# Patient Record
Sex: FEMALE | Race: WHITE | Employment: OTHER | URBAN - METROPOLITAN AREA
[De-identification: names, ages, dates, MRNs, and addresses within clinical notes are randomized per-mention and may not be internally consistent; named-entity substitution may affect disease eponyms.]

---

## 2017-01-06 RX ORDER — OMEPRAZOLE 20 MG/1
CAPSULE, DELAYED RELEASE ORAL
Qty: 90 CAP | Refills: 3 | Status: SHIPPED | OUTPATIENT
Start: 2017-01-06 | End: 2017-10-23 | Stop reason: SDUPTHER

## 2017-01-06 RX ORDER — METOPROLOL SUCCINATE 25 MG/1
TABLET, EXTENDED RELEASE ORAL
Qty: 90 TAB | Refills: 3 | Status: SHIPPED | OUTPATIENT
Start: 2017-01-06 | End: 2017-10-23 | Stop reason: SDUPTHER

## 2017-01-31 ENCOUNTER — CLINICAL SUPPORT (OUTPATIENT)
Dept: INTERNAL MEDICINE CLINIC | Age: 72
End: 2017-01-31

## 2017-01-31 DIAGNOSIS — Z23 ENCOUNTER FOR IMMUNIZATION: Primary | ICD-10-CM

## 2017-01-31 NOTE — PATIENT INSTRUCTIONS
Vaccine Information Statement     Pneumococcal Conjugate Vaccine (PCV13): What You Need to Know    Many Vaccine Information Statements are available in Hebrew and other languages. See www.immunize.org/vis. Hojas de información Sobre Vacunas están disponibles en español y en muchos otros idiomas. Visite www.immunize.org/vis. 1. Why get vaccinated? Vaccination can protect both children and adults from pneumococcal disease. Pneumococcal disease is caused by bacteria that can spread from person to person through close contact. It can cause ear infections, and it can also lead to more serious infections of the:   Lungs (pneumonia),   Blood (bacteremia), and   Covering of the brain and spinal cord (meningitis). Pneumococcal pneumonia is most common among adults. Pneumococcal meningitis can cause deafness and brain damage, and it kills about 1 child in 10 who get it. Anyone can get pneumococcal disease, but children under 3years of age and adults 72 years and older, people with certain medical conditions, and cigarette smokers are at the highest risk. Before there was a vaccine, the [facility] saw:   more than 700 cases of meningitis,   about 13,000 blood infections,   about 5 million ear infections, and   about 200 deaths  in children under 5 each year from pneumococcal disease. Since vaccine became available, severe pneumococcal disease in these children has fallen by 88%. About 18,000 older adults die of pneumococcal disease each year in the [place]. Treatment of pneumococcal infections with penicillin and other drugs is not as effective as it used to be, because some strains of the disease have become resistant to these drugs. This makes prevention of the disease, through vaccination, even more important. 2. PCV13 vaccine    Pneumococcal conjugate vaccine (called PCV13) protects against 13 types of pneumococcal bacteria.       PCV13 is routinely given to children at 2, 4, 6, and 1515 months of age. It is also recommended for children and adults 3to 59years of age with certain health conditions, and for all adults 72years of age and older. Your doctor can give you details. 3. Some people should not get this vaccine    Anyone who has ever had a life-threatening allergic reaction to a dose of this vaccine, to an earlier pneumococcal vaccine called PCV7, or to any vaccine containing diphtheria toxoid (for example, DTaP), should not get PCV13. Anyone with a severe allergy to any component of PCV13 should not get the vaccine. Tell your doctor if the person being vaccinated has any severe allergies. If the person scheduled for vaccination is not feeling well, your healthcare provider might decide to reschedule the shot on another day. 4. Risks of a vaccine reaction    With any medicine, including vaccines, there is a chance of reactions. These are usually mild and go away on their own, but serious reactions are also possible. Problems reported following PCV13 varied by age and dose in the series. The most common problems reported among children were:    About half became drowsy after the shot, had a temporary loss of appetite, or had redness or tenderness where the shot was given.  About 1 out of 3 had swelling where the shot was given.  About 1 out of 3 had a mild fever, and about 1 in 20 had a fever over 102.2°F.   Up to about 8 out of 10 became fussy or irritable. Adults have reported pain, redness, and swelling where the shot was given; also mild fever, fatigue, headache, chills, or muscle pain. [employer] children who get PCV13 along with inactivated flu vaccine at the same time may be at increased risk for seizures caused by fever. Ask your doctor for more information. Problems that could happen after any vaccine:     People sometimes faint after a medical procedure, including vaccination.  Sitting or lying down for about 15 minutes can help prevent fainting, and injuries caused by a fall. Tell your doctor if you feel dizzy, or have vision changes or ringing in the ears.  Some older children and adults get severe pain in the shoulder and have difficulty moving the arm where a shot was given. This happens very rarely.  Any medication can cause a severe allergic reaction. Such reactions from a vaccine are very rare, estimated at about 1 in a million doses, and would happen within a few minutes to a few hours after the vaccination. As with any medicine, there is a very small chance of a vaccine causing a serious injury or death. The safety of vaccines is always being monitored. For more information, visit: www.cdc.gov/vaccinesafety/     5. What if there is a serious reaction? What should I look for?  Look for anything that concerns you, such as signs of a severe allergic reaction, very high fever, or unusual behavior. Signs of a severe allergic reaction can include hives, swelling of the face and throat, difficulty breathing, a fast heartbeat, dizziness, and weakness - usually within a few minutes to a few hours after the vaccination. What should I do?  If you think it is a severe allergic reaction or other emergency that cant wait, call 9-1-1 or get the person to the nearest hospital. Otherwise, call your doctor. Reactions should be reported to the Vaccine Adverse Event Reporting System (VAERS). Your doctor should file this report, or you can do it yourself through the VAERS web site at www.vaers. hhs.gov, or by calling 5-154.727.7606. VAERS does not give medical advice. 6. The National Vaccine Injury Compensation Program    The [facility] Vaccine Injury Compensation Program (VICP) is a federal program that was created to compensate people who may have been injured by certain vaccines.     Persons who believe they may have been injured by a vaccine can learn about the program and about filing a claim by calling 1-806.302.5250 or visiting the [facility] website at www.[email or web address]/vaccinecompensation. There is a time limit to file a claim for compensation. 7. How can I learn more?  Ask your healthcare provider. He or she can give you the vaccine package insert or suggest other sources of information.  Call your local or state health department.  Contact the Centers for Disease Control and Prevention (CDC):  - Call 0-623.257.4990 (8-095-NJQ-INFO) or  - Visit CDCs website at www.cdc.gov/vaccines    Vaccine Information Statement   PCV13 Vaccine   11/5/2015   42 PRESTON No 124KJ-08    Department of Health and Human Services  Centers for Disease Control and Prevention    Office Use Only

## 2017-01-31 NOTE — PROGRESS NOTES
After obtaining consent, and per orders of Dr Kristina Jackson, injection of Prevnar-13 given by Randal Evans LPN in Left deltoid without complaint.

## 2017-03-08 RX ORDER — PRAMIPEXOLE DIHYDROCHLORIDE 0.5 MG/1
TABLET ORAL
Qty: 45 TAB | Refills: 3 | Status: SHIPPED | OUTPATIENT
Start: 2017-03-08 | End: 2017-12-04 | Stop reason: SDUPTHER

## 2017-07-21 RX ORDER — ALENDRONATE SODIUM 70 MG/1
TABLET ORAL
Qty: 12 TAB | Refills: 3 | Status: SHIPPED | OUTPATIENT
Start: 2017-07-21 | End: 2018-04-18 | Stop reason: SDUPTHER

## 2017-08-04 ENCOUNTER — TELEPHONE (OUTPATIENT)
Dept: INTERNAL MEDICINE CLINIC | Age: 72
End: 2017-08-04

## 2017-08-04 DIAGNOSIS — E55.9 VITAMIN D DEFICIENCY: ICD-10-CM

## 2017-08-04 DIAGNOSIS — E78.5 HYPERLIPIDEMIA, UNSPECIFIED HYPERLIPIDEMIA TYPE: Primary | ICD-10-CM

## 2017-08-04 NOTE — TELEPHONE ENCOUNTER
Pt has appt on Novemeber 6, 2017 with Dr Richie Lopez and she wants to know does she need to do fasting lab work the week prior? Pt can be reached at [phone].

## 2017-08-07 ENCOUNTER — HOSPITAL ENCOUNTER (OUTPATIENT)
Dept: MAMMOGRAPHY | Age: 72
Discharge: HOME | End: 2017-08-07
Attending: INTERNAL MEDICINE

## 2017-08-07 DIAGNOSIS — Z12.31 VISIT FOR SCREENING MAMMOGRAM: ICD-10-CM

## 2017-10-06 ENCOUNTER — PATIENT OUTREACH (OUTPATIENT)
Dept: INTERNAL MEDICINE CLINIC | Age: 72
End: 2017-10-06

## 2017-10-06 NOTE — PROGRESS NOTES
Hospital Discharge Follow-Up      Date/Time:  10/6/2017 3:14 PM    Patient listed on discharge [facility]) report on 10/5/17. RRAT score: Low Risk            4       Total Score        4 Charlson Comorbidity Score (Age + Comorbid Conditions)        Criteria that do not apply:    Has Seen PCP in Last 6 Months (Yes=3, No=0)    . Living with Significant Other. Assisted Living. LTAC. SNF. or   Rehab    Patient Length of Stay (>5 days = 3)    IP Visits Last 12 Months (1-3=4, 4=9, >4=11)    Pt. Coverage (Medicare=5 , Medicaid, or Self-Pay=4)        Brief hospitalization history:  Patient was admitted to [facility] on 10/1/17 and discharged on 10/5/17 for sepsis due to pyelonephritis. Patient woke up in the middle of the night with heartburn and vomiting and started having pain under her left rib. On admission, patient had leukocytosis, WBC 17.12, and elevated lactic acid. ABD CT showed high-grade obstruction left ureteral stone. Urology consulted and had ureteral stent placed. Blood and urine cultures positive for Klebsiella Pneumoniae. IV Levaquin started. Patient discharged with 2 weeks of IV Levaquin and [facility] home health. Nurse Navigator(NN) contacted the patient by telephone to perform post hospital discharge assessment. Verified  and address with patient as identifiers. Provided introduction to self, and explanation of the Nurses Navigator role. The patient states that she is doing good. She is not having any pain and never really had any. She says that she has a Midline access for IV antibiotics, but when the home health nurse came out, she said that the Midline will not handle Levaquin. The home health nurse is trying to get in contact with the ordering physician to see what he would like to do. The patient states that she drinks water and a lot of coke. NN discussed not drinking any coke right now and to drink plenty of water and the patient verbalizes understanding. She is fever free and denies nausea or vomiting at this time. Patient given an opportunity to ask questions and they have no further questions or concerns at this time. No other clinical/social/functional needs noted. The patient agrees to contact the PCP office for questions related to their healthcare. NN provided contact information to the patient for future reference. The patient expressed thanks, offered no additional questions and ended the call. Diet:   Patient reports: Regular Diet    Activity:    Patient reports: very active, drives, lives alone, and does need any assistance with ADL's. Medication:   Performed medication reconciliation with patient, and patient verbalizes understanding of administration of home medications. There were no barriers to obtaining medications identified at this time. Support system:  patient and daughter    Discharge Instructions :  Reviewed discharge instructions with patient. Patient verbalizes understanding of discharge instructions and follow-up care. Red Flags:  Signs of infection, back or abdominal pain, vomiting  Reviewed red flags with patient, and patient verbalizes understanding. PCP/Specialist follow up: Patient scheduled to follow up with Dr. Sonido Mathew on 10/17/17.

## 2017-10-10 ENCOUNTER — PATIENT OUTREACH (OUTPATIENT)
Dept: INTERNAL MEDICINE CLINIC | Age: 72
End: 2017-10-10

## 2017-10-10 NOTE — PROGRESS NOTES
NN spoke to patient to follow up on antibiotic therapy. The patient states that she went back to the ED on Saturday for dizziness and lightheadedness when she stood up and she had a headache. She states that she has not received her IV Levaquin yet, but was given PO Levaquin Saturday and is currently taking the PO. The patient is concerned because she has been unable to reach a doctor regarding the IV antibiotics. She states that [facility] is refusing to give IV Levaquin through a Midline. She did call radiology and spoke to a  about getting the Midline changed to a PICC line, but has not heard anything back from them. She also states that her bandage needs to be changed because it is starting to smell and there is dried blood underneath. The patient denies having any abdominal or back pain and says that her urine is clear yellow. She denies any pain with urination and denies having a fever. DONNIE spoke with [facility] radiology department to schedule an appointment for the PICC line to be placed and was transferred to the  who is working on this case. Unable to speak with the , Cuca Castro, so left her a voicemail. DONNIE spoke with Jonathan at [facility] and explained the situation. [facility] is going to have the [facility] nurse call the NN. The radiology department called DONNIE stating that the patient needs an order for the PICC line in order to change the Midline out. Cuca Castro from [facility] called DONNIE stating that the hospitalist wrote the order for the Midline so since the patient has been discharged, the PCP will need to write the order. Cuca Castro also states that [facility] will need to be notified to send the Levaquin once the PICC line is placed. The patient called DONNIE and left a message stating that she has an appointment at 1:00 PM to change out the line.     DONNIE spoke to [address] [place] [address] with radiology and she states that they still need an order before they change out the line. Will fax order once Dr. Carmen Haque gives order for PICC line. NN spoke to Dr. Carmen Haque and explained patient's situation. Dr. Carmen Haque advised that PO Levaquin should be as effective and will not give orders for PICC line. Dr. Carmen Haque also advised that this needs to be handled with the urologist or hospitalist.    NN spoke to [address] [place] [address] with [facility] Radiology Dept to make them aware that there will not be an order for a PICC line via Dr. Carmen Haque. [address] [place] [address] states that they are cancelling the procedure since there is not an order. NN spoke to Maryam Rodriguez  with [facility] Radiology Dept who states that [facility] had the order for the PICC to be placed. Radiology has already cancelled the procedure. Rajat Navas states that she is going to have Quang Herndon with [facility]. NN spoke to [facility] with [facility]iology Dept and explained the situation. [facility] says that they have received an order for a PICC line from Quang Herndon with [facility]. [facility] states that they are going to go ahead and switch out the Midline with the PICC line.

## 2017-10-11 ENCOUNTER — PATIENT OUTREACH (OUTPATIENT)
Dept: INTERNAL MEDICINE CLINIC | Age: 72
End: 2017-10-11

## 2017-10-11 NOTE — PROGRESS NOTES
Patient called NN to make her aware that [facility] was not able to come out yesterday evening. She did have the PICC placed.  is supposed to be coming out this afternoon. The patient states that [facility] is only coming out once a week. NN explained that the [facility] nurse will show her how to give the Levaquin IV. The patient is concerned about doing it herself. NN told patient that if she has any issues she can come to the office and NN will work with patient on how to give herself the [address] The patient does not have any further questions or concerns at this time, but has the NN contact information for any future needs that arise.

## 2017-10-12 ENCOUNTER — DOCUMENTATION ONLY (OUTPATIENT)
Dept: INTERNAL MEDICINE CLINIC | Age: 72
End: 2017-10-12

## 2017-10-12 NOTE — PROGRESS NOTES
Pt walked into office, requesting PICC line care. States Queenie was at her home this morning and she feels the nurse did not know what she was doing. Advise pt we do not do PICC line care here. Called [facility] on behalf of the pt and spoke with Ronaldo. Ronaldo states her nurse was there this am for PICC care for pt and she had not heard of any problem. States she will call the pt to clarify her concerns. Advise pt that I spoke with Ronaldo and I also gave pt Yeimi's contact #.

## 2017-10-19 ENCOUNTER — TELEPHONE (OUTPATIENT)
Dept: INTERNAL MEDICINE CLINIC | Age: 72
End: 2017-10-19

## 2017-10-19 NOTE — TELEPHONE ENCOUNTER
Pt would like a call for her x-ray results from [facility] in [place], [place]. Pt can be reached at [phone].

## 2017-10-19 NOTE — TELEPHONE ENCOUNTER
Called pt to let her know we do not have access to [facility] records. Pt states the ordering physician for the x-rays was Dr Violetta Rinne. Advise pt to call Dr Lanetta Rinne office.

## 2017-10-23 ENCOUNTER — TELEPHONE (OUTPATIENT)
Dept: INTERNAL MEDICINE CLINIC | Age: 72
End: 2017-10-23

## 2017-10-23 RX ORDER — OMEPRAZOLE 20 MG/1
CAPSULE, DELAYED RELEASE ORAL
Qty: 90 CAP | Refills: 3 | Status: SHIPPED | OUTPATIENT
Start: 2017-10-23 | End: 2019-02-19 | Stop reason: SDUPTHER

## 2017-10-23 RX ORDER — METOPROLOL SUCCINATE 25 MG/1
TABLET, EXTENDED RELEASE ORAL
Qty: 90 TAB | Refills: 3 | Status: SHIPPED | OUTPATIENT
Start: 2017-10-23 | End: 2019-02-19 | Stop reason: SDUPTHER

## 2017-10-23 NOTE — TELEPHONE ENCOUNTER
Crystal Morin [phone] called from [facility] for labs and urine on patient. She is requesting cbc bmp and urine. Per dr rogers cbc and bmp are approved. Left detailed message for melissa with order from dr rogers.

## 2017-10-24 ENCOUNTER — TELEPHONE (OUTPATIENT)
Dept: INTERNAL MEDICINE CLINIC | Age: 72
End: 2017-10-24

## 2017-10-24 NOTE — TELEPHONE ENCOUNTER
Per John:    [facility] called w/STAT labs for pt; states they may be reached at [phone]

## 2017-10-25 NOTE — TELEPHONE ENCOUNTER
Called the lab at the below # and they were unable to locate this patient and provide lab results. I will check the fax for STAT lab faxed results.

## 2017-10-30 ENCOUNTER — APPOINTMENT (OUTPATIENT)
Dept: INTERNAL MEDICINE CLINIC | Age: 72
End: 2017-10-30

## 2017-10-30 ENCOUNTER — HOSPITAL ENCOUNTER (OUTPATIENT)
Dept: LAB | Age: 72
Discharge: HOME | End: 2017-10-30
Payer: MEDICARE

## 2017-10-30 DIAGNOSIS — I10 ESSENTIAL HYPERTENSION: Primary | ICD-10-CM

## 2017-10-30 DIAGNOSIS — E55.9 VITAMIN D DEFICIENCY: ICD-10-CM

## 2017-10-30 DIAGNOSIS — E78.5 HYPERLIPIDEMIA, UNSPECIFIED HYPERLIPIDEMIA TYPE: ICD-10-CM

## 2017-10-30 PROCEDURE — 80061 LIPID PANEL: CPT

## 2017-10-30 PROCEDURE — 82306 VITAMIN D 25 HYDROXY: CPT

## 2017-10-30 PROCEDURE — 36415 COLL VENOUS BLD VENIPUNCTURE: CPT

## 2017-10-30 PROCEDURE — 80053 COMPREHEN METABOLIC PANEL: CPT

## 2017-10-31 LAB
25(OH)D3+25(OH)D2 SERPL-MCNC: 24.6 NG/ML (ref 30–100)
ALBUMIN SERPL-MCNC: 3.8 G/DL (ref 3.5–4.8)
ALBUMIN/GLOB SERPL: 1.4 {RATIO} (ref 1.2–2.2)
ALP SERPL-CCNC: 80 IU/L (ref 39–117)
ALT SERPL-CCNC: 19 IU/L (ref 0–32)
AST SERPL-CCNC: 14 IU/L (ref 0–40)
BILIRUB SERPL-MCNC: <0.2 MG/DL (ref 0–1.2)
BUN SERPL-MCNC: 14 MG/DL (ref 8–27)
BUN/CREAT SERPL: 12 (ref 12–28)
CALCIUM SERPL-MCNC: 8.7 MG/DL (ref 8.7–10.3)
CHLORIDE SERPL-SCNC: 106 MMOL/L (ref 96–106)
CHOLEST SERPL-MCNC: 177 MG/DL (ref 100–199)
CO2 SERPL-SCNC: 24 MMOL/L (ref 18–29)
CREAT SERPL-MCNC: 1.14 MG/DL (ref 0.57–1)
GFR SERPLBLD CREATININE-BSD FMLA CKD-EPI: 48 ML/MIN/1.73
GFR SERPLBLD CREATININE-BSD FMLA CKD-EPI: 56 ML/MIN/1.73
GLOBULIN SER CALC-MCNC: 2.8 G/DL (ref 1.5–4.5)
GLUCOSE SERPL-MCNC: 99 MG/DL (ref 65–99)
HDLC SERPL-MCNC: 33 MG/DL
INTERPRETATION, 910389: NORMAL
INTERPRETATION: NORMAL
LDLC SERPL CALC-MCNC: 105 MG/DL (ref 0–99)
PDF IMAGE, 910387: NORMAL
POTASSIUM SERPL-SCNC: 5.1 MMOL/L (ref 3.5–5.2)
PROT SERPL-MCNC: 6.6 G/DL (ref 6–8.5)
SODIUM SERPL-SCNC: 142 MMOL/L (ref 134–144)
TRIGL SERPL-MCNC: 196 MG/DL (ref 0–149)
VLDLC SERPL CALC-MCNC: 39 MG/DL (ref 5–40)

## 2017-11-06 ENCOUNTER — HOSPITAL ENCOUNTER (OUTPATIENT)
Dept: LAB | Age: 72
Discharge: HOME | End: 2017-11-06
Payer: MEDICARE

## 2017-11-06 ENCOUNTER — OFFICE VISIT (OUTPATIENT)
Dept: INTERNAL MEDICINE CLINIC | Age: 72
End: 2017-11-06

## 2017-11-06 ENCOUNTER — PATIENT OUTREACH (OUTPATIENT)
Dept: INTERNAL MEDICINE CLINIC | Age: 72
End: 2017-11-06

## 2017-11-06 VITALS
TEMPERATURE: 97.9 F | BODY MASS INDEX: 29.57 KG/M2 | HEIGHT: 60 IN | SYSTOLIC BLOOD PRESSURE: 138 MMHG | WEIGHT: 150.6 LBS | DIASTOLIC BLOOD PRESSURE: 80 MMHG | OXYGEN SATURATION: 96 % | HEART RATE: 80 BPM | RESPIRATION RATE: 16 BRPM

## 2017-11-06 DIAGNOSIS — N20.0 KIDNEY STONE ON LEFT SIDE: ICD-10-CM

## 2017-11-06 DIAGNOSIS — I10 ESSENTIAL HYPERTENSION: ICD-10-CM

## 2017-11-06 DIAGNOSIS — M54.50 ACUTE MIDLINE LOW BACK PAIN WITHOUT SCIATICA: ICD-10-CM

## 2017-11-06 DIAGNOSIS — M54.50 ACUTE BILATERAL LOW BACK PAIN WITHOUT SCIATICA: ICD-10-CM

## 2017-11-06 DIAGNOSIS — E78.5 HYPERLIPIDEMIA, UNSPECIFIED HYPERLIPIDEMIA TYPE: ICD-10-CM

## 2017-11-06 DIAGNOSIS — N12 PYELONEPHRITIS: Primary | ICD-10-CM

## 2017-11-06 DIAGNOSIS — Z23 ENCOUNTER FOR IMMUNIZATION: ICD-10-CM

## 2017-11-06 LAB
BILIRUB UR QL STRIP: NEGATIVE
GLUCOSE UR-MCNC: NEGATIVE MG/DL
KETONES P FAST UR STRIP-MCNC: NEGATIVE MG/DL
PH UR STRIP: 5.5 [PH] (ref 4.6–8)
PROT UR QL STRIP: NORMAL
SP GR UR STRIP: 1.01 (ref 1–1.03)
UA UROBILINOGEN AMB POC: NORMAL (ref 0.2–1)
URINALYSIS CLARITY POC: CLEAR
URINALYSIS COLOR POC: YELLOW
URINE BLOOD POC: NORMAL
URINE LEUKOCYTES POC: NORMAL
URINE NITRITES POC: NEGATIVE

## 2017-11-06 PROCEDURE — 87086 URINE CULTURE/COLONY COUNT: CPT

## 2017-11-06 NOTE — PROGRESS NOTES
HPI  Ms. Romaine Skinner is a 67y.o. year old female, she is seen today for follow up HTN, high cholesterol. Also hospital follow up. Admitted to [address] for pyelonephritis. Hospital records reviewed. Treated with IV levofloxacin outpatient via PICC line - since removed. Also has left renal stent. Has been feeling tired since hospitalization. More tired as day goes on, napping but feels worse when gets up. No f/c, no n/v/abd pain. No sob or chest pain. Back has been hurting for past couple of years, but now different kind of pain for the last 2 weeks. Pain is across low back, doesn't radiate. Worse with bending over, sharp pain at times. Walking longer than 15 min will cause worsening back pain. Had upper arm and knee pain while on levofloxacin - no pain in knees now and improving in shoulders but still present. Was told it could be side effect of levofloxacin. Some urinary pressure and burning for over a week. Prior to admission had abdominal pain, n/v sudden onset without urinary symptoms. Still has left renal stent. No pain in that area. Follows up with urology on 12/6/17. Chief Complaint   Patient presents with   [facility] Follow Up     Room 1// PICC line was removed 10/24    Shoulder Pain     bilat shoulder pain x weeks     LOW BACK PAIN     bilat low back pain         Prior to Admission medications    Medication Sig Start Date End Date Taking?  Authorizing Provider   metoprolol succinate (TOPROL-XL) 25 mg XL tablet TAKE ONE TABLET BY MOUTH NIGHTLY 10/23/17  Yes Karin Lowe MD   omeprazole (PRILOSEC) 20 mg capsule TAKE 1 CAPSULE EVERY DAY 10/23/17  Yes Karin Lowe MD   alendronate (FOSAMAX) 70 mg tablet TAKE 1 TABLET EVERY SUNDAY 7/21/17  Yes Karin Lowe MD   pramipexole (MIRAPEX) 0.5 mg tablet TAKE 1/2 TABLET EVERY NIGHT 3/8/17  Yes Karin Lowe MD   atorvastatin (LIPITOR) 40 mg tablet TAKE 1 TABLET EVERY DAY 12/13/16  Yes Bettie Castaneda MD Danny   Aspirin, Buffered 81 mg tab Take 81 mg by mouth daily. Yes Historical Provider   acetaminophen (TYLENOL) 325 mg tablet Take  by mouth every four (4) hours as needed for Pain. Yes Historical Provider         Allergies   Allergen Reactions    Fosamax [Alendronate] Myalgia         REVIEW OF SYSTEMS:  Per HPI    PHYSICAL EXAM:  Visit Vitals    /80    Pulse 80    Temp 97.9 °F (36.6 °C) (Oral)    Resp 16    Ht 5' (1.524 m)    Wt 150 lb 9.6 oz (68.3 kg)    LMP 10/26/1995    SpO2 96%    BMI 29.41 kg/m2     Constitutional: Appears well-developed and well-nourished. No distress. HENT:   Head: Normocephalic and atraumatic. Eyes: No scleral icterus. Neck: no lad, no tm, supple   Cardiovascular: Normal S1/S2, regular rhythm. No murmurs, rubs, or gallops. Pulmonary/Chest: Effort normal and breath sounds normal. No respiratory distress. No wheezes, rhonchi, or rales. Abdomen: Soft, NT/ND, +BS, no rebound or guarding, no masses, no HSM appreciated. Back: mild tenderness paraspinal muscles lumbar region b/l, no CVA tenderness  Ext: No edema. Neurological: Alert. SLR negative b/l  Psychiatric: Normal mood and affect. Behavior is normal.     Lab Results   Component Value Date/Time    Sodium 142 10/30/2017 08:22 AM    Potassium 5.1 10/30/2017 08:22 AM    Chloride 106 10/30/2017 08:22 AM    CO2 24 10/30/2017 08:22 AM    Anion gap 12 07/16/2010 10:07 AM    Glucose 99 10/30/2017 08:22 AM    Glucose 98 08/05/2010 08:34 AM    BUN 14 10/30/2017 08:22 AM    Creatinine 1.14 10/30/2017 08:22 AM    BUN/Creatinine ratio 12 10/30/2017 08:22 AM    GFR est AA 56 10/30/2017 08:22 AM    GFR est non-AA 48 10/30/2017 08:22 AM    Calcium 8.7 10/30/2017 08:22 AM    Bilirubin, total <0.2 10/30/2017 08:22 AM    AST (SGOT) 14 10/30/2017 08:22 AM    Alk.  phosphatase 80 10/30/2017 08:22 AM    Protein, total 6.6 10/30/2017 08:22 AM    Albumin 3.8 10/30/2017 08:22 AM    Globulin 3.1 07/16/2010 10:07 AM    A-G Ratio 1.4 10/30/2017 08:22 AM    ALT (SGPT) 19 10/30/2017 08:22 AM     Lab Results   Component Value Date/Time    Hemoglobin A1c 5.7 08/05/2010 08:34 AM      Lab Results   Component Value Date/Time    Cholesterol, total 177 10/30/2017 08:22 AM    HDL Cholesterol 33 10/30/2017 08:22 AM    LDL, calculated 105 10/30/2017 08:22 AM    VLDL, calculated 39 10/30/2017 08:22 AM    Triglyceride 196 10/30/2017 08:22 AM          ASSESSMENT/PLAN  Diagnoses and all orders for this visit:    1. Pyelonephritis  -     CULTURE, URINE  Recent hospitalization - check culture  2. Acute bilateral low back pain without sciatica  -     AMB POC URINALYSIS DIP STICK AUTO W/O MICRO    3. Encounter for immunization  -     Administration fee () for Medicare insured patients  -     Administration fee () for Medicare insured patients  -     Influenza virus vaccine ([address]) 72 years and older (32636)  -     Administration fee () for Medicare insured patients    4. Kidney stone on left side  To follow up with urology - still has stent  5. Essential hypertension  Controlled on current regimen, continue   6. Hyperlipidemia, unspecified hyperlipidemia type  triglycerides little high - otherwise okay  7. Acute midline low back pain without sciatica  Likely msk, try heat  8. BMI 29.0-29.9,adult  Acceptable for age    Health Maintenance Due   Topic Date Due    GLAUCOMA SCREENING Q2Y  05/27/2017        Follow-up Disposition:  Return in about 2 months (around 1/6/2018) for renal labs, bp. Reviewed plan of care. Patient has provided input and agrees with goals. The nurse provided the patient and/or family with advanced directive information if needed and encouraged the patient to provide a copy to the office when available.

## 2017-11-06 NOTE — MR AVS SNAPSHOT
Visit Information Date & Time Provider Department Dept. Phone Encounter #  
 11/6/2017  2:00 PM Radha Lyon MD [address] [place] [address] [phone] [id number] Follow-up Instructions Return in about 2 months (around 1/6/2018) for renal labs, bp. Upcoming Health Maintenance Date Due  
 GLAUCOMA SCREENING Q2Y 5/27/2017 INFLUENZA AGE 9 TO ADULT 8/1/2017 MEDICARE YEARLY EXAM 12/30/2017 BREAST CANCER SCRN MAMMOGRAM 8/8/2018 COLONOSCOPY 9/4/2020 DTaP/Tdap/Td series (2 - Td) 12/19/2024 Allergies as of 11/6/2017  Review Complete On: 11/6/2017 By: Radha Lyon MD  
  
 Severity Noted Reaction Type Reactions Fosamax [Alendronate]  08/31/2010    Myalgia Current Immunizations  Reviewed on 1/31/2017 Name Date Influenza High Dose Vaccine PF 11/3/2016, 9/14/2015 Influenza Vaccine 10/5/2013 Influenza Vaccine (Quad) PF  Incomplete Influenza Vaccine Split 10/26/2010 Pneumococcal Conjugate (PCV-13) 1/31/2017 Tdap 12/19/2014 ZZZ-RETIRED (DO NOT USE) Pneumococcal Vaccine (Unspecified Type) 10/26/2010, 10/26/2003 Zoster Vaccine, Live 1/23/2015 Not reviewed this visit You Were Diagnosed With   
  
 Codes Comments Pyelonephritis    -  Primary ICD-10-CM: N12 
ICD-9-CM: 590.80 Acute bilateral low back pain without sciatica     ICD-10-CM: M54.5 ICD-9-CM: 724.2, 338.19 Encounter for immunization     ICD-10-CM: V97 ICD-9-CM: V03.89 Kidney stone on left side     ICD-10-CM: N20.0 ICD-9-CM: 592.0 Essential hypertension     ICD-10-CM: I10 
ICD-9-CM: 401.9 Hyperlipidemia, unspecified hyperlipidemia type     ICD-10-CM: E78.5 ICD-9-CM: 272.4 Acute midline low back pain without sciatica     ICD-10-CM: M54.5 ICD-9-CM: 724.2 BMI 29.0-29.9,adult     ICD-10-CM: M09.35 ICD-9-CM: V85.25 Vitals BP Pulse Temp Resp Height(growth percentile) Weight(growth percentile) 138/80 80 97.9 °F (36.6 °C) (Oral) 16 5' (1.524 m) 150 lb 9.6 oz (68.3 kg) LMP SpO2 BMI OB Status Smoking Status 10/26/1995 96% 29.41 kg/m2 Postmenopausal Never Smoker Vitals History BMI and BSA Data Body Mass Index Body Surface Area  
 29.41 kg/m 2 1.7 m 2 Preferred Pharmacy Pharmacy Name Phone [facility]  [address] [phone] Your Updated Medication List  
  
   
This list is accurate as of: 11/6/17  2:52 PM.  Always use your most recent med list.  
  
  
  
  
 alendronate 70 mg tablet Commonly known as:  FOSAMAX TAKE 1 TABLET EVERY SUNDAY  
  
 aspirin, buffered 81 mg Tab Take 81 mg by mouth daily. atorvastatin 40 mg tablet Commonly known as:  LIPITOR  
TAKE 1 TABLET EVERY DAY  
  
 metoprolol succinate 25 mg XL tablet Commonly known as:  TOPROL-XL  
TAKE ONE TABLET BY MOUTH NIGHTLY  
  
 omeprazole 20 mg capsule Commonly known as:  PRILOSEC  
TAKE 1 CAPSULE EVERY DAY  
  
 pramipexole 0.5 mg tablet Commonly known as:  MIRAPEX TAKE 1/2 TABLET EVERY NIGHT  
  
 TYLENOL 325 mg tablet Generic drug:  acetaminophen Take  by mouth every four (4) hours as needed for Pain. We Performed the Following ADMIN INFLUENZA VIRUS VAC [ [facility]] ADMIN INFLUENZA VIRUS VAC [ [facility]] AMB POC URINALYSIS DIP STICK AUTO W/O MICRO [10143 CPT(R)] CULTURE, URINE [id number] CPT(R)] INFLUENZA VIRUS VAC QUAD,SPLIT,PRESV FREE SYRINGE IM [id number] CPT(R)] Follow-up Instructions Return in about 2 months (around 1/6/2018) for renal labs, bp. Patient Instructions Take vit D 1000IU daily for low levels. Vaccine Information Statement Influenza (Flu) Vaccine (Inactivated or Recombinant): What you need to know Many Vaccine Information Statements are available in Welsh and other languages. See www.immunize.org/vis Hojas de Información Sobre Vacunas están disponibles en Español y en muchos otros idiomas. Visite www.immunize.org/vis 1. Why get vaccinated? Influenza (flu) is a contagious disease that spreads around the [place] every year, usually between October and May. Flu is caused by influenza viruses, and is spread mainly by coughing, sneezing, and close contact. Anyone can get flu. Flu strikes suddenly and can last several days. Symptoms vary by age, but can include: 
 fever/chills  sore throat  muscle aches  fatigue  cough  headache  runny or stuffy nose Flu can also lead to pneumonia and blood infections, and cause diarrhea and seizures in children. If you have a medical condition, such as heart or lung disease, flu can make it worse. Flu is more dangerous for some people. Infants and young children, people 72years of age and older, pregnant women, and people with certain health conditions or a weakened immune system are at greatest risk. Each year thousands of people in the [facility] die from flu, and many more are hospitalized. Flu vaccine can: 
 keep you from getting flu, 
 make flu less severe if you do get it, and 
 keep you from spreading flu to your family and other people. 2. Inactivated and recombinant flu vaccines A dose of flu vaccine is recommended every flu season. Children 6 months through 6years of age may need two doses during the same flu season. Everyone else needs only one dose each flu season. Some inactivated flu vaccines contain a very small amount of a mercury-based preservative called thimerosal. Studies have not shown thimerosal in vaccines to be harmful, but flu vaccines that do not contain thimerosal are available. There is no live flu virus in flu shots. They cannot cause the flu. There are many flu viruses, and they are always changing.  Each year a new flu vaccine is made to protect against three or four viruses that are likely to cause disease in the upcoming flu season. But even when the vaccine doesnt exactly match these viruses, it may still provide some protection Flu vaccine cannot prevent: 
 flu that is caused by a virus not covered by the vaccine, or 
 illnesses that look like flu but are not. It takes about 2 weeks for protection to develop after vaccination, and protection lasts through the flu season. 3. Some people should not get this vaccine Tell the person who is giving you the vaccine:  If you have any severe, life-threatening allergies. If you ever had a life-threatening allergic reaction after a dose of flu vaccine, or have a severe allergy to any part of this vaccine, you may be advised not to get vaccinated. Most, but not all, types of flu vaccine contain a small amount of egg protein.  If you ever had Guillain-Barré Syndrome (also called GBS). Some people with a history of GBS should not get this vaccine. This should be discussed with your doctor.  If you are not feeling well. It is usually okay to get flu vaccine when you have a mild illness, but you might be asked to come back when you feel better. 4. Risks of a vaccine reaction With any medicine, including vaccines, there is a chance of reactions. These are usually mild and go away on their own, but serious reactions are also possible. Most people who get a flu shot do not have any problems with it. Minor problems following a flu shot include:  
 soreness, redness, or swelling where the shot was given  hoarseness  sore, red or itchy eyes  cough  fever  aches  headache  itching  fatigue If these problems occur, they usually begin soon after the shot and last 1 or 2 days. More serious problems following a flu shot can include the following:  There may be a small increased risk of Guillain-Barré Syndrome (GBS) after inactivated flu vaccine. This risk has been estimated at 1 or 2 additional cases per million people vaccinated. This is much lower than the risk of severe complications from flu, which can be prevented by flu vaccine.  Young children who get the flu shot along with pneumococcal vaccine (PCV13) and/or DTaP vaccine at the same time might be slightly more likely to have a seizure caused by fever. Ask your doctor for more information. Tell your doctor if a child who is getting flu vaccine has ever had a seizure. Problems that could happen after any injected vaccine:  People sometimes faint after a medical procedure, including vaccination. Sitting or lying down for about 15 minutes can help prevent fainting, and injuries caused by a fall. Tell your doctor if you feel dizzy, or have vision changes or ringing in the ears.  Some people get severe pain in the shoulder and have difficulty moving the arm where a shot was given. This happens very rarely.  Any medication can cause a severe allergic reaction. Such reactions from a vaccine are very rare, estimated at about 1 in a million doses, and would happen within a few minutes to a few hours after the vaccination. As with any medicine, there is a very remote chance of a vaccine causing a serious injury or death. The safety of vaccines is always being monitored. For more information, visit: www.cdc.gov/vaccinesafety/ 
 
5. What if there is a serious reaction? What should I look for?  Look for anything that concerns you, such as signs of a severe allergic reaction, very high fever, or unusual behavior. Signs of a severe allergic reaction can include hives, swelling of the face and throat, difficulty breathing, a fast heartbeat, dizziness, and weakness  usually within a few minutes to a few hours after the vaccination. What should I do?  
 
 If you think it is a severe allergic reaction or other emergency that cant wait, call 9-1-1 and get the person to the nearest hospital. Otherwise, call your doctor.  Reactions should be reported to the Vaccine Adverse Event Reporting System (VAERS). Your doctor should file this report, or you can do it yourself through  the VAERS web site at www.vaers. [facility].gov, or by calling 3-646.927.5225. VAERS does not give medical advice. 6. The National Vaccine Injury Compensation Program 
 
The [facility] Vaccine Injury Compensation Program (VICP) is a federal program that was created to compensate people who may have been injured by certain vaccines. Persons who believe they may have been injured by a vaccine can learn about the program and about filing a claim by calling 5-287.854.5101 or visiting the [employer] website at www.[facility].gov/vaccinecompensation. There is a time limit to file a claim for compensation. 7. How can I learn more?  Ask your healthcare provider. He or she can give you the vaccine package insert or suggest other sources of information.  Call your local or state health department.  Contact the Centers for Disease Control and Prevention (CDC): 
- Call 9-715.338.7077 (1-800-CDC-INFO) or 
- Visit CDCs website at www.cdc.gov/flu Vaccine Information Statement Inactivated Influenza Vaccine 8/7/2015 
42 PRESTON Verde 931VS-82 [facility] and [employer] Centers for Disease Control and Prevention Office Use Only Vaccine Information Statement Influenza (Flu) Vaccine (Inactivated or Recombinant): What you need to know Many Vaccine Information Statements are available in Danish and other languages. See www.immunize.org/vis Hojas de Información Sobre Vacunas están disponibles en Español y en muchos otros idiomas. Visite www.immunize.org/vis 1. Why get vaccinated? Influenza (flu) is a contagious disease that spreads around the [place] every year, usually between October and May. Flu is caused by influenza viruses, and is spread mainly by coughing, sneezing, and close contact. Anyone can get flu. Flu strikes suddenly and can last several days. Symptoms vary by age, but can include: 
 fever/chills  sore throat  muscle aches  fatigue  cough  headache  runny or stuffy nose Flu can also lead to pneumonia and blood infections, and cause diarrhea and seizures in children. If you have a medical condition, such as heart or lung disease, flu can make it worse. Flu is more dangerous for some people. Infants and young children, people 72years of age and older, pregnant women, and people with certain health conditions or a weakened immune system are at greatest risk. Each year thousands of people in the [facility] die from flu, and many more are hospitalized. Flu vaccine can: 
 keep you from getting flu, 
 make flu less severe if you do get it, and 
 keep you from spreading flu to your family and other people. 2. Inactivated and recombinant flu vaccines A dose of flu vaccine is recommended every flu season. Children 6 months through 6years of age may need two doses during the same flu season. Everyone else needs only one dose each flu season. Some inactivated flu vaccines contain a very small amount of a mercury-based preservative called thimerosal. Studies have not shown thimerosal in vaccines to be harmful, but flu vaccines that do not contain thimerosal are available. There is no live flu virus in flu shots. They cannot cause the flu. There are many flu viruses, and they are always changing. Each year a new flu vaccine is made to protect against three or four viruses that are likely to cause disease in the upcoming flu season. But even when the vaccine doesnt exactly match these viruses, it may still provide some protection Flu vaccine cannot prevent: 
 flu that is caused by a virus not covered by the vaccine, or 
  illnesses that look like flu but are not. It takes about 2 weeks for protection to develop after vaccination, and protection lasts through the flu season. 3. Some people should not get this vaccine Tell the person who is giving you the vaccine:  If you have any severe, life-threatening allergies. If you ever had a life-threatening allergic reaction after a dose of flu vaccine, or have a severe allergy to any part of this vaccine, you may be advised not to get vaccinated. Most, but not all, types of flu vaccine contain a small amount of egg protein.  If you ever had Guillain-Barré Syndrome (also called GBS). Some people with a history of GBS should not get this vaccine. This should be discussed with your doctor.  If you are not feeling well. It is usually okay to get flu vaccine when you have a mild illness, but you might be asked to come back when you feel better. 4. Risks of a vaccine reaction With any medicine, including vaccines, there is a chance of reactions. These are usually mild and go away on their own, but serious reactions are also possible. Most people who get a flu shot do not have any problems with it. Minor problems following a flu shot include:  
 soreness, redness, or swelling where the shot was given  hoarseness  sore, red or itchy eyes  cough  fever  aches  headache  itching  fatigue If these problems occur, they usually begin soon after the shot and last 1 or 2 days. More serious problems following a flu shot can include the following:  There may be a small increased risk of Guillain-Barré Syndrome (GBS) after inactivated flu vaccine. This risk has been estimated at 1 or 2 additional cases per million people vaccinated. This is much lower than the risk of severe complications from flu, which can be prevented by flu vaccine.    
 
 Young children who get the flu shot along with pneumococcal vaccine (PCV13) and/or DTaP vaccine at the same time might be slightly more likely to have a seizure caused by fever. Ask your doctor for more information. Tell your doctor if a child who is getting flu vaccine has ever had a seizure. Problems that could happen after any injected vaccine:  People sometimes faint after a medical procedure, including vaccination. Sitting or lying down for about 15 minutes can help prevent fainting, and injuries caused by a fall. Tell your doctor if you feel dizzy, or have vision changes or ringing in the ears.  Some people get severe pain in the shoulder and have difficulty moving the arm where a shot was given. This happens very rarely.  Any medication can cause a severe allergic reaction. Such reactions from a vaccine are very rare, estimated at about 1 in a million doses, and would happen within a few minutes to a few hours after the vaccination. As with any medicine, there is a very remote chance of a vaccine causing a serious injury or death. The safety of vaccines is always being monitored. For more information, visit: www.cdc.gov/vaccinesafety/ 
 
 
The [facility] Vaccine Injury Compensation Program (VICP) is a federal program that was created to compensate people who may have been injured by certain vaccines. Persons who believe they may have been injured by a vaccine can learn about the program and about filing a claim by calling 1-251.890.5131 or visiting the [employer] website at www.[facility].gov/vaccinecompensation. There is a time limit to file a claim for compensation. 7. How can I learn more?  Ask your healthcare provider. He or she can give you the vaccine package insert or suggest other sources of information.  Call your local or state health department.  Contact the Centers for Disease Control and Prevention (CDC): 
- Call 6-186.624.5305 (1-800-CDC-INFO) or 
- Visit CDCs website at www.cdc.gov/flu Vaccine Information Statement Inactivated Influenza Vaccine 8/7/2015 
42 UN[place]elongo [id number]-08 Department of Health and [employer] Centers for Disease Control and Prevention Office Use Only Back Stretches: Exercises Your Care Instructions Here are some examples of exercises for stretching your back. Start each exercise slowly. Ease off the exercise if you start to have pain. Your doctor or physical therapist will tell you when you can start these exercises and which ones will work best for you. How to do the exercises Overhead stretch 1. Stand comfortably with your feet shoulder-width apart. 2. Looking straight ahead, raise both arms over your head and reach toward the ceiling. Do not allow your head to tilt back. 3. Hold for 15 to 30 seconds, then lower your arms to your sides. 4. Repeat 2 to 4 times. Side stretch 1. Stand comfortably with your feet shoulder-width apart. 2. Raise one arm over your head, and then lean to the other side. 3. Slide your hand down your leg as you let the weight of your arm gently stretch your side muscles. Hold for 15 to 30 seconds. 4. Repeat 2 to 4 times on each side. Press-up 1. Lie on your stomach, supporting your body with your forearms. 2. Press your elbows down into the floor to raise your upper back. As you do this, relax your stomach muscles and allow your back to arch without using your back muscles. As your press up, do not let your hips or pelvis come off the floor. 3. Hold for 15 to 30 seconds, then relax. 4. Repeat 2 to 4 times. Relax and rest 
 
1. Lie on your back with a rolled towel under your neck and a pillow under your knees. Extend your arms comfortably to your sides. 2. Relax and breathe normally. 3. Remain in this position for about 10 minutes. 4. If you can, do this 2 or 3 times each day. Follow-up care is a key part of your treatment and safety. Be sure to make and go to all appointments, and call your doctor if you are having problems. It's also a good idea to know your test results and keep a list of the medicines you take. Where can you learn more? Go to http://grace-gmóez.info/. Enter H196 in the search box to learn more about \"Back Stretches: Exercises. \" Current as of: March 21, 2017 Content Version: 11.4 © 7661-4729 [employer]wise, [employer]. Care instructions adapted under license by [employer] (which disclaims liability or warranty for this information). If you have questions about a medical condition or this instruction, always ask your healthcare professional. [address] any warranty or liability for your use of this information. Introducing [facility]! Dear Dawn Coffey: 
Thank you for requesting a RightAnswers account. Our records indicate that you already have an active [employer] account. You can access your account anytime at [email or web address] Did you know that you can access your hospital and ER discharge instructions at any time in [employer] You can also review all of your test results from your hospital stay or ER visit. Additional Information If you have questions, please visit the Frequently Asked Questions section of the [employer] website at [email or web address] Remember, [employer] is NOT to be used for urgent needs. For medical emergencies, dial 911. Now available from your iPhone and Android! Please provide this summary of care documentation to your next provider. Your primary care clinician is listed as Ray Slater. If you have any questions after today's visit, please call [phone].

## 2017-11-06 NOTE — PATIENT INSTRUCTIONS
Take vit D 1000IU daily for low levels. Vaccine Information Statement    Influenza (Flu) Vaccine (Inactivated or Recombinant): What you need to know    Many Vaccine Information Statements are available in Turkish and other languages. See www.immunize.org/vis  Hojas de Información Sobre Vacunas están disponibles en Español y en muchos otros idiomas. Visite www.immunize.org/vis    1. Why get vaccinated? Influenza (flu) is a contagious disease that spreads around the [facility] every year, usually between October and May. Flu is caused by influenza viruses, and is spread mainly by coughing, sneezing, and close contact. Anyone can get flu. Flu strikes suddenly and can last several days. Symptoms vary by age, but can include:   fever/chills   sore throat   muscle aches   fatigue   cough   headache    runny or stuffy nose    Flu can also lead to pneumonia and blood infections, and cause diarrhea and seizures in children. If you have a medical condition, such as heart or lung disease, flu can make it worse. Flu is more dangerous for some people. Infants and young children, people 72years of age and older, pregnant women, and people with certain health conditions or a weakened immune system are at greatest risk. Each year thousands of people in the [facility] die from flu, and many more are hospitalized. Flu vaccine can:   keep you from getting flu,   make flu less severe if you do get it, and   keep you from spreading flu to your family and other people. 2. Inactivated and recombinant flu vaccines    A dose of flu vaccine is recommended every flu season. Children 6 months through 6years of age may need two doses during the same flu season. Everyone else needs only one dose each flu season.        Some inactivated flu vaccines contain a very small amount of a mercury-based preservative called thimerosal. Studies have not shown thimerosal in vaccines to be harmful, but flu vaccines that do not contain thimerosal are available. There is no live flu virus in flu shots. They cannot cause the flu. There are many flu viruses, and they are always changing. Each year a new flu vaccine is made to protect against three or four viruses that are likely to cause disease in the upcoming flu season. But even when the vaccine doesnt exactly match these viruses, it may still provide some protection    Flu vaccine cannot prevent:   flu that is caused by a virus not covered by the vaccine, or   illnesses that look like flu but are not. It takes about 2 weeks for protection to develop after vaccination, and protection lasts through the flu season. 3. Some people should not get this vaccine    Tell the person who is giving you the vaccine:     If you have any severe, life-threatening allergies. If you ever had a life-threatening allergic reaction after a dose of flu vaccine, or have a severe allergy to any part of this vaccine, you may be advised not to get vaccinated. Most, but not all, types of flu vaccine contain a small amount of egg protein.  If you ever had Guillain-Barré Syndrome (also called GBS). Some people with a history of GBS should not get this vaccine. This should be discussed with your doctor.  If you are not feeling well. It is usually okay to get flu vaccine when you have a mild illness, but you might be asked to come back when you feel better. 4. Risks of a vaccine reaction    With any medicine, including vaccines, there is a chance of reactions. These are usually mild and go away on their own, but serious reactions are also possible. Most people who get a flu shot do not have any problems with it.      Minor problems following a flu shot include:    soreness, redness, or swelling where the shot was given     hoarseness   sore, red or itchy eyes   cough   fever   aches   headache   itching   fatigue  If these problems occur, they usually begin soon after the shot and last 1 or 2 days. More serious problems following a flu shot can include the following:     There may be a small increased risk of Guillain-Barré Syndrome (GBS) after inactivated flu vaccine. This risk has been estimated at 1 or 2 additional cases per million people vaccinated. This is much lower than the risk of severe complications from flu, which can be prevented by flu vaccine.  Young children who get the flu shot along with pneumococcal vaccine (PCV13) and/or DTaP vaccine at the same time might be slightly more likely to have a seizure caused by fever. Ask your doctor for more information. Tell your doctor if a child who is getting flu vaccine has ever had a seizure. Problems that could happen after any injected vaccine:      People sometimes faint after a medical procedure, including vaccination. Sitting or lying down for about 15 minutes can help prevent fainting, and injuries caused by a fall. Tell your doctor if you feel dizzy, or have vision changes or ringing in the ears.  Some people get severe pain in the shoulder and have difficulty moving the arm where a shot was given. This happens very rarely.  Any medication can cause a severe allergic reaction. Such reactions from a vaccine are very rare, estimated at about 1 in a million doses, and would happen within a few minutes to a few hours after the vaccination. As with any medicine, there is a very remote chance of a vaccine causing a serious injury or death. The safety of vaccines is always being monitored. For more information, visit: www.cdc.gov/vaccinesafety/    5. What if there is a serious reaction? What should I look for?  Look for anything that concerns you, such as signs of a severe allergic reaction, very high fever, or unusual behavior.     Signs of a severe allergic reaction can include hives, swelling of the face and throat, difficulty breathing, a fast heartbeat, dizziness, and weakness  usually within a few minutes to a few hours after the vaccination. What should I do?  If you think it is a severe allergic reaction or other emergency that cant wait, call 9-1-1 and get the person to the nearest hospital. Otherwise, call your doctor.  Reactions should be reported to the Vaccine Adverse Event Reporting System (VAERS). Your doctor should file this report, or you can do it yourself through  the VAERS web site at www.vaers. [facility].gov, or by calling 2-916.339.1310. VAERS does not give medical advice. 6. The National Vaccine Injury Compensation Program    The [facility] Vaccine Injury Compensation Program (VICP) is a federal program that was created to compensate people who may have been injured by certain vaccines. Persons who believe they may have been injured by a vaccine can learn about the program and about filing a claim by calling 5-970.438.4363 or visiting the [place] website at www.[facility].gov/vaccinecompensation. There is a time limit to file a claim for compensation. 7. How can I learn more?  Ask your healthcare provider. He or she can give you the vaccine package insert or suggest other sources of information.  Call your local or state health department.  Contact the Centers for Disease Control and Prevention (CDC):  - Call 7-972.271.6151 (1-800-CDC-INFO) or  - Visit CDCs website at www.cdc.gov/flu    Vaccine Information Statement   Inactivated Influenza Vaccine   8/7/2015  42 [address] Norm Faustinaot [id number]    Department of Health and Human Services  Centers for Disease Control and Prevention    Office Use Only      Vaccine Information Statement    Influenza (Flu) Vaccine (Inactivated or Recombinant): What you need to know    Many Vaccine Information Statements are available in Latvian and other languages. See www.immunize.org/vis  Hojas de Información Sobre Vacunas están disponibles en Español y en muchos otros idiomas. Visite www.immunize.org/vis    1.  Why get vaccinated? Influenza (flu) is a contagious disease that spreads around the [place] every year, usually between October and May. Flu is caused by influenza viruses, and is spread mainly by coughing, sneezing, and close contact. Anyone can get flu. Flu strikes suddenly and can last several days. Symptoms vary by age, but can include:   fever/chills   sore throat   muscle aches   fatigue   cough   headache    runny or stuffy nose    Flu can also lead to pneumonia and blood infections, and cause diarrhea and seizures in children. If you have a medical condition, such as heart or lung disease, flu can make it worse. Flu is more dangerous for some people. Infants and young children, people 72years of age and older, pregnant women, and people with certain health conditions or a weakened immune system are at greatest risk. Each year thousands of people in the [facility] die from flu, and many more are hospitalized. Flu vaccine can:   keep you from getting flu,   make flu less severe if you do get it, and   keep you from spreading flu to your family and other people. 2. Inactivated and recombinant flu vaccines    A dose of flu vaccine is recommended every flu season. Children 6 months through 6years of age may need two doses during the same flu season. Everyone else needs only one dose each flu season. Some inactivated flu vaccines contain a very small amount of a mercury-based preservative called thimerosal. Studies have not shown thimerosal in vaccines to be harmful, but flu vaccines that do not contain thimerosal are available. There is no live flu virus in flu shots. They cannot cause the flu. There are many flu viruses, and they are always changing. Each year a new flu vaccine is made to protect against three or four viruses that are likely to cause disease in the upcoming flu season.  But even when the vaccine doesnt exactly match these viruses, it may still provide some protection    Flu vaccine cannot prevent:   flu that is caused by a virus not covered by the vaccine, or   illnesses that look like flu but are not. It takes about 2 weeks for protection to develop after vaccination, and protection lasts through the flu season. 3. Some people should not get this vaccine    Tell the person who is giving you the vaccine:     If you have any severe, life-threatening allergies. If you ever had a life-threatening allergic reaction after a dose of flu vaccine, or have a severe allergy to any part of this vaccine, you may be advised not to get vaccinated. Most, but not all, types of flu vaccine contain a small amount of egg protein.  If you ever had Guillain-Barré Syndrome (also called GBS). Some people with a history of GBS should not get this vaccine. This should be discussed with your doctor.  If you are not feeling well. It is usually okay to get flu vaccine when you have a mild illness, but you might be asked to come back when you feel better. 4. Risks of a vaccine reaction    With any medicine, including vaccines, there is a chance of reactions. These are usually mild and go away on their own, but serious reactions are also possible. Most people who get a flu shot do not have any problems with it. Minor problems following a flu shot include:    soreness, redness, or swelling where the shot was given     hoarseness   sore, red or itchy eyes   cough   fever   aches   headache   itching   fatigue  If these problems occur, they usually begin soon after the shot and last 1 or 2 days. More serious problems following a flu shot can include the following:     There may be a small increased risk of Guillain-Barré Syndrome (GBS) after inactivated flu vaccine. This risk has been estimated at 1 or 2 additional cases per million people vaccinated.  This is much lower than the risk of severe complications from flu, which can be prevented by flu vaccine.  Young children who get the flu shot along with pneumococcal vaccine (PCV13) and/or DTaP vaccine at the same time might be slightly more likely to have a seizure caused by fever. Ask your doctor for more information. Tell your doctor if a child who is getting flu vaccine has ever had a seizure. Problems that could happen after any injected vaccine:      People sometimes faint after a medical procedure, including vaccination. Sitting or lying down for about 15 minutes can help prevent fainting, and injuries caused by a fall. Tell your doctor if you feel dizzy, or have vision changes or ringing in the ears.  Some people get severe pain in the shoulder and have difficulty moving the arm where a shot was given. This happens very rarely.  Any medication can cause a severe allergic reaction. Such reactions from a vaccine are very rare, estimated at about 1 in a million doses, and would happen within a few minutes to a few hours after the vaccination. As with any medicine, there is a very remote chance of a vaccine causing a serious injury or death. The safety of vaccines is always being monitored. For more information, visit: www.cdc.gov/vaccinesafety/    5. What if there is a serious reaction? What should I look for?  Look for anything that concerns you, such as signs of a severe allergic reaction, very high fever, or unusual behavior. Signs of a severe allergic reaction can include hives, swelling of the face and throat, difficulty breathing, a fast heartbeat, dizziness, and weakness  usually within a few minutes to a few hours after the vaccination. What should I do?  If you think it is a severe allergic reaction or other emergency that cant wait, call 9-1-1 and get the person to the nearest hospital. Otherwise, call your doctor.  Reactions should be reported to the Vaccine Adverse Event Reporting System (VAERS).  Your doctor should file this report, or you can do it yourself through  the VAERS web site at www.vaers. [facility].gov, or by calling 5-988.731.7991. [employer] does not give medical advice. 6. The National Vaccine Injury Compensation Program    The [facility] Vaccine Injury Compensation Program (VICP) is a federal program that was created to compensate people who may have been injured by certain vaccines. Persons who believe they may have been injured by a vaccine can learn about the program and about filing a claim by calling 5-942.105.4918 or visiting the TM website at www.[facility].gov/vaccinecompensation. There is a time limit to file a claim for compensation. 7. How can I learn more?  Ask your healthcare provider. He or she can give you the vaccine package insert or suggest other sources of information.  Call your local or state health department.  Contact the Centers for Disease Control and Prevention (CDC):  - Call 3-914.808.8384 (1-800-CDC-INFO) or  - Visit CDCs website at www.cdc.gov/flu    Vaccine Information Statement   Inactivated Influenza Vaccine   8/7/2015  42 PRESTON Martino [id number]-86    Department of Health and Human Services  Centers for Disease Control and Prevention    Office Use Only       Back Stretches: Exercises  Your Care Instructions  Here are some examples of exercises for stretching your back. Start each exercise slowly. Ease off the exercise if you start to have pain. Your doctor or physical therapist will tell you when you can start these exercises and which ones will work best for you. How to do the exercises  Overhead stretch    1. Stand comfortably with your feet shoulder-width apart. 2. Looking straight ahead, raise both arms over your head and reach toward the ceiling. Do not allow your head to tilt back. 3. Hold for 15 to 30 seconds, then lower your arms to your sides. 4. Repeat 2 to 4 times. Side stretch    1. Stand comfortably with your feet shoulder-width apart.   2. Raise one arm over your head, and then lean to the other side. 3. Slide your hand down your leg as you let the weight of your arm gently stretch your side muscles. Hold for 15 to 30 seconds. 4. Repeat 2 to 4 times on each side. Press-up    1. Lie on your stomach, supporting your body with your forearms. 2. Press your elbows down into the floor to raise your upper back. As you do this, relax your stomach muscles and allow your back to arch without using your back muscles. As your press up, do not let your hips or pelvis come off the floor. 3. Hold for 15 to 30 seconds, then relax. 4. Repeat 2 to 4 times. Relax and rest    1. Lie on your back with a rolled towel under your neck and a pillow under your knees. Extend your arms comfortably to your sides. 2. Relax and breathe normally. 3. Remain in this position for about 10 minutes. 4. If you can, do this 2 or 3 times each day. Follow-up care is a key part of your treatment and safety. Be sure to make and go to all appointments, and call your doctor if you are having problems. It's also a good idea to know your test results and keep a list of the medicines you take. Where can you learn more? Go to http://grace-gómez.info/. Enter Z602 in the search box to learn more about \"Back Stretches: Exercises. \"  Current as of: March 21, 2017  Content Version: 11.4  © 9302-0758 [employer], [employer]. Care instructions adapted under license by [employer] (which disclaims liability or warranty for this information). If you have questions about a medical condition or this instruction, always ask your healthcare professional. [place] [address] any warranty or liability for your use of this information.

## 2017-11-06 NOTE — PROGRESS NOTES
Angela CUADRA  Identified pt with two pt identifiers(name and ). Chief Complaint   Patient presents with   [facility] Follow Up     Room 1//     Hypertension       1. Have you been to the ER, urgent care clinic since your last visit? Hospitalized since your last visit? Yes, See encounter notes    2. Have you seen or consulted any other health care providers outside of the [address] [place] [address] since your last visit? Include any pap smears or colon screening.  NO       [facility] notified of reason for visit and vitals    Patient received paperwork for advance directive during previous visit but has not completed at this time     Reviewed record In preparation for visit, huddled with provider and have obtained necessary documentation      Health Maintenance Due   Topic    GLAUCOMA SCREENING Q2Y     INFLUENZA AGE 9 TO ADULT        Wt Readings from Last 3 Encounters:   17 150 lb 9.6 oz (68.3 kg)   16 159 lb 6.4 oz (72.3 kg)   16 153 lb 12.8 oz (69.8 kg)     Temp Readings from Last 3 Encounters:   16 97.8 °F (36.6 °C) (Oral)   16 98.3 °F (36.8 °C)   12/04/15 97.4 °F (36.3 °C) (Oral)     BP Readings from Last 3 Encounters:   16 150/74   16 122/90   16 142/74     Pulse Readings from Last 3 Encounters:   16 62   16 72   16 100         Learning Assessment:  :     Learning Assessment 2014   PRIMARY LEARNER Patient   HIGHEST LEVEL OF EDUCATION - PRIMARY LEARNER  DID NOT GRADUATE HIGH SCHOOL   BARRIERS PRIMARY LEARNER NONE   CO-LEARNER CAREGIVER No   PRIMARY LANGUAGE ENGLISH   LEARNER PREFERENCE PRIMARY DEMONSTRATION   ANSWERED BY patient   RELATIONSHIP SELF       Depression Screening:  :     PHQ over the last two weeks 2016   Little interest or pleasure in doing things Not at all   Feeling down, depressed or hopeless Not at all   Total Score PHQ 2 0       Fall Risk Assessment:  :     Fall Risk Assessment, last 12 mths 2016   Able to walk? Yes   Fall in past 12 months? No       Abuse Screening:  :     Abuse Screening Questionnaire 12/29/2016 9/14/2015   Do you ever feel afraid of your partner? N N   Are you in a relationship with someone who physically or mentally threatens you? N N   Is it safe for you to go home? Rashad Andujar I have received verbal consent from [facility]. to discuss any/all medical information while they are present in the room. Medication reconciliation up to date and corrected with patient at this time.

## 2017-11-07 LAB — BACTERIA UR CULT: NORMAL

## 2017-12-05 RX ORDER — PRAMIPEXOLE DIHYDROCHLORIDE 0.5 MG/1
TABLET ORAL
Qty: 45 TAB | Refills: 3 | Status: SHIPPED | OUTPATIENT
Start: 2017-12-05 | End: 2019-02-19 | Stop reason: SDUPTHER

## 2018-01-08 ENCOUNTER — OFFICE VISIT (OUTPATIENT)
Dept: INTERNAL MEDICINE CLINIC | Age: 73
End: 2018-01-08

## 2018-01-08 ENCOUNTER — HOSPITAL ENCOUNTER (OUTPATIENT)
Dept: LAB | Age: 73
Discharge: HOME | End: 2018-01-08
Payer: MEDICARE

## 2018-01-08 VITALS
WEIGHT: 151.4 LBS | RESPIRATION RATE: 14 BRPM | SYSTOLIC BLOOD PRESSURE: 152 MMHG | HEART RATE: 72 BPM | BODY MASS INDEX: 29.72 KG/M2 | OXYGEN SATURATION: 98 % | HEIGHT: 60 IN | DIASTOLIC BLOOD PRESSURE: 86 MMHG | TEMPERATURE: 97.9 F

## 2018-01-08 DIAGNOSIS — Z13.39 SCREENING FOR ALCOHOLISM: ICD-10-CM

## 2018-01-08 DIAGNOSIS — Z13.31 SCREENING FOR DEPRESSION: ICD-10-CM

## 2018-01-08 DIAGNOSIS — I10 ESSENTIAL HYPERTENSION: ICD-10-CM

## 2018-01-08 DIAGNOSIS — Z00.00 MEDICARE ANNUAL WELLNESS VISIT, SUBSEQUENT: Primary | ICD-10-CM

## 2018-01-08 DIAGNOSIS — Z87.442 HISTORY OF KIDNEY STONES: ICD-10-CM

## 2018-01-08 DIAGNOSIS — G89.29 CHRONIC MIDLINE LOW BACK PAIN WITHOUT SCIATICA: ICD-10-CM

## 2018-01-08 DIAGNOSIS — Z71.89 ADVANCED DIRECTIVES, COUNSELING/DISCUSSION: ICD-10-CM

## 2018-01-08 DIAGNOSIS — M54.50 CHRONIC MIDLINE LOW BACK PAIN WITHOUT SCIATICA: ICD-10-CM

## 2018-01-08 DIAGNOSIS — E78.5 HYPERLIPIDEMIA, UNSPECIFIED HYPERLIPIDEMIA TYPE: ICD-10-CM

## 2018-01-08 PROCEDURE — 80048 BASIC METABOLIC PNL TOTAL CA: CPT

## 2018-01-08 PROCEDURE — 80061 LIPID PANEL: CPT

## 2018-01-08 PROCEDURE — 36415 COLL VENOUS BLD VENIPUNCTURE: CPT

## 2018-01-08 NOTE — PROGRESS NOTES
[id number] [address] [place] [address]  Identified pt with two pt identifiers(name and ). Chief Complaint   Patient presents with   [facility] Annual Wellness Visit     Room 1// Renal Labs // fasting // Honoring Choices     Blood Pressure Check     eye exam up to date w/ nba       1. Have you been to the ER, urgent care clinic since your last visit? Hospitalized since your last visit? NO    2. Have you seen or consulted any other health care providers outside of the [address] [place] [address] since your last visit? Include any pap smears or colon screening.  NO      Dr Brittni Cano notified of reason for visit and vitals    Patient received paperwork for advance directive during previous visit but has not completed at this time     Reviewed record In preparation for visit, huddled with provider and have obtained necessary documentation      Health Maintenance Due   Topic    MEDICARE YEARLY EXAM        Wt Readings from Last 3 Encounters:   18 151 lb 6.4 oz (68.7 kg)   17 150 lb 9.6 oz (68.3 kg)   16 159 lb 6.4 oz (72.3 kg)     Temp Readings from Last 3 Encounters:   17 97.9 °F (36.6 °C) (Oral)   16 97.8 °F (36.6 °C) (Oral)   16 98.3 °F (36.8 °C)     BP Readings from Last 3 Encounters:   17 138/80   16 150/74   16 122/90     Pulse Readings from Last 3 Encounters:   17 80   16 62   16 72         Learning Assessment:  :     Learning Assessment 2014   PRIMARY LEARNER Patient   HIGHEST LEVEL OF EDUCATION - PRIMARY LEARNER  DID NOT GRADUATE HIGH SCHOOL   BARRIERS PRIMARY LEARNER NONE   CO-LEARNER CAREGIVER No   PRIMARY LANGUAGE ENGLISH   LEARNER PREFERENCE PRIMARY DEMONSTRATION   ANSWERED BY patient   RELATIONSHIP SELF       Depression Screening:  :     PHQ over the last two weeks 2018   Little interest or pleasure in doing things Not at all   Feeling down, depressed or hopeless Not at all   Total Score PHQ 2 0       Fall Risk Assessment:  :     Fall Risk Assessment, last 12 mths 1/8/2018   Able to walk? Yes   Fall in past 12 months? No       Abuse Screening:  :     Abuse Screening Questionnaire 1/8/2018 12/29/2016 9/14/2015   Do you ever feel afraid of your partner? N N N   Are you in a relationship with someone who physically or mentally threatens you? N N N   Is it safe for you to go home? Y Y Y            I have received verbal consent from Yenni Guzman to discuss any/all medical information while they are present in the room. Medication reconciliation up to date and corrected with patient at this time.

## 2018-01-08 NOTE — MR AVS SNAPSHOT
Visit Information Date & Time Provider Department Dept. Phone Encounter #  
 1/8/2018  9:30 AM Sonia Lozano MD [facility] [phone] [id number] Follow-up Instructions Return in about 6 months (around 7/8/2018) for bp, chol. Upcoming Health Maintenance Date Due  
 MEDICARE YEARLY EXAM 12/30/2017 BREAST CANCER SCRN MAMMOGRAM 8/8/2018 GLAUCOMA SCREENING Q2Y 9/1/2019 COLONOSCOPY 9/4/2020 DTaP/Tdap/Td series (2 - Td) 12/19/2024 Allergies as of 1/8/2018  Review Complete On: 1/8/2018 By: Sonia Lozano MD  
  
 Severity Noted Reaction Type Reactions Fosamax [Alendronate]  08/31/2010    Myalgia Current Immunizations  Reviewed on 1/31/2017 Name Date Influenza High Dose Vaccine PF 11/6/2017, 11/3/2016, 9/14/2015 Influenza Vaccine 10/5/2013 Influenza Vaccine Split 10/26/2010 Pneumococcal Conjugate (PCV-13) 1/31/2017 Tdap 12/19/2014 ZZZ-RETIRED (DO NOT USE) Pneumococcal Vaccine (Unspecified Type) 10/26/2010, 10/26/2003 Zoster Vaccine, Live 1/23/2015 Not reviewed this visit You Were Diagnosed With   
  
 Codes Comments Essential hypertension    -  Primary ICD-10-CM: I10 
ICD-9-CM: 401.9 History of kidney stones     ICD-10-CM: Z87.442 ICD-9-CM: V13.01 Chronic midline low back pain without sciatica     ICD-10-CM: M54.5, G89.29 ICD-9-CM: 724.2, 338.29 Hyperlipidemia, unspecified hyperlipidemia type     ICD-10-CM: E78.5 ICD-9-CM: 272.4 Medicare annual wellness visit, subsequent     ICD-10-CM: Z00.00 ICD-9-CM: V70.0 Advanced directives, counseling/discussion     ICD-10-CM: Z71.89 ICD-9-CM: V65.49 Screening for alcoholism     ICD-10-CM: Z13.89 ICD-9-CM: V79.1 Screening for depression     ICD-10-CM: Z13.89 ICD-9-CM: V79.0 Vitals BP Pulse Temp Resp Height(growth percentile) Weight(growth percentile) 152/86 72 97.9 °F (36.6 °C) (Oral) 14 5' (1.524 m) 151 lb 6.4 oz (68.7 kg) LMP SpO2 BMI OB Status Smoking Status 10/26/1995 98% 29.57 kg/m2 Postmenopausal Never Smoker Vitals History BMI and BSA Data Body Mass Index Body Surface Area  
 29.57 kg/m 2 1.71 m 2 Preferred Pharmacy Pharmacy Name Phone Marc  [address] [phone] Your Updated Medication List  
  
   
This list is accurate as of: 1/8/18  9:52 AM.  Always use your most recent med list.  
  
  
  
  
 alendronate 70 mg tablet Commonly known as:  FOSAMAX TAKE 1 TABLET EVERY SUNDAY  
  
 aspirin, buffered 81 mg Tab Take 81 mg by mouth daily. atorvastatin 40 mg tablet Commonly known as:  LIPITOR  
TAKE 1 TABLET EVERY DAY  
  
 metoprolol succinate 25 mg XL tablet Commonly known as:  TOPROL-XL  
TAKE ONE TABLET BY MOUTH NIGHTLY  
  
 omeprazole 20 mg capsule Commonly known as:  PRILOSEC  
TAKE 1 CAPSULE EVERY DAY  
  
 OTHER  
  
 pramipexole 0.5 mg tablet Commonly known as:  MIRAPEX TAKE 1/2 TABLET EVERY NIGHT  
  
 TYLENOL 325 mg tablet Generic drug:  acetaminophen Take  by mouth every four (4) hours as needed for Pain. We Performed the Following [facility] [[id number] [facility]] LIPID PANEL [42638 CPT(R)] METABOLIC PANEL, BASIC [75275 CPT(R)] [place] ANNUAL ALCOHOL SCREEN 15 MIN [id number] [facility]] REFERRAL TO ORTHOPEDICS [TEH528 Custom] Follow-up Instructions Return in about 6 months (around 7/8/2018) for bp, chol. Referral Information Referral ID Referred By Referred To  
  
 [id number] [facility] [address] [place] [address] [address] [place], [address] Phone: [phone] Visits Status Start Date End Date 1 New Request 1/8/18 1/8/19  If your referral has a status of pending review or denied, additional information will be sent to support the outcome of this decision. Patient Instructions Medicare Wellness Visit, Female The best way to live healthy is to have a healthy lifestyle by eating a well-balanced diet, exercising regularly, limiting alcohol and stopping smoking. Regular physical exams and screening tests are another way to keep healthy. Preventive exams provided by your health care provider can find health problems before they become diseases or illnesses. Preventive services including immunizations, screening tests, monitoring and exams can help you take care of your own health. All people over age 72 should have a pneumovax  and and a prevnar shot to prevent pneumonia. These are once in a lifetime unless you and your provider decide differently. All people over 65 should have a yearly flu shot and a tetanus vaccine every 10 years. A bone mass density to screen for osteoporosis or thinning of the bones should be done every 2 years after 65. Screening for diabetes mellitus with a blood sugar test should be done every year. Glaucoma is a disease of the eye due to increased ocular pressure that can lead to blindness and it should be done every year by an eye professional. 
 
Cardiovascular screening tests that check for elevated lipids (fatty part of blood) which can lead to heart disease and strokes should be done every 5 years. Colorectal screening that evaluates for blood or polyps in your colon should be done yearly as a stool test or every five years as a flexible sigmoidoscope or every 10 years as a colonoscopy up to age 76. Breast cancer screening with a mammogram is recommended biennially  for women age 54-69. Screening for cervical cancer with a pap smear and pelvic exam is recommended for women after age 72 years every 2 years up to age 79 or when the provider and patient decide to stop. If there is a history of cervical abnormalities or other increased risk for cancer then the test is recommended yearly. Hepatitis C screening is also recommended for anyone born between [birth year] through [address]. A shingles vaccine is also recommended once in a lifetime after age 61. Your Medicare Wellness Exam is recommended annually. Here is a list of your current Health Maintenance items with a due date: 
Health Maintenance Due Topic Date Due  
 Annual Well Visit  12/30/2017 Introducing [facility]! Dear Alexsander Troncoso: 
Thank you for requesting a [employer] account. Our records indicate that you already have an active [employer] account. You can access your account anytime at [email or web address] Did you know that you can access your hospital and ER discharge instructions at any time in [employer]? You can also review all of your test results from your hospital stay or ER visit. Additional Information If you have questions, please visit the Frequently Asked Questions section of the [employer] website at [email or web address] Remember, [employer] is NOT to be used for urgent needs. For medical emergencies, dial 911. Now available from your iPhone and Android! Please provide this summary of care documentation to your next provider. Your primary care clinician is listed as Ray [facility]ater. If you have any questions after today's visit, please call [phone].

## 2018-01-08 NOTE — PATIENT INSTRUCTIONS

## 2018-01-09 LAB
BUN SERPL-MCNC: 19 MG/DL (ref 8–27)
BUN/CREAT SERPL: 18 (ref 12–28)
CALCIUM SERPL-MCNC: 9.2 MG/DL (ref 8.7–10.3)
CHLORIDE SERPL-SCNC: 103 MMOL/L (ref 96–106)
CHOLEST SERPL-MCNC: 247 MG/DL (ref 100–199)
CO2 SERPL-SCNC: 23 MMOL/L (ref 18–29)
CREAT SERPL-MCNC: 1.07 MG/DL (ref 0.57–1)
GLUCOSE SERPL-MCNC: 100 MG/DL (ref 65–99)
HDLC SERPL-MCNC: 33 MG/DL
INTERPRETATION, 910389: NORMAL
INTERPRETATION: NORMAL
LDLC SERPL CALC-MCNC: 158 MG/DL (ref 0–99)
PDF IMAGE, 910387: NORMAL
POTASSIUM SERPL-SCNC: 4.7 MMOL/L (ref 3.5–5.2)
SODIUM SERPL-SCNC: 141 MMOL/L (ref 134–144)
TRIGL SERPL-MCNC: 282 MG/DL (ref 0–149)
VLDLC SERPL CALC-MCNC: 56 MG/DL (ref 5–40)

## 2018-01-18 ENCOUNTER — PATIENT OUTREACH (OUTPATIENT)
Dept: INTERNAL MEDICINE CLINIC | Age: 73
End: 2018-01-18

## 2018-01-18 ENCOUNTER — CLINICAL SUPPORT (OUTPATIENT)
Dept: INTERNAL MEDICINE CLINIC | Age: 73
End: 2018-01-18

## 2018-01-18 DIAGNOSIS — Z71.89 ADVANCE CARE PLANNING: Primary | ICD-10-CM

## 2018-01-18 NOTE — ACP (ADVANCE CARE PLANNING)
[address]     Prior to today's conversation, did individual have existing ACP documents? [] Yes  [x] No    Date of conversation: 1/18/18  Location: [facility]    Participants:   [x] Patient      Individual's Fears/Concerns about planning: none     Goals/Narrative of Conversation: Discussed facilitators role and purpose of the program.  Discussed ACP planning and what it is about along with the patient's understanding of what ACP planning is. Explored and discussed the patient's experiences with family who were seriously ill or injured. Explored and discussed living well and what that means to the patient. Provided a brief overview of the 3 decisions in [employer] ACP. Explored and discussed cultural, Latter-day, and personal beliefs that the patient has. Discussed CPR and mechanical ventilation and patient gave their views of how they felt about them. Explored and discussed scenario regarding the patient's goals of care for a severe permanent brain injury. Discussed anatomical gifts with the patient. Explained that in any scenario or decision that is made, the patient will always be made comfortable. Clarified any questions and concerns that the patient had. Conversation topics for Individual    Has learned the following from prior experiences with serious illness: she lost 2 sons and had to make all of the decisions. She stated it was hard to do and it would have been easier if they had an AMD.    Identifies the following as important for living well: being able to work in the yard, not having any pain, being able to do anything she wants    \"What cultural, Latter-day, spiritual, or personal beliefs (if any) do you have that might help you choose the care you want, or do not want? \"  Patient response: none    \"Would you like to talk to someone about these beliefs or concerns? \"  Patient response: no      The healthcare agent was not available, but does know what the patient wants. The patient has discussed this with her daughter who will be her agent.     [employer] ACP Facilitator: Sawyer Jenkins RN    Length (minutes): 39    The following was provided (check all that apply):   [x] Clarification of the goals of ACP    [x] Criteria for choosing a healthcare agent   [x] Written information on the planning process      Healthcare Decision Information Cards:   [x] Help with Breathing Facts   [x] Tube Feeding Facts   [] CPR Facts      [x] Review of the completion of the advance directive    [] Review of existing advance directive       Meeting Outcomes:   [x] ACP discussion completed   [x] Advance directive form completed   [x] Original of completed advance directive given to patient   [x] Copy made to give to healthcare agent    [x] Copy scanned to electronic medical record    Follow-up plan:     [] Schedule follow-up conversation to continue planning   [] Referred individual to provider for additional questions/concerns    [] Individual will invite agent/prospective agent to next ACP appointment   [x] Recommended to review completed AMD annually or upon change in health status     [x] This note routed to one or more involved healthcare providers

## 2018-02-11 RX ORDER — ATORVASTATIN CALCIUM 40 MG/1
TABLET, FILM COATED ORAL
Qty: 90 TAB | Refills: 3 | Status: SHIPPED | OUTPATIENT
Start: 2018-02-11 | End: 2019-02-19 | Stop reason: SDUPTHER

## 2018-03-31 ENCOUNTER — HOSPITAL ENCOUNTER (OUTPATIENT)
Dept: MRI IMAGING | Age: 73
Discharge: HOME | End: 2018-03-31
Attending: PHYSICAL MEDICINE & REHABILITATION
Payer: MEDICARE

## 2018-03-31 DIAGNOSIS — M54.42 BILATERAL LOW BACK PAIN WITH LEFT-SIDED SCIATICA: ICD-10-CM

## 2018-03-31 PROCEDURE — 72148 MRI LUMBAR SPINE W/O DYE: CPT

## 2018-04-18 RX ORDER — ALENDRONATE SODIUM 70 MG/1
TABLET ORAL
Qty: 12 TAB | Refills: 3 | Status: SHIPPED | OUTPATIENT
Start: 2018-04-18 | End: 2019-02-19 | Stop reason: SDUPTHER

## 2018-06-04 NOTE — PROGRESS NOTES
HPI  Ms. Fawn Benton is a 67y.o. year old female, she is seen today for follow up HTN, recheck kidney labs. Will follow up later this week with urologist for h/o stones, pyelonephritis. No n/v/abd pain, f/c, chest pain, sob. No weakness in legs. Has chronic back pain in low back, has been seen by ortho Dr. Imelda Lamb in past with some improvement after what sounds like DAXA. She would like to make another appt with him. BP at home normally about 130-150/80. Takes tylenol for back pain every 2-3 days which helps temporarily. Chief Complaint   Patient presents with   [facility] Annual Wellness Visit     Room 1// Renal Labs // fasting // [facility] [place]     Blood Pressure Check     eye exam up to date w/ nba        Prior to Admission medications    Medication Sig Start Date End Date Taking? Authorizing Provider   OTHER    Yes Historical Provider   pramipexole (MIRAPEX) 0.5 mg tablet TAKE 1/2 TABLET EVERY NIGHT 12/5/17  Yes Kareem Celeste MD   metoprolol succinate (TOPROL-XL) 25 mg XL tablet TAKE ONE TABLET BY MOUTH NIGHTLY 10/23/17  Yes Kareem Celeste MD   omeprazole (PRILOSEC) 20 mg capsule TAKE 1 CAPSULE EVERY DAY 10/23/17  Yes Kareem Celeste MD   alendronate (FOSAMAX) 70 mg tablet TAKE 1 TABLET EVERY SUNDAY 7/21/17  Yes Kareem Celeste MD   atorvastatin (LIPITOR) 40 mg tablet TAKE 1 TABLET EVERY DAY 12/13/16  Yes Kareem Celeste MD   Aspirin, Buffered 81 mg tab Take 81 mg by mouth daily. Yes Historical Provider   acetaminophen (TYLENOL) 325 mg tablet Take  by mouth every four (4) hours as needed for Pain.    Yes Historical Provider         Allergies   Allergen Reactions    Fosamax [Alendronate] Myalgia         REVIEW OF SYSTEMS:  Per HPI    PHYSICAL EXAM:  Visit Vitals    /86    Pulse 72    Temp 97.9 °F (36.6 °C) (Oral)    Resp 14    Ht 5' (1.524 m)    Wt 151 lb 6.4 oz (68.7 kg)    LMP 10/26/1995    SpO2 98%    BMI 29.57 kg/m2     Constitutional: Appears well-developed [facility]  *** FINAL REPORT ***    Name: Yves Shaw  MRN: [id number]        Outpatient  : 1951  HIS Order #: [id number]  [id number] [facility] Visit #: [id number]  Date: 2018    TYPE OF TEST: Extremity Arterial Duplex    REASON FOR TEST  Diminished pulses                            Right                     Left  Artery               PSV   Finding             PSV   Finding  ------------------  -----  ---------------    -----  ---------------  External iliac:  Common femoral:      93.0  Mild stenosis      130.0  Mild stenosis  Profunda femoris:   103.0  Mild stenosis      101.0  Mild stenosis  Proximal SFA:        93.0                     110.0  Mid SFA:            105.0  Mild stenosis      106.0  Mild stenosis  Distal SFA:         100.0                      49.0  Popliteal:           77.0  Mild stenosis       67.0  Mild stenosis  Anterior tibial:     44.0  Mild stenosis       35.0  Posterior tibial:    85.0  Mild stenosis       79.0  Mild stenosis    Pressures               Right     Left               -----     -----     Brachial:           DP:           PT:            MILLA:            Toe: INTERPRETATION/FINDINGS  Right leg :  1. <50% stenosis of the common femoral, profunda femoris, superficial  femoral, popliteal (ak), anterior tibial, posterior tibial and  peroneal arteries. Left leg :  1. <50% stenosis of the common femoral, profunda femoris, superficial  femoral, popliteal (ak) and posterior tibial arteries. 2. A monophasic signal is demonstrated in the anterior tibial and  peroneal arteries. ADDITIONAL COMMENTS    I have personally reviewed the data relevant to the interpretation of  this  study. TECHNOLOGIST: Leatha Mg Rater  Signed: 2018 01:25 PM    PHYSICIAN: Tiffany Sims.  Reji Galloway MD  Signed: 2018 02:33 PM and well-nourished. No distress. HENT:   Head: Normocephalic and atraumatic. Eyes: No scleral icterus. Cardiovascular: Normal S1/S2, regular rhythm. No murmurs, rubs, or gallops. Pulmonary/Chest: Effort normal and breath sounds normal. No respiratory distress. No wheezes, rhonchi, or rales. Back: no pain on palpation  Ext: No edema. Neurological: Alert. Strength 5/5 b/l LE  Psychiatric: Normal mood and affect. Behavior is normal.     Lab Results   Component Value Date/Time    Sodium 142 10/30/2017 08:22 AM    Potassium 5.1 10/30/2017 08:22 AM    Chloride 106 10/30/2017 08:22 AM    CO2 24 10/30/2017 08:22 AM    Anion gap 12 07/16/2010 10:07 AM    Glucose 99 10/30/2017 08:22 AM    Glucose 98 08/05/2010 08:34 AM    BUN 14 10/30/2017 08:22 AM    Creatinine 1.14 10/30/2017 08:22 AM    BUN/Creatinine ratio 12 10/30/2017 08:22 AM    GFR est AA 56 10/30/2017 08:22 AM    GFR est non-AA 48 10/30/2017 08:22 AM    Calcium 8.7 10/30/2017 08:22 AM    Bilirubin, total <0.2 10/30/2017 08:22 AM    AST (SGOT) 14 10/30/2017 08:22 AM    Alk. phosphatase 80 10/30/2017 08:22 AM    Protein, total 6.6 10/30/2017 08:22 AM    Albumin 3.8 10/30/2017 08:22 AM    Globulin 3.1 07/16/2010 10:07 AM    A-G Ratio 1.4 10/30/2017 08:22 AM    ALT (SGPT) 19 10/30/2017 08:22 AM     Lab Results   Component Value Date/Time    Hemoglobin A1c 5.7 08/05/2010 08:34 AM      Lab Results   Component Value Date/Time    Cholesterol, total 177 10/30/2017 08:22 AM    HDL Cholesterol 33 10/30/2017 08:22 AM    LDL, calculated 105 10/30/2017 08:22 AM    VLDL, calculated 39 10/30/2017 08:22 AM    Triglyceride 196 10/30/2017 08:22 AM          ASSESSMENT/PLAN  Diagnoses and all orders for this visit:    1. Essential hypertension  -     METABOLIC PANEL, BASIC  Slightly high - most values at home are okay - continue current medications , continue to monitor bp at home and call if consistently high  2.  History of kidney stones  To see urology later this week - no symptoms of stones or infection  3. Chronic midline low back pain without sciatica  -     Porsha Lace [facility]  Tylenol prn  4. Hyperlipidemia, unspecified hyperlipidemia type  -     LIPID PANEL  Recheck today - tg slightly high last visit  5. Medicare annual wellness visit, subsequent    6. Advanced directives, counseling/discussion    7. Screening for alcoholism  -     Annual  Alcohol Screen 15 min ()    8. Screening for depression  -     Depression Screen Annual          Health Maintenance Due   Topic Date Due    MEDICARE YEARLY EXAM  12/30/2017        Follow-up Disposition:  Return in about 6 months (around 7/8/2018) for bp, chol. Reviewed plan of care. Patient has provided input and agrees with goals. The nurse provided the patient and/or family with advanced directive information if needed and encouraged the patient to provide a copy to the office when available. This is a Subsequent Medicare Annual Wellness Exam (AWV) (Performed 12 months after IPPE or effective date of Medicare Part B enrollment)    I have reviewed the patient's medical history in detail and updated the computerized patient record.      History     Past Medical History:   Diagnosis Date    Arthritis     GERD (gastroesophageal reflux disease)     Menopause 1995    Palpitations     Pneumonia 2006 (approx)    RLS (restless legs syndrome)     Shoulder fracture 2009 [facility] -Jan 09  Right side       Past Surgical History:   Procedure Laterality Date    COLONOSCOPY  7/30/2010    Dr. Naif Ruiz, diverticulosis, repeat due 7/30/2015    COLONOSCOPY,JONAH CALHOUN,FORCEP/CAUTERY  9/4/2015         HX ORTHOPAEDIC      Left ankle  1982     Current Outpatient Prescriptions   Medication Sig Dispense Refill    OTHER       pramipexole (MIRAPEX) 0.5 mg tablet TAKE 1/2 TABLET EVERY NIGHT 45 Tab 3    metoprolol succinate (TOPROL-XL) 25 mg XL tablet TAKE ONE TABLET BY MOUTH NIGHTLY 90 Tab 3    omeprazole (PRILOSEC) 20 mg capsule TAKE 1 CAPSULE EVERY DAY 90 Cap 3    alendronate (FOSAMAX) 70 mg tablet TAKE 1 TABLET EVERY SUNDAY 12 Tab 3    atorvastatin (LIPITOR) 40 mg tablet TAKE 1 TABLET EVERY DAY 90 Tab 3    Aspirin, Buffered 81 mg tab Take 81 mg by mouth daily.  acetaminophen (TYLENOL) 325 mg tablet Take  by mouth every four (4) hours as needed for Pain. Allergies   Allergen Reactions    Fosamax [Alendronate] Myalgia     Family History   Problem Relation Age of Onset    Cancer Father      Colon     Diabetes Grandchild      Social History   Substance Use Topics    Smoking status: Never Smoker    Smokeless tobacco: Never Used    Alcohol use No     Patient Active Problem List   Diagnosis Code    Osteoporosis M81.0    Vitamin D deficiency E55.9    RLS (restless legs syndrome) G25.81    Hyperlipidemia E78.5    Abnormal stress test R94.39    Compression fracture [id number]    Low back pain M54.5    Hypertension I10    Advanced care planning/counseling discussion Z71.89    Chronic midline low back pain without sciatica M54.5, G89.29       Depression Risk Factor Screening:     PHQ over the last two weeks 1/8/2018   Little interest or pleasure in doing things Not at all   Feeling down, depressed or hopeless Not at all   Total Score PHQ 2 0     Alcohol Risk Factor Screening: You do not drink alcohol or very rarely. Functional Ability and Level of Safety:   Hearing Loss  Hearing is good. Activities of Daily Living  The home contains: no safety equipment. Patient does total self care    Fall Risk  Fall Risk Assessment, last 12 mths 1/8/2018   Able to walk? Yes   Fall in past 12 months?  No       Abuse Screen  Patient is not abused    Cognitive Screening   Evaluation of Cognitive Function:  Has your family/caregiver stated any concerns about your memory: no  Normal    Patient Care Team   Patient Care Team:  Adelaida Kayser, MD as PCP - Isabel Wang MD as Physician (Cardiology)    Assessment/Plan Education and counseling provided:  Are appropriate based on today's review and evaluation    Diagnoses and all orders for this visit:    1. Essential hypertension  -     METABOLIC PANEL, BASIC    2. History of kidney stones    3. Chronic midline low back pain without sciatica  -     [address]    4. Hyperlipidemia, unspecified hyperlipidemia type  -     LIPID PANEL    5. Medicare annual wellness visit, subsequent    6. Advanced directives, counseling/discussion    7. Screening for alcoholism  -     Annual  Alcohol Screen 15 min ()    8.  Screening for depression  -     Depression Screen Annual        Health Maintenance Due   Topic Date Due    MEDICARE YEARLY EXAM  12/30/2017

## 2018-06-16 ENCOUNTER — HOSPITAL ENCOUNTER (OUTPATIENT)
Dept: MRI IMAGING | Age: 73
Discharge: HOME | End: 2018-06-16
Attending: PHYSICAL MEDICINE & REHABILITATION
Payer: MEDICARE

## 2018-06-16 DIAGNOSIS — S16.1XXS CERVICAL STRAIN, SEQUELA: ICD-10-CM

## 2018-06-16 DIAGNOSIS — M54.2 CERVICALGIA: ICD-10-CM

## 2018-06-16 PROCEDURE — 72141 MRI NECK SPINE W/O DYE: CPT

## 2018-08-20 ENCOUNTER — HOSPITAL ENCOUNTER (OUTPATIENT)
Dept: MAMMOGRAPHY | Age: 73
Discharge: HOME | End: 2018-08-20
Attending: INTERNAL MEDICINE
Payer: MEDICARE

## 2018-08-20 DIAGNOSIS — Z12.31 VISIT FOR SCREENING MAMMOGRAM: ICD-10-CM

## 2018-08-20 PROCEDURE — 77067 SCR MAMMO BI INCL CAD: CPT

## 2018-10-02 ENCOUNTER — OFFICE VISIT (OUTPATIENT)
Dept: INTERNAL MEDICINE CLINIC | Facility: CLINIC | Age: 73
End: 2018-10-02

## 2018-10-02 VITALS
HEART RATE: 82 BPM | WEIGHT: 149 LBS | SYSTOLIC BLOOD PRESSURE: 125 MMHG | RESPIRATION RATE: 16 BRPM | DIASTOLIC BLOOD PRESSURE: 74 MMHG | HEIGHT: 60 IN | OXYGEN SATURATION: 96 % | TEMPERATURE: 98 F | BODY MASS INDEX: 29.25 KG/M2

## 2018-10-02 DIAGNOSIS — Z23 ENCOUNTER FOR IMMUNIZATION: ICD-10-CM

## 2018-10-02 DIAGNOSIS — G89.29 CHRONIC MIDLINE LOW BACK PAIN WITHOUT SCIATICA: ICD-10-CM

## 2018-10-02 DIAGNOSIS — I10 ESSENTIAL HYPERTENSION: ICD-10-CM

## 2018-10-02 DIAGNOSIS — M54.50 CHRONIC MIDLINE LOW BACK PAIN WITHOUT SCIATICA: ICD-10-CM

## 2018-10-02 DIAGNOSIS — R60.0 LOCALIZED EDEMA: Primary | ICD-10-CM

## 2018-10-02 RX ORDER — FUROSEMIDE 20 MG/1
20 TABLET ORAL
Qty: 10 TAB | Refills: 0 | Status: SHIPPED | OUTPATIENT
Start: 2018-10-02 | End: 2019-06-24

## 2018-10-02 NOTE — PROGRESS NOTES
Sunshine Payne    Chief Complaint   Patient presents with   [facility] Visit     Room 2C//            Reviewed record In preparation for visit and have obtained necessary documentation    1. Have you been to the ER, urgent care clinic since your last visit? Hospitalized since your last visit? NO  2. Have you seen or consulted any other health care providers outside of the [address] [place] [address] since your last visit? Include any pap smears or colon screening. NO    Patient has advance directive on file    Provider advised of reason for visit and vitals    Health Maintenance Due   Topic    Shingrix Vaccine Age 50> (1 of 2)    Influenza Age 5 to Adult        Wt Readings from Last 3 Encounters:   01/08/18 151 lb 6.4 oz (68.7 kg)   11/06/17 150 lb 9.6 oz (68.3 kg)   12/29/16 159 lb 6.4 oz (72.3 kg)     Temp Readings from Last 3 Encounters:   01/08/18 97.9 °F (36.6 °C) (Oral)   11/06/17 97.9 °F (36.6 °C) (Oral)   12/29/16 97.8 °F (36.6 °C) (Oral)     BP Readings from Last 3 Encounters:   01/08/18 152/86   11/06/17 138/80   12/29/16 150/74     Pulse Readings from Last 3 Encounters:   01/08/18 72   11/06/17 80   12/29/16 62         Learning Assessment:  :     Learning Assessment 12/19/2014   PRIMARY LEARNER Patient   HIGHEST LEVEL OF EDUCATION - PRIMARY LEARNER  DID NOT GRADUATE HIGH SCHOOL   BARRIERS PRIMARY LEARNER NONE   CO-LEARNER CAREGIVER No   PRIMARY LANGUAGE ENGLISH   LEARNER PREFERENCE PRIMARY DEMONSTRATION   ANSWERED BY patient   RELATIONSHIP SELF       Depression Screening:  :     PHQ over the last two weeks 1/8/2018   Little interest or pleasure in doing things Not at all   Feeling down, depressed, irritable, or hopeless Not at all   Total Score PHQ 2 0       Fall Risk Assessment:  :     Fall Risk Assessment, last 12 mths 1/8/2018   Able to walk? Yes   Fall in past 12 months?  No       Abuse Screening:  :     Abuse Screening Questionnaire 1/8/2018 12/29/2016 9/14/2015   Do you ever feel afraid of your partner? N N N   Are you in a relationship with someone who physically or mentally threatens you? N N N   Is it safe for you to go home? Y Y Y       ADL Screening:  :     ADL Assessment 1/8/2018   Feeding yourself No Help Needed   Getting from bed to chair No Help Needed   Getting dressed No Help Needed   Bathing or showering No Help Needed   Walk across the room (includes cane/walker) No Help Needed   Using the telphone No Help Needed   Taking your medications No Help Needed   Preparing meals No Help Needed   Managing money (expenses/bills) No Help Needed   Moderately strenuous housework (laundry) No Help Needed   Shopping for personal items (toiletries/medicines) No Help Needed   Shopping for groceries No Help Needed   Driving No Help Needed   Climbing a flight of stairs No Help Needed   Getting to places beyond walking distances No Help Needed           Medication reconciliation up to date and corrected with patient at this time.

## 2018-10-02 NOTE — MR AVS SNAPSHOT
[address] [address] [phone] Patient: Maris Narayan MRN: [id number] Visit Information Date & Time Provider Department Dept. Phone Encounter #  
 10/2/2018  2:00 PM Melissa Talamantes MD [facility] Internal Medicine of [address] [place] [address] [id number] Follow-up Instructions Return in about 4 months (around 2/2/2019) for cholesterol, bp - come fasting, AWV, 30. Upcoming Health Maintenance Date Due Influenza Age 5 to Adult 8/1/2018 Shingrix Vaccine Age 50> (1 of 2) 11/30/2018* MEDICARE YEARLY EXAM 1/9/2019 GLAUCOMA SCREENING Q2Y 10/12/2019 BREAST CANCER SCRN MAMMOGRAM 8/20/2020 COLONOSCOPY 9/4/2020 DTaP/Tdap/Td series (2 - Td) 12/19/2024 *Topic was postponed. The date shown is not the original due date. Allergies as of 10/2/2018  Review Complete On: 10/2/2018 By: Melissa Talamantes MD  
  
 Severity Noted Reaction Type Reactions Fosamax [Alendronate]  08/31/2010    Myalgia Current Immunizations  Reviewed on 1/31/2017 Name Date Influenza High Dose Vaccine PF 11/6/2017, 11/3/2016, 9/14/2015 Influenza Vaccine 10/5/2013 Influenza Vaccine (Tri) Adjuvanted  Incomplete Influenza Vaccine Split 10/26/2010 Pneumococcal Conjugate (PCV-13) 1/31/2017 Tdap 12/19/2014 ZZZ-RETIRED (DO NOT USE) Pneumococcal Vaccine (Unspecified Type) 10/26/2010, 10/26/2003 Zoster Vaccine, Live 1/23/2015 Not reviewed this visit You Were Diagnosed With   
  
 Codes Comments Localized edema    -  Primary ICD-10-CM: R60.0 ICD-9-CM: 782.3 Encounter for immunization     ICD-10-CM: P69 ICD-9-CM: V03.89 Essential hypertension     ICD-10-CM: I10 
ICD-9-CM: 401.9 Chronic midline low back pain without sciatica     ICD-10-CM: M54.5, G89.29 ICD-9-CM: 724.2, 338.29 Vitals BP Pulse Temp Resp Height(growth percentile) Weight(growth percentile) 125/74 (BP 1 Location: Left arm, BP Patient Position: Sitting) 82 98 °F (36.7 °C) (Oral) 16 5' (1.524 m) 149 lb (67.6 kg) LMP SpO2 BMI OB Status Smoking Status 10/26/1995 96% 29.1 kg/m2 Postmenopausal Never Smoker Vitals History BMI and BSA Data Body Mass Index Body Surface Area  
 29.1 kg/m 2 1.69 m 2 Preferred Pharmacy Pharmacy Name Phone [facility] [address] [place] [address] Vernon Guardado [phone] Your Updated Medication List  
  
   
This list is accurate as of 10/2/18  2:38 PM.  Always use your most recent med list.  
  
  
  
  
 alendronate 70 mg tablet Commonly known as:  FOSAMAX TAKE 1 TABLET EVERY SUNDAY  
  
 aspirin, buffered 81 mg Tab Take 81 mg by mouth daily. atorvastatin 40 mg tablet Commonly known as:  LIPITOR  
TAKE 1 TABLET EVERY DAY  
  
 furosemide 20 mg tablet Commonly known as:  LASIX Take 1 Tab by mouth daily as needed. Indications: swelling  
  
 metoprolol succinate 25 mg XL tablet Commonly known as:  TOPROL-XL  
TAKE ONE TABLET BY MOUTH NIGHTLY  
  
 omeprazole 20 mg capsule Commonly known as:  PRILOSEC  
TAKE 1 CAPSULE EVERY DAY  
  
 pramipexole 0.5 mg tablet Commonly known as:  MIRAPEX TAKE 1/2 TABLET EVERY NIGHT  
  
 TYLENOL 325 mg tablet Generic drug:  acetaminophen Take  by mouth every four (4) hours as needed for Pain. Prescriptions Sent to Pharmacy Refills  
 furosemide (LASIX) 20 mg tablet 0 Sig: Take 1 Tab by mouth daily as needed. Indications: swelling Class: Normal  
 Pharmacy: [address], [address] [place] [address] Ph #: [phone] Route: Oral  
  
We Performed the Following ADMIN INFLUENZA VIRUS VAC [ [facility]] CBC WITH AUTOMATED DIFF [09600 CPT(R)] INFLUENZA VACCINE INACTIVATED (IIV), SUBUNIT, ADJUVANTED, IM [id number] CPT(R)] METABOLIC PANEL, BASIC [19939 CPT(R)] Follow-up Instructions Return in about 4 months (around 2/2/2019) for cholesterol, bp - come fasting, AWV, 30. Patient Instructions Vaccine Information Statement Influenza (Flu) Vaccine (Inactivated or Recombinant): What you need to know Many Vaccine Information Statements are available in [place] and other languages. See www.immunize.org/vis Hojas de Información Sobre Vacunas están disponibles en Español y en muchos otros idiomas. Visite www.immunize.org/vis 1. Why get vaccinated? Influenza (flu) is a contagious disease that spreads around the [facility] every year, usually between October and May. Flu is caused by influenza viruses, and is spread mainly by coughing, sneezing, and close contact. Anyone can get flu. Flu strikes suddenly and can last several days. Symptoms vary by age, but can include: 
 fever/chills  sore throat  muscle aches  fatigue  cough  headache  runny or stuffy nose Flu can also lead to pneumonia and blood infections, and cause diarrhea and seizures in children. If you have a medical condition, such as heart or lung disease, flu can make it worse. Flu is more dangerous for some people. Infants and young children, people 72years of age and older, pregnant women, and people with certain health conditions or a weakened immune system are at greatest risk. Each year thousands of people in the [facility] die from flu, and many more are hospitalized. Flu vaccine can: 
 keep you from getting flu, 
 make flu less severe if you do get it, and 
 keep you from spreading flu to your family and other people. 2. Inactivated and recombinant flu vaccines A dose of flu vaccine is recommended every flu season. Children 6 months through 6years of age may need two doses during the same flu season. Everyone else needs only one dose each flu season.   
 
 
Some inactivated flu vaccines contain a very small amount of a mercury-based preservative called thimerosal. Studies have not shown thimerosal in vaccines to be harmful, but flu vaccines that do not contain thimerosal are available. There is no live flu virus in flu shots. They cannot cause the flu. There are many flu viruses, and they are always changing. Each year a new flu vaccine is made to protect against three or four viruses that are likely to cause disease in the upcoming flu season. But even when the vaccine doesnt exactly match these viruses, it may still provide some protection Flu vaccine cannot prevent: 
 flu that is caused by a virus not covered by the vaccine, or 
 illnesses that look like flu but are not. It takes about 2 weeks for protection to develop after vaccination, and protection lasts through the flu season. 3. Some people should not get this vaccine Tell the person who is giving you the vaccine:  If you have any severe, life-threatening allergies. If you ever had a life-threatening allergic reaction after a dose of flu vaccine, or have a severe allergy to any part of this vaccine, you may be advised not to get vaccinated. Most, but not all, types of flu vaccine contain a small amount of egg protein.  If you ever had Guillain-Barré Syndrome (also called GBS). Some people with a history of GBS should not get this vaccine. This should be discussed with your doctor.  If you are not feeling well. It is usually okay to get flu vaccine when you have a mild illness, but you might be asked to come back when you feel better. 4. Risks of a vaccine reaction With any medicine, including vaccines, there is a chance of reactions. These are usually mild and go away on their own, but serious reactions are also possible. Most people who get a flu shot do not have any problems with it. Minor problems following a flu shot include:  
 soreness, redness, or swelling where the shot was given  hoarseness  sore, red or itchy eyes  cough  fever  aches  headache  itching  fatigue If these problems occur, they usually begin soon after the shot and last 1 or 2 days. More serious problems following a flu shot can include the following:  There may be a small increased risk of Guillain-Barré Syndrome (GBS) after inactivated flu vaccine. This risk has been estimated at 1 or 2 additional cases per million people vaccinated. This is much lower than the risk of severe complications from flu, which can be prevented by flu vaccine.  Young children who get the flu shot along with pneumococcal vaccine (PCV13) and/or DTaP vaccine at the same time might be slightly more likely to have a seizure caused by fever. Ask your doctor for more information. Tell your doctor if a child who is getting flu vaccine has ever had a seizure. Problems that could happen after any injected vaccine:  People sometimes faint after a medical procedure, including vaccination. Sitting or lying down for about 15 minutes can help prevent fainting, and injuries caused by a fall. Tell your doctor if you feel dizzy, or have vision changes or ringing in the ears.  Some people get severe pain in the shoulder and have difficulty moving the arm where a shot was given. This happens very rarely.  Any medication can cause a severe allergic reaction. Such reactions from a vaccine are very rare, estimated at about 1 in a million doses, and would happen within a few minutes to a few hours after the vaccination. As with any medicine, there is a very remote chance of a vaccine causing a serious injury or death. The safety of vaccines is always being monitored. For more information, visit: www.cdc.gov/vaccinesafety/ 
 
5. What if there is a serious reaction? What should I look for?  Look for anything that concerns you, such as signs of a severe allergic reaction, very high fever, or unusual behavior. Signs of a severe allergic reaction can include hives, swelling of the face and throat, difficulty breathing, a fast heartbeat, dizziness, and weakness  usually within a few minutes to a few hours after the vaccination. What should I do?  If you think it is a severe allergic reaction or other emergency that cant wait, call 9-1-1 and get the person to the nearest hospital. Otherwise, call your doctor.  Reactions should be reported to the Vaccine Adverse Event Reporting System (VAERS). Your doctor should file this report, or you can do it yourself through  the VAERS web site at www.vaers. [facility].gov, or by calling 5-998.849.4855. VAERS does not give medical advice. 6. The National Vaccine Injury Compensation Program 
 
The [facility] Vaccine Injury Compensation Program (VICP) is a federal program that was created to compensate people who may have been injured by certain vaccines. Persons who believe they may have been injured by a vaccine can learn about the program and about filing a claim by calling 8-988.207.8192 or visiting the [facility] [address] website at www.[facility].gov/vaccinecompensation. There is a time limit to file a claim for compensation. 7. How can I learn more?  Ask your healthcare provider. He or she can give you the vaccine package insert or suggest other sources of information.  Call your local or state health department.  Contact the Centers for Disease Control and Prevention (CDC): 
- Call 5-384.420.7137 (1-800-CDC-INFO) or 
- Visit CDCs website at www.cdc.gov/flu Vaccine Information Statement Inactivated Influenza Vaccine 8/7/2015 
42 PRESTON Brantley [id number] Department of Health and [employer] Centers for Disease Control and Prevention Office Use Only Introducing [facility]! Dear Sathish Gooden: 
Thank you for requesting a [employer] account. Our records indicate that you already have an active [employer] account.   You can access your account anytime at [email or web address] Did you know that you can access your hospital and ER discharge instructions at any time in [employer]? You can also review all of your test results from your hospital stay or ER visit. Additional Information If you have questions, please visit the Frequently Asked Questions section of the [employer] website at [email or web address] Remember, [employer] is NOT to be used for urgent needs. For medical emergencies, dial 911. Now available from your iPhone and Android! Please provide this summary of care documentation to your next provider. Your primary care clinician is listed as Ray Slater. If you have any questions after today's visit, please call [phone].

## 2018-10-02 NOTE — PATIENT INSTRUCTIONS
Vaccine Information Statement    Influenza (Flu) Vaccine (Inactivated or Recombinant): What you need to know    Many Vaccine Information Statements are available in English and other languages. See www.immunize.org/vis  Hojas de Información Sobre Vacunas están disponibles en Español y en muchos otros idiomas. Visite www.immunize.org/vis    1. Why get vaccinated? Influenza (flu) is a contagious disease that spreads around the [place] every year, usually between October and May. Flu is caused by influenza viruses, and is spread mainly by coughing, sneezing, and close contact. Anyone can get flu. Flu strikes suddenly and can last several days. Symptoms vary by age, but can include:   fever/chills   sore throat   muscle aches   fatigue   cough   headache    runny or stuffy nose    Flu can also lead to pneumonia and blood infections, and cause diarrhea and seizures in children. If you have a medical condition, such as heart or lung disease, flu can make it worse. Flu is more dangerous for some people. Infants and young children, people 72years of age and older, pregnant women, and people with certain health conditions or a weakened immune system are at greatest risk. Each year thousands of people in the [facility] die from flu, and many more are hospitalized. Flu vaccine can:   keep you from getting flu,   make flu less severe if you do get it, and   keep you from spreading flu to your family and other people. 2. Inactivated and recombinant flu vaccines    A dose of flu vaccine is recommended every flu season. Children 6 months through 6years of age may need two doses during the same flu season. Everyone else needs only one dose each flu season.        Some inactivated flu vaccines contain a very small amount of a mercury-based preservative called thimerosal. Studies have not shown thimerosal in vaccines to be harmful, but flu vaccines that do not contain thimerosal are available. There is no live flu virus in flu shots. They cannot cause the flu. There are many flu viruses, and they are always changing. Each year a new flu vaccine is made to protect against three or four viruses that are likely to cause disease in the upcoming flu season. But even when the vaccine doesnt exactly match these viruses, it may still provide some protection    Flu vaccine cannot prevent:   flu that is caused by a virus not covered by the vaccine, or   illnesses that look like flu but are not. It takes about 2 weeks for protection to develop after vaccination, and protection lasts through the flu season. 3. Some people should not get this vaccine    Tell the person who is giving you the vaccine:     If you have any severe, life-threatening allergies. If you ever had a life-threatening allergic reaction after a dose of flu vaccine, or have a severe allergy to any part of this vaccine, you may be advised not to get vaccinated. Most, but not all, types of flu vaccine contain a small amount of egg protein.  If you ever had Guillain-Barré Syndrome (also called GBS). Some people with a history of GBS should not get this vaccine. This should be discussed with your doctor.  If you are not feeling well. It is usually okay to get flu vaccine when you have a mild illness, but you might be asked to come back when you feel better. 4. Risks of a vaccine reaction    With any medicine, including vaccines, there is a chance of reactions. These are usually mild and go away on their own, but serious reactions are also possible. Most people who get a flu shot do not have any problems with it.      Minor problems following a flu shot include:    soreness, redness, or swelling where the shot was given     hoarseness   sore, red or itchy eyes   cough   fever   aches   headache   itching   fatigue  If these problems occur, they usually begin soon after the shot and last 1 or 2 days. More serious problems following a flu shot can include the following:     There may be a small increased risk of Guillain-Barré Syndrome (GBS) after inactivated flu vaccine. This risk has been estimated at 1 or 2 additional cases per million people vaccinated. This is much lower than the risk of severe complications from flu, which can be prevented by flu vaccine.  Young children who get the flu shot along with pneumococcal vaccine (PCV13) and/or DTaP vaccine at the same time might be slightly more likely to have a seizure caused by fever. Ask your doctor for more information. Tell your doctor if a child who is getting flu vaccine has ever had a seizure. Problems that could happen after any injected vaccine:      People sometimes faint after a medical procedure, including vaccination. Sitting or lying down for about 15 minutes can help prevent fainting, and injuries caused by a fall. Tell your doctor if you feel dizzy, or have vision changes or ringing in the ears.  Some people get severe pain in the shoulder and have difficulty moving the arm where a shot was given. This happens very rarely.  Any medication can cause a severe allergic reaction. Such reactions from a vaccine are very rare, estimated at about 1 in a million doses, and would happen within a few minutes to a few hours after the vaccination. As with any medicine, there is a very remote chance of a vaccine causing a serious injury or death. The safety of vaccines is always being monitored. For more information, visit: www.cdc.gov/vaccinesafety/    5. What if there is a serious reaction? What should I look for?  Look for anything that concerns you, such as signs of a severe allergic reaction, very high fever, or unusual behavior.     Signs of a severe allergic reaction can include hives, swelling of the face and throat, difficulty breathing, a fast heartbeat, dizziness, and weakness - usually within a few minutes to a few hours after the vaccination. What should I do?  If you think it is a severe allergic reaction or other emergency that cant wait, call 9-1-1 and get the person to the nearest hospital. Otherwise, call your doctor.  Reactions should be reported to the Vaccine Adverse Event Reporting System (VAERS). Your doctor should file this report, or you can do it yourself through  the VAERS web site at www.vaers. [facility].gov, or by calling 1-148.635.9408. VAERS does not give medical advice. 6. The National Vaccine Injury Compensation Program    The [facility] Vaccine Injury Compensation Program (VICP) is a federal program that was created to compensate people who may have been injured by certain vaccines. Persons who believe they may have been injured by a vaccine can learn about the program and about filing a claim by calling 3-425.246.2724 or visiting the [employer] website at www.[facility].gov/vaccinecompensation. There is a time limit to file a claim for compensation. 7. How can I learn more?  Ask your healthcare provider. He or she can give you the vaccine package insert or suggest other sources of information.  Call your local or state health department.  Contact the Centers for Disease Control and Prevention (CDC):  - Call 8-558.804.6999 (1-800-CDC-INFO) or  - Visit CDCs website at www.cdc.gov/flu    Vaccine Information Statement   Inactivated Influenza Vaccine   8/7/2015  42 [address] Juvencio Lowers [id number]    Department of Health and Human Services  Centers for Disease Control and Prevention    Office Use Only

## 2018-10-02 NOTE — PROGRESS NOTES
HPI  Ms. Tracey Ganser is a 68y.o. year old female, she is seen today for AWV but already done. Had DAXA lumbar spine on Thursday. Prior to injection had tingling down left leg. Initially numbness after injection and since has had burning, numbness and swelling left lower leg getting worse. Better in am and better as day goes on. Has tried ice doesn't help much. No chest pain or sob. No f/c. No n/v/abd pain. Low back pain is improved since injection. Notes swelling is actually improved today compared to yesterday. Chief Complaint   Patient presents with    Annual Wellness Visit     Room 2A// NON fasting     Foot Swelling     R foot x Saturday         Prior to Admission medications    Medication Sig Start Date End Date Taking? Authorizing Provider   alendronate (FOSAMAX) 70 mg tablet TAKE 1 TABLET EVERY SUNDAY 4/18/18  Yes Conchita Valdez MD   atorvastatin (LIPITOR) 40 mg tablet TAKE 1 TABLET EVERY DAY 2/11/18  Yes Conchita Valdez MD   pramipexole (MIRAPEX) 0.5 mg tablet TAKE 1/2 TABLET EVERY NIGHT 12/5/17  Yes Conchita Valdez MD   metoprolol succinate (TOPROL-XL) 25 mg XL tablet TAKE ONE TABLET BY MOUTH NIGHTLY 10/23/17  Yes Conchita Valdez MD   omeprazole (PRILOSEC) 20 mg capsule TAKE 1 CAPSULE EVERY DAY 10/23/17  Yes Conchita Valdez MD   Aspirin, Buffered 81 mg tab Take 81 mg by mouth daily. Yes Historical Provider   acetaminophen (TYLENOL) 325 mg tablet Take  by mouth every four (4) hours as needed for Pain.    Yes Historical Provider   OTHER     Historical Provider         Allergies   Allergen Reactions    Fosamax [Alendronate] Myalgia         REVIEW OF SYSTEMS:  Per HPI    PHYSICAL EXAM:  Visit Vitals    /74 (BP 1 Location: Left arm, BP Patient Position: Sitting)    Pulse 82    Temp 98 °F (36.7 °C) (Oral)    Resp 16    Ht 5' (1.524 m)    Wt 149 lb (67.6 kg)    LMP 10/26/1995    SpO2 96%    BMI 29.1 kg/m2     Constitutional: Appears well-developed and well-nourished. No distress. HENT:   Head: Normocephalic and atraumatic. Eyes: No scleral icterus. Cardiovascular: Normal S1/S2, regular rhythm. No murmurs, rubs, or gallops. Pulmonary/Chest: Effort normal and breath sounds normal. No respiratory distress. No wheezes, rhonchi, or rales. Back: no lesions, no pain on palpation  Ext: 1+ left foot edema, no calf tenderness. Neurological: Alert. Strength 5/5 b/l LE  Psychiatric: Normal mood and affect. Behavior is normal.     Lab Results   Component Value Date/Time    Sodium 141 01/08/2018 09:59 AM    Potassium 4.7 01/08/2018 09:59 AM    Chloride 103 01/08/2018 09:59 AM    CO2 23 01/08/2018 09:59 AM    Anion gap 12 07/16/2010 10:07 AM    Glucose 100 (H) 01/08/2018 09:59 AM    Glucose 98 08/05/2010 08:34 AM    BUN 19 01/08/2018 09:59 AM    Creatinine 1.07 (H) 01/08/2018 09:59 AM    BUN/Creatinine ratio 18 01/08/2018 09:59 AM    GFR est AA 60 01/08/2018 09:59 AM    GFR est non-AA 52 (L) 01/08/2018 09:59 AM    Calcium 9.2 01/08/2018 09:59 AM    Bilirubin, total <0.2 10/30/2017 08:22 AM    AST (SGOT) 14 10/30/2017 08:22 AM    Alk. phosphatase 80 10/30/2017 08:22 AM    Protein, total 6.6 10/30/2017 08:22 AM    Albumin 3.8 10/30/2017 08:22 AM    Globulin 3.1 07/16/2010 10:07 AM    A-G Ratio 1.4 10/30/2017 08:22 AM    ALT (SGPT) 19 10/30/2017 08:22 AM     Lab Results   Component Value Date/Time    Hemoglobin A1c 5.7 08/05/2010 08:34 AM      Lab Results   Component Value Date/Time    Cholesterol, total 247 (H) 01/08/2018 09:59 AM    HDL Cholesterol 33 (L) 01/08/2018 09:59 AM    LDL, calculated 158 (H) 01/08/2018 09:59 AM    VLDL, calculated 56 (H) 01/08/2018 09:59 AM    Triglyceride 282 (H) 01/08/2018 09:59 AM          ASSESSMENT/PLAN  Diagnoses and all orders for this visit:    1. Localized edema  -     furosemide (LASIX) 20 mg tablet; Take 1 Tab by mouth daily as needed.  Indications: swelling  Improving - add above - if not better see Dr. Glen Garner as it occurred after injection  2. Encounter for immunization  -     Influenza Vaccine Inactivated (IIV)(FLUAD), Subunit, Adjuvanted, IM, (99732)  -     Administration fee () for Medicare insured patients    3. Essential hypertension  -     METABOLIC PANEL, BASIC  -     CBC WITH AUTOMATED DIFF  Controlled on current regimen, continue   4. Chronic midline low back pain without sciatica  Followed by ortho - s/p DAXA        Health Maintenance Due   Topic Date Due    Influenza Age 5 to Adult  08/01/2018        Follow-up Disposition:  Return in about 4 months (around 2/2/2019) for cholesterol, bp - come fasting, AWV, 30.       Reviewed plan of care. Patient has provided input and agrees with goals. The nurse provided the patient and/or family with advanced directive information if needed and encouraged the patient to provide a copy to the office when available.

## 2018-10-03 LAB
BASOPHILS # BLD AUTO: 0 X10E3/UL (ref 0–0.2)
BASOPHILS NFR BLD AUTO: 0 %
BUN SERPL-MCNC: 20 MG/DL (ref 8–27)
BUN/CREAT SERPL: 15 (ref 12–28)
CALCIUM SERPL-MCNC: 9.1 MG/DL (ref 8.7–10.3)
CHLORIDE SERPL-SCNC: 105 MMOL/L (ref 96–106)
CO2 SERPL-SCNC: 22 MMOL/L (ref 20–29)
CREAT SERPL-MCNC: 1.31 MG/DL (ref 0.57–1)
EOSINOPHIL # BLD AUTO: 0.1 X10E3/UL (ref 0–0.4)
EOSINOPHIL NFR BLD AUTO: 1 %
ERYTHROCYTE [DISTWIDTH] IN BLOOD BY AUTOMATED COUNT: 14.2 % (ref 12.3–15.4)
GLUCOSE SERPL-MCNC: 130 MG/DL (ref 65–99)
HCT VFR BLD AUTO: 42 % (ref 34–46.6)
HGB BLD-MCNC: 13.7 G/DL (ref 11.1–15.9)
IMM GRANULOCYTES # BLD: 0.1 X10E3/UL (ref 0–0.1)
IMM GRANULOCYTES NFR BLD: 1 %
LYMPHOCYTES # BLD AUTO: 2.3 X10E3/UL (ref 0.7–3.1)
LYMPHOCYTES NFR BLD AUTO: 24 %
MCH RBC QN AUTO: 30 PG (ref 26.6–33)
MCHC RBC AUTO-ENTMCNC: 32.6 G/DL (ref 31.5–35.7)
MCV RBC AUTO: 92 FL (ref 79–97)
MONOCYTES # BLD AUTO: 0.6 X10E3/UL (ref 0.1–0.9)
MONOCYTES NFR BLD AUTO: 6 %
NEUTROPHILS # BLD AUTO: 6.5 X10E3/UL (ref 1.4–7)
NEUTROPHILS NFR BLD AUTO: 68 %
PLATELET # BLD AUTO: 274 X10E3/UL (ref 150–379)
POTASSIUM SERPL-SCNC: 4.3 MMOL/L (ref 3.5–5.2)
RBC # BLD AUTO: 4.57 X10E6/UL (ref 3.77–5.28)
SODIUM SERPL-SCNC: 143 MMOL/L (ref 134–144)
WBC # BLD AUTO: 9.6 X10E3/UL (ref 3.4–10.8)

## 2018-11-04 NOTE — PROGRESS NOTES
Please have patient return this month for repeat BMP - creatinine (kidney lab) slightly elevated and need to recheck - if taking nsaids (aleve, ibuprofen) stop them

## 2018-11-07 NOTE — PROGRESS NOTES
Pt notified. States Ortho put her on something for her back and not sure if it is an NSAID or not. Will call back when she gets home with the medication name to ask if she can still take it. She will schedule her lab appt when she calls back.

## 2018-11-09 ENCOUNTER — TELEPHONE (OUTPATIENT)
Dept: INTERNAL MEDICINE CLINIC | Facility: CLINIC | Age: 73
End: 2018-11-09

## 2018-11-09 NOTE — TELEPHONE ENCOUNTER
Pt states spoke w/nurse on 11/7/18 but couldn't remember the name of the medication she was taking  Called to advise name of medication is celecoxib, wants to know if that interferes with any of her other medications

## 2018-11-09 NOTE — TELEPHONE ENCOUNTER
Pt reports she is taking 200 mg daily. States the pharmacist did not report any compatibility concerns with her other meds, but she wanted Dr Michelle Sawyer to know she is on this now. Was rx'ed by Dr Riki Nguyen @[facility].

## 2018-11-28 ENCOUNTER — TELEPHONE (OUTPATIENT)
Dept: INTERNAL MEDICINE CLINIC | Facility: CLINIC | Age: 73
End: 2018-11-28

## 2018-11-28 DIAGNOSIS — R79.89 ELEVATED SERUM CREATININE: Primary | ICD-10-CM

## 2018-11-28 NOTE — TELEPHONE ENCOUNTER
Celecoxib is an nsaid and should not take for now. Can consider restarting at lower or less frequent dosing if repeat bmp normal.  Should have scheduled soon.

## 2018-11-28 NOTE — TELEPHONE ENCOUNTER
Pt was concerned her Celecoxib was an Nsaid and wanted to see if she should take it with her elevated kidney labs

## 2018-11-28 NOTE — TELEPHONE ENCOUNTER
[phone] - pt number    Patient is stating she is still waiting for instructions concerning a medication that Dr. Zelalem Ortiz prescribed her on 11/9    Please call.

## 2018-12-03 NOTE — TELEPHONE ENCOUNTER
Best if she avoids nsaids including celebrex. Tylenol okay. If pain intolerable could try celebrex 100mg few days per week if needed.

## 2019-02-19 ENCOUNTER — OFFICE VISIT (OUTPATIENT)
Dept: INTERNAL MEDICINE CLINIC | Facility: CLINIC | Age: 74
End: 2019-02-19

## 2019-02-19 VITALS
SYSTOLIC BLOOD PRESSURE: 140 MMHG | BODY MASS INDEX: 30.9 KG/M2 | WEIGHT: 157.4 LBS | HEART RATE: 74 BPM | DIASTOLIC BLOOD PRESSURE: 74 MMHG | OXYGEN SATURATION: 97 % | RESPIRATION RATE: 16 BRPM | TEMPERATURE: 97.6 F | HEIGHT: 60 IN

## 2019-02-19 DIAGNOSIS — Z00.00 MEDICARE ANNUAL WELLNESS VISIT, SUBSEQUENT: Primary | ICD-10-CM

## 2019-02-19 DIAGNOSIS — M81.0 AGE-RELATED OSTEOPOROSIS WITHOUT CURRENT PATHOLOGICAL FRACTURE: ICD-10-CM

## 2019-02-19 DIAGNOSIS — Z71.89 ADVANCED DIRECTIVES, COUNSELING/DISCUSSION: ICD-10-CM

## 2019-02-19 DIAGNOSIS — E78.5 HYPERLIPIDEMIA, UNSPECIFIED HYPERLIPIDEMIA TYPE: ICD-10-CM

## 2019-02-19 DIAGNOSIS — Z13.31 SCREENING FOR DEPRESSION: ICD-10-CM

## 2019-02-19 DIAGNOSIS — K21.9 GASTROESOPHAGEAL REFLUX DISEASE, ESOPHAGITIS PRESENCE NOT SPECIFIED: ICD-10-CM

## 2019-02-19 DIAGNOSIS — M54.41 ACUTE BACK PAIN WITH SCIATICA, RIGHT: ICD-10-CM

## 2019-02-19 DIAGNOSIS — I10 ESSENTIAL HYPERTENSION: ICD-10-CM

## 2019-02-19 DIAGNOSIS — G25.81 RLS (RESTLESS LEGS SYNDROME): ICD-10-CM

## 2019-02-19 RX ORDER — ATORVASTATIN CALCIUM 40 MG/1
40 TABLET, FILM COATED ORAL DAILY
Qty: 90 TAB | Refills: 3 | Status: SHIPPED | OUTPATIENT
Start: 2019-02-19 | End: 2020-10-13 | Stop reason: SDUPTHER

## 2019-02-19 RX ORDER — PRAMIPEXOLE DIHYDROCHLORIDE 0.5 MG/1
0.25 TABLET ORAL DAILY
Qty: 45 TAB | Refills: 3 | Status: SHIPPED | OUTPATIENT
Start: 2019-02-19 | End: 2020-03-10 | Stop reason: SDUPTHER

## 2019-02-19 RX ORDER — METOPROLOL SUCCINATE 25 MG/1
25 TABLET, EXTENDED RELEASE ORAL DAILY
Qty: 90 TAB | Refills: 3 | Status: SHIPPED | OUTPATIENT
Start: 2019-02-19 | End: 2019-09-10 | Stop reason: SDUPTHER

## 2019-02-19 RX ORDER — ALENDRONATE SODIUM 70 MG/1
TABLET ORAL
Qty: 12 TAB | Refills: 3 | Status: SHIPPED | OUTPATIENT
Start: 2019-02-19 | End: 2019-12-12

## 2019-02-19 RX ORDER — METHYLPREDNISOLONE 4 MG/1
TABLET ORAL
Qty: 1 DOSE PACK | Refills: 0 | Status: SHIPPED | OUTPATIENT
Start: 2019-02-19 | End: 2019-02-25

## 2019-02-19 RX ORDER — OMEPRAZOLE 20 MG/1
20 CAPSULE, DELAYED RELEASE ORAL DAILY
Qty: 90 CAP | Refills: 3 | Status: SHIPPED | OUTPATIENT
Start: 2019-02-19 | End: 2020-03-10 | Stop reason: SDUPTHER

## 2019-02-19 NOTE — PROGRESS NOTES
[id number] Chief Complaint Patient presents with  Cholesterol Problem Room 2A//  Blood Pressure Check [facility] Annual Wellness Visit Reviewed record In preparation for visit and have obtained necessary documentation 1. Have you been to the ER, urgent care clinic since your last visit? Hospitalized since your last visit? NO 
2. Have you seen or consulted any other health care providers outside of the [address] [place] [address] since your last visit? Include any pap smears or colon screening. NO Patient has advance directive on file Provider advised of reason for visit and vitals Health Maintenance Due Topic  MEDICARE YEARLY EXAM   
 
 
Wt Readings from Last 3 Encounters:  
10/02/18 149 lb (67.6 kg) 01/08/18 151 lb 6.4 oz (68.7 kg) 11/06/17 150 lb 9.6 oz (68.3 kg) Temp Readings from Last 3 Encounters:  
10/02/18 98 °F (36.7 °C) (Oral) 01/08/18 97.9 °F (36.6 °C) (Oral) 11/06/17 97.9 °F (36.6 °C) (Oral) BP Readings from Last 3 Encounters:  
10/02/18 125/74  
01/08/18 152/86  
11/06/17 138/80 Pulse Readings from Last 3 Encounters:  
10/02/18 82  
01/08/18 72  
11/06/17 80 Learning Assessment: 
:  
 
Learning Assessment 12/19/2014 PRIMARY LEARNER Patient HIGHEST LEVEL OF EDUCATION - PRIMARY LEARNER  DID NOT GRADUATE HIGH SCHOOL  
BARRIERS PRIMARY LEARNER NONE  
CO-LEARNER CAREGIVER No  
PRIMARY LANGUAGE ENGLISH  
LEARNER PREFERENCE PRIMARY DEMONSTRATION  
ANSWERED BY patient RELATIONSHIP SELF Depression Screening: 
:  
 
3 most recent PHQ Screens 2/19/2019 Little interest or pleasure in doing things Not at all Feeling down, depressed, irritable, or hopeless Not at all Total Score PHQ 2 0 Fall Risk Assessment: 
:  
 
Fall Risk Assessment, last 12 mths 2/19/2019 Able to walk? Yes Fall in past 12 months? No  
 
 
Abuse Screening: 
:  
 
Abuse Screening Questionnaire 2/19/2019 1/8/2018 12/29/2016 9/14/2015 Do you ever feel afraid of your partner? N N N N Are you in a relationship with someone who physically or mentally threatens you? N N N N Is it safe for you to go home? Y Y Y Y  
 
 
ADL Screening: 
:  
 
ADL Assessment 1/8/2018 Feeding yourself No Help Needed Getting from bed to chair No Help Needed Getting dressed No Help Needed Bathing or showering No Help Needed Walk across the room (includes cane/walker) No Help Needed Using the telphone No Help Needed Taking your medications No Help Needed Preparing meals No Help Needed Managing money (expenses/bills) No Help Needed Moderately strenuous housework (laundry) No Help Needed Shopping for personal items (toiletries/medicines) No Help Needed Shopping for groceries No Help Needed Driving No Help Needed Climbing a flight of stairs No Help Needed Getting to places beyond walking distances No Help Needed Medication reconciliation up to date and corrected with patient at this time.

## 2019-02-19 NOTE — PROGRESS NOTES
HPI Ms. Sanjana Capellan is a 68y.o. year old female, she is seen today for follow up cholesterol, HTN, and AWV. Has been out of metoprolol for past 5 days. No chest pain, sob, dizziness, weakness. No n/v/abd pain. Complains of throbbing pain right leg, worse at night for the last 10 days. Pain is worse in right calf with swelling. No known injury. Has worsening chronic low back pain since traveled to [place] about 3 weeks ago. Went down for son's surgery - had cancer in lung, bladder, prostate. Did get up and walk during drive. Also complains of right hand pain and swelling over mcp joints for about 10 days. Weight is up 8# since October. May have been eating more - has been eating out more this month - was down in [place] with son for most of February. Chief Complaint Patient presents with  Cholesterol Problem Room 2A// fasting  Blood Pressure Check  
  ran out of metoprolol x 5 days  Pre-op Exam  
 Leg Pain R leg pain and bilat hand pain  Medication Refill  
  would like printed rx's Prior to Admission medications Medication Sig Start Date End Date Taking? Authorizing Provider  
metoprolol succinate (TOPROL-XL) 25 mg XL tablet Take 1 Tab by mouth daily. 2/19/19  Yes Deborah Duarte MD  
atorvastatin (LIPITOR) 40 mg tablet Take 1 Tab by mouth daily. 2/19/19  Yes Deborah Duarte MD  
pramipexole (MIRAPEX) 0.5 mg tablet Take 0.5 Tabs by mouth daily. 2/19/19  Yes Deborah Duarte MD  
omeprazole (PRILOSEC) 20 mg capsule Take 1 Cap by mouth daily. 2/19/19  Yes Deborah Duarte MD  
alendronate (FOSAMAX) 70 mg tablet TAKE 1 TABLET EVERY SUNDAY 2/19/19  Yes Deborah Duarte MD  
methylPREDNISolone (MEDROL DOSEPACK) 4 mg tablet use as directed 2/19/19 2/25/19 Yes Deborah Duarte MD  
Aspirin, Buffered 81 mg tab Take 81 mg by mouth daily.    Yes Provider, Historical  
acetaminophen (TYLENOL) 325 mg tablet Take  by mouth every four (4) hours as needed for Pain. Yes Provider, Historical  
furosemide (LASIX) 20 mg tablet Take 1 Tab by mouth daily as needed. Indications: swelling 10/2/18   Lainey Hernandez MD  
 
 
 
Allergies Allergen Reactions  Fosamax [Alendronate] Myalgia REVIEW OF SYSTEMS: 
Per HPI PHYSICAL EXAM: 
Visit Vitals /74 Pulse 74 Temp 97.6 °F (36.4 °C) (Oral) Resp 16 Ht 5' (1.524 m) Wt 157 lb 6.4 oz (71.4 kg) LMP 10/26/1995 SpO2 97% BMI 30.74 kg/m² Constitutional: Appears well-developed and well-nourished. No distress. HENT:  
Head: Normocephalic and atraumatic. Eyes: No scleral icterus. Neck: no lad, no tm, supple Cardiovascular: Normal S1/S2, regular rhythm. No murmurs, rubs, or gallops. Pulmonary/Chest: Effort normal and breath sounds normal. No respiratory distress. No wheezes, rhonchi, or rales. Abdomen: Soft, NT/ND, +BS, no rebound or guarding, no masses, no HSM appreciated. Back: +pain on palpation lumbar region b/l Ext: No edema. No calf tenderness, no erythema or warm. Rehan's negative on right. Right knee: no pain on palpation, ligaments intact, no effusion, warmth, erythema Neurological: Alert. SLR pos on right Psychiatric: Normal mood and affect. Behavior is normal. 
  
Lab Results Component Value Date/Time Sodium 140 11/29/2018 01:53 PM  
 Potassium 4.8 11/29/2018 01:53 PM  
 Chloride 102 11/29/2018 01:53 PM  
 CO2 27 11/29/2018 01:53 PM  
 Anion gap 12 07/16/2010 10:07 AM  
 Glucose 105 (H) 11/29/2018 01:53 PM  
 Glucose 98 08/05/2010 08:34 AM  
 BUN 15 11/29/2018 01:53 PM  
 Creatinine 0.95 11/29/2018 01:53 PM  
 BUN/Creatinine ratio 16 11/29/2018 01:53 PM  
 GFR est AA 69 11/29/2018 01:53 PM  
 GFR est non-AA 60 11/29/2018 01:53 PM  
 Calcium 8.8 11/29/2018 01:53 PM  
 Bilirubin, total <0.2 10/30/2017 08:22 AM  
 AST (SGOT) 14 10/30/2017 08:22 AM  
 Alk.  phosphatase 80 10/30/2017 08:22 AM  
 Protein, total 6.6 10/30/2017 08:22 AM  
 Albumin 3.8 10/30/2017 08:22 AM  
 Globulin 3.1 07/16/2010 10:07 AM  
 A-G Ratio 1.4 10/30/2017 08:22 AM  
 ALT (SGPT) 19 10/30/2017 08:22 AM  
 
Lab Results Component Value Date/Time Hemoglobin A1c 5.7 08/05/2010 08:34 AM  
  
Lab Results Component Value Date/Time Cholesterol, total 247 (H) 01/08/2018 09:59 AM  
 HDL Cholesterol 33 (L) 01/08/2018 09:59 AM  
 LDL, calculated 158 (H) 01/08/2018 09:59 AM  
 VLDL, calculated 56 (H) 01/08/2018 09:59 AM  
 Triglyceride 282 (H) 01/08/2018 09:59 AM  
  
 
 
ASSESSMENT/PLAN Diagnoses and all orders for this visit: 
 
1. Medicare annual wellness visit, subsequent 2. Essential hypertension 
-     metoprolol succinate (TOPROL-XL) 25 mg XL tablet; Take 1 Tab by mouth daily. High today but out of metoprolol - will restart 3. Age-related osteoporosis without current pathological fracture 
-     alendronate (FOSAMAX) 70 mg tablet; TAKE 1 TABLET EVERY SUNDAY 4. Hyperlipidemia, unspecified hyperlipidemia type -     LIPID PANEL 
-     METABOLIC PANEL, COMPREHENSIVE 
-     atorvastatin (LIPITOR) 40 mg tablet; Take 1 Tab by mouth daily. Check lipids 5. Acute back pain with sciatica, right 
-     methylPREDNISolone (MEDROL DOSEPACK) 4 mg tablet; use as directed Suspect injured while long drive to [place] and less activity staying with son - treat as above - will go back to Dr. Connie Solorio if worse 6. Screening for depression 
-     [place] 68 7. Advanced directives, counseling/discussion 8. RLS (restless legs syndrome) -     pramipexole (MIRAPEX) 0.5 mg tablet; Take 0.5 Tabs by mouth daily. 9. Gastroesophageal reflux disease, esophagitis presence not specified 
-     omeprazole (PRILOSEC) 20 mg capsule; Take 1 Cap by mouth daily. Health Maintenance Due Topic Date Due  MEDICARE YEARLY EXAM  01/09/2019 Follow-up Disposition: 
Return for 1-2 weeks pre op. Reviewed plan of care. Patient has provided input and agrees with goals. The nurse provided the patient and/or family with advanced directive information if needed and encouraged the patient to provide a copy to the office when available. This is the Subsequent Medicare Annual Wellness Exam, performed 12 months or more after the Initial AWV or the last Subsequent AWV I have reviewed the patient's medical history in detail and updated the computerized patient record. History Past Medical History:  
Diagnosis Date  Arthritis  GERD (gastroesophageal reflux disease) [address]  Palpitations  Pneumonia 2006 (approx)  RLS (restless legs syndrome)  Shoulder fracture 2009 [facility] -Jan 09  Right side Past Surgical History:  
Procedure Laterality Date  COLONOSCOPY  7/30/2010 Dr. Tavo Layton, diverticulosis, repeat due 7/30/2015  COLONOSCOPY,JONAH CALHOUN,FORCEP/CAUTERY  9/4/2015  HX ORTHOPAEDIC Left ankle  1982 Current Outpatient Medications Medication Sig Dispense Refill  metoprolol succinate (TOPROL-XL) 25 mg XL tablet Take 1 Tab by mouth daily. 90 Tab 3  
 atorvastatin (LIPITOR) 40 mg tablet Take 1 Tab by mouth daily. 90 Tab 3  pramipexole (MIRAPEX) 0.5 mg tablet Take 0.5 Tabs by mouth daily. 45 Tab 3  
 omeprazole (PRILOSEC) 20 mg capsule Take 1 Cap by mouth daily. 90 Cap 3  
 alendronate (FOSAMAX) 70 mg tablet TAKE 1 TABLET EVERY SUNDAY 12 Tab 3  
 methylPREDNISolone (MEDROL DOSEPACK) 4 mg tablet use as directed 1 Dose Pack 0  Aspirin, Buffered 81 mg tab Take 81 mg by mouth daily.  acetaminophen (TYLENOL) 325 mg tablet Take  by mouth every four (4) hours as needed for Pain.  furosemide (LASIX) 20 mg tablet Take 1 Tab by mouth daily as needed. Indications: swelling 10 Tab 0 Allergies Allergen Reactions  Fosamax [Alendronate] Myalgia Family History Problem Relation Age of Onset  Cancer Father Colon  Diabetes Grandchild Social History Tobacco Use  Smoking status: Never Smoker  Smokeless tobacco: Never Used Substance Use Topics  Alcohol use: No  
  Alcohol/week: 0.0 oz Patient Active Problem List  
Diagnosis Code  Osteoporosis M81.0  Vitamin D deficiency E55.9  RLS (restless legs syndrome) G25.81  
 Hyperlipidemia E78.5  Abnormal stress test R94.39  
 Compression fracture [id number]  Low back pain M54.5  Hypertension I10  
 Advanced care planning/counseling discussion Z71.89  Chronic midline low back pain without sciatica M54.5, G89.29 Depression Risk Factor Screening:  
 
3 most recent PHQ Screens 2/19/2019 Little interest or pleasure in doing things Not at all Feeling down, depressed, irritable, or hopeless Not at all Total Score PHQ 2 0 Alcohol Risk Factor Screening: You do not drink alcohol or very rarely. Functional Ability and Level of Safety:  
Hearing Loss Hearing is good. Activities of Daily Living The home contains: no safety equipment. Patient does total self care Fall Risk Fall Risk Assessment, last 12 mths 2/19/2019 Able to walk? Yes Fall in past 12 months? No  
 
 
Abuse Screen Patient is not abused Cognitive Screening Evaluation of Cognitive Function: 
Has your family/caregiver stated any concerns about your memory: no 
Normal 
 
Patient Care Team  
Patient Care Team: 
Vero Mccann MD as PCP - Marilu Boles MD as Physician (Cardiology) Kashmir Roper MD (Physical Medicine and Rehab) Assessment/Plan Education and counseling provided: 
Are appropriate based on today's review and evaluation Diagnoses and all orders for this visit: 
 
1. Medicare annual wellness visit, subsequent 2. Essential hypertension 
-     metoprolol succinate (TOPROL-XL) 25 mg XL tablet; Take 1 Tab by mouth daily. 3. Age-related osteoporosis without current pathological fracture 
-     alendronate (FOSAMAX) 70 mg tablet; TAKE 1 TABLET EVERY SUNDAY 4. Hyperlipidemia, unspecified hyperlipidemia type -     LIPID PANEL 
-     METABOLIC PANEL, COMPREHENSIVE 
-     atorvastatin (LIPITOR) 40 mg tablet; Take 1 Tab by mouth daily. 5. Acute back pain with sciatica, right 
-     methylPREDNISolone (MEDROL DOSEPACK) 4 mg tablet; use as directed 6. Screening for depression 
-     [place] [id number] 7. Advanced directives, counseling/discussion 8. RLS (restless legs syndrome) -     pramipexole (MIRAPEX) 0.5 mg tablet; Take 0.5 Tabs by mouth daily. 9. Gastroesophageal reflux disease, esophagitis presence not specified 
-     omeprazole (PRILOSEC) 20 mg capsule; Take 1 Cap by mouth daily. Health Maintenance Due Topic Date Due  MEDICARE YEARLY EXAM  01/09/2019

## 2019-03-01 ENCOUNTER — OFFICE VISIT (OUTPATIENT)
Dept: INTERNAL MEDICINE CLINIC | Facility: CLINIC | Age: 74
End: 2019-03-01

## 2019-03-01 VITALS
WEIGHT: 154.8 LBS | RESPIRATION RATE: 16 BRPM | HEART RATE: 83 BPM | SYSTOLIC BLOOD PRESSURE: 130 MMHG | OXYGEN SATURATION: 97 % | TEMPERATURE: 98 F | DIASTOLIC BLOOD PRESSURE: 79 MMHG | BODY MASS INDEX: 30.39 KG/M2 | HEIGHT: 60 IN

## 2019-03-01 DIAGNOSIS — M54.50 CHRONIC MIDLINE LOW BACK PAIN WITHOUT SCIATICA: ICD-10-CM

## 2019-03-01 DIAGNOSIS — Z01.818 PREOP EXAMINATION: Primary | ICD-10-CM

## 2019-03-01 DIAGNOSIS — E78.5 HYPERLIPIDEMIA, UNSPECIFIED HYPERLIPIDEMIA TYPE: ICD-10-CM

## 2019-03-01 DIAGNOSIS — M81.0 AGE-RELATED OSTEOPOROSIS WITHOUT CURRENT PATHOLOGICAL FRACTURE: ICD-10-CM

## 2019-03-01 DIAGNOSIS — G25.81 RLS (RESTLESS LEGS SYNDROME): ICD-10-CM

## 2019-03-01 DIAGNOSIS — G89.29 CHRONIC MIDLINE LOW BACK PAIN WITHOUT SCIATICA: ICD-10-CM

## 2019-03-01 DIAGNOSIS — H25.9 SENILE CATARACT OF RIGHT EYE, UNSPECIFIED AGE-RELATED CATARACT TYPE: ICD-10-CM

## 2019-03-01 DIAGNOSIS — I10 ESSENTIAL HYPERTENSION: ICD-10-CM

## 2019-03-01 NOTE — PATIENT INSTRUCTIONS
Viral Respiratory Infection: Care Instructions Your Care Instructions Viruses are very small organisms. They grow in number after they enter your body. There are many types that cause different illnesses, such as colds and the mumps. The symptoms of a viral respiratory infection often start quickly. They include a fever, sore throat, and runny nose. You may also just not feel well. Or you may not want to eat much. Most viral respiratory infections are not serious. They usually get better with time and self-care. Antibiotics are not used to treat a viral infection. That's because antibiotics will not help cure a viral illness. In some cases, antiviral medicine can help your body fight a serious viral infection. Follow-up care is a key part of your treatment and safety. Be sure to make and go to all appointments, and call your doctor if you are having problems. It's also a good idea to know your test results and keep a list of the medicines you take. How can you care for yourself at home? · Rest as much as possible until you feel better. · Be safe with medicines. Take your medicine exactly as prescribed. Call your doctor if you think you are having a problem with your medicine. You will get more details on the specific medicine your doctor prescribes. · Take an over-the-counter pain medicine, such as acetaminophen (Tylenol), ibuprofen (Advil, Motrin), or naproxen (Aleve), as needed for pain and fever. Read and follow all instructions on the label. Do not give aspirin to anyone younger than 20. It has been linked to Reye syndrome, a serious illness. · Drink plenty of fluids, enough so that your urine is light yellow or clear like water. Hot fluids, such as tea or soup, may help relieve congestion in your nose and throat. If you have kidney, heart, or liver disease and have to limit fluids, talk with your doctor before you increase the amount of fluids you drink. · Try to clear mucus from your lungs by breathing deeply and coughing. · Gargle with warm salt water once an hour. This can help reduce swelling and throat pain. Use 1 teaspoon of salt mixed in 1 cup of warm water. · Do not smoke or allow others to smoke around you. If you need help quitting, talk to your doctor about stop-smoking programs and medicines. These can increase your chances of quitting for good. To avoid spreading the virus · Cough or sneeze into a tissue. Then throw the tissue away. · If you don't have a tissue, use your hand to cover your cough or sneeze. Then clean your hand. You can also cough into your sleeve. · Wash your hands often. Use soap and warm water. Wash for 15 to 20 seconds each time. · If you don't have soap and water near you, you can clean your hands with alcohol wipes or gel. When should you call for help? Call your doctor now or seek immediate medical care if: 
  · You have a new or higher fever.  
  · Your fever lasts more than 48 hours.  
  · You have trouble breathing.  
  · You have a fever with a stiff neck or a severe headache.  
  · You are sensitive to light.  
  · You feel very sleepy or confused.  
 Watch closely for changes in your health, and be sure to contact your doctor if: 
  · You do not get better as expected. Where can you learn more? Go to http://grace-gómez.info/. Enter M503 in the search box to learn more about \"Viral Respiratory Infection: Care Instructions. \" Current as of: September 5, 2018 Content Version: 11.9 © 7388-0745 [employer]. Care instructions adapted under license by [employer] (which disclaims liability or warranty for this information). If you have questions about a medical condition or this instruction, always ask your healthcare professional. [address] any warranty or liability for your use of this information.

## 2019-03-01 NOTE — PROGRESS NOTES
CC:  
Chief Complaint Patient presents with  Pre-op Exam  
  Dr Jah Keith [facility] surgery HISTORY OF PRESENT ILLNESS 
Gonzalez Leblanc is a 68 y.o. female. Presents for pre-operative consultation requested by Dr. Jah Kearns prior to right eye cataract surgery scheduled on 3/13/19. She has HTN, hyperlipidemia, chronic low back pain, osteoporosis, vitamin D deficiency. Today she complains of a 2 day history of a cold with non-productive cough and runny nose. Denies fevers, chills, sore throat, dyspnea, wheezing, or chest pain. Reports right leg pain she complained of at her last clinic visit went away with Medrol dose pack. Hx of Preoperative Complications Anesthesia Reactions: no 
Malignant Hypertension: no 
Bleeding excessively: no 
Transfusion: no 
DVT/PE Hx: no 
  
Latex allergy: no 
  
Physical Activity Capacity:  
Greater than 4 METS (e.g., walking > 4 mph, 1 flight of stairs, moderate housework or exercise) 
  Medication Considerations: 
Patient takes aspirin 81 mg  daily. Patient Active Problem List  
Diagnosis Code  Osteoporosis M81.0  Vitamin D deficiency E55.9  RLS (restless legs syndrome) G25.81  
 Hyperlipidemia E78.5  Abnormal stress test R94.39  
 Compression fracture [id number]  Low back pain M54.5  Hypertension I10  
 Advanced care planning/counseling discussion Z71.89  Chronic midline low back pain without sciatica M54.5, G89.29 Past Medical History:  
Diagnosis Date  Arthritis  GERD (gastroesophageal reflux disease) [address]  Palpitations  Pneumonia 2006 (approx)  RLS (restless legs syndrome)  Shoulder fracture 2009 [facility] -Jan 09  Right side Past Surgical History:  
Procedure Laterality Date  COLONOSCOPY  7/30/2010 Dr. Melvin Silva, diverticulosis, repeat due 7/30/2015  COLONOSCOPY,JONAH CALHOUN,FORCEP/CAUTERY  9/4/2015  HX LITHOTRIPSY  10/2017 [address] Left ankle fracture repair  HX OTHER SURGICAL  2008 Right shoulder surgery Allergies Allergen Reactions  Fosamax [Alendronate] Myalgia Current Outpatient Medications Medication Sig Dispense Refill  metoprolol succinate (TOPROL-XL) 25 mg XL tablet Take 1 Tab by mouth daily. 90 Tab 3  
 atorvastatin (LIPITOR) 40 mg tablet Take 1 Tab by mouth daily. 90 Tab 3  pramipexole (MIRAPEX) 0.5 mg tablet Take 0.5 Tabs by mouth daily. 45 Tab 3  
 omeprazole (PRILOSEC) 20 mg capsule Take 1 Cap by mouth daily. 90 Cap 3  
 alendronate (FOSAMAX) 70 mg tablet TAKE 1 TABLET EVERY SUNDAY 12 Tab 3  furosemide (LASIX) 20 mg tablet Take 1 Tab by mouth daily as needed. Indications: swelling 10 Tab 0  Aspirin, Buffered 81 mg tab Take 81 mg by mouth daily.  acetaminophen (TYLENOL) 325 mg tablet Take  by mouth every four (4) hours as needed for Pain. PHYSICAL EXAM 
Visit Vitals /79 (BP 1 Location: Right arm, BP Patient Position: Sitting) Pulse 83 Temp 98 °F (36.7 °C) (Oral) Resp 16 Ht 5' (1.524 m) Wt 154 lb 12.8 oz (70.2 kg) LMP 10/26/1995 SpO2 97% BMI 30.23 kg/m² General: Well-developed and well-nourished, no distress. HEENT:  Head normocephalic/atraumatic, no scleral icterus Neck: Supple. No carotid bruits, JVD, lymphadenopathy, or thyromegaly. Lungs:  Clear to ausculation bilaterally. Good air movement. Heart:  Regular rate and rhythm, normal S1 and S2, no murmur, gallop, or rub Abdomen: Soft, non-distended, normal bowel sounds, no tenderness, no guarding, masses, rebound tenderness, or HSM. Extremities: No clubbing, cyanosis, or edema. 2+ pedal pulses. Neurological: Alert and oriented. Psychiatric: Normal mood and affect. Behavior is normal.  
 
Results for orders placed or performed in visit on 11/29/18 METABOLIC PANEL, BASIC Result Value Ref Range Glucose 105 (H) 65 - 99 mg/dL BUN 15 8 - 27 mg/dL Creatinine 0.95 0.57 - 1.00 mg/dL GFR est non-AA 60 >59 mL/min/1.73 GFR est AA 69 >59 mL/min/1.73  
 BUN/Creatinine ratio 16 12 - 28 Sodium 140 134 - 144 mmol/L Potassium 4.8 3.5 - 5.2 mmol/L Chloride 102 96 - 106 mmol/L  
 CO2 27 20 - 29 mmol/L Calcium 8.8 8.7 - 10.3 mg/dL EKG today: NSR at 87 bpm. Nonspecific T wave changes. Unchanged compared to EKG dated 6/20/16. ASSESSMENT AND PLAN 
  ICD-10-CM ICD-9-CM 1. Preop examination Z01.818 V72.84 AMB POC EKG ROUTINE W/ 12 LEADS, INTER & REP 2. Senile cataract of right eye, unspecified age-related cataract type H25.9 366.10   
3. Cold J00 460   
4. Essential hypertension I10 401.9 AMB POC EKG ROUTINE W/ 12 LEADS, INTER & REP 5. Hyperlipidemia, unspecified hyperlipidemia type E78.5 272.4 6. Chronic midline low back pain without sciatica M54.5 724.2 G89.29 338.29   
7. Age-related osteoporosis without current pathological fracture M81.0 733.01   
8. RLS (restless legs syndrome) G25.81 333.94 Diagnoses and all orders for this visit: 1. Preop examination She is a low risk candidate for cataract surgery. Okay to continue ASA 81 mg daily. 
-     AMB POC EKG ROUTINE W/ 12 LEADS, INTER & REP 2. Senile cataract of right eye, unspecified age-related cataract type 3. Cold Recommended symptomatic treatment, fluids, and rest. 
 
4. Essential hypertension -     AMB POC EKG ROUTINE W/ 12 LEADS, INTER & REP 5. Hyperlipidemia, unspecified hyperlipidemia type 6. Chronic midline low back pain without sciatica 7. Age-related osteoporosis without current pathological fracture 8. RLS (restless legs syndrome) Follow-up Disposition: 
Return in about 6 months (around 9/1/2019), or if symptoms worsen or fail to improve, for Follow up with Dr. Minnie Feliciano (PCP). I have discussed the diagnosis with the patient and the intended plan as seen in the above orders. Patient is in agreement.   The patient has received an after-visit summary and questions were answered concerning future plans. I have discussed medication side effects and warnings with the patient as well.

## 2019-03-01 NOTE — PROGRESS NOTES
Per Dr. Pamela Aponte verbal order read back orders placed for EKG. [id number]  Identified pt with two pt identifiers(name and ). Chief Complaint Patient presents with  Pre-op Exam  
  Dr Didi Eugene [facility] surgery 1. Have you been to the ER, urgent care clinic since your last visit? Hospitalized since your last visit? NO 
 
2. Have you seen or consulted any other health care providers outside of the [address] [place] [address] since your last visit? Include any pap smears or colon screening. Dr Didi Eugene Today's provider has been notified of reason for visit, vitals and flowsheets obtained on patients. Patient received paperwork for advance directive during previous visit but has not completed at this time Reviewed record In preparation for visit, huddled with provider and have obtained necessary documentation There are no preventive care reminders to display for this patient. Wt Readings from Last 3 Encounters:  
19 154 lb 12.8 oz (70.2 kg) 19 157 lb 6.4 oz (71.4 kg) 10/02/18 149 lb (67.6 kg) Temp Readings from Last 3 Encounters:  
19 98 °F (36.7 °C) (Oral) 19 97.6 °F (36.4 °C) (Oral) 10/02/18 98 °F (36.7 °C) (Oral) BP Readings from Last 3 Encounters:  
19 130/79  
19 140/74  
10/02/18 125/74 Pulse Readings from Last 3 Encounters:  
19 83  
19 74  
10/02/18 82 Vitals:  
 19 1560 BP: 130/79 Pulse: 83 Resp: 16 Temp: 98 °F (36.7 °C) TempSrc: Oral  
SpO2: 97% Weight: 154 lb 12.8 oz (70.2 kg) Height: 5' (1.524 m) PainSc:   0 - No pain LMP: 10/26/1995 Learning Assessment: 
:  
 
Learning Assessment 2014 PRIMARY LEARNER Patient HIGHEST LEVEL OF EDUCATION - PRIMARY LEARNER  DID NOT GRADUATE HIGH SCHOOL  
BARRIERS PRIMARY LEARNER NONE  
CO-LEARNER CAREGIVER No  
PRIMARY LANGUAGE ENGLISH  
LEARNER PREFERENCE PRIMARY DEMONSTRATION  
ANSWERED BY patient RELATIONSHIP SELF  
 
 
 Depression Screening: 
:  
 
3 most recent PHQ Screens 3/1/2019 Little interest or pleasure in doing things Not at all Feeling down, depressed, irritable, or hopeless Not at all Total Score PHQ 2 0 Fall Risk Assessment: 
:  
 
Fall Risk Assessment, last 12 mths 2/19/2019 Able to walk? Yes Fall in past 12 months? No  
 
 
Abuse Screening: 
:  
 
Abuse Screening Questionnaire 2/19/2019 1/8/2018 12/29/2016 9/14/2015 Do you ever feel afraid of your partner? N N N N Are you in a relationship with someone who physically or mentally threatens you? N N N N Is it safe for you to go home? Y Y Y Y  
 
 
ADL Screening: 
:  
 
ADL Assessment 1/8/2018 Feeding yourself No Help Needed Getting from bed to chair No Help Needed Getting dressed No Help Needed Bathing or showering No Help Needed Walk across the room (includes cane/walker) No Help Needed Using the telphone No Help Needed Taking your medications No Help Needed Preparing meals No Help Needed Managing money (expenses/bills) No Help Needed Moderately strenuous housework (laundry) No Help Needed Shopping for personal items (toiletries/medicines) No Help Needed Shopping for groceries No Help Needed Driving No Help Needed Climbing a flight of stairs No Help Needed Getting to places beyond walking distances No Help Needed Medication reconciliation up to date and corrected with patient at this time.

## 2019-03-04 ENCOUNTER — DOCUMENTATION ONLY (OUTPATIENT)
Dept: INTERNAL MEDICINE CLINIC | Facility: CLINIC | Age: 74
End: 2019-03-04

## 2019-03-07 PROBLEM — H25.811 COMBINED FORMS OF AGE-RELATED CATARACT OF RIGHT EYE: Status: ACTIVE | Noted: 2019-03-07

## 2019-03-08 NOTE — PERIOP NOTES
[facility]  Ambulatory Surgery Unit  Pre-operative Instructions    Surgery/Procedure Date  Wednesday, March 13, 2019            Tentative Arrival Time unable to give      1. On the day of your surgery/procedure, please report to the Ambulatory Surgery Unit Registration Desk and sign in at your designated time. The Ambulatory Surgery Unit is located in [facility] on the [facility] side of the [facility] across from the [address] [place] [address]. Please have all of your health insurance cards and a photo ID. 2. You must have someone with you to drive you home, as you should not drive a car for 24 hours following anesthesia. Please make arrangements for a responsible adult friend or family member to stay with you for at least the first 24 hours after your surgery. 3. Do not have anything to eat or drink (including water, gum, mints, coffee, juice) after 11:59 PM, Tuesday. This may not apply to medications prescribed by your physician. (Please note below the special instructions with medications to take the morning of surgery, if applicable.)    4. We recommend you do not drink any alcoholic beverages for 24 hours before and after your surgery. 5. Contact your surgeons office for instructions on the following medications: non-steroidal anti-inflammatory drugs (i.e. Advil, Aleve), vitamins, and supplements. (Some surgeons will want you to stop these medications prior to surgery and others may allow you to take them)   **If you are currently taking Plavix, Coumadin, Aspirin and/or other blood-thinning agents, contact your surgeon for instructions. ** Your surgeon will partner with the physician prescribing these medications to determine if it is safe to stop or if you need to continue taking. Please do not stop taking these medications without instructions from your surgeon.     6. In an effort to help prevent surgical site infection, we ask that you shower with an anti-bacterial soap (i.e. Dial/Safeguard, or the soap provided to you at your preadmission testing appointment) for 3 days prior to and on the morning of surgery, using a fresh towel after each shower. (Please begin this process with fresh bed linens.) Do not apply any lotions, powders, or deodorants after the shower on the day of your procedure. If applicable, please do not shave the operative site for 48 hours prior to surgery. 7. Wear comfortable clothes. Wear glasses instead of contacts. Do not bring any jewelry or money (other than copays or fees as instructed). Do not wear make-up, particularly mascara, the morning of your surgery. Do not wear nail polish, particularly if you are having foot /hand surgery. Wear your hair loose or down, no ponytails, buns, meghana pins or clips. All body piercings must be removed. 8. You should understand that if you do not follow these instructions your surgery may be cancelled. If your physical condition changes (i.e. fever, cold or flu) please contact your surgeon as soon as possible. 9. It is important that you be on time. If a situation occurs where you may be late, or if you have any questions or problems, please call [phone].    10. Your surgery time may be subject to change. You will receive a phone call the day prior to surgery to confirm your arrival time. 11. Pediatric patients: please bring a change of clothes, diapers, bottle/sippy cup, pacifier, etc.      Special Instructions: Take all medications and inhalers, as prescribed, on the morning of surgery with a sip of water. I understand a pre-operative phone call will be made to verify my surgery time. In the event that I am not available, I give permission for a message to be left on my answering service and/or with another person?       yes    Preop instructions reviewed  Pt verbalized understanding.      ___________________      ___________________      ________________  (Signature of Patient)          (Witness) (Date and Time)

## 2019-03-12 ENCOUNTER — ANESTHESIA EVENT (OUTPATIENT)
Dept: SURGERY | Age: 74
End: 2019-03-12
Payer: MEDICARE

## 2019-03-12 RX ORDER — KETOROLAC TROMETHAMINE 4 MG/ML
1 SOLUTION/ DROPS OPHTHALMIC
Status: CANCELLED | OUTPATIENT
Start: 2019-03-13

## 2019-03-13 ENCOUNTER — ANESTHESIA (OUTPATIENT)
Dept: SURGERY | Age: 74
End: 2019-03-13
Payer: MEDICARE

## 2019-03-13 ENCOUNTER — HOSPITAL ENCOUNTER (OUTPATIENT)
Age: 74
Setting detail: OUTPATIENT SURGERY
Discharge: HOME | End: 2019-03-13
Attending: OPHTHALMOLOGY | Admitting: OPHTHALMOLOGY
Payer: MEDICARE

## 2019-03-13 VITALS
BODY MASS INDEX: 31.22 KG/M2 | RESPIRATION RATE: 17 BRPM | SYSTOLIC BLOOD PRESSURE: 134 MMHG | HEART RATE: 73 BPM | TEMPERATURE: 97.6 F | OXYGEN SATURATION: 95 % | HEIGHT: 60 IN | DIASTOLIC BLOOD PRESSURE: 75 MMHG | WEIGHT: 159 LBS

## 2019-03-13 PROCEDURE — 76210000046 HC AMBSU PH II REC FIRST 0.5 HR: Performed by: OPHTHALMOLOGY

## 2019-03-13 PROCEDURE — V2632 POST CHMBR INTRAOCULAR LENS: HCPCS | Performed by: OPHTHALMOLOGY

## 2019-03-13 PROCEDURE — 74011250636 HC RX REV CODE- 250/636

## 2019-03-13 PROCEDURE — 74011000250 HC RX REV CODE- 250: Performed by: OPHTHALMOLOGY

## 2019-03-13 PROCEDURE — 76060000073 HC AMB SURG ANES FIRST 0.5 HR: Performed by: OPHTHALMOLOGY

## 2019-03-13 PROCEDURE — 74011250637 HC RX REV CODE- 250/637: Performed by: OPHTHALMOLOGY

## 2019-03-13 PROCEDURE — 74011250636 HC RX REV CODE- 250/636: Performed by: OPHTHALMOLOGY

## 2019-03-13 PROCEDURE — 76030000002 HC AMB SURG OR TIME FIRST 0.: Performed by: OPHTHALMOLOGY

## 2019-03-13 PROCEDURE — 77030021352 HC CBL LD SYS DISP COVD -B: Performed by: OPHTHALMOLOGY

## 2019-03-13 PROCEDURE — 77030018846 HC SOL IRR STRL H20 ICUM -A: Performed by: OPHTHALMOLOGY

## 2019-03-13 PROCEDURE — 74011000250 HC RX REV CODE- 250

## 2019-03-13 DEVICE — LENS IOL POST 1-PC 6X13 24.0 -- ACRYSOF: Type: IMPLANTABLE DEVICE | Site: EYE | Status: FUNCTIONAL

## 2019-03-13 RX ORDER — LIDOCAINE HYDROCHLORIDE 10 MG/ML
1 INJECTION, SOLUTION EPIDURAL; INFILTRATION; INTRACAUDAL; PERINEURAL ONCE
Status: COMPLETED | OUTPATIENT
Start: 2019-03-13 | End: 2019-03-13

## 2019-03-13 RX ORDER — MIDAZOLAM HYDROCHLORIDE 1 MG/ML
INJECTION, SOLUTION INTRAMUSCULAR; INTRAVENOUS AS NEEDED
Status: DISCONTINUED | OUTPATIENT
Start: 2019-03-13 | End: 2019-03-13 | Stop reason: HOSPADM

## 2019-03-13 RX ORDER — ERYTHROMYCIN 5 MG/G
OINTMENT OPHTHALMIC AS NEEDED
Status: DISCONTINUED | OUTPATIENT
Start: 2019-03-13 | End: 2019-03-13 | Stop reason: HOSPADM

## 2019-03-13 RX ORDER — SODIUM CHLORIDE 0.9 % (FLUSH) 0.9 %
5-40 SYRINGE (ML) INJECTION EVERY 8 HOURS
Status: DISCONTINUED | OUTPATIENT
Start: 2019-03-13 | End: 2019-03-13 | Stop reason: HOSPADM

## 2019-03-13 RX ORDER — SODIUM CHLORIDE, SODIUM LACTATE, POTASSIUM CHLORIDE, CALCIUM CHLORIDE 600; 310; 30; 20 MG/100ML; MG/100ML; MG/100ML; MG/100ML
25 INJECTION, SOLUTION INTRAVENOUS CONTINUOUS
Status: DISCONTINUED | OUTPATIENT
Start: 2019-03-13 | End: 2019-03-13 | Stop reason: HOSPADM

## 2019-03-13 RX ORDER — ERYTHROMYCIN 5 MG/G
OINTMENT OPHTHALMIC
Status: DISCONTINUED | OUTPATIENT
Start: 2019-03-13 | End: 2019-03-13 | Stop reason: HOSPADM

## 2019-03-13 RX ORDER — SODIUM CHLORIDE 0.9 % (FLUSH) 0.9 %
5-40 SYRINGE (ML) INJECTION AS NEEDED
Status: DISCONTINUED | OUTPATIENT
Start: 2019-03-13 | End: 2019-03-13 | Stop reason: HOSPADM

## 2019-03-13 RX ORDER — ONDANSETRON 2 MG/ML
4 INJECTION INTRAMUSCULAR; INTRAVENOUS AS NEEDED
Status: DISCONTINUED | OUTPATIENT
Start: 2019-03-13 | End: 2019-03-13 | Stop reason: HOSPADM

## 2019-03-13 RX ORDER — LIDOCAINE HYDROCHLORIDE 10 MG/ML
0.1 INJECTION, SOLUTION EPIDURAL; INFILTRATION; INTRACAUDAL; PERINEURAL AS NEEDED
Status: DISCONTINUED | OUTPATIENT
Start: 2019-03-13 | End: 2019-03-13 | Stop reason: HOSPADM

## 2019-03-13 RX ORDER — FENTANYL CITRATE 50 UG/ML
INJECTION, SOLUTION INTRAMUSCULAR; INTRAVENOUS AS NEEDED
Status: DISCONTINUED | OUTPATIENT
Start: 2019-03-13 | End: 2019-03-13 | Stop reason: HOSPADM

## 2019-03-13 RX ORDER — DIPHENHYDRAMINE HYDROCHLORIDE 50 MG/ML
12.5 INJECTION, SOLUTION INTRAMUSCULAR; INTRAVENOUS AS NEEDED
Status: DISCONTINUED | OUTPATIENT
Start: 2019-03-13 | End: 2019-03-13 | Stop reason: HOSPADM

## 2019-03-13 RX ORDER — SODIUM CHLORIDE 9 MG/ML
25 INJECTION, SOLUTION INTRAVENOUS CONTINUOUS
Status: DISCONTINUED | OUTPATIENT
Start: 2019-03-13 | End: 2019-03-13 | Stop reason: HOSPADM

## 2019-03-13 RX ORDER — KETOROLAC TROMETHAMINE 5 MG/ML
SOLUTION OPHTHALMIC
Status: COMPLETED
Start: 2019-03-13 | End: 2019-03-13

## 2019-03-13 RX ORDER — CYCLOPENTOLATE HYDROCHLORIDE 20 MG/ML
1 SOLUTION/ DROPS OPHTHALMIC
Status: COMPLETED | OUTPATIENT
Start: 2019-03-13 | End: 2019-03-13

## 2019-03-13 RX ORDER — KETOROLAC TROMETHAMINE 5 MG/ML
1 SOLUTION OPHTHALMIC
Status: COMPLETED | OUTPATIENT
Start: 2019-03-13 | End: 2019-03-13

## 2019-03-13 RX ORDER — PREDNISOLONE ACETATE 10 MG/ML
1 SUSPENSION/ DROPS OPHTHALMIC 2 TIMES DAILY
Status: DISCONTINUED | OUTPATIENT
Start: 2019-03-13 | End: 2019-03-13 | Stop reason: HOSPADM

## 2019-03-13 RX ORDER — FENTANYL CITRATE 50 UG/ML
25 INJECTION, SOLUTION INTRAMUSCULAR; INTRAVENOUS
Status: DISCONTINUED | OUTPATIENT
Start: 2019-03-13 | End: 2019-03-13 | Stop reason: HOSPADM

## 2019-03-13 RX ORDER — KETOROLAC TROMETHAMINE 5 MG/ML
1 SOLUTION OPHTHALMIC
Status: DISCONTINUED | OUTPATIENT
Start: 2019-03-13 | End: 2019-03-13 | Stop reason: HOSPADM

## 2019-03-13 RX ORDER — PROPARACAINE HYDROCHLORIDE 5 MG/ML
1 SOLUTION/ DROPS OPHTHALMIC
Status: COMPLETED | OUTPATIENT
Start: 2019-03-13 | End: 2019-03-13

## 2019-03-13 RX ORDER — LIDOCAINE HYDROCHLORIDE 20 MG/ML
3 INJECTION, SOLUTION INFILTRATION; PERINEURAL ONCE
Status: COMPLETED | OUTPATIENT
Start: 2019-03-13 | End: 2019-03-13

## 2019-03-13 RX ORDER — GENTAMICIN SULFATE 3 MG/ML
1 SOLUTION/ DROPS OPHTHALMIC 3 TIMES DAILY
Status: DISCONTINUED | OUTPATIENT
Start: 2019-03-13 | End: 2019-03-13 | Stop reason: HOSPADM

## 2019-03-13 RX ORDER — PHENYLEPHRINE HYDROCHLORIDE 25 MG/ML
1 SOLUTION/ DROPS OPHTHALMIC
Status: COMPLETED | OUTPATIENT
Start: 2019-03-13 | End: 2019-03-13

## 2019-03-13 RX ORDER — ONDANSETRON 2 MG/ML
INJECTION INTRAMUSCULAR; INTRAVENOUS AS NEEDED
Status: DISCONTINUED | OUTPATIENT
Start: 2019-03-13 | End: 2019-03-13 | Stop reason: HOSPADM

## 2019-03-13 RX ORDER — PHENYLEPHRINE HYDROCHLORIDE 25 MG/ML
SOLUTION/ DROPS OPHTHALMIC
Status: COMPLETED
Start: 2019-03-13 | End: 2019-03-13

## 2019-03-13 RX ADMIN — MIDAZOLAM HYDROCHLORIDE 1 MG: 1 INJECTION, SOLUTION INTRAMUSCULAR; INTRAVENOUS at 09:17

## 2019-03-13 RX ADMIN — MIDAZOLAM HYDROCHLORIDE 0.5 MG: 1 INJECTION, SOLUTION INTRAMUSCULAR; INTRAVENOUS at 09:13

## 2019-03-13 RX ADMIN — MIDAZOLAM HYDROCHLORIDE 0.5 MG: 1 INJECTION, SOLUTION INTRAMUSCULAR; INTRAVENOUS at 09:09

## 2019-03-13 RX ADMIN — KETOROLAC TROMETHAMINE 1 DROP: 5 SOLUTION OPHTHALMIC at 08:13

## 2019-03-13 RX ADMIN — PHENYLEPHRINE HYDROCHLORIDE 1 DROP: 25 SOLUTION/ DROPS OPHTHALMIC at 08:06

## 2019-03-13 RX ADMIN — FENTANYL CITRATE 25 MCG: 50 INJECTION, SOLUTION INTRAMUSCULAR; INTRAVENOUS at 09:17

## 2019-03-13 RX ADMIN — CYCLOPENTOLATE HYDROCHLORIDE 1 DROP: 20 SOLUTION/ DROPS OPHTHALMIC at 08:06

## 2019-03-13 RX ADMIN — PROPARACAINE HYDROCHLORIDE 1 DROP: 5 SOLUTION/ DROPS OPHTHALMIC at 08:13

## 2019-03-13 RX ADMIN — PROPARACAINE HYDROCHLORIDE 1 DROP: 5 SOLUTION/ DROPS OPHTHALMIC at 08:19

## 2019-03-13 RX ADMIN — KETOROLAC TROMETHAMINE 1 DROP: 5 SOLUTION OPHTHALMIC at 08:06

## 2019-03-13 RX ADMIN — PHENYLEPHRINE HYDROCHLORIDE 1 DROP: 25 SOLUTION/ DROPS OPHTHALMIC at 08:14

## 2019-03-13 RX ADMIN — PROPARACAINE HYDROCHLORIDE 1 DROP: 5 SOLUTION/ DROPS OPHTHALMIC at 08:25

## 2019-03-13 RX ADMIN — CYCLOPENTOLATE HYDROCHLORIDE 1 DROP: 20 SOLUTION/ DROPS OPHTHALMIC at 08:25

## 2019-03-13 RX ADMIN — CYCLOPENTOLATE HYDROCHLORIDE 1 DROP: 20 SOLUTION/ DROPS OPHTHALMIC at 08:14

## 2019-03-13 RX ADMIN — PROPARACAINE HYDROCHLORIDE 1 DROP: 5 SOLUTION/ DROPS OPHTHALMIC at 08:29

## 2019-03-13 RX ADMIN — KETOROLAC TROMETHAMINE 1 DROP: 5 SOLUTION OPHTHALMIC at 08:25

## 2019-03-13 RX ADMIN — SODIUM CHLORIDE 25 ML/HR: 900 INJECTION, SOLUTION INTRAVENOUS at 08:05

## 2019-03-13 RX ADMIN — PHENYLEPHRINE HYDROCHLORIDE 1 DROP: 25 SOLUTION/ DROPS OPHTHALMIC at 08:25

## 2019-03-13 RX ADMIN — PHENYLEPHRINE HYDROCHLORIDE 1 DROP: 25 SOLUTION/ DROPS OPHTHALMIC at 08:20

## 2019-03-13 RX ADMIN — CYCLOPENTOLATE HYDROCHLORIDE 1 DROP: 20 SOLUTION/ DROPS OPHTHALMIC at 08:20

## 2019-03-13 RX ADMIN — ONDANSETRON 4 MG: 2 INJECTION INTRAMUSCULAR; INTRAVENOUS at 09:18

## 2019-03-13 RX ADMIN — PROPARACAINE HYDROCHLORIDE 1 DROP: 5 SOLUTION/ DROPS OPHTHALMIC at 08:06

## 2019-03-13 RX ADMIN — KETOROLAC TROMETHAMINE 1 DROP: 5 SOLUTION OPHTHALMIC at 08:20

## 2019-03-13 NOTE — OP NOTES
Name[place] Akash Luciano [facility]        updated 3/1/2013  Description:  Jackson Radha with intraocular lens implant    PREOPERATIVE DIAGNOSIS: Visually impairing combined cataract, Right eye. POSTOPERATIVE DIAGNOSIS: Visually impairing combined   cataract,Right eye. OPERATION: Procedure(s):  CATARACT EXTRACTION WITH INTRA OCULAR LENS IMPLANT RIGHT EYE    ANESTHESIA: Topical with intravenous sedation    TYPE OF LENS IMPLANT USED:   Implant Name Type Inv. Item Serial No.  Lot No. LRB No. Used Action   LENS IOL POST 1-PC 6X13 24.0 -- [employer] - [id number] Other LENS IOL POST 1-PC 6X13 24.0 -- [employer] [id number] [employer] N/A Right 1 Implanted       PHACO TIME:   37 seconds at an average power of 16.1%. Estimated blood loss: None    Complications:None    Specimen removed: None    DESCRIPTION OF PROCEDURE:  The patient was brought to the holding area, where an IV was begun at the keep open rate. The patient received several instillations of Francisco-Synephrine 2.5%, Cyclogyl 2%, and tetracaine 0.5% until adequate pupillary dilatation was achieved. The patient was connected to cardiovascular monitoring. Prior to being brought back to the operating suite, the patient received 2 drops of Betadine 5% solution into the inferior cul-de-sac of the operated eye. The patient was then brought back to the operating suite, and prepped and draped in the usual sterile manner after being re-connnected to cardiovascular monitoring. One drop of 4% Xylocaine was then instilled onto the eye. The lid speculum was set into position and the operating microscope was focused on the patient. Two drops of 4% Xylocaine were again instilled onto the eye. Using the fixation ring, the sharp 15-degree blade was used to create a paracentesis site and 1% MPF Xylocaine was instilled into the anterior chamber for anesthesia. Viscoelastic was then instilled into the anterior chamber.  The 2.4 mm keratome was then utilized to create a clear corneal incision, and a circular capsulorrhexis was performed. BSS was utilized to perform careful hydrodissection of the lens. Viscoelastic was then instilled into the anterior chamber to protect the corneal endothelium. Phacoemulsification was then carried out. Any remaining cortical material was removed in irrigation/aspiration mode. Viscoelastic was then instilled into the capsular bag. The lens implant was inspected for any defects. After finding none, the lens was placed in its injector and inserted into the capsular bag. The lens implant was centered on the patient's visual/astigmatic axis. The viscoelastic was then removed from the eye. Miochol was instilled posterior to the iris plane to facilitate pupillary miosis. The wound was checked for any leaks, after finding none, Iopidine and Pred Forte drops were instilled into the inferior cul-de-sac, and gentamicin ointment was placed over the cornea. A semi-pressure dressing and shield were then placed for the patient. The patient tolerated the procedure very well, and was brought to the recovery room in an alert and stable and satisfactory postoperative condition. Instructions were given to the patient and their family for their immediate postoperative care.   [address] [place] [address],   3/13/2019

## 2019-03-13 NOTE — PERIOP NOTES
Patient: Augusto Lanza MRN: [id number]  SSN: [id number]   YOB: 1945  Age: 68 y.o. Sex: female     Patient is status post Procedure(s):  CATARACT EXTRACTION WITH INTRA OCULAR LENS IMPLANT RIGHT EYE. Surgeon(s) and Role:     * Roopa Long, DO - Primary    Local/Dose/Irrigation:  SEE MAR                  Peripheral IV 03/13/19 Left Hand (Active)   Site Assessment Clean, dry, & intact 3/13/2019  8:17 AM   Phlebitis Assessment 0 3/13/2019  8:17 AM   Infiltration Assessment 0 3/13/2019  8:17 AM   Dressing Status Clean, dry, & intact 3/13/2019  8:17 AM   Dressing Type Tape;Transparent 3/13/2019  8:17 AM   Hub Color/Line Status Blue; Infusing 3/13/2019  8:17 AM                           Dressing/Packing:  Wound Eye Right-Dressing Type : Eye patch; Eye shield; Other (Comment)(SECURED WITH TAPE BY MD.) (03/13/19 0700)

## 2019-03-13 NOTE — PERIOP NOTES
Permission received to review discharge instructions and discuss private health information with daughter Deya Piresmelyssa

## 2019-03-13 NOTE — ANESTHESIA PREPROCEDURE EVALUATION
Anesthetic History   No history of anesthetic complications            Review of Systems / Medical History  Patient summary reviewed, nursing notes reviewed and pertinent labs reviewed    Pulmonary  Within defined limits                 Neuro/Psych   Within defined limits           Cardiovascular    Hypertension        Dysrhythmias (palpitations)   Hyperlipidemia    Exercise tolerance: >4 METS     GI/Hepatic/Renal     GERD: well controlled          Comments: diverticulosis Endo/Other        Arthritis     Other Findings   Comments: RLS  Low back pain  Compression Fracture  Osteoporosis  Vit D defic         Physical Exam    Airway  Mallampati: III  TM Distance: < 4 cm  Neck ROM: normal range of motion   Mouth opening: Normal     Cardiovascular    Rhythm: regular  Rate: normal         Dental    Dentition: Full upper dentures and Implants  Comments: implants across lower front   Pulmonary  Breath sounds clear to auscultation               Abdominal  GI exam deferred       Other Findings            Anesthetic Plan    ASA: 2  Anesthesia type: MAC          Induction: Intravenous  Anesthetic plan and risks discussed with: Patient      Took BB at 6 am

## 2019-03-13 NOTE — PERIOP NOTES
Christy [facility]  1945  [id number]    Situation:  Verbal report given from: SAMIRA Ivory rN and Braxton GORMAN  Procedure: Procedure(s):  CATARACT EXTRACTION WITH INTRA OCULAR LENS IMPLANT RIGHT EYE    Background:    Preoperative diagnosis: Combined forms of age-related cataract of right eye [H25.811]    Postoperative diagnosis: Combined forms of age-related cataract of right eye [H25.811]    :  Dr. Simeon Haines    Assistant(s): Circ-1: Leonie Montgomery RN  Scrub Tech-1: Ely Bernabe    Specimens: * No specimens in log *    Assessment:  Intra-procedure medications         Anesthesia gave intra-procedure sedation and medications, see anesthesia flow sheet     Intravenous fluids: [address] signs stable       Recommendation:    Permission to share finding with vidhi Cruz Sat : yes

## 2019-03-13 NOTE — DISCHARGE INSTRUCTIONS
Tami Vásquez D.O., P.CNelson  [facility] 183.  [phone]    Post-Operative Instructions for Cataract Surgery     Remove your eye shield and bandage at 12 noon the same day as surgery and start your eye drops. THROW AWAY THE GAUZE UNDER THE SHIELD.  Place the blue eye shield back on for one week while asleep, even if napping in the recliner. Use the tape included in your bag.  DO NOT RUB YOUR EYE EVER! DO NOT WIPE YOUR EYE! ONLY WIPE ON YOUR CHEEK!                DO NOT WEAR EYE MAKEUP FOR 1 WEEK!  You can shower starting tomorrow.  You may resume your previous diet.  If you use glaucoma drops in the operative eye, continue them as directed.  Common symptoms after surgery include a scratchy feeling, slight headache, red eye, and/or blurred vision. You may use Advil or Tylenol for any discomfort.  Avoid strenuous activities and driving until you see Dr. Royer Flores tomorrow. Please use care when walking, your depth perception may be altered.  Bring your bag with your drops to your follow up appointment. Below are Instructions On How To Use Your Eye Drops. ON THE DAY OF SURGERY:     Use all three eye drops at 12 noon, 4pm, and 8pm.  Wait 10 minutes between drops. THE DAY AFTER SURGERY:     You will use the drops 4 times a day at 8am, 12 noon, 4pm, and 8pm.   Use the drops every day until bottles are empty. Vigamox (tan top) Put 1 drop in at 8am, 12 noon, 4pm, and 8pm.    Pred Acetate (pink top) Put 1 drop in at 8:10am, 12:10pm, 4:10pm, and 8:10pm. Shake before using. Ketorolac/Diclofenac Put 1 drop in at 8:20am, 12:20pm, 4:20pm, 8:20pm.    CONTINUE DROPS UNTIL ALL BOTTLES ARE EMPTY! Follow-up appointment tomorrow at Dr. Michel Singleton office:_____948 am_______________    Please call the office at [phone] if you experience severe pain or nausea.      The following personal items collected during your admission are returned to you: Dental Appliance: Dental Appliances: Lowers(implants)  Vision: Visual Aid: Glasses  Hearing Aid: @FLOW  DISCHARGE SUMMARY from Nurse  (1341:LAST)@  Jewelry:    Clothing:    Other Valuables:    Valuables sent to safe:        PATIENT INSTRUCTIONS:    After General Anesthesia or Intravenous Sedation, for 24 hours or while taking prescription Narcotics:        Someone should be with you for the next 24 hours. For your own safety, a responsible adult must drive you home. · Limit your activities  · Recommended activity: Rest today, up with assistance today. Do not climb stairs or shower unattended for the next 24 hours. · Please start with a soft bland diet and advance as tolerated (no nausea) to regular diet. · If you have a sore throat you should try the following: fluids, warm salt water gargles, or throat lozenges. If it does not improve after several days please follow up with your primary physician. · Do not drive and operate hazardous machinery  · Do not make important personal or business decisions  · Do  not drink alcoholic beverages  · If you have not urinated within 8 hours after discharge, please contact your surgeon on call. Report the following to your surgeon:  · Excessive pain, swelling, redness or odor of or around the surgical area  · Temperature over 100.5  · Any nausea or vomiting. · You will receive a Post Operative Call from one of the Recovery Room Nurses on the day after your surgery to check on you. It is very important for us to know how you are recovering after your surgery. If you have an issue or need to speak with someone, please call your surgeon, do not wait for the post operative call. · You may receive an e-mail or letter in the mail from [facility] regarding your experience with us in the Ambulatory Surgery Unit. Your feedback is valuable to us and we appreciate your participation in the survey.        · If the above instructions are not adequate or you are having problems after your surgery, call the physician at their office number. · We wish you a speedy recovery ? What to do at Home:      *  Please give a list of your current medications to your Primary Care Provider. *  Please update this list whenever your medications are discontinued, doses are      changed, or new medications (including over-the-counter products) are added. *  Please carry medication information at all times in case of emergency situations. These are general instructions for a healthy lifestyle:    No smoking/ No tobacco products/ Avoid exposure to second hand smoke    Surgeon General's Warning:  Quitting smoking now greatly reduces serious risk to your health. Obesity, smoking, and sedentary lifestyle greatly increases your risk for illness    A healthy diet, regular physical exercise & weight monitoring are important for maintaining a healthy lifestyle    You may be retaining fluid if you have a history of heart failure or if you experience any of the following symptoms:  Weight gain of 3 pounds or more overnight or 5 pounds in a week, increased swelling in our hands or feet or shortness of breath while lying flat in bed. Please call your doctor as soon as you notice any of these symptoms; do not wait until your next office visit. Recognize signs and symptoms of STROKE:    B - Balance  E - Eyes    F-  Face looks uneven    A-  Arms unable to move or move even    S-  Speech slurred or non-existent    T-  Time-call 911 as soon as signs and symptoms begin-DO NOT go       Back to bed or wait to see if you get better-TIME IS BRAIN. If you have not received your influenza and/or pneumococcal vaccine, please follow up with your primary care physician. The discharge information has been reviewed with the patient and family. The patient and family verbalized understanding. TO PREVENT AN INFECTION      1.  [address] YOUR HANDS     To prevent infection, good handwashing is the most important thing you or your caregiver can do.  Wash your hands with soap and water or use the hand  we gave you before you touch any wounds. 2.  USE CLEAN SHEETS     Use freshly cleaned sheets on your bed after surgery.  To keep the surgery site clean, do not allow pets to sleep with you while your wound is still healing. 3. STOP SMOKING    Stop smoking, or at least cut back on smoking     Smoking slows your healing. 4.  CONTROL YOUR BLOOD SUGAR     High blood sugars slow wound healing.  If you are diabetic, control your blood sugar levels before and after your surgery.

## 2019-03-18 NOTE — PERIOP NOTES
[facility]  Ambulatory Surgery Unit  Pre-operative Instructions    Surgery/Procedure Date  Wednesday, March 20, 2019            Tentative Arrival Time unable to give      1. On the day of your surgery/procedure, please report to the Ambulatory Surgery Unit Registration Desk and sign in at your designated time. The Ambulatory Surgery Unit is located in [facility] on the [facility] side of the [facility] across from the [address] [place] [address]. Please have all of your health insurance cards and a photo ID. 2. You must have someone with you to drive you home, as you should not drive a car for 24 hours following anesthesia. Please make arrangements for a responsible adult friend or family member to stay with you for at least the first 24 hours after your surgery. 3. Do not have anything to eat or drink (including water, gum, mints, coffee, juice) after 11:59 PM, Tuesday. This may not apply to medications prescribed by your physician. (Please note below the special instructions with medications to take the morning of surgery, if applicable.)    4. We recommend you do not drink any alcoholic beverages for 24 hours before and after your surgery. 5. Contact your surgeons office for instructions on the following medications: non-steroidal anti-inflammatory drugs (i.e. Advil, Aleve), vitamins, and supplements. (Some surgeons will want you to stop these medications prior to surgery and others may allow you to take them)   **If you are currently taking Plavix, Coumadin, Aspirin and/or other blood-thinning agents, contact your surgeon for instructions. ** Your surgeon will partner with the physician prescribing these medications to determine if it is safe to stop or if you need to continue taking. Please do not stop taking these medications without instructions from your surgeon.     6. In an effort to help prevent surgical site infection, we ask that you shower with an anti-bacterial soap (i.e. Dial/Safeguard, or the soap provided to you at your preadmission testing appointment) for 3 days prior to and on the morning of surgery, using a fresh towel after each shower. (Please begin this process with fresh bed linens.) Do not apply any lotions, powders, or deodorants after the shower on the day of your procedure. If applicable, please do not shave the operative site for 48 hours prior to surgery. 7. Wear comfortable clothes. Wear glasses instead of contacts. Do not bring any jewelry or money (other than copays or fees as instructed). Do not wear make-up, particularly mascara, the morning of your surgery. Do not wear nail polish, particularly if you are having foot /hand surgery. Wear your hair loose or down, no ponytails, buns, meghana pins or clips. All body piercings must be removed. 8. You should understand that if you do not follow these instructions your surgery may be cancelled. If your physical condition changes (i.e. fever, cold or flu) please contact your surgeon as soon as possible. 9. It is important that you be on time. If a situation occurs where you may be late, or if you have any questions or problems, please call [phone].    10. Your surgery time may be subject to change. You will receive a phone call the day prior to surgery to confirm your arrival time. 11. Pediatric patients: please bring a change of clothes, diapers, bottle/sippy cup, pacifier, etc.      Special Instructions: Take all medications and inhalers, as prescribed, on the morning of surgery with a sip of water. I understand a pre-operative phone call will be made to verify my surgery time. In the event that I am not available, I give permission for a message to be left on my answering service and/or with another person?       yes    Preop instructions reviewed  Pt verbalized understanding.      ___________________      ___________________      ________________  (Signature of Patient)          (Witness) (Date and Time)

## 2019-03-19 ENCOUNTER — ANESTHESIA EVENT (OUTPATIENT)
Dept: SURGERY | Age: 74
End: 2019-03-19
Payer: MEDICARE

## 2019-03-20 ENCOUNTER — ANESTHESIA (OUTPATIENT)
Dept: SURGERY | Age: 74
End: 2019-03-20
Payer: MEDICARE

## 2019-03-20 ENCOUNTER — HOSPITAL ENCOUNTER (OUTPATIENT)
Age: 74
Setting detail: OUTPATIENT SURGERY
Discharge: HOME | End: 2019-03-20
Attending: OPHTHALMOLOGY | Admitting: OPHTHALMOLOGY
Payer: MEDICARE

## 2019-03-20 VITALS
BODY MASS INDEX: 31.41 KG/M2 | WEIGHT: 160 LBS | SYSTOLIC BLOOD PRESSURE: 134 MMHG | RESPIRATION RATE: 19 BRPM | HEIGHT: 60 IN | TEMPERATURE: 98 F | HEART RATE: 69 BPM | DIASTOLIC BLOOD PRESSURE: 55 MMHG | OXYGEN SATURATION: 96 %

## 2019-03-20 PROCEDURE — 77030018846 HC SOL IRR STRL H20 ICUM -A: Performed by: OPHTHALMOLOGY

## 2019-03-20 PROCEDURE — 74011250636 HC RX REV CODE- 250/636: Performed by: ANESTHESIOLOGY

## 2019-03-20 PROCEDURE — 76030000002 HC AMB SURG OR TIME FIRST 0.: Performed by: OPHTHALMOLOGY

## 2019-03-20 PROCEDURE — V2632 POST CHMBR INTRAOCULAR LENS: HCPCS | Performed by: OPHTHALMOLOGY

## 2019-03-20 PROCEDURE — 74011250636 HC RX REV CODE- 250/636: Performed by: OPHTHALMOLOGY

## 2019-03-20 PROCEDURE — 74011250636 HC RX REV CODE- 250/636

## 2019-03-20 PROCEDURE — 76210000050 HC AMBSU PH II REC 0.5 TO 1 HR: Performed by: OPHTHALMOLOGY

## 2019-03-20 PROCEDURE — 74011000250 HC RX REV CODE- 250

## 2019-03-20 PROCEDURE — 74011000250 HC RX REV CODE- 250: Performed by: OPHTHALMOLOGY

## 2019-03-20 PROCEDURE — 74011250637 HC RX REV CODE- 250/637: Performed by: OPHTHALMOLOGY

## 2019-03-20 PROCEDURE — 76060000073 HC AMB SURG ANES FIRST 0.5 HR: Performed by: OPHTHALMOLOGY

## 2019-03-20 DEVICE — LENS IOL POST 1-PC 6X13 24.0 -- ACRYSOF: Type: IMPLANTABLE DEVICE | Site: EYE | Status: FUNCTIONAL

## 2019-03-20 RX ORDER — KETOROLAC TROMETHAMINE 5 MG/ML
1 SOLUTION OPHTHALMIC 4 TIMES DAILY
Status: DISCONTINUED | OUTPATIENT
Start: 2019-03-20 | End: 2019-03-20 | Stop reason: HOSPADM

## 2019-03-20 RX ORDER — SODIUM CHLORIDE 9 MG/ML
25 INJECTION, SOLUTION INTRAVENOUS CONTINUOUS
Status: DISCONTINUED | OUTPATIENT
Start: 2019-03-20 | End: 2019-03-20 | Stop reason: HOSPADM

## 2019-03-20 RX ORDER — PROPARACAINE HYDROCHLORIDE 5 MG/ML
1 SOLUTION/ DROPS OPHTHALMIC
Status: COMPLETED | OUTPATIENT
Start: 2019-03-20 | End: 2019-03-20

## 2019-03-20 RX ORDER — LIDOCAINE HYDROCHLORIDE 10 MG/ML
0.1 INJECTION, SOLUTION EPIDURAL; INFILTRATION; INTRACAUDAL; PERINEURAL AS NEEDED
Status: DISCONTINUED | OUTPATIENT
Start: 2019-03-20 | End: 2019-03-20 | Stop reason: HOSPADM

## 2019-03-20 RX ORDER — PHENYLEPHRINE HYDROCHLORIDE 25 MG/ML
SOLUTION/ DROPS OPHTHALMIC
Status: COMPLETED
Start: 2019-03-20 | End: 2019-03-20

## 2019-03-20 RX ORDER — ERYTHROMYCIN 5 MG/G
OINTMENT OPHTHALMIC ONCE
Status: COMPLETED | OUTPATIENT
Start: 2019-03-20 | End: 2019-03-20

## 2019-03-20 RX ORDER — ONDANSETRON 2 MG/ML
INJECTION INTRAMUSCULAR; INTRAVENOUS AS NEEDED
Status: DISCONTINUED | OUTPATIENT
Start: 2019-03-20 | End: 2019-03-20 | Stop reason: HOSPADM

## 2019-03-20 RX ORDER — PHENYLEPHRINE HYDROCHLORIDE 25 MG/ML
1 SOLUTION/ DROPS OPHTHALMIC
Status: COMPLETED | OUTPATIENT
Start: 2019-03-20 | End: 2019-03-20

## 2019-03-20 RX ORDER — CYCLOPENTOLATE HYDROCHLORIDE 20 MG/ML
1 SOLUTION/ DROPS OPHTHALMIC
Status: COMPLETED | OUTPATIENT
Start: 2019-03-20 | End: 2019-03-20

## 2019-03-20 RX ORDER — SODIUM CHLORIDE 0.9 % (FLUSH) 0.9 %
5-40 SYRINGE (ML) INJECTION AS NEEDED
Status: DISCONTINUED | OUTPATIENT
Start: 2019-03-20 | End: 2019-03-20 | Stop reason: HOSPADM

## 2019-03-20 RX ORDER — SODIUM CHLORIDE 0.9 % (FLUSH) 0.9 %
5-40 SYRINGE (ML) INJECTION EVERY 8 HOURS
Status: DISCONTINUED | OUTPATIENT
Start: 2019-03-20 | End: 2019-03-20 | Stop reason: HOSPADM

## 2019-03-20 RX ORDER — LIDOCAINE HYDROCHLORIDE 10 MG/ML
1 INJECTION, SOLUTION EPIDURAL; INFILTRATION; INTRACAUDAL; PERINEURAL ONCE
Status: COMPLETED | OUTPATIENT
Start: 2019-03-20 | End: 2019-03-20

## 2019-03-20 RX ORDER — KETOROLAC TROMETHAMINE 5 MG/ML
SOLUTION OPHTHALMIC
Status: COMPLETED
Start: 2019-03-20 | End: 2019-03-20

## 2019-03-20 RX ORDER — MIDAZOLAM HYDROCHLORIDE 1 MG/ML
INJECTION, SOLUTION INTRAMUSCULAR; INTRAVENOUS AS NEEDED
Status: DISCONTINUED | OUTPATIENT
Start: 2019-03-20 | End: 2019-03-20 | Stop reason: HOSPADM

## 2019-03-20 RX ORDER — DIPHENHYDRAMINE HYDROCHLORIDE 50 MG/ML
12.5 INJECTION, SOLUTION INTRAMUSCULAR; INTRAVENOUS AS NEEDED
Status: DISCONTINUED | OUTPATIENT
Start: 2019-03-20 | End: 2019-03-20 | Stop reason: HOSPADM

## 2019-03-20 RX ORDER — LIDOCAINE HYDROCHLORIDE 20 MG/ML
5 INJECTION, SOLUTION EPIDURAL; INFILTRATION; INTRACAUDAL; PERINEURAL ONCE
Status: COMPLETED | OUTPATIENT
Start: 2019-03-20 | End: 2019-03-20

## 2019-03-20 RX ORDER — FENTANYL CITRATE 50 UG/ML
INJECTION, SOLUTION INTRAMUSCULAR; INTRAVENOUS AS NEEDED
Status: DISCONTINUED | OUTPATIENT
Start: 2019-03-20 | End: 2019-03-20 | Stop reason: HOSPADM

## 2019-03-20 RX ORDER — SODIUM CHLORIDE, SODIUM LACTATE, POTASSIUM CHLORIDE, CALCIUM CHLORIDE 600; 310; 30; 20 MG/100ML; MG/100ML; MG/100ML; MG/100ML
25 INJECTION, SOLUTION INTRAVENOUS CONTINUOUS
Status: DISCONTINUED | OUTPATIENT
Start: 2019-03-20 | End: 2019-03-20 | Stop reason: HOSPADM

## 2019-03-20 RX ORDER — ONDANSETRON 2 MG/ML
4 INJECTION INTRAMUSCULAR; INTRAVENOUS AS NEEDED
Status: DISCONTINUED | OUTPATIENT
Start: 2019-03-20 | End: 2019-03-20 | Stop reason: HOSPADM

## 2019-03-20 RX ORDER — FENTANYL CITRATE 50 UG/ML
25 INJECTION, SOLUTION INTRAMUSCULAR; INTRAVENOUS
Status: DISCONTINUED | OUTPATIENT
Start: 2019-03-20 | End: 2019-03-20 | Stop reason: HOSPADM

## 2019-03-20 RX ORDER — PREDNISOLONE ACETATE 10 MG/ML
1 SUSPENSION/ DROPS OPHTHALMIC 2 TIMES DAILY
Status: DISCONTINUED | OUTPATIENT
Start: 2019-03-20 | End: 2019-03-20 | Stop reason: HOSPADM

## 2019-03-20 RX ADMIN — CYCLOPENTOLATE HYDROCHLORIDE 1 DROP: 20 SOLUTION/ DROPS OPHTHALMIC at 08:18

## 2019-03-20 RX ADMIN — FENTANYL CITRATE 25 MCG: 50 INJECTION, SOLUTION INTRAMUSCULAR; INTRAVENOUS at 09:13

## 2019-03-20 RX ADMIN — LIDOCAINE HYDROCHLORIDE 0.1 ML: 10 INJECTION, SOLUTION EPIDURAL; INFILTRATION; INTRACAUDAL; PERINEURAL at 08:19

## 2019-03-20 RX ADMIN — PHENYLEPHRINE HYDROCHLORIDE 1 DROP: 25 SOLUTION/ DROPS OPHTHALMIC at 08:18

## 2019-03-20 RX ADMIN — MIDAZOLAM HYDROCHLORIDE 1 MG: 1 INJECTION, SOLUTION INTRAMUSCULAR; INTRAVENOUS at 08:53

## 2019-03-20 RX ADMIN — KETOROLAC TROMETHAMINE 1 DROP: 5 SOLUTION OPHTHALMIC at 08:01

## 2019-03-20 RX ADMIN — KETOROLAC TROMETHAMINE 1 DROP: 5 SOLUTION OPHTHALMIC at 08:08

## 2019-03-20 RX ADMIN — PHENYLEPHRINE HYDROCHLORIDE 1 DROP: 25 SOLUTION/ DROPS OPHTHALMIC at 08:13

## 2019-03-20 RX ADMIN — PROPARACAINE HYDROCHLORIDE 1 DROP: 5 SOLUTION/ DROPS OPHTHALMIC at 08:18

## 2019-03-20 RX ADMIN — PROPARACAINE HYDROCHLORIDE 1 DROP: 5 SOLUTION/ DROPS OPHTHALMIC at 08:01

## 2019-03-20 RX ADMIN — CYCLOPENTOLATE HYDROCHLORIDE 1 DROP: 20 SOLUTION/ DROPS OPHTHALMIC at 08:08

## 2019-03-20 RX ADMIN — PROPARACAINE HYDROCHLORIDE 1 DROP: 5 SOLUTION/ DROPS OPHTHALMIC at 08:23

## 2019-03-20 RX ADMIN — FENTANYL CITRATE 25 MCG: 50 INJECTION, SOLUTION INTRAMUSCULAR; INTRAVENOUS at 09:10

## 2019-03-20 RX ADMIN — MIDAZOLAM HYDROCHLORIDE 1 MG: 1 INJECTION, SOLUTION INTRAMUSCULAR; INTRAVENOUS at 08:57

## 2019-03-20 RX ADMIN — KETOROLAC TROMETHAMINE 1 DROP: 5 SOLUTION/ DROPS OPHTHALMIC at 08:01

## 2019-03-20 RX ADMIN — KETOROLAC TROMETHAMINE 1 DROP: 5 SOLUTION OPHTHALMIC at 08:13

## 2019-03-20 RX ADMIN — CYCLOPENTOLATE HYDROCHLORIDE 1 DROP: 20 SOLUTION/ DROPS OPHTHALMIC at 08:13

## 2019-03-20 RX ADMIN — PROPARACAINE HYDROCHLORIDE 1 DROP: 5 SOLUTION/ DROPS OPHTHALMIC at 08:08

## 2019-03-20 RX ADMIN — SODIUM CHLORIDE 25 ML/HR: 900 INJECTION, SOLUTION INTRAVENOUS at 08:19

## 2019-03-20 RX ADMIN — PHENYLEPHRINE HYDROCHLORIDE 1 DROP: 25 SOLUTION/ DROPS OPHTHALMIC at 08:01

## 2019-03-20 RX ADMIN — KETOROLAC TROMETHAMINE 1 DROP: 5 SOLUTION OPHTHALMIC at 08:18

## 2019-03-20 RX ADMIN — CYCLOPENTOLATE HYDROCHLORIDE 1 DROP: 20 SOLUTION/ DROPS OPHTHALMIC at 08:01

## 2019-03-20 RX ADMIN — PHENYLEPHRINE HYDROCHLORIDE 1 DROP: 25 SOLUTION/ DROPS OPHTHALMIC at 08:08

## 2019-03-20 RX ADMIN — PROPARACAINE HYDROCHLORIDE 1 DROP: 5 SOLUTION/ DROPS OPHTHALMIC at 08:13

## 2019-03-20 RX ADMIN — ONDANSETRON 4 MG: 2 INJECTION INTRAMUSCULAR; INTRAVENOUS at 08:57

## 2019-03-20 NOTE — PERIOP NOTES
Permission received to review discharge instructions and discuss private health information with anton Aguiar.

## 2019-03-20 NOTE — OP NOTES
Name: Thony Granados MASC-4        updated 3/1/2013  Description:  Dara Hameed with intraocular lens implant    PREOPERATIVE DIAGNOSIS: Visually impairing combined cataract, Left eye. POSTOPERATIVE DIAGNOSIS: Visually impairing combined   cataract,Left eye. OPERATION: Procedure(s):  CATARACT EXTRACTION WITH INTRA OCULAR LENS IMPLANT LEFT EYE 2ND    ANESTHESIA: Topical with intravenous sedation    TYPE OF LENS IMPLANT USED:   Implant Name Type Inv. Item Serial No.  Lot No. LRB No. Used Action   LENS IOL POST 1-PC 6X13 24.0 -- [employer] - [id number]  LENS IOL POST 1-PC 6X13 24.0 -- [employer] [id number] [employer] NA Left 1 Implanted       PHACO TIME:   40.4 seconds at an average power of 14%. Estimated blood loss: None    Complications:None    Specimen removed: None    DESCRIPTION OF PROCEDURE:  The patient was brought to the holding area, where an IV was begun at the keep open rate. The patient received several instillations of Francisco-Synephrine 2.5%, Cyclogyl 2%, and tetracaine 0.5% until adequate pupillary dilatation was achieved. The patient was connected to cardiovascular monitoring. Prior to being brought back to the operating suite, the patient received 2 drops of Betadine 5% solution into the inferior cul-de-sac of the operated eye. The patient was then brought back to the operating suite, and prepped and draped in the usual sterile manner after being re-connnected to cardiovascular monitoring. One drop of 4% Xylocaine was then instilled onto the eye. The lid speculum was set into position and the operating microscope was focused on the patient. Two drops of 4% Xylocaine were again instilled onto the eye. Using the fixation ring, the sharp 15-degree blade was used to create a paracentesis site and 1% MPF Xylocaine was instilled into the anterior chamber for anesthesia. Viscoelastic was then instilled into the anterior chamber.  The 2.4 mm keratome was then utilized to create a clear corneal incision, and a circular capsulorrhexis was performed. BSS was utilized to perform careful hydrodissection of the lens. Viscoelastic was then instilled into the anterior chamber to protect the corneal endothelium. Phacoemulsification was then carried out. Any remaining cortical material was removed in irrigation/aspiration mode. Viscoelastic was then instilled into the capsular bag. The lens implant was inspected for any defects. After finding none, the lens was placed in its injector and inserted into the capsular bag. The lens implant was centered on the patient's visual/astigmatic axis. The viscoelastic was then removed from the eye. Miochol was instilled posterior to the iris plane to facilitate pupillary miosis. The wound was checked for any leaks, after finding none, Iopidine and Pred Forte drops were instilled into the inferior cul-de-sac, and gentamicin ointment was placed over the cornea. A semi-pressure dressing and shield were then placed for the patient. The patient tolerated the procedure very well, and was brought to the recovery room in an alert and stable and satisfactory postoperative condition. Instructions were given to the patient and their family for their immediate postoperative care.   [address] [place] [address],   3/20/2019

## 2019-03-20 NOTE — PERIOP NOTES
Ellie Fish Camp  1945  [id number]    Situation:  Verbal report given from: Hortencia Caruso and Lucas Patel  Procedure: Procedure(s):  CATARACT EXTRACTION WITH INTRA OCULAR LENS IMPLANT LEFT EYE 2ND    Background:    Preoperative diagnosis: Combined forms of age-related cataract of left eye [H25.812]    Postoperative diagnosis: Combined forms of age-related cataract of left eye [H25.812]    :  Dr. Chikis Fabian    Assistant(s): Circ-1: Chiara Velasquez RN  Scrub Tech-1: Michael Garcia    Specimens: * No specimens in log *    Assessment:  Intra-procedure medications         Anesthesia gave intra-procedure sedation and medications, see anesthesia flow sheet     Intravenous fluids: LR@ KVO     Vital signs stable       Recommendation:    Permission to share finding with anton Galilea

## 2019-03-20 NOTE — ADVANCED PRACTICE NURSE
BRENNAN 4 in PAT phone assessment. Pt admits to snoring loudly, but denies ever having been advised that she has witnessed apnea while sleeping or ever having been referred for a sleep apnea evaluation.

## 2019-03-20 NOTE — DISCHARGE INSTRUCTIONS
Sheryl Tena D.O., PNelsonCNelson  [facility] 183.  [phone]    Post-Operative Instructions for Cataract Surgery     Remove your eye shield and bandage at 12 noon the same day as surgery and start your eye drops. THROW AWAY THE GAUZE UNDER THE SHIELD.  Place the blue eye shield back on for one week while asleep, even if napping in the recliner. Use the tape included in your bag.  DO NOT RUB YOUR EYE EVER! DO NOT WIPE YOUR EYE! ONLY WIPE ON YOUR CHEEK!                DO NOT WEAR EYE MAKEUP FOR 1 WEEK!  You can shower starting tomorrow.  You may resume your previous diet.  If you use glaucoma drops in the operative eye, continue them as directed.  Common symptoms after surgery include a scratchy feeling, slight headache, red eye, and/or blurred vision. You may use Advil or Tylenol for any discomfort.  Avoid strenuous activities and driving until you see Dr. Arnoldo Quintero tomorrow. Please use care when walking, your depth perception may be altered.  Bring your bag with your drops to your follow up appointment. Below are Instructions On How To Use Your Eye Drops. ON THE DAY OF SURGERY:     Use all three eye drops at 12 noon, 4pm, and 8pm.  Wait 10 minutes between drops. THE DAY AFTER SURGERY:     You will use the drops 4 times a day at 8am, 12 noon, 4pm, and 8pm.   Use the drops every day until bottles are empty. Vigamox (tan top) Put 1 drop in at 8am, 12 noon, 4pm, and 8pm.    Pred Acetate (pink top) Put 1 drop in at 8:10am, 12:10pm, 4:10pm, and 8:10pm. Shake before using. Ketorolac/Diclofenac Put 1 drop in at 8:20am, 12:20pm, 4:20pm, 8:20pm.    CONTINUE DROPS UNTIL ALL BOTTLES ARE EMPTY! Follow-up appointment tomorrow at Dr. Tiffany Major office:________8:45____________    Please call the office at [phone] if you experience severe pain or nausea.      The following personal items collected during your admission are returned to you:   Dental Appliance: Dental Appliances: Lowers, Uppers  Vision: Visual Aid: Glasses  Hearing Aid: @FLOW  DISCHARGE SUMMARY from Nurse  (1341:LAST)@  Jewelry:    Clothing:    Other Valuables:    Valuables sent to safe:        PATIENT INSTRUCTIONS:    After General Anesthesia or Intravenous Sedation, for 24 hours or while taking prescription Narcotics:        Someone should be with you for the next 24 hours. For your own safety, a responsible adult must drive you home. · Limit your activities  · Recommended activity: Rest today, up with assistance today. Do not climb stairs or shower unattended for the next 24 hours. · Please start with a soft bland diet and advance as tolerated (no nausea) to regular diet. · If you have a sore throat you should try the following: fluids, warm salt water gargles, or throat lozenges. If it does not improve after several days please follow up with your primary physician. · Do not drive and operate hazardous machinery  · Do not make important personal or business decisions  · Do  not drink alcoholic beverages  · If you have not urinated within 8 hours after discharge, please contact your surgeon on call. Report the following to your surgeon:  · Excessive pain, swelling, redness or odor of or around the surgical area  · Temperature over 100.5  · Any nausea or vomiting. · You will receive a Post Operative Call from one of the Recovery Room Nurses on the day after your surgery to check on you. It is very important for us to know how you are recovering after your surgery. If you have an issue or need to speak with someone, please call your surgeon, do not wait for the post operative call. · You may receive an e-mail or letter in the mail from [employer] regarding your experience with us in the Ambulatory Surgery Unit. Your feedback is valuable to us and we appreciate your participation in the survey.        · If the above instructions are not adequate or you are having problems after your surgery, call the physician at their office number. · We wish you a speedy recovery ? What to do at Home:      *  Please give a list of your current medications to your Primary Care Provider. *  Please update this list whenever your medications are discontinued, doses are      changed, or new medications (including over-the-counter products) are added. *  Please carry medication information at all times in case of emergency situations. These are general instructions for a healthy lifestyle:    No smoking/ No tobacco products/ Avoid exposure to second hand smoke    Surgeon General's Warning:  Quitting smoking now greatly reduces serious risk to your health. Obesity, smoking, and sedentary lifestyle greatly increases your risk for illness    A healthy diet, regular physical exercise & weight monitoring are important for maintaining a healthy lifestyle    You may be retaining fluid if you have a history of heart failure or if you experience any of the following symptoms:  Weight gain of 3 pounds or more overnight or 5 pounds in a week, increased swelling in our hands or feet or shortness of breath while lying flat in bed. Please call your doctor as soon as you notice any of these symptoms; do not wait until your next office visit. Recognize signs and symptoms of STROKE:    B - Balance  E - Eyes    F-  Face looks uneven    A-  Arms unable to move or move even    S-  Speech slurred or non-existent    T-  Time-call 911 as soon as signs and symptoms begin-DO NOT go       Back to bed or wait to see if you get better-TIME IS BRAIN. If you have not received your influenza and/or pneumococcal vaccine, please follow up with your primary care physician. The discharge information has been reviewed with the patient and family. The patient and family verbalized understanding. TO PREVENT AN INFECTION      1.  [address] YOUR HANDS     To prevent infection, good handwashing is the most important thing you or your caregiver can do.  Wash your hands with soap and water or use the hand  we gave you before you touch any wounds. 2.  USE CLEAN SHEETS     Use freshly cleaned sheets on your bed after surgery.  To keep the surgery site clean, do not allow pets to sleep with you while your wound is still healing. 3. STOP SMOKING    Stop smoking, or at least cut back on smoking     Smoking slows your healing. 4.  CONTROL YOUR BLOOD SUGAR     High blood sugars slow wound healing.  If you are diabetic, control your blood sugar levels before and after your surgery.

## 2019-03-20 NOTE — ANESTHESIA POSTPROCEDURE EVALUATION
Procedure(s): CATARACT EXTRACTION WITH INTRA OCULAR LENS IMPLANT LEFT EYE 2ND. MAC Anesthesia Post Evaluation Multimodal analgesia: multimodal analgesia not used between 6 hours prior to anesthesia start to PACU discharge Patient location during evaluation: bedside Patient participation: complete - patient cannot participate Level of consciousness: awake and alert Pain score: 0 Airway patency: patent Anesthetic complications: no 
Cardiovascular status: acceptable Respiratory status: acceptable Hydration status: acceptable Post anesthesia nausea and vomiting:  none Vitals Value Taken Time BP  134/55 3/20/2019 11:02 AM  
Temp  3/20/2019 11:02 AM  
Pulse  69 3/20/2019 11:02 AM  
Resp   19 3/20/2019 11:02 AM  
SpO2   96% 3/20/2019 11:02 AM

## 2019-03-20 NOTE — ANESTHESIA PREPROCEDURE EVALUATION
Anesthetic History No history of anesthetic complications Review of Systems / Medical History Patient summary reviewed, nursing notes reviewed and pertinent labs reviewed Pulmonary Within defined limits Neuro/Psych Within defined limits Cardiovascular Hypertension Dysrhythmias (palpitations) Hyperlipidemia Exercise tolerance: >4 METS 
  
GI/Hepatic/Renal 
  
GERD: well controlled Comments: diverticulosis Endo/Other Arthritis Other Findings Comments: RLS Low back pain Compression Fracture Osteoporosis Vit D defic Physical Exam 
 
Airway Mallampati: III 
TM Distance: < 4 cm Neck ROM: normal range of motion Mouth opening: Normal 
 
 Cardiovascular Rhythm: regular Rate: normal 
 
 
 
 Dental 
 
Dentition: Full upper dentures and Implants Comments: implants across lower front Pulmonary Breath sounds clear to auscultation Abdominal 
GI exam deferred Other Findings Anesthetic Plan ASA: 2 Anesthesia type: MAC Induction: Intravenous Anesthetic plan and risks discussed with: Patient Took BB at 5 am

## 2019-03-26 ENCOUNTER — OFFICE VISIT (OUTPATIENT)
Dept: INTERNAL MEDICINE CLINIC | Facility: CLINIC | Age: 74
End: 2019-03-26

## 2019-03-26 VITALS
WEIGHT: 157.4 LBS | SYSTOLIC BLOOD PRESSURE: 132 MMHG | DIASTOLIC BLOOD PRESSURE: 74 MMHG | HEIGHT: 60 IN | BODY MASS INDEX: 30.9 KG/M2 | RESPIRATION RATE: 16 BRPM | TEMPERATURE: 98.1 F | HEART RATE: 70 BPM | OXYGEN SATURATION: 96 %

## 2019-03-26 DIAGNOSIS — N64.52 BLOODY DISCHARGE FROM RIGHT NIPPLE: Primary | ICD-10-CM

## 2019-03-26 RX ORDER — MOXIFLOXACIN 5 MG/ML
SOLUTION/ DROPS OPHTHALMIC
COMMUNITY
Start: 2019-03-11 | End: 2019-06-24

## 2019-03-26 RX ORDER — KETOROLAC TROMETHAMINE 5 MG/ML
1 SOLUTION OPHTHALMIC 4 TIMES DAILY
COMMUNITY
End: 2019-06-24

## 2019-03-26 RX ORDER — PREDNISOLONE ACETATE 10 MG/ML
SUSPENSION/ DROPS OPHTHALMIC
COMMUNITY
End: 2019-06-24

## 2019-03-26 NOTE — PROGRESS NOTES
[id number] [address] [place] [address]  Identified pt with two pt identifiers(name and ). Chief Complaint   Patient presents with   [facility] Breast Discharge     Room 2C// R breast discharge x        1. Have you been to the ER, urgent care clinic since your last visit? Hospitalized since your last visit? NO    2. Have you seen or consulted any other health care providers outside of the [address] [place] [address] since your last visit? Include any pap smears or colon screening. NO      Provider notified of reason for visit, vitals and flowsheets obtained on patients. Reviewed record In preparation for visit, huddled with provider and have obtained necessary documentation      Health Maintenance Due   Topic    Pneumococcal 65+ years (2 of 2 - PPSV23)       Wt Readings from Last 3 Encounters:   19 160 lb (72.6 kg)   19 159 lb (72.1 kg)   19 154 lb 12.8 oz (70.2 kg)     Temp Readings from Last 3 Encounters:   19 98 °F (36.7 °C)   19 97.6 °F (36.4 °C)   19 98 °F (36.7 °C) (Oral)     BP Readings from Last 3 Encounters:   19 134/55   19 134/75   19 130/79     Pulse Readings from Last 3 Encounters:   19 69   19 73   19 83     There were no vitals filed for this visit. Learning Assessment:  :     Learning Assessment 2014   PRIMARY LEARNER Patient   HIGHEST LEVEL OF EDUCATION - PRIMARY LEARNER  DID NOT GRADUATE HIGH SCHOOL   BARRIERS PRIMARY LEARNER NONE   CO-LEARNER CAREGIVER No   PRIMARY LANGUAGE ENGLISH   LEARNER PREFERENCE PRIMARY DEMONSTRATION   ANSWERED BY patient   RELATIONSHIP SELF       Depression Screening:  :     3 most recent PHQ Screens 3/1/2019   Little interest or pleasure in doing things Not at all   Feeling down, depressed, irritable, or hopeless Not at all   Total Score PHQ 2 0       Fall Risk Assessment:  :     Fall Risk Assessment, last 12 mths 2019   Able to walk? Yes   Fall in past 12 months?  No       Abuse Screening:  :     Abuse Screening Questionnaire 2/19/2019 1/8/2018 12/29/2016 9/14/2015   Do you ever feel afraid of your partner? N N N N   Are you in a relationship with someone who physically or mentally threatens you? N N N N   Is it safe for you to go home? Y Y Y Y       ADL Screening:  :     ADL Assessment 1/8/2018   Feeding yourself No Help Needed   Getting from bed to chair No Help Needed   Getting dressed No Help Needed   Bathing or showering No Help Needed   Walk across the room (includes cane/walker) No Help Needed   Using the telphone No Help Needed   Taking your medications No Help Needed   Preparing meals No Help Needed   Managing money (expenses/bills) No Help Needed   Moderately strenuous housework (laundry) No Help Needed   Shopping for personal items (toiletries/medicines) No Help Needed   Shopping for groceries No Help Needed   Driving No Help Needed   Climbing a flight of stairs No Help Needed   Getting to places beyond walking distances No Help Needed         Medication reconciliation up to date and corrected with patient at this time.

## 2019-03-29 ENCOUNTER — HOSPITAL ENCOUNTER (OUTPATIENT)
Dept: ULTRASOUND IMAGING | Age: 74
Discharge: HOME | End: 2019-03-29
Attending: INTERNAL MEDICINE
Payer: MEDICARE

## 2019-03-29 ENCOUNTER — HOSPITAL ENCOUNTER (OUTPATIENT)
Dept: MAMMOGRAPHY | Age: 74
Discharge: HOME | End: 2019-03-29
Attending: INTERNAL MEDICINE
Payer: MEDICARE

## 2019-03-29 DIAGNOSIS — N64.52 BLOODY DISCHARGE FROM RIGHT NIPPLE: ICD-10-CM

## 2019-03-29 PROCEDURE — 76642 ULTRASOUND BREAST LIMITED: CPT

## 2019-03-29 PROCEDURE — 77065 DX MAMMO INCL CAD UNI: CPT

## 2019-03-29 PROCEDURE — 76641 ULTRASOUND BREAST COMPLETE: CPT

## 2019-04-01 ENCOUNTER — TELEPHONE (OUTPATIENT)
Dept: INTERNAL MEDICINE CLINIC | Facility: CLINIC | Age: 74
End: 2019-04-01

## 2019-04-01 NOTE — TELEPHONE ENCOUNTER
Pt came in upset (415pm) because she went to the [address] [facility] imaging center for her mammogram referral and they were closed. She tried calling the number on her paper and no one answered. Pt then came to office and we found where she was scheduled for the mammogram at [address] [facility] [address]. Pt was never made aware that this appointment was at [facility] for today and she stated that this is the second time that this has happened to her. Pt stated that she just won't get this mammogram done because she can't afford to take off of work again.

## 2019-06-04 ENCOUNTER — HOSPITAL ENCOUNTER (OUTPATIENT)
Dept: MRI IMAGING | Age: 74
Discharge: HOME | End: 2019-06-04
Attending: ORTHOPAEDIC SURGERY
Payer: MEDICARE

## 2019-06-04 DIAGNOSIS — M25.512 LEFT SHOULDER PAIN, UNSPECIFIED CHRONICITY: ICD-10-CM

## 2019-06-04 PROCEDURE — 73221 MRI JOINT UPR EXTREM W/O DYE: CPT

## 2019-06-24 NOTE — PERIOP NOTES
[facility]  Ambulatory Surgery Unit  Pre-operative Instructions    Surgery/Procedure Date  6/28            Tentative Arrival Time tbd      1. On the day of your surgery/procedure, please report to the Ambulatory Surgery Unit Registration Desk and sign in at your designated time. The Ambulatory Surgery Unit is located in [facility] on the [facility] side of the hospitals across from the [address] [place] [address]. Please have all of your health insurance cards and a photo ID. 2. You must have someone with you to drive you home, as you should not drive a car for 24 hours following anesthesia. Please make arrangements for a responsible adult friend or family member to stay with you for at least the first 24 hours after your surgery. 3. Do not have anything to eat or drink (including water, gum, mints, coffee, juice) after 11:59 PM  6/27. This may not apply to medications prescribed by your physician. (Please note below the special instructions with medications to take the morning of surgery, if applicable.)    4. We recommend you do not drink any alcoholic beverages for 24 hours before and after your surgery. 5. Contact your surgeons office for instructions on the following medications: non-steroidal anti-inflammatory drugs (i.e. Advil, Aleve), vitamins, and supplements. (Some surgeons will want you to stop these medications prior to surgery and others may allow you to take them)   **If you are currently taking Plavix, Coumadin, Aspirin and/or other blood-thinning agents, contact your surgeon for instructions. ** Your surgeon will partner with the physician prescribing these medications to determine if it is safe to stop or if you need to continue taking. Please do not stop taking these medications without instructions from your surgeon.     6. In an effort to help prevent surgical site infection, we ask that you shower with an anti-bacterial soap (i.e. Dial/Safeguard, or the soap provided to you at your preadmission testing appointment) for 3 days prior to and on the morning of surgery, using a fresh towel after each shower. (Please begin this process with fresh bed linens.) Do not apply any lotions, powders, or deodorants after the shower on the day of your procedure. If applicable, please do not shave the operative site for 48 hours prior to surgery. 7. Wear comfortable clothes. Wear glasses instead of contacts. Do not bring any jewelry or money (other than copays or fees as instructed). Do not wear make-up, particularly mascara, the morning of your surgery. Do not wear nail polish, particularly if you are having foot /hand surgery. Wear your hair loose or down, no ponytails, buns, meghana pins or clips. All body piercings must be removed. 8. You should understand that if you do not follow these instructions your surgery may be cancelled. If your physical condition changes (i.e. fever, cold or flu) please contact your surgeon as soon as possible. 9. It is important that you be on time. If a situation occurs where you may be late, or if you have any questions or problems, please call [phone].    10. Your surgery time may be subject to change. You will receive a phone call the day prior to surgery to confirm your arrival time. Special Instructions: Take all medications and inhalers, as prescribed, on the morning of surgery with a sip of water EXCEPT: none    I understand a pre-operative phone call will be made to verify my surgery time. In the event that I am not available, I give permission for a message to be left on my answering service and/or with another person?       yes    Reviewed by phone with pt, verbalized understanding     ___________________      ___________________      ________________  (Signature of Patient)          (Witness)                   (Date and Time)

## 2019-06-27 ENCOUNTER — ANESTHESIA EVENT (OUTPATIENT)
Dept: SURGERY | Age: 74
End: 2019-06-27
Payer: MEDICARE

## 2019-06-27 PROBLEM — S46.012A TRAUMATIC COMPLETE TEAR OF LEFT ROTATOR CUFF: Status: ACTIVE | Noted: 2019-06-27

## 2019-06-27 NOTE — H&P
PRE- OP History and Physical                             Subjective:     Patient is a 76 y.o. female presented with a history of left shoulder pain at the referral of Dr. Jesse Russell.       She states 2 months ago she picked up a heavy umbrella stand and the next day woke up with severe pain to the point she could not raise her arm. Since then the pain and weakness have been worsening. She was seen at [facility] where she was prescribed muscle relaxers and anti-inflammatories and these have not helped.       She locates the sharp stabbing achy pain to the deltoid and rates the pain as a 10/10 at its worse. Pain with any movement of her arm and use, laying on left side. Pain is constant, unimproved with rest.  Pain causes difficulty sleeping and night awakening. She has tried a cortisone shot from Dr. Jesse Russell 7 weeks ago which she states did not help. Dr. Jesse Russell ordered a recent MRI of her left shoulder. She has no previous difficulty with the left shoulder, but she had her right rotator cuff repaired 2 years ago.       The patient's condition has been resistant to non-operative treatment and is being admitted for surgical management of this condition.      Patient Active Problem List    Diagnosis Date Noted    Traumatic complete tear of left rotator cuff 06/27/2019    Combined forms of age-related cataract of right eye 03/07/2019    Chronic midline low back pain without sciatica 01/08/2018    Advanced care planning/counseling discussion 12/29/2016    Hypertension 06/20/2016    Compression fracture 06/27/2015    Low back pain 06/27/2015    Abnormal stress test 05/19/2015    Hyperlipidemia 04/20/2015    Osteoporosis 08/27/2010    Vitamin D deficiency 08/27/2010    RLS (restless legs syndrome) 08/27/2010     Past Medical History:   Diagnosis Date    Arthritis     At risk for sleep apnea 03/18/2019    on phone assessment with PAT, kathia sleep scoring tool, scored 4    GERD (gastroesophageal reflux disease)     High cholesterol     Hypertension     Menopause 1995    Palpitations     as of 6/24/19 pt reports was evaluated by cardiology and cleared, now followed by cardiology, no further symptoms per pt    Pneumonia 2006 (approx)    RLS (restless legs syndrome)     Shoulder fracture 2009 [facility] -Jan 09  Right side       Past Surgical History:   Procedure Laterality Date    COLONOSCOPY  7/30/2010    Dr. WHALEY, diverticulosis, repeat due 7/30/2015    COLONOSCOPY,REMV LESN,FORCEP/CAUTERY  9/4/2015         HX CATARACT REMOVAL Bilateral 03/2019    HX LITHOTRIPSY  10/2017    HX OPEN REDUCTION INTERNAL FIXATION Left 1982    ankle    HX ROTATOR CUFF REPAIR Right 2008      Prior to Admission medications    Medication Sig Start Date End Date Taking? Authorizing Provider   metoprolol succinate (TOPROL-XL) 25 mg XL tablet Take 1 Tab by mouth daily. 2/19/19  Yes Helen Arevalo MD   atorvastatin (LIPITOR) 40 mg tablet Take 1 Tab by mouth daily. Patient taking differently: Take 40 mg by mouth nightly. 2/19/19  Yes Helen Arevalo MD   pramipexole (MIRAPEX) 0.5 mg tablet Take 0.5 Tabs by mouth daily. Patient taking differently: Take 0.25 mg by mouth daily. 2/19/19  Yes Helen Arevalo MD   omeprazole (PRILOSEC) 20 mg capsule Take 1 Cap by mouth daily. 2/19/19  Yes Helen Arevalo MD   alendronate (FOSAMAX) 70 mg tablet TAKE 1 TABLET EVERY SUNDAY 2/19/19  Yes Helen Arevalo MD   Aspirin, Buffered 81 mg tab Take 81 mg by mouth daily. Indications: \"for prevention\"   Yes Provider, Historical   acetaminophen (TYLENOL) 325 mg tablet Take 650 mg by mouth every four (4) hours as needed for Pain.    Yes Provider, Historical     No Known Allergies   Social History     Tobacco Use    Smoking status: Never Smoker    Smokeless tobacco: Never Used   Substance Use Topics    Alcohol use: No     Alcohol/week: 0.0 oz      Family History   Problem Relation Age of Onset    Cancer Father         Colon     Diabetes Grandchild       Review of Systems  A comprehensive review of systems was negative except for that written in the HPI. Objective:     Patient Vitals for the past 8 hrs:   BP Temp Pulse Resp SpO2 Height Weight   06/28/19 1052 (!) 140/93 97.8 °F (36.6 °C) 76 14 98 % 5' (1.524 m) 68 kg (150 lb)     Visit Vitals  BP (!) 140/93 (BP 1 Location: Right arm, BP Patient Position: At rest)   Pulse 76   Temp 97.8 °F (36.6 °C)   Resp 14   Ht 5' (1.524 m)   Wt 68 kg (150 lb)   LMP 10/26/1995   SpO2 98%   BMI 29.29 kg/m²     General:  Alert, cooperative, no distress, appears stated age. Head:  Normocephalic, without obvious abnormality, atraumatic. Eyes:  Conjunctivae/corneas clear. PERRL, EOMs intact. Ears:  Normal TMs and external ear canals both ears. Nose: Nares normal. Septum midline. Mucosa normal. No drainage or sinus tenderness. Throat: Lips, mucosa, and tongue normal. Teeth and gums normal.   Neck: Supple, symmetrical, trachea midline, no adenopathy, thyroid: no enlargement/tenderness/nodules, no carotid bruit and no JVD. Back:   Symmetric, no curvature. ROM normal. No CVA tenderness. Lungs:   Clear to auscultation bilaterally. Chest wall:  No tenderness or deformity. Heart:  Regular rate and rhythm, S1, S2, no murmur, click, rub or gallop. Abdomen:   Soft, non-tender. Bowel sounds normal. No masses,  No organomegaly. Extremities: Extremities normal except Shoulder exam reveals palpable radial pulse in both wrists, skin intact bilaterally, sensory exam intact bilaterally. Normal stability bilaterally. No Trey deformity bilaterally. Positive AC joint pain to palpation on the left, negative on the right. LEFT shoulder reveals pseudoparalytic, but has passive assisted elevation 150. Positive drop arm test on the left. Positive Lillian Dibbles' on the left. Good strength with external rotation testing on the left.   , atraumatic, no cyanosis or edema. Pulses: 2+ and symmetric all extremities. Skin: Skin color, texture, turgor normal. No rashes or lesions   Lymph nodes: Cervical, supraclavicular, and axillary nodes normal.   Neurologic: CNII-XII intact. Neurovascular exam intact in distal extremities        Imaging Review  I independently reviewed and interpreted both the MRI and report of left shoulder from 6/4/19 which shows a full thickness rotator cuff tear supraspinatus.       I independently reviewed and interpreted previous x-rays of LEFT shoulder from 5/3/19 which show no significant glenohumeral osteoarthritis, type II acromion, no proximal humerus migration, no fracture. Assessment:     Active Problems:    Traumatic complete tear of left rotator cuff (6/27/2019)        Plan:   Patient has a left full thickness rotator cuff tear. I discussed the natural history and natural progression of diagnosis.      I reviewed patient's medical record, previous office notes, previous x-rays, MRI, and discussed findings, impression, treatment options, and plan with the patient. Treatment options discussed to include no intervention, prescription strength oral anti-inflammatories, injections, Tylenol, physical therapy, and surgical intervention.      I discussed indications, risks, benefits, and recovery of RCR with patient, emphasizing risk of retear and stiffness, and gave a RCR handout detailing the same. I reviewed risk factors for reparability and healing to include smoking, diabetes, age over 61, genetic predisposition, chronicity and size of tear, and compliance. I also discussed indications, risks, benefits, and recovery for subacromial decompression and biceps tenodesis.       After discussion and answering questions, it was elected to proceed with arthroscopic rotator cuff repair, subacromial decompression, biceps tenodesis if needed.   Medical history was reviewed preop.       Operative and non-operative treatments have been discussed with the patient including risks and benefits of each. After consideration of risks, benefits limitations to the consented procedures and alternative options for treatment, the patient has consented to surgical interventions. Questions were answered and Pre-op teaching was completed.       Manoj Cancer, PA

## 2019-06-28 ENCOUNTER — HOSPITAL ENCOUNTER (OUTPATIENT)
Age: 74
Setting detail: OUTPATIENT SURGERY
Discharge: HOME | End: 2019-06-28
Attending: ORTHOPAEDIC SURGERY | Admitting: ORTHOPAEDIC SURGERY
Payer: MEDICARE

## 2019-06-28 ENCOUNTER — ANESTHESIA (OUTPATIENT)
Dept: SURGERY | Age: 74
End: 2019-06-28
Payer: MEDICARE

## 2019-06-28 VITALS
RESPIRATION RATE: 16 BRPM | OXYGEN SATURATION: 99 % | WEIGHT: 150 LBS | SYSTOLIC BLOOD PRESSURE: 144 MMHG | BODY MASS INDEX: 29.45 KG/M2 | TEMPERATURE: 97.5 F | HEART RATE: 86 BPM | HEIGHT: 60 IN | DIASTOLIC BLOOD PRESSURE: 81 MMHG

## 2019-06-28 PROCEDURE — 76030000003 HC AMB SURG OR TIME 1.5 TO 2: Performed by: ORTHOPAEDIC SURGERY

## 2019-06-28 PROCEDURE — 74011250636 HC RX REV CODE- 250/636: Performed by: ORTHOPAEDIC SURGERY

## 2019-06-28 PROCEDURE — 77030018835 HC SOL IRR LR ICUM -A: Performed by: ORTHOPAEDIC SURGERY

## 2019-06-28 PROCEDURE — 76060000063 HC AMB SURG ANES 1.5 TO 2 HR: Performed by: ORTHOPAEDIC SURGERY

## 2019-06-28 PROCEDURE — 77030002916 HC SUT ETHLN J&J -A: Performed by: ORTHOPAEDIC SURGERY

## 2019-06-28 PROCEDURE — 74011250636 HC RX REV CODE- 250/636

## 2019-06-28 PROCEDURE — 77030020143 HC AIRWY LARYN INTUB CGAS -A: Performed by: NURSE ANESTHETIST, CERTIFIED REGISTERED

## 2019-06-28 PROCEDURE — 77030037717 HC BIT DRL SUT TAK KT -ARTH -D: Performed by: ORTHOPAEDIC SURGERY

## 2019-06-28 PROCEDURE — 76210000050 HC AMBSU PH II REC 0.5 TO 1 HR: Performed by: ORTHOPAEDIC SURGERY

## 2019-06-28 PROCEDURE — 77030004451 HC BUR SHV S&N -B: Performed by: ORTHOPAEDIC SURGERY

## 2019-06-28 PROCEDURE — 74011000250 HC RX REV CODE- 250

## 2019-06-28 PROCEDURE — C1713 ANCHOR/SCREW BN/BN,TIS/BN: HCPCS | Performed by: ORTHOPAEDIC SURGERY

## 2019-06-28 PROCEDURE — 74011250636 HC RX REV CODE- 250/636: Performed by: ANESTHESIOLOGY

## 2019-06-28 PROCEDURE — 77030020274 HC MISC IMPL ORTHOPEDIC: Performed by: ORTHOPAEDIC SURGERY

## 2019-06-28 PROCEDURE — P9045 ALBUMIN (HUMAN), 5%, 250 ML: HCPCS

## 2019-06-28 PROCEDURE — 76210000040 HC AMBSU PH I REC FIRST 0.5 HR: Performed by: ORTHOPAEDIC SURGERY

## 2019-06-28 PROCEDURE — 77030019908 HC STETH ESOPH SIMS -A: Performed by: NURSE ANESTHETIST, CERTIFIED REGISTERED

## 2019-06-28 PROCEDURE — 77030008496 HC TBNG ARTHSC IRR S&N -B: Performed by: ORTHOPAEDIC SURGERY

## 2019-06-28 DEVICE — ANCHOR SUT L19.1MM DIA5.5MM PEEK FULL THRD KNOTLESS EYELET: Type: IMPLANTABLE DEVICE | Site: SHOULDER | Status: FUNCTIONAL

## 2019-06-28 RX ORDER — SODIUM CHLORIDE 0.9 % (FLUSH) 0.9 %
5-40 SYRINGE (ML) INJECTION EVERY 8 HOURS
Status: DISCONTINUED | OUTPATIENT
Start: 2019-06-28 | End: 2019-06-28 | Stop reason: HOSPADM

## 2019-06-28 RX ORDER — HYDROMORPHONE HYDROCHLORIDE 1 MG/ML
.2-.5 INJECTION, SOLUTION INTRAMUSCULAR; INTRAVENOUS; SUBCUTANEOUS ONCE
Status: DISCONTINUED | OUTPATIENT
Start: 2019-06-28 | End: 2019-06-28 | Stop reason: HOSPADM

## 2019-06-28 RX ORDER — LIDOCAINE HYDROCHLORIDE 20 MG/ML
INJECTION, SOLUTION EPIDURAL; INFILTRATION; INTRACAUDAL; PERINEURAL AS NEEDED
Status: DISCONTINUED | OUTPATIENT
Start: 2019-06-28 | End: 2019-06-28 | Stop reason: HOSPADM

## 2019-06-28 RX ORDER — SODIUM CHLORIDE, SODIUM LACTATE, POTASSIUM CHLORIDE, CALCIUM CHLORIDE 600; 310; 30; 20 MG/100ML; MG/100ML; MG/100ML; MG/100ML
25 INJECTION, SOLUTION INTRAVENOUS CONTINUOUS
Status: DISCONTINUED | OUTPATIENT
Start: 2019-06-28 | End: 2019-06-28 | Stop reason: HOSPADM

## 2019-06-28 RX ORDER — ALBUMIN HUMAN 50 G/1000ML
SOLUTION INTRAVENOUS AS NEEDED
Status: DISCONTINUED | OUTPATIENT
Start: 2019-06-28 | End: 2019-06-28 | Stop reason: HOSPADM

## 2019-06-28 RX ORDER — EPHEDRINE SULFATE/0.9% NACL/PF 50 MG/5 ML
SYRINGE (ML) INTRAVENOUS AS NEEDED
Status: DISCONTINUED | OUTPATIENT
Start: 2019-06-28 | End: 2019-06-28 | Stop reason: HOSPADM

## 2019-06-28 RX ORDER — SODIUM CHLORIDE 0.9 % (FLUSH) 0.9 %
5-40 SYRINGE (ML) INJECTION AS NEEDED
Status: DISCONTINUED | OUTPATIENT
Start: 2019-06-28 | End: 2019-06-28 | Stop reason: HOSPADM

## 2019-06-28 RX ORDER — ROPIVACAINE HYDROCHLORIDE 5 MG/ML
INJECTION, SOLUTION EPIDURAL; INFILTRATION; PERINEURAL
Status: COMPLETED | OUTPATIENT
Start: 2019-06-28 | End: 2019-06-28

## 2019-06-28 RX ORDER — MORPHINE SULFATE 10 MG/ML
2 INJECTION, SOLUTION INTRAMUSCULAR; INTRAVENOUS
Status: DISCONTINUED | OUTPATIENT
Start: 2019-06-28 | End: 2019-06-28 | Stop reason: HOSPADM

## 2019-06-28 RX ORDER — ONDANSETRON 2 MG/ML
INJECTION INTRAMUSCULAR; INTRAVENOUS AS NEEDED
Status: DISCONTINUED | OUTPATIENT
Start: 2019-06-28 | End: 2019-06-28 | Stop reason: HOSPADM

## 2019-06-28 RX ORDER — ROPIVACAINE HYDROCHLORIDE 5 MG/ML
INJECTION, SOLUTION EPIDURAL; INFILTRATION; PERINEURAL
Status: COMPLETED
Start: 2019-06-28 | End: 2019-06-28

## 2019-06-28 RX ORDER — CEFAZOLIN SODIUM/WATER 2 G/20 ML
2 SYRINGE (ML) INTRAVENOUS ONCE
Status: COMPLETED | OUTPATIENT
Start: 2019-06-28 | End: 2019-06-28

## 2019-06-28 RX ORDER — FENTANYL CITRATE 50 UG/ML
INJECTION, SOLUTION INTRAMUSCULAR; INTRAVENOUS AS NEEDED
Status: DISCONTINUED | OUTPATIENT
Start: 2019-06-28 | End: 2019-06-28 | Stop reason: HOSPADM

## 2019-06-28 RX ORDER — MIDAZOLAM HYDROCHLORIDE 1 MG/ML
INJECTION, SOLUTION INTRAMUSCULAR; INTRAVENOUS
Status: COMPLETED
Start: 2019-06-28 | End: 2019-06-28

## 2019-06-28 RX ORDER — DEXAMETHASONE SODIUM PHOSPHATE 4 MG/ML
INJECTION, SOLUTION INTRA-ARTICULAR; INTRALESIONAL; INTRAMUSCULAR; INTRAVENOUS; SOFT TISSUE AS NEEDED
Status: DISCONTINUED | OUTPATIENT
Start: 2019-06-28 | End: 2019-06-28 | Stop reason: HOSPADM

## 2019-06-28 RX ORDER — DIPHENHYDRAMINE HYDROCHLORIDE 50 MG/ML
12.5 INJECTION, SOLUTION INTRAMUSCULAR; INTRAVENOUS AS NEEDED
Status: DISCONTINUED | OUTPATIENT
Start: 2019-06-28 | End: 2019-06-28 | Stop reason: HOSPADM

## 2019-06-28 RX ORDER — LIDOCAINE HYDROCHLORIDE 20 MG/ML
INJECTION, SOLUTION INFILTRATION; PERINEURAL
Status: DISCONTINUED
Start: 2019-06-28 | End: 2019-06-28 | Stop reason: HOSPADM

## 2019-06-28 RX ORDER — LIDOCAINE HYDROCHLORIDE 10 MG/ML
0.1 INJECTION, SOLUTION EPIDURAL; INFILTRATION; INTRACAUDAL; PERINEURAL AS NEEDED
Status: DISCONTINUED | OUTPATIENT
Start: 2019-06-28 | End: 2019-06-28 | Stop reason: HOSPADM

## 2019-06-28 RX ORDER — PHENYLEPHRINE HCL IN 0.9% NACL 0.4MG/10ML
SYRINGE (ML) INTRAVENOUS AS NEEDED
Status: DISCONTINUED | OUTPATIENT
Start: 2019-06-28 | End: 2019-06-28 | Stop reason: HOSPADM

## 2019-06-28 RX ORDER — FENTANYL CITRATE 50 UG/ML
25 INJECTION, SOLUTION INTRAMUSCULAR; INTRAVENOUS
Status: DISCONTINUED | OUTPATIENT
Start: 2019-06-28 | End: 2019-06-28 | Stop reason: HOSPADM

## 2019-06-28 RX ORDER — MIDAZOLAM HYDROCHLORIDE 1 MG/ML
INJECTION, SOLUTION INTRAMUSCULAR; INTRAVENOUS AS NEEDED
Status: DISCONTINUED | OUTPATIENT
Start: 2019-06-28 | End: 2019-06-28 | Stop reason: HOSPADM

## 2019-06-28 RX ORDER — PROPOFOL 10 MG/ML
INJECTION, EMULSION INTRAVENOUS AS NEEDED
Status: DISCONTINUED | OUTPATIENT
Start: 2019-06-28 | End: 2019-06-28 | Stop reason: HOSPADM

## 2019-06-28 RX ORDER — OXYCODONE AND ACETAMINOPHEN 5; 325 MG/1; MG/1
1 TABLET ORAL
Status: DISCONTINUED | OUTPATIENT
Start: 2019-06-28 | End: 2019-06-28 | Stop reason: HOSPADM

## 2019-06-28 RX ADMIN — ROPIVACAINE HYDROCHLORIDE 30 ML: 5 INJECTION, SOLUTION EPIDURAL; INFILTRATION; PERINEURAL at 11:27

## 2019-06-28 RX ADMIN — DEXAMETHASONE SODIUM PHOSPHATE 4 MG: 4 INJECTION, SOLUTION INTRA-ARTICULAR; INTRALESIONAL; INTRAMUSCULAR; INTRAVENOUS; SOFT TISSUE at 12:10

## 2019-06-28 RX ADMIN — MIDAZOLAM HYDROCHLORIDE 2 MG: 1 INJECTION, SOLUTION INTRAMUSCULAR; INTRAVENOUS at 11:22

## 2019-06-28 RX ADMIN — LIDOCAINE HYDROCHLORIDE 60 MG: 20 INJECTION, SOLUTION EPIDURAL; INFILTRATION; INTRACAUDAL; PERINEURAL at 12:02

## 2019-06-28 RX ADMIN — FENTANYL CITRATE 50 MCG: 50 INJECTION, SOLUTION INTRAMUSCULAR; INTRAVENOUS at 12:15

## 2019-06-28 RX ADMIN — Medication 80 MCG: at 12:17

## 2019-06-28 RX ADMIN — Medication 80 MCG: at 12:14

## 2019-06-28 RX ADMIN — Medication 2 G: at 12:10

## 2019-06-28 RX ADMIN — PROPOFOL 120 MG: 10 INJECTION, EMULSION INTRAVENOUS at 12:02

## 2019-06-28 RX ADMIN — Medication 80 MCG: at 12:22

## 2019-06-28 RX ADMIN — SODIUM CHLORIDE, SODIUM LACTATE, POTASSIUM CHLORIDE, AND CALCIUM CHLORIDE 25 ML/HR: 600; 310; 30; 20 INJECTION, SOLUTION INTRAVENOUS at 10:59

## 2019-06-28 RX ADMIN — Medication 5 MG: at 13:15

## 2019-06-28 RX ADMIN — Medication 80 MCG: at 12:36

## 2019-06-28 RX ADMIN — Medication 80 MCG: at 12:29

## 2019-06-28 RX ADMIN — PROPOFOL 80 MG: 10 INJECTION, EMULSION INTRAVENOUS at 12:11

## 2019-06-28 RX ADMIN — ONDANSETRON 4 MG: 2 INJECTION INTRAMUSCULAR; INTRAVENOUS at 13:19

## 2019-06-28 RX ADMIN — ALBUMIN HUMAN 12.5 G: 50 SOLUTION INTRAVENOUS at 13:14

## 2019-06-28 NOTE — PERIOP NOTES
Permission received to review discharge instructions and discuss private health information with daughter, Juana Garner. Patient states that daughter will be with them for at least 24 hours following today's procedure. Martina Paws applied at this time and set to appropriate temperature. Patient given remote and instructed on use. Will continue to monitor.

## 2019-06-28 NOTE — ANESTHESIA POSTPROCEDURE EVALUATION
Procedure(s):  LEFT SHOULDER ARTHROSCOPY  ROTATOR CUFF REPAIR, biceps tenodesis,debridement.     general, regional    Anesthesia Post Evaluation      Multimodal analgesia: multimodal analgesia used between 6 hours prior to anesthesia start to PACU discharge  Patient location during evaluation: bedside  Patient participation: complete - patient participated  Level of consciousness: awake  Pain score: 0  Airway patency: patent  Anesthetic complications: no  Cardiovascular status: acceptable  Respiratory status: acceptable  Hydration status: acceptable  Post anesthesia nausea and vomiting:  none      Vitals Value Taken Time   /78 6/28/2019  1:50 PM   Temp 36.4 °C (97.5 °F) 6/28/2019  1:34 PM   Pulse 88 6/28/2019  1:50 PM   Resp 16 6/28/2019  1:50 PM   SpO2 97 % 6/28/2019  1:50 PM

## 2019-06-28 NOTE — BRIEF OP NOTE
BRIEF OPERATIVE NOTE    Date of Procedure: 6/28/2019   Preoperative Diagnosis: ROTATOR CUFF TEAR LEFT SHOULDER  Postoperative Diagnosis: ROTATOR CUFF TEAR LEFT SHOULDER    Procedure(s):  LEFT SHOULDER ARTHROSCOPY  ROTATOR CUFF REPAIR, biceps tenodesis,debridement  Surgeon(s) and Role:     Moriah Garcia MD - Primary         Surgical Assistant: Jim Danielle PA-C  - Assist     Surgical Staff:  Circ-1: Casa Spann RN  Scrub Tech-1: Gaby Al  Scrub Tech-2: Raul Frankel Time In Time Out   Incision Start 1217    Incision Close 1327      Anesthesia: General   Estimated Blood Loss: 3cc  Specimens: * No specimens in log *   Findings: see above   Complications: none  Implants:   Implant Name Type Inv.  Item Serial No.  Lot No. LRB No. Used Action   2.6 fibertak suture double loaded   NA  [id number] Left 2 Implanted   fibertak triple loaded   NA  [id number] Left 2 Implanted   ANCHOR SUT CLOS EYE 5.5X19.1MM -- PEEK SWIVELOCK - SNA  ANCHOR SUT CLOS EYE 5.5X19.1MM -- PEEK SWIVELOCK NA ARTHREX [id number] Left 1 Implanted

## 2019-06-28 NOTE — DISCHARGE INSTRUCTIONS
Rotator Cuff Repair  Post-Op Instructions  Georgie Saenz M.D.  [phone]    Sling: You will use your sling for five (5) weeks. You may remove your arm from the sling for showers. You may also remove your arm from the sling and let your arm hang down so your elbow doesn't get too stiff. You are encouraged to perform hand, wrist and elbow range of motion, arm dangles, and shoulder blade pinches at least 5 times per day. These exercises will help to decrease swelling and stiffness. I will teach your how to do dangles and shoulder blade pinches starting right after your surgery. Swelling and bruising is common after surgery. You may also notice swelling in you hand, if you do, adjust your sling so the hand is slightly above the elbow, you may squeeze a ball like a stress ball and you can do some bicep curls without weight. These exercises will help with the swelling. The essential point is that your are NOT allowed to actively lift your elbow away from your side (under its own power) for the first five (5) weeks. Dressing: Your dressing will be left in place for 1-2 days. You may notice bloody drainage on the dressing. That is fine. You will remove the dressing two (2) days after the surgery and cover the incisions with circular band-aids. Shower with band-aids on, then remove and replace band-aids after showers. Sleeping:  Patients are generally more comfortable sleeping in a reclining chair or with some pillows propped behind the shoulder. You can wear your sling when sleeping, or you may remove the sling and just slide a bed pillow up underneath your arm and sleep like that. Some difficulty with sleeping is common for 2-3 weeks after surgery. Therapy:  Arrangements will be made at your post-op appointment. Your Physical Therapist will progress your activity appropriately. Medications:        You should resume your daily medication for other medical conditions the day after surgery. You will go home with pain medication prescription, Oxycodone. If you have an adverse reaction to the pain medicine, please call [phone]. DO NOT  Wait until you are in a lot of pain before taking the medication. It takes the medication 30-45 minutes to take effect. -DO NOT take NSAIDs (Advil, Aleve, Motrin, Ibuprofen, etc.) of the first month. NSAIDs are reported to delay tendon healing. Take Tylenol or Acetaminophen instead. No more than 4000 mg daily. The use of narcotics can lead to constipation. A high fiber diet and lots of fluids can prevent this occurring. Over the counter laxatives such as Dulcolax, Milk of Magnesia, and Magnesium Citrate can be used as directed on the label. We recommend taking both Miralax and Pericolace to help with constipation. If a refill of medication is needed, please call the office during regular business hours, Monday through Friday 8:00 am to 5:00 pm.  Dr. Sebastian Mendenhall may not be readily available to sign a prescription so it is important to call ahead. Refills will not be made after hours, so please plan ahead. We also cannot guarantee a same day prescription. Pain Scale                        Driving: DO NOT drive while taking narcotic medication. It is okay to drive in your sling, just follow same rules, no lifting elbow away from your side. Return to school/work: This will depend on your job requirements and level of activity. If you work at a desk job or in a supervisory role, you may return as early as 3-4 days after surgery. Average return to regular activities is 4-6 months post-op. Follow up: You will be given an appointment card for your follow-up appointment at our [facility] office. At that time your sutures will be removed and physical therapy will be arranged. If unexpected problems, emergencies or other issues occur and you need to speak with our office, please call. A physician is on call 24 hours a day, 7 days a week for emergencies and may be reached through the answering service by calling the regular office number [phone]. Melvin Morales M.D.         Rj Kearns! For the night of surgery, while block is still in effect, start with 1 pain pill at bedtime    Narcotics tend to be constipating and we recommend taking a stool softener such as Colace or Miralax (follow package instructions). If you were given prescriptions, please review the written information on the prescribed medications. DO NOT DRIVE WHILE TAKING NARCOTIC PAIN MEDICATIONS. CPAP PATIENTS BE SURE TO WEAR MACHINE WHENEVER NAPPING OR SLEEPING DAY/NIGHT OF SURGERY! DISCHARGE SUMMARY from Nurse    The following personal items collected during your admission are returned to you:   Dental Appliance: Dental Appliances: Uppers, Lowers(in PACU)  Vision: Visual Aid: None  Hearing Aid:    Jewelry: Jewelry: None  Clothing: Clothing: With patient  Other Valuables: Other Valuables: Purse(Given to daughter)  Valuables sent to safe:        PATIENT INSTRUCTIONS:    After General Anesthesia or Intravenous Sedation, for 24 hours or while taking prescription Narcotics:  · Someone should be with you for the next 24 hours. · For your own safety, a responsible adult must drive you home. · Limit your activities  · Recommended activity: Rest today, up with assistance today. Do not climb stairs or shower unattended for the next 24 hours. · Start with a soft bland diet and advance as tolerated (no nausea) to regular diet. · If you have a sore throat some things that may help are: fluids, warm salt water gargle, or throat lozenges. If this does not improve after several days please follow up with your family physician.   · Do not drive and operate hazardous machinery  · Do not make important personal or business decisions  · Do  not drink alcoholic beverages  · If you have not urinated within 8 hours after discharge, please contact your surgeon on call. Report the following to your surgeon:  · Excessive pain, swelling, redness or odor of or around the surgical area  · Temperature over 100.5  · Nausea and vomiting lasting longer than 4 hours or if unable to take medications  · Any signs of decreased circulation or nerve impairment to extremity: change in color, persistent  numbness, tingling, coldness or increase pain    · If you received an upper extremity nerve block, please wear your sling until the block has worn off, then refer to your surgeons post-operative instructions. If you have had a shoulder block or a block near your collar bone, you may have              symptoms such as:          1. Mild shortness of breath        2. A hoarse voice        3. Blurry vision        4. Unequal pupils        5. Drooping of your face on the same side as the nerve block. These symptoms will disappear as the nerve block wears off. · You will receive a Post Operative Call from one of the Recovery Room Nurses on the day after your surgery to check on you. It is very important for us to know how you are recovering after your surgery. If you have an issue please call your surgeon, do not wait for the post operative call. · You may receive an e-mail or letter in the mail from [place] regarding your experience with us in the Ambulatory Surgery Unit. Your feedback is valuable to us and we appreciate your participation in the survey. ·   · If the above instructions are not adequate or you are having problems after your surgery, call your physician at their office number. ·   · We wish youre a speedy recovery ? What to do at Home:    *  Please give a list of your current medications to your Primary Care Provider.     *  Please update this list whenever your medications are discontinued, doses are      changed, or new medications (including over-the-counter products) are added.    *  Please carry medication information at all times in case of emergency situations. If you have not had your influenza or pneumococcal vaccines, please follow up with your primary care physician. The discharge information has been reviewed with the patient and caregiver. The patient and caregiver verbalized understanding. TO PREVENT AN INFECTION      1. [address] YOUR HANDS     To prevent infection, good handwashing is the most important thing you or your caregiver can do.  Wash your hands with soap and water or use the hand  we gave you before you touch any wounds. 2. SHOWER     Use the antibacterial soap we gave you when you take a shower.  Shower with this soap until your wounds are healed.  To reach all areas of your body, you may need someone to help you.  Dont forget to clean your belly button with every shower. 3.  USE CLEAN SHEETS     Use freshly cleaned sheets on your bed after surgery.  To keep the surgery site clean, do not allow pets to sleep with you while your wound is still healing. 4. STOP SMOKING     Stop smoking, or at least cut back on smoking     Smoking slows your healing. 5.  CONTROL YOUR BLOOD SUGAR     High blood sugars slow wound healing.  If you are diabetic, control your blood sugar levels before and after your surgery. [place] THROMBOSIS AND PULMONARY EMBOLUS    SURGICAL PATIENTS  Surgical patients are the #1 risk for DVT and PE. WHAT IS DVT? WHAT IS PE?  DVT is a serious condition where blood clots develop deep in the veins of the legs. PE occurs when a blood clot breaks loose from the wall of a vein and travels to the lungs blocking the pulmonary artery or one of its branches impairing blood flow from the heart, which could result in death.   RISK FACTORS   Surgery lasting longer than 45 minutes   History of inflammatory bowel disease   Oral contraceptive or hormone replacement therapy   Immobilization   Varicose veins / swollen legs   Smoking    CHF / Acute MI / Irregular heart beat   Family history of thrombosis   General anesthesia greater than 2 hours   Obesity   Infection of less than one month   Less than 1 month postpartum   COPD / Pneumonia   Arthroscopic surgery   Malignancy / cancer   Spine surgery   Blood abnormalities   Stroke / Paralysis / Coma    SIGNS AND SYMPTOMS OF DEEP VEIN TROMBOSIS  Usually occurs in one leg, above or below the knee   Swelling - one calf or thigh may be larger than the other   Feeling increased warmth in the area of the leg that is swollen or painful   Leg pain, which may increase when standing or walking   Swelling along the vein of the leg   When swollen areas is pressed with a finger, a depression may remain   Tenderness of the leg that may be confined to one area   Change in leg color (bluish or red)    SIGNS AND SYMPTOMS OF PULMONARY EMBOLUS   Chest pain that gets worse with deep breath, coughing or chest movement   Coughing up blood   Sweating   Shortness of breath or difficulty breathing   Rapid heart beat   Lightheadedness    HOW TO REDUCE THE POSSIBLE RISK OF DVT   Exercise - simple activities as rotating ankles and wrists, wiggling toes and fingers, tightening and relaxing muscles in calves and thighs promotes circulation while recovering from surgery. Please do these exercises every hour during waking hours   Take mediation as prescribed by your physician (Lovenox, Coumadin, Aspirin)   Resume your normal activities as soon as your doctor advises you to do so.  Remember, when traveling, to [place] your legs frequently.         PATIENTS WHO BELIEVE THEY MAY BE EXPERIENCING SIGNS AND SYMPTOMS OF DVT OR PE SHOULD SEEK MEDICAL HELP IMMEDIATELY

## 2019-06-28 NOTE — PERIOP NOTES
Dr. Augustine Chaudhry performed a LUE block with ultrasound. Patient on continuous cardiac and pulse oximetry monitoring throughout the procedure, nasal cannula 3 liters. Patient received 2 mg, Versed, administered by Dr. Augustine Chaudhry. Vital signs stable. Patient tolerated procedure well. Patient drowsy but arouses when spoken to. Will continue to monitor.

## 2019-06-28 NOTE — ANESTHESIA PREPROCEDURE EVALUATION
Anesthetic History   No history of anesthetic complications            Review of Systems / Medical History  Patient summary reviewed, nursing notes reviewed and pertinent labs reviewed    Pulmonary  Within defined limits                 Neuro/Psych   Within defined limits           Cardiovascular    Hypertension        Dysrhythmias (palpitations; controlled)   Hyperlipidemia    Exercise tolerance: >4 METS  Comments: 03/19 EKG= SR, NSSTTW changes   GI/Hepatic/Renal     GERD: well controlled           Endo/Other        Arthritis     Other Findings   Comments: RLS  Low back pain  H/o Compression Fracture         Physical Exam    Airway  Mallampati: III  TM Distance: < 4 cm  Neck ROM: normal range of motion   Mouth opening: Normal     Cardiovascular    Rhythm: regular  Rate: normal         Dental    Dentition: Full upper dentures and Implants  Comments: implants across lower front   Pulmonary  Breath sounds clear to auscultation               Abdominal  GI exam deferred       Other Findings            Anesthetic Plan    ASA: 2  Anesthesia type: general and regional - interscalene block      Post-op pain plan if not by surgeon: peripheral nerve block single    Induction: Intravenous  Anesthetic plan and risks discussed with: Patient      Took BB at 8 am

## 2019-06-28 NOTE — PERIOP NOTES
Alicia Tucker  1945  [id number]    Situation:  Verbal report given from: [id number] Aguilar and Joseph Radford  Procedure: Procedure(s):  LEFT SHOULDER ARTHROSCOPY  ROTATOR CUFF REPAIR, biceps tenodesis,debridement    Background:    Preoperative diagnosis: ROTATOR CUFF TEAR LEFT SHOULDER    Postoperative diagnosis: ROTATOR CUFF TEAR LEFT SHOULDER    :  Dr. Jose Alfredo Cunha    Assistant(s): Circ-1: Selam Leyva RN  Scrub Tech-1: Debbie Isaac  Scrub Tech-2: Kuldeep BRAN    Specimens: * No specimens in log *    Assessment:  Intra-procedure medications         Anesthesia gave intra-procedure sedation and medications, see anesthesia flow sheet     Intravenous fluids: LR@ KVO     Vital signs stable       Recommendation:    Permission to share finding with daughter.  Negar Medina

## 2019-06-28 NOTE — ANESTHESIA PROCEDURE NOTES
Peripheral Block Performed by: Rachel Rocha MD 
Authorized by: Rachel Rocha MD  
 
 
Block Type: Assessment: 
 
Injection Assessment:

## 2019-06-28 NOTE — ANESTHESIA PROCEDURE NOTES
Peripheral Block    Start time: 6/28/2019 11:19 AM  End time: 6/28/2019 11:30 AM  Performed by: Louise Garza MD  Authorized by: Louise Garza MD       Pre-procedure:   Timeout Time: 11:21          Block Type:   Block Type:   Interscalene  Laterality:  Left  Monitoring:  Standard ASA monitoring, responsive to questions and oxygen  Injection Technique:  Single shot  Procedures: ultrasound guided    Patient Position: supine  Prep: chlorhexidine    Location:  Interscalene  Needle Type:  Stimuplex  Needle Gauge:  22 G  Needle Localization:  Ultrasound guidance    Assessment:  Number of attempts:  1  Injection Assessment:  Incremental injection every 5 mL, no paresthesia, ultrasound image on chart, local visualized surrounding nerve on ultrasound, negative aspiration for blood and no intravascular symptoms  Patient tolerance:  Patient tolerated the procedure well with no immediate complications

## 2019-06-29 NOTE — OP NOTES
[facility]  OPERATIVE REPORT    Name:  Freedom Oliver  MR#:  [id number]  :  1945  ACCOUNT #:  [de-identified]  DATE OF SERVICE:  2019    PREOPERATIVE DIAGNOSES:  Left shoulder rotator cuff tear, long head biceps tear with medial instability due to partial subscapularis tear, subacromial bursitis and lateral impingement. POSTOPERATIVE DIAGNOSES:  Left shoulder rotator cuff tear, long head biceps tear with medial instability due to partial subscapularis tear, subacromial bursitis and lateral impingement. PROCEDURE PERFORMED:  Left shoulder arthroscopic rotator cuff repair supra and infraspinatus tendon, arthroscopic long head biceps tenodesis, extensive debridement of biceps tendon stump, subacromial bursa outlet impingement of CA ligament and lateral spurs. SURGEON:  Franki Tatum. Leilani Carr MD    ASSISTANT:  Juana Acevedo PA-C    ANESTHESIA:  General endotracheal.    COMPLICATIONS:  None. SPECIMENS REMOVED:  none    IMPLANTS:  Arthrex bioabsorbable suture anchors. ESTIMATED BLOOD LOSS:  5 mL. INDICATIONS:  The patient has shoulder pain, comes in for surgical treatment. This is a complex surgical procedure requiring an assistant for patient positioning, for wound closure, and for critically what is a 4-handed operation requiring an assistant to hold the scope while sutures are placed, while anchors are placed, while sutures are tied, for suture management, and cannula stabilization, and one is used. PROCEDURE:  The patient was brought into the operating room theater, given airway, IV antibiotics, and preoperative interscalene block; rolled over into the left side up lateral position. Beanbag exsufflated downside. Axillary roll placed downside. Peroneal nerve padded. Neck placed in neutral extension position on a folded pillow. Left shoulder prepped and draped, put in 10 pounds of traction, 30 degrees of abduction, and 20 degrees of forward flexion.   Established anterior and posterior arthroscopic portals. Reviewed the joint in its entirety. Glenohumeral joint showed no osteoarthritis. Biceps was torn at the pulley due to a partial subscapularis tear that caused subluxation medially out of the biceps grooves. This was tenotomized for later tenodesis and through a separate fascial incision anteriorly, the stump of the biceps was debrided using a sucker shaver until stable. Full thickness cuff tear of the supra and infraspinatus were noted from below. We went into the subacromial space and did a thorough bursectomy. The patient has mild lateral impingement, so lateral half CA ligament release was done as well as debridement of the lateral part of the acromial spur. Then put up the scope anteriorly on the 25-yard line and the PassPort posterolaterally. Through the PassPort, roughened up the footprint of the greater tuberosity. Then we took a #2 FiberWire suture and created a whipstitch in the biceps tendon at the level of the pulley and then brought the sutures through the transverse ligament and tied this tenodesing the long head biceps and excised the remnant proximal biceps. Then did multiple all sutures. Suture anchors from Arthrex were used to repair the cuff, 2 in the medial row, lateral row and then far lateral SwiveLock used to repair this cuff. Tied down arthroscopically, crisscrossed and draped and SwiveLock way laterally. After the lateral rows sewn down, took pictures, copiously irrigated, closed the portals, took the patient to recovery room in stable condition.       Evin Byrne MD [email or web address]  D:  06/29/2019 11:24  T:  06/29/2019 12:07  JOB #:  [id number]

## 2019-08-30 ENCOUNTER — HOSPITAL ENCOUNTER (OUTPATIENT)
Dept: MAMMOGRAPHY | Age: 74
Discharge: HOME | End: 2019-08-30
Attending: INTERNAL MEDICINE
Payer: MEDICARE

## 2019-08-30 DIAGNOSIS — Z12.39 BREAST SCREENING: ICD-10-CM

## 2019-08-30 PROCEDURE — 77063 BREAST TOMOSYNTHESIS BI: CPT

## 2019-09-09 NOTE — PROGRESS NOTES
Familia Missaelfaviola    Chief Complaint   Patient presents with    Hypertension     Room 2A//     Cholesterol Problem           Reviewed record In preparation for visit and have obtained necessary documentation    1. Have you been to the ER, urgent care clinic since your last visit? Hospitalized since your last visit? NO  2. Have you seen or consulted any other health care providers outside of the [address] [place] [address] since your last visit? Include any pap smears or colon screening. NO    Patient has advance directive on file    Provider advised of reason for visit and vitals    Health Maintenance Due   Topic    Pneumococcal 65+ years (2 of 2 - PPSV23)    Influenza Age 5 to Adult     GLAUCOMA SCREENING Q2Y        Wt Readings from Last 3 Encounters:   06/28/19 150 lb (68 kg)   03/26/19 157 lb 6.4 oz (71.4 kg)   03/20/19 160 lb (72.6 kg)     Temp Readings from Last 3 Encounters:   06/28/19 97.5 °F (36.4 °C)   03/26/19 98.1 °F (36.7 °C) (Oral)   03/20/19 98 °F (36.7 °C)     BP Readings from Last 3 Encounters:   06/28/19 144/81   03/26/19 132/74   03/20/19 134/55     Pulse Readings from Last 3 Encounters:   06/28/19 86   03/26/19 70   03/20/19 69         Learning Assessment:  :     Learning Assessment 12/19/2014   PRIMARY LEARNER Patient   HIGHEST LEVEL OF EDUCATION - PRIMARY LEARNER  DID NOT GRADUATE HIGH SCHOOL   BARRIERS PRIMARY LEARNER NONE   CO-LEARNER CAREGIVER No   PRIMARY LANGUAGE ENGLISH   LEARNER PREFERENCE PRIMARY DEMONSTRATION   ANSWERED BY patient   RELATIONSHIP SELF       Depression Screening:  :     3 most recent PHQ Screens 3/1/2019   Little interest or pleasure in doing things Not at all   Feeling down, depressed, irritable, or hopeless Not at all   Total Score PHQ 2 0       Fall Risk Assessment:  :     Fall Risk Assessment, last 12 mths 2/19/2019   Able to walk? Yes   Fall in past 12 months?  No       Abuse Screening:  :     Abuse Screening Questionnaire 2/19/2019 1/8/2018 12/29/2016 9/14/2015 Do you ever feel afraid of your partner? N N N N   Are you in a relationship with someone who physically or mentally threatens you? N N N N   Is it safe for you to go home? Y Y Y Y       ADL Screening:  :     ADL Assessment 1/8/2018   Feeding yourself No Help Needed   Getting from bed to chair No Help Needed   Getting dressed No Help Needed   Bathing or showering No Help Needed   Walk across the room (includes cane/walker) No Help Needed   Using the telphone No Help Needed   Taking your medications No Help Needed   Preparing meals No Help Needed   Managing money (expenses/bills) No Help Needed   Moderately strenuous housework (laundry) No Help Needed   Shopping for personal items (toiletries/medicines) No Help Needed   Shopping for groceries No Help Needed   Driving No Help Needed   Climbing a flight of stairs No Help Needed   Getting to places beyond walking distances No Help Needed           Medication reconciliation up to date and corrected with patient at this time.

## 2019-09-10 ENCOUNTER — OFFICE VISIT (OUTPATIENT)
Dept: INTERNAL MEDICINE CLINIC | Facility: CLINIC | Age: 74
End: 2019-09-10

## 2019-09-10 VITALS
RESPIRATION RATE: 14 BRPM | DIASTOLIC BLOOD PRESSURE: 80 MMHG | SYSTOLIC BLOOD PRESSURE: 152 MMHG | TEMPERATURE: 97.8 F | OXYGEN SATURATION: 97 % | WEIGHT: 149 LBS | HEIGHT: 60 IN | HEART RATE: 74 BPM | BODY MASS INDEX: 29.25 KG/M2

## 2019-09-10 DIAGNOSIS — E78.5 HYPERLIPIDEMIA, UNSPECIFIED HYPERLIPIDEMIA TYPE: Primary | ICD-10-CM

## 2019-09-10 DIAGNOSIS — I10 ESSENTIAL HYPERTENSION: ICD-10-CM

## 2019-09-10 DIAGNOSIS — M81.0 AGE-RELATED OSTEOPOROSIS WITHOUT CURRENT PATHOLOGICAL FRACTURE: ICD-10-CM

## 2019-09-10 DIAGNOSIS — N64.52 BREAST DISCHARGE: ICD-10-CM

## 2019-09-10 RX ORDER — METOPROLOL SUCCINATE 50 MG/1
50 TABLET, EXTENDED RELEASE ORAL DAILY
Qty: 90 TAB | Refills: 3 | Status: SHIPPED | OUTPATIENT
Start: 2019-09-10 | End: 2021-06-07

## 2019-09-10 NOTE — PROGRESS NOTES
HPI  Ms. Zhen Moreno is a 76y.o. year old female, she is seen today for follow up HTN, high cholesterol. Recent left shoulder surgery - still in PT for last 6 weeks - now 2x per week. No chest pain, sob, dizziness, weakness. No HA. No n/v/abd pain. Hasn't been checking bp lately. Continues to have clear/cloudy discharge from right breast. Never saw breast surgeon due to scheduling mix up. Chief Complaint   Patient presents with    Hypertension     Room 2A// fasting     Cholesterol Problem    Shoulder Pain     L side rotator cuff surgery June 28th - Pt 2x weekly // had back cortisone injection done in May and L leg pain ever since         Prior to Admission medications    Medication Sig Start Date End Date Taking? Authorizing Provider   metoprolol succinate (TOPROL-XL) 50 mg XL tablet Take 1 Tab by mouth daily. 9/10/19  Yes Kath Morocho MD   atorvastatin (LIPITOR) 40 mg tablet Take 1 Tab by mouth daily. Patient taking differently: Take 40 mg by mouth nightly. 2/19/19  Yes Kath Morocho MD   pramipexole (MIRAPEX) 0.5 mg tablet Take 0.5 Tabs by mouth daily. Patient taking differently: Take 0.25 mg by mouth daily. 2/19/19  Yes Kath Morocho MD   omeprazole (PRILOSEC) 20 mg capsule Take 1 Cap by mouth daily. 2/19/19  Yes Kath Morocho MD   alendronate (FOSAMAX) 70 mg tablet TAKE 1 TABLET EVERY SUNDAY 2/19/19  Yes Kath Morocho MD   acetaminophen (TYLENOL) 325 mg tablet Take 650 mg by mouth every four (4) hours as needed for Pain. Yes Provider, Historical   Aspirin, Buffered 81 mg tab Take 81 mg by mouth daily. Indications: \"for prevention\"    Provider, Historical         No Known Allergies      REVIEW OF SYSTEMS:  Per HPI    PHYSICAL EXAM:  Visit Vitals  /80   Pulse 74   Temp 97.8 °F (36.6 °C) (Oral)   Resp 14   Ht 5' (1.524 m)   Wt 149 lb (67.6 kg)   LMP 10/26/1995   SpO2 97%   BMI 29.10 kg/m²     Constitutional: Appears well-developed and well-nourished.  No distress. HENT:   Head: Normocephalic and atraumatic. Eyes: No scleral icterus. Neck: no lad, no tm, supple   Cardiovascular: Normal S1/S2, regular rhythm. No murmurs, rubs, or gallops. Pulmonary/Chest: Effort normal and breath sounds normal. No respiratory distress. No wheezes, rhonchi, or rales. Ext: No edema. Neurological: Alert. Psychiatric: Normal mood and affect. Behavior is normal.     Lab Results   Component Value Date/Time    Sodium 140 11/29/2018 01:53 PM    Potassium 4.8 11/29/2018 01:53 PM    Chloride 102 11/29/2018 01:53 PM    CO2 27 11/29/2018 01:53 PM    Anion gap 12 07/16/2010 10:07 AM    Glucose 105 (H) 11/29/2018 01:53 PM    Glucose 98 08/05/2010 08:34 AM    BUN 15 11/29/2018 01:53 PM    Creatinine 0.95 11/29/2018 01:53 PM    BUN/Creatinine ratio 16 11/29/2018 01:53 PM    GFR est AA 69 11/29/2018 01:53 PM    GFR est non-AA 60 11/29/2018 01:53 PM    Calcium 8.8 11/29/2018 01:53 PM    Bilirubin, total <0.2 10/30/2017 08:22 AM    AST (SGOT) 14 10/30/2017 08:22 AM    Alk. phosphatase 80 10/30/2017 08:22 AM    Protein, total 6.6 10/30/2017 08:22 AM    Albumin 3.8 10/30/2017 08:22 AM    Globulin 3.1 07/16/2010 10:07 AM    A-G Ratio 1.4 10/30/2017 08:22 AM    ALT (SGPT) 19 10/30/2017 08:22 AM     Lab Results   Component Value Date/Time    Hemoglobin A1c 5.7 08/05/2010 08:34 AM      Lab Results   Component Value Date/Time    Cholesterol, total 247 (H) 01/08/2018 09:59 AM    HDL Cholesterol 33 (L) 01/08/2018 09:59 AM    LDL, calculated 158 (H) 01/08/2018 09:59 AM    VLDL, calculated 56 (H) 01/08/2018 09:59 AM    Triglyceride 282 (H) 01/08/2018 09:59 AM          ASSESSMENT/PLAN  Diagnoses and all orders for this visit:    1. Hyperlipidemia, unspecified hyperlipidemia type  -     LIPID PANEL  Check lipids  2. Essential hypertension  -     METABOLIC PANEL, COMPREHENSIVE  -     metoprolol succinate (TOPROL-XL) 50 mg XL tablet; Take 1 Tab by mouth daily. Not controlled - increase dose as above  3. BMI 29.0-29.9,adult  Has lost 8# since march - continue diet changes  4. Breast discharge  -     REFERRAL TO BREAST SURGERY  See HPI - scheduling confusion - patient will make appt  5. Age-related osteoporosis without current pathological fracture  -     DEXA BONE DENSITY STUDY AXIAL; Future          Health Maintenance Due   Topic Date Due    GLAUCOMA SCREENING Q2Y  10/12/2019        Follow-up and Dispositions    · Return for 4-6 weeks bp . Reviewed plan of care. Patient has provided input and agrees with goals. The nurse provided the patient and/or family with advanced directive information if needed and encouraged the patient to provide a copy to the office when available.

## 2019-09-11 LAB
ALBUMIN SERPL-MCNC: 4 G/DL (ref 3.5–4.8)
ALBUMIN/GLOB SERPL: 1.5 {RATIO} (ref 1.2–2.2)
ALP SERPL-CCNC: 74 IU/L (ref 39–117)
ALT SERPL-CCNC: 20 IU/L (ref 0–32)
AST SERPL-CCNC: 16 IU/L (ref 0–40)
BILIRUB SERPL-MCNC: 0.4 MG/DL (ref 0–1.2)
BUN SERPL-MCNC: 18 MG/DL (ref 8–27)
BUN/CREAT SERPL: 17 (ref 12–28)
CALCIUM SERPL-MCNC: 9.3 MG/DL (ref 8.7–10.3)
CHLORIDE SERPL-SCNC: 105 MMOL/L (ref 96–106)
CHOLEST SERPL-MCNC: 187 MG/DL (ref 100–199)
CO2 SERPL-SCNC: 24 MMOL/L (ref 20–29)
CREAT SERPL-MCNC: 1.08 MG/DL (ref 0.57–1)
GLOBULIN SER CALC-MCNC: 2.7 G/DL (ref 1.5–4.5)
GLUCOSE SERPL-MCNC: 109 MG/DL (ref 65–99)
HDLC SERPL-MCNC: 35 MG/DL
LDLC SERPL CALC-MCNC: 99 MG/DL (ref 0–99)
POTASSIUM SERPL-SCNC: 4.3 MMOL/L (ref 3.5–5.2)
PROT SERPL-MCNC: 6.7 G/DL (ref 6–8.5)
SODIUM SERPL-SCNC: 141 MMOL/L (ref 134–144)
TRIGL SERPL-MCNC: 264 MG/DL (ref 0–149)
VLDLC SERPL CALC-MCNC: 53 MG/DL (ref 5–40)

## 2019-10-07 ENCOUNTER — HOSPITAL ENCOUNTER (OUTPATIENT)
Dept: MRI IMAGING | Age: 74
Discharge: HOME | End: 2019-10-07
Attending: PHYSICAL MEDICINE & REHABILITATION
Payer: MEDICARE

## 2019-10-07 ENCOUNTER — HOSPITAL ENCOUNTER (OUTPATIENT)
Dept: MAMMOGRAPHY | Age: 74
Discharge: HOME | End: 2019-10-07
Payer: MEDICARE

## 2019-10-07 DIAGNOSIS — M81.0 AGE-RELATED OSTEOPOROSIS WITHOUT CURRENT PATHOLOGICAL FRACTURE: ICD-10-CM

## 2019-10-07 DIAGNOSIS — M51.36 DDD (DEGENERATIVE DISC DISEASE), LUMBAR: ICD-10-CM

## 2019-10-07 PROCEDURE — 72148 MRI LUMBAR SPINE W/O DYE: CPT

## 2019-10-07 PROCEDURE — 77080 DXA BONE DENSITY AXIAL: CPT

## 2019-10-08 ENCOUNTER — OFFICE VISIT (OUTPATIENT)
Dept: INTERNAL MEDICINE CLINIC | Facility: CLINIC | Age: 74
End: 2019-10-08

## 2019-10-08 VITALS
WEIGHT: 149.8 LBS | SYSTOLIC BLOOD PRESSURE: 132 MMHG | HEIGHT: 60 IN | HEART RATE: 71 BPM | OXYGEN SATURATION: 97 % | DIASTOLIC BLOOD PRESSURE: 74 MMHG | RESPIRATION RATE: 14 BRPM | TEMPERATURE: 98 F | BODY MASS INDEX: 29.41 KG/M2

## 2019-10-08 DIAGNOSIS — M81.0 AGE-RELATED OSTEOPOROSIS WITHOUT CURRENT PATHOLOGICAL FRACTURE: ICD-10-CM

## 2019-10-08 DIAGNOSIS — E78.2 MIXED HYPERLIPIDEMIA: ICD-10-CM

## 2019-10-08 DIAGNOSIS — M54.16 LUMBAR RADICULOPATHY: ICD-10-CM

## 2019-10-08 DIAGNOSIS — Z23 ENCOUNTER FOR IMMUNIZATION: ICD-10-CM

## 2019-10-08 DIAGNOSIS — I10 ESSENTIAL HYPERTENSION: Primary | ICD-10-CM

## 2019-10-08 RX ORDER — GABAPENTIN 100 MG/1
CAPSULE ORAL
Qty: 90 CAP | Refills: 1 | Status: SHIPPED | OUTPATIENT
Start: 2019-10-08 | End: 2019-12-09 | Stop reason: SDUPTHER

## 2019-10-08 NOTE — PATIENT INSTRUCTIONS
Vaccine Information Statement    Influenza (Flu) Vaccine (Inactivated or Recombinant): What You Need to Know    Many Vaccine Information Statements are available in Occitan and other languages. See www.immunize.org/vis  Hojas de información sobre vacunas están disponibles en español y en muchos otros idiomas. Visite www.immunize.org/vis    1. Why get vaccinated? Influenza vaccine can prevent influenza (flu). Flu is a contagious disease that spreads around the [facility] every year, usually between October and May. Anyone can get the flu, but it is more dangerous for some people. Infants and young children, people 72years of age and older, pregnant women, and people with certain health conditions or a weakened immune system are at greatest risk of flu complications. Pneumonia, bronchitis, sinus infections and ear infections are examples of flu-related complications. If you have a medical condition, such as heart disease, cancer or diabetes, flu can make it worse. Flu can cause fever and chills, sore throat, muscle aches, fatigue, cough, headache, and runny or stuffy nose. Some people may have vomiting and diarrhea, though this is more common in children than adults. Each year thousands of people in the [facility] die from flu, and many more are hospitalized. Flu vaccine prevents millions of illnesses and flu-related visits to the doctor each year. 2. Influenza vaccines     CDC recommends everyone 10months of age and older get vaccinated every flu season. Children 6 months through 6years of age may need 2 doses during a single flu season. Everyone else needs only 1 dose each flu season. It takes about 2 weeks for protection to develop after vaccination. There are many flu viruses, and they are always changing. Each year a new flu vaccine is made to protect against three or four viruses that are likely to cause disease in the upcoming flu season.  Even when the vaccine doesnt exactly match these viruses, it may still provide some protection. Influenza vaccine does not cause flu. Influenza vaccine may be given at the same time as other vaccines. 3. Talk with your health care provider    Tell your vaccine provider if the person getting the vaccine:   Has had an allergic reaction after a previous dose of influenza vaccine, or has any severe, life-threatening allergies.  Has ever had Guillain-Barré Syndrome (also called GBS). In some cases, your health care provider may decide to postpone influenza vaccination to a future visit. People with minor illnesses, such as a cold, may be vaccinated. People who are moderately or severely ill should usually wait until they recover before getting influenza vaccine. Your health care provider can give you more information. 4. Risks of a reaction     Soreness, redness, and swelling where shot is given, fever, muscle aches, and headache can happen after influenza vaccine.  There may be a very small increased risk of Guillain-Barré Syndrome (GBS) after inactivated influenza vaccine (the flu shot). Anabelle Days children who get the flu shot along with pneumococcal vaccine (PCV13), and/or DTaP vaccine at the same time might be slightly more likely to have a seizure caused by fever. Tell your health care provider if a child who is getting flu vaccine has ever had a seizure. People sometimes faint after medical procedures, including vaccination. Tell your provider if you feel dizzy or have vision changes or ringing in the ears. As with any medicine, there is a very remote chance of a vaccine causing a severe allergic reaction, other serious injury, or death. 5. What if there is a serious problem? An allergic reaction could occur after the vaccinated person leaves the clinic.  If you see signs of a severe allergic reaction (hives, swelling of the face and throat, difficulty breathing, a fast heartbeat, dizziness, or weakness), call 9-1-1 and get the person to the nearest hospital.    For other signs that concern you, call your health care provider. Adverse reactions should be reported to the Vaccine Adverse Event Reporting System (VAERS). Your health care provider will usually file this report, or you can do it yourself. Visit the VAERS website at www.vaers. [facility].gov or call 5-118.350.1806. VAERS is only for reporting reactions, and VAERS staff do not give medical advice. 6. The National Vaccine Injury Compensation Program    The [facility] Vaccine Injury Compensation Program (VICP) is a federal program that was created to compensate people who may have been injured by certain vaccines. Visit the VICP website at www.[facility].gov/vaccinecompensation or call 2-509.987.7447 to learn about the program and about filing a claim. There is a time limit to file a claim for compensation. 7. How can I learn more?  Ask your health care provider.  Call your local or state health department.  Contact the Centers for Disease Control and Prevention (CDC):  - Call 6-894.434.1512 (8-670-WYZ-INFO) or  - Visit CDCs influenza website at www.cdc.gov/flu    Vaccine Information Statement (Interim)  Inactivated Influenza Vaccine   8/15/2019  42 PRESTON Jett [id number]   Department of Health and Human Services  Centers for Disease Control and Prevention    Office Use Only

## 2019-10-08 NOTE — PROGRESS NOTES
Manuelito Fernández    Chief Complaint   Patient presents with    Blood Pressure Check     Room 2B//  dexa and back MRI done yesterday            Reviewed record In preparation for visit and have obtained necessary documentation    1. Have you been to the ER, urgent care clinic since your last visit? Hospitalized since your last visit? NO  2. Have you seen or consulted any other health care providers outside of the [address] [place] [address] since your last visit? Include any pap smears or colon screening. NO    Patient has advance directive on file    Provider advised of reason for visit and vitals    Health Maintenance Due   Topic    GLAUCOMA SCREENING Q2Y        Wt Readings from Last 3 Encounters:   10/08/19 149 lb 12.8 oz (67.9 kg)   09/10/19 149 lb (67.6 kg)   06/28/19 150 lb (68 kg)     Temp Readings from Last 3 Encounters:   09/10/19 97.8 °F (36.6 °C) (Oral)   06/28/19 97.5 °F (36.4 °C)   03/26/19 98.1 °F (36.7 °C) (Oral)     BP Readings from Last 3 Encounters:   09/10/19 152/80   06/28/19 144/81   03/26/19 132/74     Pulse Readings from Last 3 Encounters:   09/10/19 74   06/28/19 86   03/26/19 70         Learning Assessment:  :     Learning Assessment 12/19/2014   PRIMARY LEARNER Patient   HIGHEST LEVEL OF EDUCATION - PRIMARY LEARNER  DID NOT GRADUATE HIGH SCHOOL   BARRIERS PRIMARY LEARNER NONE   CO-LEARNER CAREGIVER No   PRIMARY LANGUAGE ENGLISH   LEARNER PREFERENCE PRIMARY DEMONSTRATION   ANSWERED BY patient   RELATIONSHIP SELF       Depression Screening:  :     3 most recent PHQ Screens 3/1/2019   Little interest or pleasure in doing things Not at all   Feeling down, depressed, irritable, or hopeless Not at all   Total Score PHQ 2 0       Fall Risk Assessment:  :     Fall Risk Assessment, last 12 mths 2/19/2019   Able to walk? Yes   Fall in past 12 months? No       Abuse Screening:  :     Abuse Screening Questionnaire 2/19/2019 1/8/2018 12/29/2016 9/14/2015   Do you ever feel afraid of your partner?  N N N N   Are you in a relationship with someone who physically or mentally threatens you? N N N N   Is it safe for you to go home? Y Y Y Y       ADL Screening:  :     ADL Assessment 1/8/2018   Feeding yourself No Help Needed   Getting from bed to chair No Help Needed   Getting dressed No Help Needed   Bathing or showering No Help Needed   Walk across the room (includes cane/walker) No Help Needed   Using the telphone No Help Needed   Taking your medications No Help Needed   Preparing meals No Help Needed   Managing money (expenses/bills) No Help Needed   Moderately strenuous housework (laundry) No Help Needed   Shopping for personal items (toiletries/medicines) No Help Needed   Shopping for groceries No Help Needed   Driving No Help Needed   Climbing a flight of stairs No Help Needed   Getting to places beyond walking distances No Help Needed           Medication reconciliation up to date and corrected with patient at this time.

## 2019-10-08 NOTE — PROGRESS NOTES
HPI  Ms. Basil Figueroa is a 76y.o. year old female, she is seen today for follow up HTN after increasing metoprolol. In addition had BMD yesterday - results osteoporosis but some improvement. BP has been pretty good at home - 130s/70s. No chest pain, sob, dizziness, weakness. Takes tylenol for pain, nothing else. Was prescribed lyrica by ortho - not affordable - has been on gabapentin in past but only 100mg hs and only for one month when rx ran out. Chief Complaint   Patient presents with    Blood Pressure Check     Room 2B//  dexa and back MRI done yesterday         Prior to Admission medications    Medication Sig Start Date End Date Taking? Authorizing Provider   gabapentin (NEURONTIN) 100 mg capsule 100mg hs x 3 nights, then 200mg hs x 3 nights, then 300mg hs 10/8/19  Yes Diony Monson MD   metoprolol succinate (TOPROL-XL) 50 mg XL tablet Take 1 Tab by mouth daily. 9/10/19  Yes Diony Monson MD   atorvastatin (LIPITOR) 40 mg tablet Take 1 Tab by mouth daily. Patient taking differently: Take 40 mg by mouth nightly. 2/19/19  Yes Diony Monson MD   pramipexole (MIRAPEX) 0.5 mg tablet Take 0.5 Tabs by mouth daily. Patient taking differently: Take 0.25 mg by mouth daily. 2/19/19  Yes Diony Monson MD   omeprazole (PRILOSEC) 20 mg capsule Take 1 Cap by mouth daily. 2/19/19  Yes Diony Monson MD   alendronate (FOSAMAX) 70 mg tablet TAKE 1 TABLET EVERY SUNDAY 2/19/19  Yes Diony Monson MD   Aspirin, Buffered 81 mg tab Take 81 mg by mouth daily. Indications: \"for prevention\"   Yes Provider, Historical   acetaminophen (TYLENOL) 325 mg tablet Take 650 mg by mouth every four (4) hours as needed for Pain.    Yes Provider, Historical         No Known Allergies      REVIEW OF SYSTEMS:  Per HPI    PHYSICAL EXAM:  Visit Vitals  /74   Pulse 71   Temp 98 °F (36.7 °C) (Oral)   Resp 14   Ht 5' (1.524 m)   Wt 149 lb 12.8 oz (67.9 kg)   LMP 10/26/1995   SpO2 97%   BMI 29.26 kg/m²     Constitutional: Appears well-developed and well-nourished, mildly uncomfortable due to right leg pain. HENT:   Head: Normocephalic and atraumatic. Eyes: No scleral icterus. Cardiovascular: Normal S1/S2, regular rhythm. No murmurs, rubs, or gallops. Pulmonary/Chest: Effort normal and breath sounds normal. No respiratory distress. No wheezes, rhonchi, or rales. Ext: No edema. Neurological: Alert. Psychiatric: Normal mood and affect. Behavior is normal.     Lab Results   Component Value Date/Time    Sodium 141 09/10/2019 10:14 AM    Potassium 4.3 09/10/2019 10:14 AM    Chloride 105 09/10/2019 10:14 AM    CO2 24 09/10/2019 10:14 AM    Anion gap 12 07/16/2010 10:07 AM    Glucose 109 (H) 09/10/2019 10:14 AM    Glucose 98 08/05/2010 08:34 AM    BUN 18 09/10/2019 10:14 AM    Creatinine 1.08 (H) 09/10/2019 10:14 AM    BUN/Creatinine ratio 17 09/10/2019 10:14 AM    GFR est AA 58 (L) 09/10/2019 10:14 AM    GFR est non-AA 51 (L) 09/10/2019 10:14 AM    Calcium 9.3 09/10/2019 10:14 AM    Bilirubin, total 0.4 09/10/2019 10:14 AM    AST (SGOT) 16 09/10/2019 10:14 AM    Alk. phosphatase 74 09/10/2019 10:14 AM    Protein, total 6.7 09/10/2019 10:14 AM    Albumin 4.0 09/10/2019 10:14 AM    Globulin 3.1 07/16/2010 10:07 AM    A-G Ratio 1.5 09/10/2019 10:14 AM    ALT (SGPT) 20 09/10/2019 10:14 AM     Lab Results   Component Value Date/Time    Hemoglobin A1c 5.7 08/05/2010 08:34 AM      Lab Results   Component Value Date/Time    Cholesterol, total 187 09/10/2019 10:14 AM    HDL Cholesterol 35 (L) 09/10/2019 10:14 AM    LDL, calculated 99 09/10/2019 10:14 AM    VLDL, calculated 53 (H) 09/10/2019 10:14 AM    Triglyceride 264 (H) 09/10/2019 10:14 AM          ASSESSMENT/PLAN  Diagnoses and all orders for this visit:    1. Essential hypertension  Controlled on current regimen, continue   2. Age-related osteoporosis without current pathological fracture  Improvement - continue current medications   3.  Mixed hyperlipidemia  At goal other than slightly high triglycerides   4. Encounter for immunization  -     INFLUENZA VACCINE INACTIVATED (IIV), SUBUNIT, ADJUVANTED, IM  -     ADMIN INFLUENZA VIRUS VAC    5. Lumbar radiculopathy  -     gabapentin (NEURONTIN) 100 mg capsule; 100mg hs x 3 nights, then 200mg hs x 3 nights, then 300mg hs  Start above - lyrica not affordable - has follow up with Dr. Nick Mcdonnell Maintenance Due   Topic Date Due    GLAUCOMA SCREENING Q2Y  10/12/2019        Follow-up and Dispositions    · Return in about 3 months (around 1/8/2020) for sciatica, bp. Reviewed plan of care. Patient has provided input and agrees with goals. The nurse provided the patient and/or family with advanced directive information if needed and encouraged the patient to provide a copy to the office when available.

## 2019-12-09 ENCOUNTER — OFFICE VISIT (OUTPATIENT)
Dept: INTERNAL MEDICINE CLINIC | Facility: CLINIC | Age: 74
End: 2019-12-09

## 2019-12-09 VITALS
SYSTOLIC BLOOD PRESSURE: 140 MMHG | DIASTOLIC BLOOD PRESSURE: 80 MMHG | HEART RATE: 66 BPM | HEIGHT: 60 IN | TEMPERATURE: 97.7 F | BODY MASS INDEX: 30.19 KG/M2 | OXYGEN SATURATION: 96 % | WEIGHT: 153.8 LBS | RESPIRATION RATE: 14 BRPM

## 2019-12-09 DIAGNOSIS — M71.38 SYNOVIAL CYST OF LUMBAR SPINE: Primary | ICD-10-CM

## 2019-12-09 DIAGNOSIS — I10 ESSENTIAL HYPERTENSION: ICD-10-CM

## 2019-12-09 DIAGNOSIS — M54.16 LUMBAR RADICULOPATHY: ICD-10-CM

## 2019-12-09 RX ORDER — GABAPENTIN 100 MG/1
CAPSULE ORAL
Qty: 90 CAP | Refills: 1 | Status: SHIPPED | OUTPATIENT
Start: 2019-12-09 | End: 2021-06-07

## 2019-12-09 NOTE — PROGRESS NOTES
HPI  Ms. Lissa Birmingham is a 76y.o. year old female, she is seen today for follow up HTN, also needs pre op evaluation prior to left L4-L5 synovial cyst excision on 12/17/19. In usual state of health. No chest pain, sob, dizziness, weakness, cough, f/c, n/v/abd pain/diarrhea. At home BP is \"up and down\". Remembers 138/82. METS >4.   Still has shoulder pain despite rotator cuff repair and having completed PT. Will follow up ortho. Chief Complaint   Patient presents with    Blood Pressure Check     Room 2C    Back Pain      L L4 and 5 Synovial Cyst Excision on 12/17/19    Shoulder Pain     L shoulder pain continues // PT ended in mid november  // sees Dr Julian Chatterjee on 12/16 for this         Prior to Admission medications    Medication Sig Start Date End Date Taking? Authorizing Provider   gabapentin (NEURONTIN) 100 mg capsule 100mg hs x 3 nights, then 200mg hs x 3 nights, then 300mg hs 12/9/19  Yes Mickey Raman MD   metoprolol succinate (TOPROL-XL) 50 mg XL tablet Take 1 Tab by mouth daily. 9/10/19  Yes Mickey Raman MD   atorvastatin (LIPITOR) 40 mg tablet Take 1 Tab by mouth daily. Patient taking differently: Take 40 mg by mouth nightly. 2/19/19  Yes Mickey Raman MD   pramipexole (MIRAPEX) 0.5 mg tablet Take 0.5 Tabs by mouth daily. Patient taking differently: Take 0.25 mg by mouth daily. 2/19/19  Yes Mickey Raman MD   omeprazole (PRILOSEC) 20 mg capsule Take 1 Cap by mouth daily. 2/19/19  Yes Mickey Raman MD   alendronate (FOSAMAX) 70 mg tablet TAKE 1 TABLET EVERY SUNDAY 2/19/19  Yes Mickey Raman MD   Aspirin, Buffered 81 mg tab Take 81 mg by mouth daily. Indications: \"for prevention\"   Yes Provider, Historical   acetaminophen (TYLENOL) 325 mg tablet Take 650 mg by mouth every four (4) hours as needed for Pain.    Yes Provider, Historical   gabapentin (NEURONTIN) 100 mg capsule 100mg hs x 3 nights, then 200mg hs x 3 nights, then 300mg hs 10/8/19 12/9/19  Danny Jacob Sol MD         No Known Allergies      REVIEW OF SYSTEMS:  Per HPI    PHYSICAL EXAM:  Visit Vitals  /80   Pulse 66   Temp 97.7 °F (36.5 °C) (Oral)   Resp 14   Ht 5' (1.524 m)   Wt 153 lb 12.8 oz (69.8 kg)   LMP 10/26/1995   SpO2 96%   BMI 30.04 kg/m²     Constitutional: Appears well-developed and well-nourished. No distress. HENT:   Head: Normocephalic and atraumatic. Eyes: No scleral icterus. Cardiovascular: Normal S1/S2, regular rhythm. No murmurs, rubs, or gallops. Pulmonary/Chest: Effort normal and breath sounds normal. No respiratory distress. No wheezes, rhonchi, or rales. Abdomen: Soft, NT/ND, +BS, no rebound or guarding, no masses, no HSM appreciated. Ext: No edema. Neurological: Alert. Psychiatric: Normal mood and affect. Behavior is normal.     Lab Results   Component Value Date/Time    Sodium 141 09/10/2019 10:14 AM    Potassium 4.3 09/10/2019 10:14 AM    Chloride 105 09/10/2019 10:14 AM    CO2 24 09/10/2019 10:14 AM    Anion gap 12 07/16/2010 10:07 AM    Glucose 109 (H) 09/10/2019 10:14 AM    Glucose 98 08/05/2010 08:34 AM    BUN 18 09/10/2019 10:14 AM    Creatinine 1.08 (H) 09/10/2019 10:14 AM    BUN/Creatinine ratio 17 09/10/2019 10:14 AM    GFR est AA 58 (L) 09/10/2019 10:14 AM    GFR est non-AA 51 (L) 09/10/2019 10:14 AM    Calcium 9.3 09/10/2019 10:14 AM    Bilirubin, total 0.4 09/10/2019 10:14 AM    AST (SGOT) 16 09/10/2019 10:14 AM    Alk.  phosphatase 74 09/10/2019 10:14 AM    Protein, total 6.7 09/10/2019 10:14 AM    Albumin 4.0 09/10/2019 10:14 AM    Globulin 3.1 07/16/2010 10:07 AM    A-G Ratio 1.5 09/10/2019 10:14 AM    ALT (SGPT) 20 09/10/2019 10:14 AM     Lab Results   Component Value Date/Time    Hemoglobin A1c 5.7 08/05/2010 08:34 AM      Lab Results   Component Value Date/Time    Cholesterol, total 187 09/10/2019 10:14 AM    HDL Cholesterol 35 (L) 09/10/2019 10:14 AM    LDL, calculated 99 09/10/2019 10:14 AM    VLDL, calculated 53 (H) 09/10/2019 10:14 AM Triglyceride 264 (H) 09/10/2019 10:14 AM          ASSESSMENT/PLAN  Diagnoses and all orders for this visit:    1. Synovial cyst of lumbar spine    2. Lumbar radiculopathy  -     gabapentin (NEURONTIN) 100 mg capsule; 100mg hs x 3 nights, then 200mg hs x 3 nights, then 300mg hs    3. Essential hypertension    Based on ACC/AHA guidelines patient may proceed to OR for planned surgery. Will restart gabapentin as she has been out but it helps with pain. Health Maintenance Due   Topic Date Due    Pneumococcal 65+ years (2 of 2 - PPSV23) 01/31/2018    GLAUCOMA SCREENING Q2Y  10/12/2019        Follow-up and Dispositions    · Return in about 4 months (around 4/9/2020) for bp, AWV. Reviewed plan of care. Patient has provided input and agrees with goals. The nurse provided the patient and/or family with advanced directive information if needed and encouraged the patient to provide a copy to the office when available.

## 2019-12-09 NOTE — PROGRESS NOTES
[id number] [address] [place] [address]  Identified pt with two pt identifiers(name and ). Chief Complaint   Patient presents with    Blood Pressure Check     Room 2C//     Back Pain     had L L4 and 5 Synovial Cyst Excision on 19    Shoulder Pain     L shoulder pain continues // PT ended in mid . Have you been to the ER, urgent care clinic since your last visit? Hospitalized since your last visit? NO    2. Have you seen or consulted any other health care providers outside of the [address] [place] [address] since your last visit? Include any pap smears or colon screening. NO      Provider notified of reason for visit, vitals and flowsheets obtained on patients.    AD on file   Reviewed record In preparation for visit, huddled with provider and have obtained necessary documentation      Health Maintenance Due   Topic    Pneumococcal 65+ years (2 of 2 - PPSV23)    GLAUCOMA SCREENING Q2Y        Wt Readings from Last 3 Encounters:   19 153 lb 12.8 oz (69.8 kg)   10/08/19 149 lb 12.8 oz (67.9 kg)   09/10/19 149 lb (67.6 kg)     Temp Readings from Last 3 Encounters:   10/08/19 98 °F (36.7 °C) (Oral)   09/10/19 97.8 °F (36.6 °C) (Oral)   19 97.5 °F (36.4 °C)     BP Readings from Last 3 Encounters:   10/08/19 132/74   09/10/19 152/80   19 144/81     Pulse Readings from Last 3 Encounters:   10/08/19 71   09/10/19 74   19 86     Vitals:    19 1113   Resp: 14   Weight: 153 lb 12.8 oz (69.8 kg)   Height: 5' (1.524 m)   PainSc:   6   PainLoc: Shoulder   LMP: 10/26/1995         Learning Assessment:  :     Learning Assessment 2014   PRIMARY LEARNER Patient   HIGHEST LEVEL OF EDUCATION - PRIMARY LEARNER  DID NOT GRADUATE HIGH SCHOOL   BARRIERS PRIMARY LEARNER NONE   CO-LEARNER CAREGIVER No   PRIMARY LANGUAGE ENGLISH   LEARNER PREFERENCE PRIMARY DEMONSTRATION   ANSWERED BY patient   RELATIONSHIP SELF       Depression Screening:  :     3 most recent PHQ Screens 3/1/2019   Little interest or pleasure in doing things Not at all   Feeling down, depressed, irritable, or hopeless Not at all   Total Score PHQ 2 0       Fall Risk Assessment:  :     Fall Risk Assessment, last 12 mths 2/19/2019   Able to walk? Yes   Fall in past 12 months? No       Abuse Screening:  :     Abuse Screening Questionnaire 2/19/2019 1/8/2018 12/29/2016 9/14/2015   Do you ever feel afraid of your partner? N N N N   Are you in a relationship with someone who physically or mentally threatens you? N N N N   Is it safe for you to go home? Y Y Y Y       ADL Screening:  :     ADL Assessment 1/8/2018   Feeding yourself No Help Needed   Getting from bed to chair No Help Needed   Getting dressed No Help Needed   Bathing or showering No Help Needed   Walk across the room (includes cane/walker) No Help Needed   Using the telphone No Help Needed   Taking your medications No Help Needed   Preparing meals No Help Needed   Managing money (expenses/bills) No Help Needed   Moderately strenuous housework (laundry) No Help Needed   Shopping for personal items (toiletries/medicines) No Help Needed   Shopping for groceries No Help Needed   Driving No Help Needed   Climbing a flight of stairs No Help Needed   Getting to places beyond walking distances No Help Needed         Medication reconciliation up to date and corrected with patient at this time.

## 2019-12-12 ENCOUNTER — HOSPITAL ENCOUNTER (OUTPATIENT)
Dept: GENERAL RADIOLOGY | Age: 74
Discharge: HOME | End: 2019-12-12
Attending: ORTHOPAEDIC SURGERY
Payer: MEDICARE

## 2019-12-12 ENCOUNTER — HOSPITAL ENCOUNTER (OUTPATIENT)
Dept: PREADMISSION TESTING | Age: 74
Discharge: HOME | End: 2019-12-12
Attending: ORTHOPAEDIC SURGERY
Payer: MEDICARE

## 2019-12-12 VITALS
DIASTOLIC BLOOD PRESSURE: 71 MMHG | RESPIRATION RATE: 16 BRPM | TEMPERATURE: 97.8 F | SYSTOLIC BLOOD PRESSURE: 152 MMHG | OXYGEN SATURATION: 100 % | HEART RATE: 70 BPM

## 2019-12-12 LAB
25(OH)D3 SERPL-MCNC: <9 NG/ML (ref 30–100)
ABO + RH BLD: NORMAL
ALBUMIN SERPL-MCNC: 3.5 G/DL (ref 3.5–5)
ALBUMIN/GLOB SERPL: 1 {RATIO} (ref 1.1–2.2)
ALP SERPL-CCNC: 83 U/L (ref 45–117)
ALT SERPL-CCNC: 34 U/L (ref 12–78)
ANION GAP SERPL CALC-SCNC: 5 MMOL/L (ref 5–15)
APPEARANCE UR: CLEAR
AST SERPL-CCNC: 25 U/L (ref 15–37)
BACTERIA URNS QL MICRO: ABNORMAL /HPF
BILIRUB SERPL-MCNC: 0.4 MG/DL (ref 0.2–1)
BILIRUB UR QL: NEGATIVE
BLOOD GROUP ANTIBODIES SERPL: NORMAL
BUN SERPL-MCNC: 13 MG/DL (ref 6–20)
BUN/CREAT SERPL: 11 (ref 12–20)
CALCIUM SERPL-MCNC: 8.9 MG/DL (ref 8.5–10.1)
CHLORIDE SERPL-SCNC: 110 MMOL/L (ref 97–108)
CO2 SERPL-SCNC: 29 MMOL/L (ref 21–32)
COLOR UR: ABNORMAL
CREAT SERPL-MCNC: 1.15 MG/DL (ref 0.55–1.02)
EPITH CASTS URNS QL MICRO: ABNORMAL /LPF
ERYTHROCYTE [DISTWIDTH] IN BLOOD BY AUTOMATED COUNT: 12.6 % (ref 11.5–14.5)
EST. AVERAGE GLUCOSE BLD GHB EST-MCNC: 143 MG/DL
GLOBULIN SER CALC-MCNC: 3.6 G/DL (ref 2–4)
GLUCOSE SERPL-MCNC: 101 MG/DL (ref 65–100)
GLUCOSE UR STRIP.AUTO-MCNC: NEGATIVE MG/DL
HBA1C MFR BLD: 6.6 % (ref 4–5.6)
HCT VFR BLD AUTO: 43 % (ref 35–47)
HGB BLD-MCNC: 13.6 G/DL (ref 11.5–16)
HGB UR QL STRIP: NEGATIVE
HYALINE CASTS URNS QL MICRO: ABNORMAL /LPF (ref 0–5)
INR PPP: 1 (ref 0.9–1.1)
KETONES UR QL STRIP.AUTO: NEGATIVE MG/DL
LEUKOCYTE ESTERASE UR QL STRIP.AUTO: ABNORMAL
MCH RBC QN AUTO: 29.5 PG (ref 26–34)
MCHC RBC AUTO-ENTMCNC: 31.6 G/DL (ref 30–36.5)
MCV RBC AUTO: 93.3 FL (ref 80–99)
NITRITE UR QL STRIP.AUTO: NEGATIVE
NRBC # BLD: 0 K/UL (ref 0–0.01)
NRBC BLD-RTO: 0 PER 100 WBC
PH UR STRIP: 6.5 [PH] (ref 5–8)
PLATELET # BLD AUTO: 266 K/UL (ref 150–400)
PMV BLD AUTO: 10.5 FL (ref 8.9–12.9)
POTASSIUM SERPL-SCNC: 4.6 MMOL/L (ref 3.5–5.1)
PREALB SERPL-MCNC: 25.5 MG/DL (ref 20–40)
PROT SERPL-MCNC: 7.1 G/DL (ref 6.4–8.2)
PROT UR STRIP-MCNC: NEGATIVE MG/DL
PROTHROMBIN TIME: 10.3 SEC (ref 9–11.1)
RBC # BLD AUTO: 4.61 M/UL (ref 3.8–5.2)
RBC #/AREA URNS HPF: ABNORMAL /HPF (ref 0–5)
SODIUM SERPL-SCNC: 144 MMOL/L (ref 136–145)
SP GR UR REFRACTOMETRY: 1.01 (ref 1–1.03)
SPECIMEN EXP DATE BLD: NORMAL
UA: UC IF INDICATED,UAUC: ABNORMAL
UROBILINOGEN UR QL STRIP.AUTO: 0.2 EU/DL (ref 0.2–1)
WBC # BLD AUTO: 7.7 K/UL (ref 3.6–11)
WBC URNS QL MICRO: ABNORMAL /HPF (ref 0–4)

## 2019-12-12 PROCEDURE — 87186 SC STD MICRODIL/AGAR DIL: CPT

## 2019-12-12 PROCEDURE — 84134 ASSAY OF PREALBUMIN: CPT

## 2019-12-12 PROCEDURE — 81001 URINALYSIS AUTO W/SCOPE: CPT

## 2019-12-12 PROCEDURE — 85027 COMPLETE CBC AUTOMATED: CPT

## 2019-12-12 PROCEDURE — 36415 COLL VENOUS BLD VENIPUNCTURE: CPT

## 2019-12-12 PROCEDURE — 82306 VITAMIN D 25 HYDROXY: CPT

## 2019-12-12 PROCEDURE — 80053 COMPREHEN METABOLIC PANEL: CPT

## 2019-12-12 PROCEDURE — 85610 PROTHROMBIN TIME: CPT

## 2019-12-12 PROCEDURE — 71046 X-RAY EXAM CHEST 2 VIEWS: CPT

## 2019-12-12 PROCEDURE — 87077 CULTURE AEROBIC IDENTIFY: CPT

## 2019-12-12 PROCEDURE — 83036 HEMOGLOBIN GLYCOSYLATED A1C: CPT

## 2019-12-12 PROCEDURE — 87086 URINE CULTURE/COLONY COUNT: CPT

## 2019-12-12 PROCEDURE — 86900 BLOOD TYPING SEROLOGIC ABO: CPT

## 2019-12-12 RX ORDER — PREGABALIN 150 MG/1
150 CAPSULE ORAL ONCE
Status: CANCELLED | OUTPATIENT
Start: 2019-12-17 | End: 2019-12-17

## 2019-12-12 RX ORDER — SODIUM CHLORIDE, SODIUM LACTATE, POTASSIUM CHLORIDE, CALCIUM CHLORIDE 600; 310; 30; 20 MG/100ML; MG/100ML; MG/100ML; MG/100ML
25 INJECTION, SOLUTION INTRAVENOUS CONTINUOUS
Status: CANCELLED | OUTPATIENT
Start: 2019-12-17

## 2019-12-12 RX ORDER — ACETAMINOPHEN 10 MG/ML
1000 INJECTION, SOLUTION INTRAVENOUS ONCE
Status: CANCELLED | OUTPATIENT
Start: 2019-12-17 | End: 2019-12-17

## 2019-12-12 NOTE — PERIOP NOTES
Preventing Infections Before and After  Your Surgery IMPORTANT INSTRUCTIONS Please read and follow these instructions carefully. If you are unable to comply with the below instructions your procedure will be cancelled. Every Night for Three (3) nights before your surgery: 1. Shower with an antibacterial soap, such as Dial, or the soap provided at your preassessment appointment. A shower is better than a bath for cleaning your skin. 2. If needed, ask someone to help you reach all areas of your body. Dont forget to clean your belly button with every shower. The night before your surgery: If you lose your Hibiclens/chlorhexidine please contact surgery center or you can purchase it at a local pharmacy 1. On the night before your surgery, shower with an antibacterial soap, such as Dial, or the soap provided at your preassessment appointment. 2. With one packet of Hibiclens/Chlorhexidine in hand, turn water off. 
3. Apply Hibiclens antiseptic skin cleanser with a clean, freshly washed washcloth. ? Gently apply to your body from chin to toes (except the genital area) and especially the area(s) where your incision(s) will be. ? Leave Hibiclens/Chlorhexidine on your skin for at least 20 seconds. CAUTION: If needed, Hibiclens/chlorhexidine may be used to clean the folds of skin of the legs (such as in the area of the groin) and on your buttocks and hips. However, do not use Hibiclens/Chlorhexidine above the neck or in the genital area (your bottom) or put inside any area of your body. 4. Turn the water back on and rinse. 5. Dry gently with a clean, freshly washed towel. 6. After your shower, do not use any powder, deodorant, perfumes or lotion. 7. Use clean, freshly washed towels and washcloths every time you shower. 8. Wear clean, freshly washed pajamas to bed the night before surgery. 9. Sleep on clean, freshly washed sheets. 10. Do not allow pets to sleep in your bed with you. The Morning of your surgery: 1. Shower again thoroughly with an antibacterial soap, such as Dial or the soap provided at your preassessment appointment. If needed, ask someone for help to reach all areas of your body. Dont forget to clean your belly button! Rinse. 2. Dry gently with a clean, freshly washed towel. 3. After your shower, do not use any powder, deodorant, perfumes or lotion prior to surgery. 4. Put on clean, freshly washed clothing. Tips to help prevent infections after your surgery: 1. Protect your surgical wound from germs: 
? Hand washing is the most important thing you and your caregivers can do to prevent infections. ? Keep your bandage clean and dry! ? Do not touch your surgical wound. 2. Use clean, freshly washed towels and washcloths every time you shower; do not share bath linens with others. 3. Until your surgical wound is healed, wear clothing and sleep on bed linens each day that are clean and freshly washed. 4. Do not allow pets to sleep in your bed with you or touch your surgical wound. 5. Do not smoke  smoking delays wound healing. This may be a good time to stop smoking. 6. If you have diabetes, it is important for you to manage your blood sugar levels properly before your surgery as well as after your surgery. Poorly managed blood sugar levels slow down wound healing and prevent you from healing completely. If you lose your Hibiclens/chlorhexidine, please call the [facility], or it is available for purchase at your pharmacy. ___________________      ___________________      ________________ 
(Signature of Patient)          (Witness)                   (Date and Time)

## 2019-12-12 NOTE — PERIOP NOTES
Preventing Infections Before and After - Your Surgery    IMPORTANT INSTRUCTIONS    Please read and follow these instructions carefully. If you are unable to comply with the below instructions your procedure will be cancelled. Every Night for Three (3) nights before your surgery:  1. Shower with an antibacterial soap, such as Dial, or the soap provided at your preassessment appointment. A shower is better than a bath for cleaning your skin. 2. If needed, ask someone to help you reach all areas of your body. Dont forget to clean your belly button with every shower. The night before your surgery: If you lose your Hibiclens/chlorhexidine please contact surgery center or you can purchase it at a local pharmacy  1. On the night before your surgery, shower with an antibacterial soap, such as Dial, or the soap provided at your preassessment appointment. 2. With one packet of Hibiclens/Chlorhexidine in hand, turn water off.  3. Apply Hibiclens antiseptic skin cleanser with a clean, freshly washed washcloth. ? Gently apply to your body from chin to toes (except the genital area) and especially the area(s) where your incision(s) will be. ? Leave Hibiclens/Chlorhexidine on your skin for at least 20 seconds. CAUTION: If needed, Hibiclens/chlorhexidine may be used to clean the folds of skin of the legs (such as in the area of the groin) and on your buttocks and hips. However, do not use Hibiclens/Chlorhexidine above the neck or in the genital area (your bottom) or put inside any area of your body. 4. Turn the water back on and rinse. 5. Dry gently with a clean, freshly washed towel. 6. After your shower, do not use any powder, deodorant, perfumes or lotion. 7. Use clean, freshly washed towels and washcloths every time you shower. 8. Wear clean, freshly washed pajamas to bed the night before surgery. 9. Sleep on clean, freshly washed sheets. 10. Do not allow pets to sleep in your bed with you.     The Morning of your surgery:  1. Shower again thoroughly with an antibacterial soap, such as Dial or the soap provided at your preassessment appointment. If needed, ask someone for help to reach all areas of your body. Dont forget to clean your belly button! Rinse. 2. Dry gently with a clean, freshly washed towel. 3. After your shower, do not use any powder, deodorant, perfumes or lotion prior to surgery. 4. Put on clean, freshly washed clothing. Tips to help prevent infections after your surgery:  1. Protect your surgical wound from germs:  ? Hand washing is the most important thing you and your caregivers can do to prevent infections. ? Keep your bandage clean and dry! ? Do not touch your surgical wound. 2. Use clean, freshly washed towels and washcloths every time you shower; do not share bath linens with others. 3. Until your surgical wound is healed, wear clothing and sleep on bed linens each day that are clean and freshly washed. 4. Do not allow pets to sleep in your bed with you or touch your surgical wound. 5. Do not smoke - smoking delays wound healing. This may be a good time to stop smoking. 6. If you have diabetes, it is important for you to manage your blood sugar levels properly before your surgery as well as after your surgery. Poorly managed blood sugar levels slow down wound healing and prevent you from healing completely. If you lose your Hibiclens/chlorhexidine, please call the [facility], or it is available for purchase at your pharmacy.                ___________________      ___________________      ________________  (Signature of Patient)          (Witness)                   (Date and Time)

## 2019-12-12 NOTE — PERIOP NOTES
[facility]  Joint/Spine Preoperative Instructions        Surgery Date 12/17/2019          Time of Arrival 0900 am  Contact [phone]      1. On the day of your surgery, please report to the Surgical Services Registration Desk and sign in at your designated time. The Surgery Center is located to the right of the Emergency Room. 2. You must have someone with you to drive you home. You should not drive a car for 24 hours following surgery. Please make arrangements for a friend or family member to stay with you for the first 24 hours after your surgery. 3. No food after midnight 12/16/2019. Medications morning of surgery should be taken with a sip of water. Please follow pre-surgery drink instructions that were given at your Pre Admission Testing appointment. 4. We recommend you do not drink any alcoholic beverages for 24 hours before and after your surgery. 5. Contact your surgeons office for instructions on the following medications: non-steroidal anti-inflammatory drugs (i.e. Advil, Aleve), vitamins, and supplements. (Some surgeons will want you to stop these medications prior to surgery and others may allow you to take them)  **If you are currently taking Plavix, Coumadin, Aspirin and/or other blood-thinning agents, contact your surgeon for instructions. ** Your surgeon will partner with the physician prescribing these medications to determine if it is safe to stop or if you need to continue taking. Please do not stop taking these medications without instructions from your surgeon    6. Wear comfortable clothes. Wear glasses instead of contacts. Do not bring any money or jewelry. Please bring picture ID, insurance card, and any prearranged co-payment or hospital payment. Do not wear make-up, particularly mascara the morning of your surgery. Do not wear nail polish, particularly if you are having foot /hand surgery.   Wear your hair loose or down, no ponytails, buns, meghana pins or clips. All body piercings must be removed. Please shower with antibacterial soap for three consecutive days before and on the morning of surgery, but do not apply any lotions, powders or deodorants after the shower on the day of surgery. Please use a fresh towels after each shower. Please sleep in clean clothes and change bed linens the night before surgery. Please do not shave for 48 hours prior to surgery. Shaving of the face is acceptable. 7. You should understand that if you do not follow these instructions your surgery may be cancelled. If your physical condition changes (I.e. fever, cold or flu) please contact your surgeon as soon as possible. 8. It is important that you be on time. If a situation occurs where you may be late, please call [phone] (OR Holding Area). 9. If you have any questions and or problems, please call [phone] (Pre-admission Testing). 10. Your surgery time may be subject to change. You will receive a phone call the evening prior if your time changes. 11.  If having outpatient surgery, you must have someone to drive you here, stay with you during the duration of your stay, and to drive you home at time of discharge. 12.   In an effort to improve the efficiency, privacy, and safety for all of our Pre-op patients visitors are not allowed in the Holding area. Once you arrive and are registered your family/visitors will be asked to remain in the waiting room. The Pre-op staff will get you from the Surgical Waiting Area and will explain to you and your family/visitors that the Pre-op phase is beginning. The staff will answer any questions and provide instructions for tracking of the patient, by use of the existing tracking number and color-coded status board in the waiting room.   At this time the staff will also ask for your designated spokesperson information in the event that the physician or staff need to provide an update or obtain any pertinent information. The designated spokesperson will be notified if the physician needs to speak to family during the pre-operative phase. If at any time your family/visitors has questions or concerns they may approach the volunteer desk in the waiting area for assistance. Special Instructions:    MEDICATIONS TO TAKE THE MORNING OF SURGERY WITH A SIP OF WATER: metoprolol, omeprazole        I understand a pre-operative phone call will be made to verify my surgery time. In the event that I am not available, I give permission for a message to be left on my answering service and/or with another person?   yes         ___________________      __________   _________    (Signature of Patient)             (Witness)                (Date and Time)

## 2019-12-13 LAB
BACTERIA SPEC CULT: NORMAL
BACTERIA SPEC CULT: NORMAL
SERVICE CMNT-IMP: NORMAL

## 2019-12-13 NOTE — PERIOP NOTES
Return call from Dr. Pema Ellison concerning faxed pre-op lab results. Office will treat nares result with mupirocin and will wait for final urine culture results for treatment. Office to inform surgeon of pending results.

## 2019-12-14 LAB
BACTERIA SPEC CULT: ABNORMAL
CC UR VC: ABNORMAL
SERVICE CMNT-IMP: ABNORMAL

## 2019-12-16 RX ORDER — CIPROFLOXACIN 500 MG/1
500 TABLET ORAL 2 TIMES DAILY
Status: ON HOLD | COMMUNITY
Start: 2019-12-16 | End: 2019-12-17 | Stop reason: CLARIF

## 2019-12-16 RX ORDER — CIPROFLOXACIN 500 MG/1
500 TABLET ORAL 2 TIMES DAILY
Qty: 14 TAB | Refills: 0 | Status: SHIPPED | OUTPATIENT
Start: 2019-12-16 | End: 2019-12-23

## 2019-12-16 NOTE — ADVANCED PRACTICE NURSE
Urine cx results received (final result 12/14/19). Rx for Cipro 500 mg BID x 7 days escribed to pt's pharmacy on file. PTA medlist updated     1330 Return call from pt. Pt to start Cipro today. Surgeon's office notified and ok to proceed with surgery tomorrow.

## 2019-12-17 ENCOUNTER — HOSPITAL ENCOUNTER (INPATIENT)
Age: 74
LOS: 1 days | Discharge: HOME | DRG: 520 | End: 2019-12-18
Attending: ORTHOPAEDIC SURGERY | Admitting: ORTHOPAEDIC SURGERY
Payer: MEDICARE

## 2019-12-17 ENCOUNTER — APPOINTMENT (OUTPATIENT)
Dept: GENERAL RADIOLOGY | Age: 74
DRG: 520 | End: 2019-12-17
Attending: ORTHOPAEDIC SURGERY
Payer: MEDICARE

## 2019-12-17 ENCOUNTER — ANESTHESIA EVENT (OUTPATIENT)
Dept: SURGERY | Age: 74
DRG: 520 | End: 2019-12-17
Payer: MEDICARE

## 2019-12-17 ENCOUNTER — ANESTHESIA (OUTPATIENT)
Dept: SURGERY | Age: 74
DRG: 520 | End: 2019-12-17
Payer: MEDICARE

## 2019-12-17 DIAGNOSIS — M71.38 SYNOVIAL CYST OF LUMBAR FACET JOINT: Primary | ICD-10-CM

## 2019-12-17 PROCEDURE — 77030003029 HC SUT VCRL J&J -B: Performed by: ORTHOPAEDIC SURGERY

## 2019-12-17 PROCEDURE — 76010000161 HC OR TIME 1 TO 1.5 HR INTENSV-TIER 1: Performed by: ORTHOPAEDIC SURGERY

## 2019-12-17 PROCEDURE — 74011250637 HC RX REV CODE- 250/637: Performed by: ORTHOPAEDIC SURGERY

## 2019-12-17 PROCEDURE — 76060000033 HC ANESTHESIA 1 TO 1.5 HR: Performed by: ORTHOPAEDIC SURGERY

## 2019-12-17 PROCEDURE — 77030018836 HC SOL IRR NACL ICUM -A: Performed by: ORTHOPAEDIC SURGERY

## 2019-12-17 PROCEDURE — 65270000029 HC RM PRIVATE

## 2019-12-17 PROCEDURE — 77030033138 HC SUT PGA STRATFX J&J -B: Performed by: ORTHOPAEDIC SURGERY

## 2019-12-17 PROCEDURE — 77030003666 HC NDL SPINAL BD -A: Performed by: ORTHOPAEDIC SURGERY

## 2019-12-17 PROCEDURE — 77030013079 HC BLNKT BAIR HGGR 3M -A: Performed by: ANESTHESIOLOGY

## 2019-12-17 PROCEDURE — 77030019908 HC STETH ESOPH SIMS -A: Performed by: ANESTHESIOLOGY

## 2019-12-17 PROCEDURE — 77030029099 HC BN WAX SSPC -A: Performed by: ORTHOPAEDIC SURGERY

## 2019-12-17 PROCEDURE — 74011000250 HC RX REV CODE- 250: Performed by: NURSE ANESTHETIST, CERTIFIED REGISTERED

## 2019-12-17 PROCEDURE — 74011250637 HC RX REV CODE- 250/637: Performed by: ANESTHESIOLOGY

## 2019-12-17 PROCEDURE — 01NB0ZZ RELEASE LUMBAR NERVE, OPEN APPROACH: ICD-10-PCS | Performed by: ORTHOPAEDIC SURGERY

## 2019-12-17 PROCEDURE — 77030004402 HC BUR NEUR STRY -C: Performed by: ORTHOPAEDIC SURGERY

## 2019-12-17 PROCEDURE — 74011250636 HC RX REV CODE- 250/636: Performed by: ORTHOPAEDIC SURGERY

## 2019-12-17 PROCEDURE — 0SB00ZZ EXCISION OF LUMBAR VERTEBRAL JOINT, OPEN APPROACH: ICD-10-PCS | Performed by: ORTHOPAEDIC SURGERY

## 2019-12-17 PROCEDURE — 76210000016 HC OR PH I REC 1 TO 1.5 HR: Performed by: ORTHOPAEDIC SURGERY

## 2019-12-17 PROCEDURE — 77030026438 HC STYL ET INTUB CARD -A: Performed by: ANESTHESIOLOGY

## 2019-12-17 PROCEDURE — 77030040922 HC BLNKT HYPOTHRM STRY -A

## 2019-12-17 PROCEDURE — 77030022704 HC SUT VLOC COVD -B: Performed by: ORTHOPAEDIC SURGERY

## 2019-12-17 PROCEDURE — 77030012961 HC IRR KT CYSTO/TUR ICUM -A: Performed by: ORTHOPAEDIC SURGERY

## 2019-12-17 PROCEDURE — 77030040361 HC SLV COMPR DVT MDII -B: Performed by: ORTHOPAEDIC SURGERY

## 2019-12-17 PROCEDURE — 94760 N-INVAS EAR/PLS OXIMETRY 1: CPT

## 2019-12-17 PROCEDURE — 74011250636 HC RX REV CODE- 250/636: Performed by: NURSE ANESTHETIST, CERTIFIED REGISTERED

## 2019-12-17 PROCEDURE — 77030008462 HC STPLR SKN PROX J&J -A: Performed by: ORTHOPAEDIC SURGERY

## 2019-12-17 PROCEDURE — 74011250636 HC RX REV CODE- 250/636

## 2019-12-17 PROCEDURE — 77030003028 HC SUT VCRL J&J -A: Performed by: ORTHOPAEDIC SURGERY

## 2019-12-17 PROCEDURE — 74011250636 HC RX REV CODE- 250/636: Performed by: ANESTHESIOLOGY

## 2019-12-17 PROCEDURE — 77030008684 HC TU ET CUF COVD -B: Performed by: ANESTHESIOLOGY

## 2019-12-17 PROCEDURE — 77030039267 HC ADH SKN EXOFIN S2SG -B: Performed by: ORTHOPAEDIC SURGERY

## 2019-12-17 PROCEDURE — 77030020061 HC IV BLD WRMR ADMIN SET 3M -B: Performed by: ANESTHESIOLOGY

## 2019-12-17 PROCEDURE — 77030040356 HC CORD BPLR FRCP COVD -A: Performed by: ORTHOPAEDIC SURGERY

## 2019-12-17 PROCEDURE — 76000 FLUOROSCOPY <1 HR PHYS/QHP: CPT

## 2019-12-17 PROCEDURE — 72100 X-RAY EXAM L-S SPINE 2/3 VWS: CPT

## 2019-12-17 PROCEDURE — 77030020263 HC SOL INJ SOD CL0.9% LFCR 1000ML: Performed by: ORTHOPAEDIC SURGERY

## 2019-12-17 PROCEDURE — 77030014647 HC SEAL FBRN TISSL BAXT -D: Performed by: ORTHOPAEDIC SURGERY

## 2019-12-17 PROCEDURE — 77030018723 HC ELCTRD BLD COVD -A: Performed by: ORTHOPAEDIC SURGERY

## 2019-12-17 PROCEDURE — 77010033678 HC OXYGEN DAILY

## 2019-12-17 PROCEDURE — 74011000250 HC RX REV CODE- 250: Performed by: ORTHOPAEDIC SURGERY

## 2019-12-17 RX ORDER — HYDROMORPHONE HYDROCHLORIDE 1 MG/ML
1 INJECTION, SOLUTION INTRAMUSCULAR; INTRAVENOUS; SUBCUTANEOUS
Status: ACTIVE | OUTPATIENT
Start: 2019-12-17 | End: 2019-12-18

## 2019-12-17 RX ORDER — ONDANSETRON 2 MG/ML
4 INJECTION INTRAMUSCULAR; INTRAVENOUS AS NEEDED
Status: DISCONTINUED | OUTPATIENT
Start: 2019-12-17 | End: 2019-12-17 | Stop reason: HOSPADM

## 2019-12-17 RX ORDER — FENTANYL CITRATE 50 UG/ML
INJECTION, SOLUTION INTRAMUSCULAR; INTRAVENOUS
Status: COMPLETED
Start: 2019-12-17 | End: 2019-12-17

## 2019-12-17 RX ORDER — SODIUM CHLORIDE, SODIUM LACTATE, POTASSIUM CHLORIDE, CALCIUM CHLORIDE 600; 310; 30; 20 MG/100ML; MG/100ML; MG/100ML; MG/100ML
25 INJECTION, SOLUTION INTRAVENOUS CONTINUOUS
Status: CANCELLED | OUTPATIENT
Start: 2019-12-17 | End: 2019-12-18

## 2019-12-17 RX ORDER — ACETAMINOPHEN 500 MG
1000 TABLET ORAL EVERY 6 HOURS
Status: DISCONTINUED | OUTPATIENT
Start: 2019-12-17 | End: 2019-12-18 | Stop reason: HOSPADM

## 2019-12-17 RX ORDER — CYCLOBENZAPRINE HCL 10 MG
10 TABLET ORAL
Status: DISCONTINUED | OUTPATIENT
Start: 2019-12-17 | End: 2019-12-18 | Stop reason: HOSPADM

## 2019-12-17 RX ORDER — SODIUM CHLORIDE 0.9 % (FLUSH) 0.9 %
5-40 SYRINGE (ML) INJECTION AS NEEDED
Status: DISCONTINUED | OUTPATIENT
Start: 2019-12-17 | End: 2019-12-17 | Stop reason: HOSPADM

## 2019-12-17 RX ORDER — MIDAZOLAM HYDROCHLORIDE 1 MG/ML
INJECTION, SOLUTION INTRAMUSCULAR; INTRAVENOUS AS NEEDED
Status: DISCONTINUED | OUTPATIENT
Start: 2019-12-17 | End: 2019-12-17 | Stop reason: HOSPADM

## 2019-12-17 RX ORDER — METOPROLOL SUCCINATE 50 MG/1
50 TABLET, EXTENDED RELEASE ORAL DAILY
Status: DISCONTINUED | OUTPATIENT
Start: 2019-12-18 | End: 2019-12-18 | Stop reason: HOSPADM

## 2019-12-17 RX ORDER — SODIUM CHLORIDE 0.9 % (FLUSH) 0.9 %
5-40 SYRINGE (ML) INJECTION AS NEEDED
Status: CANCELLED | OUTPATIENT
Start: 2019-12-17

## 2019-12-17 RX ORDER — OXYCODONE HYDROCHLORIDE 5 MG/1
10-15 TABLET ORAL
Status: DISCONTINUED | OUTPATIENT
Start: 2019-12-17 | End: 2019-12-18 | Stop reason: HOSPADM

## 2019-12-17 RX ORDER — SODIUM CHLORIDE 0.9 % (FLUSH) 0.9 %
5-40 SYRINGE (ML) INJECTION AS NEEDED
Status: DISCONTINUED | OUTPATIENT
Start: 2019-12-17 | End: 2019-12-18 | Stop reason: HOSPADM

## 2019-12-17 RX ORDER — CEFAZOLIN SODIUM 1 G/3ML
INJECTION, POWDER, FOR SOLUTION INTRAMUSCULAR; INTRAVENOUS AS NEEDED
Status: DISCONTINUED | OUTPATIENT
Start: 2019-12-17 | End: 2019-12-17 | Stop reason: HOSPADM

## 2019-12-17 RX ORDER — DIPHENHYDRAMINE HYDROCHLORIDE 50 MG/ML
12.5 INJECTION, SOLUTION INTRAMUSCULAR; INTRAVENOUS AS NEEDED
Status: DISCONTINUED | OUTPATIENT
Start: 2019-12-17 | End: 2019-12-17 | Stop reason: HOSPADM

## 2019-12-17 RX ORDER — MIDAZOLAM HYDROCHLORIDE 1 MG/ML
1 INJECTION, SOLUTION INTRAMUSCULAR; INTRAVENOUS AS NEEDED
Status: CANCELLED | OUTPATIENT
Start: 2019-12-17

## 2019-12-17 RX ORDER — SODIUM CHLORIDE 0.9 % (FLUSH) 0.9 %
5-40 SYRINGE (ML) INJECTION EVERY 8 HOURS
Status: CANCELLED | OUTPATIENT
Start: 2019-12-17

## 2019-12-17 RX ORDER — ROPIVACAINE HYDROCHLORIDE 5 MG/ML
INJECTION, SOLUTION EPIDURAL; INFILTRATION; PERINEURAL AS NEEDED
Status: DISCONTINUED | OUTPATIENT
Start: 2019-12-17 | End: 2019-12-17 | Stop reason: HOSPADM

## 2019-12-17 RX ORDER — SUCCINYLCHOLINE CHLORIDE 20 MG/ML
INJECTION INTRAMUSCULAR; INTRAVENOUS AS NEEDED
Status: DISCONTINUED | OUTPATIENT
Start: 2019-12-17 | End: 2019-12-17 | Stop reason: HOSPADM

## 2019-12-17 RX ORDER — FAMOTIDINE 20 MG/1
20 TABLET, FILM COATED ORAL 2 TIMES DAILY
Status: DISCONTINUED | OUTPATIENT
Start: 2019-12-17 | End: 2019-12-17

## 2019-12-17 RX ORDER — MIDAZOLAM HYDROCHLORIDE 1 MG/ML
0.5 INJECTION, SOLUTION INTRAMUSCULAR; INTRAVENOUS
Status: DISCONTINUED | OUTPATIENT
Start: 2019-12-17 | End: 2019-12-17 | Stop reason: HOSPADM

## 2019-12-17 RX ORDER — DEXAMETHASONE SODIUM PHOSPHATE 100 MG/10ML
INJECTION INTRAMUSCULAR; INTRAVENOUS AS NEEDED
Status: DISCONTINUED | OUTPATIENT
Start: 2019-12-17 | End: 2019-12-17 | Stop reason: HOSPADM

## 2019-12-17 RX ORDER — PRAMIPEXOLE DIHYDROCHLORIDE 0.25 MG/1
0.25 TABLET ORAL DAILY
Status: DISCONTINUED | OUTPATIENT
Start: 2019-12-18 | End: 2019-12-18 | Stop reason: HOSPADM

## 2019-12-17 RX ORDER — ATORVASTATIN CALCIUM 40 MG/1
40 TABLET, FILM COATED ORAL DAILY
Status: DISCONTINUED | OUTPATIENT
Start: 2019-12-18 | End: 2019-12-18 | Stop reason: HOSPADM

## 2019-12-17 RX ORDER — SODIUM CHLORIDE, SODIUM LACTATE, POTASSIUM CHLORIDE, CALCIUM CHLORIDE 600; 310; 30; 20 MG/100ML; MG/100ML; MG/100ML; MG/100ML
INJECTION, SOLUTION INTRAVENOUS
Status: DISCONTINUED | OUTPATIENT
Start: 2019-12-17 | End: 2019-12-17 | Stop reason: HOSPADM

## 2019-12-17 RX ORDER — SODIUM CHLORIDE 0.9 % (FLUSH) 0.9 %
5-40 SYRINGE (ML) INJECTION EVERY 8 HOURS
Status: DISCONTINUED | OUTPATIENT
Start: 2019-12-17 | End: 2019-12-18 | Stop reason: HOSPADM

## 2019-12-17 RX ORDER — DEXMEDETOMIDINE HYDROCHLORIDE 100 UG/ML
INJECTION, SOLUTION INTRAVENOUS AS NEEDED
Status: DISCONTINUED | OUTPATIENT
Start: 2019-12-17 | End: 2019-12-17 | Stop reason: HOSPADM

## 2019-12-17 RX ORDER — DIAZEPAM 5 MG/1
5 TABLET ORAL
Status: DISCONTINUED | OUTPATIENT
Start: 2019-12-17 | End: 2019-12-18 | Stop reason: HOSPADM

## 2019-12-17 RX ORDER — SODIUM CHLORIDE 9 MG/ML
125 INJECTION, SOLUTION INTRAVENOUS CONTINUOUS
Status: DISPENSED | OUTPATIENT
Start: 2019-12-17 | End: 2019-12-18

## 2019-12-17 RX ORDER — ACETAMINOPHEN 325 MG/1
650 TABLET ORAL ONCE
Status: CANCELLED | OUTPATIENT
Start: 2019-12-17 | End: 2019-12-17

## 2019-12-17 RX ORDER — GABAPENTIN 100 MG/1
100 CAPSULE ORAL 3 TIMES DAILY
Status: DISCONTINUED | OUTPATIENT
Start: 2019-12-17 | End: 2019-12-18 | Stop reason: HOSPADM

## 2019-12-17 RX ORDER — SODIUM CHLORIDE, SODIUM LACTATE, POTASSIUM CHLORIDE, CALCIUM CHLORIDE 600; 310; 30; 20 MG/100ML; MG/100ML; MG/100ML; MG/100ML
125 INJECTION, SOLUTION INTRAVENOUS CONTINUOUS
Status: DISCONTINUED | OUTPATIENT
Start: 2019-12-17 | End: 2019-12-17 | Stop reason: HOSPADM

## 2019-12-17 RX ORDER — HYDROMORPHONE HYDROCHLORIDE 1 MG/ML
0.2 INJECTION, SOLUTION INTRAMUSCULAR; INTRAVENOUS; SUBCUTANEOUS
Status: DISCONTINUED | OUTPATIENT
Start: 2019-12-17 | End: 2019-12-17 | Stop reason: HOSPADM

## 2019-12-17 RX ORDER — ONDANSETRON 2 MG/ML
INJECTION INTRAMUSCULAR; INTRAVENOUS AS NEEDED
Status: DISCONTINUED | OUTPATIENT
Start: 2019-12-17 | End: 2019-12-17 | Stop reason: HOSPADM

## 2019-12-17 RX ORDER — POLYETHYLENE GLYCOL 3350 17 G/17G
17 POWDER, FOR SOLUTION ORAL DAILY
Status: DISCONTINUED | OUTPATIENT
Start: 2019-12-17 | End: 2019-12-18 | Stop reason: HOSPADM

## 2019-12-17 RX ORDER — AMOXICILLIN 250 MG
1 CAPSULE ORAL 2 TIMES DAILY
Status: DISCONTINUED | OUTPATIENT
Start: 2019-12-17 | End: 2019-12-18 | Stop reason: HOSPADM

## 2019-12-17 RX ORDER — HYDROXYZINE HYDROCHLORIDE 10 MG/1
10 TABLET, FILM COATED ORAL
Status: DISCONTINUED | OUTPATIENT
Start: 2019-12-17 | End: 2019-12-18 | Stop reason: HOSPADM

## 2019-12-17 RX ORDER — VECURONIUM BROMIDE FOR INJECTION 1 MG/ML
INJECTION, POWDER, LYOPHILIZED, FOR SOLUTION INTRAVENOUS AS NEEDED
Status: DISCONTINUED | OUTPATIENT
Start: 2019-12-17 | End: 2019-12-17 | Stop reason: HOSPADM

## 2019-12-17 RX ORDER — FENTANYL CITRATE 50 UG/ML
50 INJECTION, SOLUTION INTRAMUSCULAR; INTRAVENOUS AS NEEDED
Status: CANCELLED | OUTPATIENT
Start: 2019-12-17

## 2019-12-17 RX ORDER — NALOXONE HYDROCHLORIDE 0.4 MG/ML
0.4 INJECTION, SOLUTION INTRAMUSCULAR; INTRAVENOUS; SUBCUTANEOUS AS NEEDED
Status: DISCONTINUED | OUTPATIENT
Start: 2019-12-17 | End: 2019-12-18 | Stop reason: HOSPADM

## 2019-12-17 RX ORDER — ACETAMINOPHEN 10 MG/ML
1000 INJECTION, SOLUTION INTRAVENOUS ONCE
Status: COMPLETED | OUTPATIENT
Start: 2019-12-17 | End: 2019-12-17

## 2019-12-17 RX ORDER — GUAIFENESIN 100 MG/5ML
81 LIQUID (ML) ORAL DAILY
Status: DISCONTINUED | OUTPATIENT
Start: 2019-12-18 | End: 2019-12-18 | Stop reason: HOSPADM

## 2019-12-17 RX ORDER — OXYCODONE HYDROCHLORIDE 5 MG/1
5 TABLET ORAL AS NEEDED
Status: DISCONTINUED | OUTPATIENT
Start: 2019-12-17 | End: 2019-12-17 | Stop reason: HOSPADM

## 2019-12-17 RX ORDER — KETAMINE HYDROCHLORIDE 10 MG/ML
INJECTION, SOLUTION INTRAMUSCULAR; INTRAVENOUS AS NEEDED
Status: DISCONTINUED | OUTPATIENT
Start: 2019-12-17 | End: 2019-12-17 | Stop reason: HOSPADM

## 2019-12-17 RX ORDER — ONDANSETRON 2 MG/ML
4 INJECTION INTRAMUSCULAR; INTRAVENOUS
Status: DISPENSED | OUTPATIENT
Start: 2019-12-17 | End: 2019-12-18

## 2019-12-17 RX ORDER — CIPROFLOXACIN 500 MG/1
500 TABLET ORAL 2 TIMES DAILY
Status: DISCONTINUED | OUTPATIENT
Start: 2019-12-17 | End: 2019-12-18 | Stop reason: HOSPADM

## 2019-12-17 RX ORDER — PANTOPRAZOLE SODIUM 40 MG/1
40 TABLET, DELAYED RELEASE ORAL DAILY
Status: DISCONTINUED | OUTPATIENT
Start: 2019-12-18 | End: 2019-12-18 | Stop reason: HOSPADM

## 2019-12-17 RX ORDER — FENTANYL CITRATE 50 UG/ML
INJECTION, SOLUTION INTRAMUSCULAR; INTRAVENOUS AS NEEDED
Status: DISCONTINUED | OUTPATIENT
Start: 2019-12-17 | End: 2019-12-17 | Stop reason: HOSPADM

## 2019-12-17 RX ORDER — MORPHINE SULFATE 10 MG/ML
2 INJECTION, SOLUTION INTRAMUSCULAR; INTRAVENOUS
Status: DISCONTINUED | OUTPATIENT
Start: 2019-12-17 | End: 2019-12-17 | Stop reason: HOSPADM

## 2019-12-17 RX ORDER — LIDOCAINE HYDROCHLORIDE 10 MG/ML
0.1 INJECTION, SOLUTION EPIDURAL; INFILTRATION; INTRACAUDAL; PERINEURAL AS NEEDED
Status: CANCELLED | OUTPATIENT
Start: 2019-12-17

## 2019-12-17 RX ORDER — SODIUM CHLORIDE 0.9 % (FLUSH) 0.9 %
5-40 SYRINGE (ML) INJECTION EVERY 8 HOURS
Status: DISCONTINUED | OUTPATIENT
Start: 2019-12-17 | End: 2019-12-17 | Stop reason: HOSPADM

## 2019-12-17 RX ORDER — NEOSTIGMINE METHYLSULFATE 1 MG/ML
INJECTION INTRAVENOUS AS NEEDED
Status: DISCONTINUED | OUTPATIENT
Start: 2019-12-17 | End: 2019-12-17 | Stop reason: HOSPADM

## 2019-12-17 RX ORDER — PREGABALIN 75 MG/1
150 CAPSULE ORAL ONCE
Status: COMPLETED | OUTPATIENT
Start: 2019-12-17 | End: 2019-12-17

## 2019-12-17 RX ORDER — MUPIROCIN 20 MG/G
1 OINTMENT TOPICAL 2 TIMES DAILY
Status: DISCONTINUED | OUTPATIENT
Start: 2019-12-17 | End: 2019-12-18 | Stop reason: HOSPADM

## 2019-12-17 RX ORDER — FACIAL-BODY WIPES
10 EACH TOPICAL DAILY PRN
Status: DISCONTINUED | OUTPATIENT
Start: 2019-12-19 | End: 2019-12-18 | Stop reason: HOSPADM

## 2019-12-17 RX ORDER — OXYCODONE HYDROCHLORIDE 5 MG/1
5 TABLET ORAL
Status: DISCONTINUED | OUTPATIENT
Start: 2019-12-17 | End: 2019-12-18 | Stop reason: HOSPADM

## 2019-12-17 RX ORDER — LIDOCAINE HYDROCHLORIDE 20 MG/ML
INJECTION, SOLUTION EPIDURAL; INFILTRATION; INTRACAUDAL; PERINEURAL AS NEEDED
Status: DISCONTINUED | OUTPATIENT
Start: 2019-12-17 | End: 2019-12-17 | Stop reason: HOSPADM

## 2019-12-17 RX ORDER — GLYCOPYRROLATE 0.2 MG/ML
INJECTION INTRAMUSCULAR; INTRAVENOUS AS NEEDED
Status: DISCONTINUED | OUTPATIENT
Start: 2019-12-17 | End: 2019-12-17 | Stop reason: HOSPADM

## 2019-12-17 RX ORDER — SODIUM CHLORIDE, SODIUM LACTATE, POTASSIUM CHLORIDE, CALCIUM CHLORIDE 600; 310; 30; 20 MG/100ML; MG/100ML; MG/100ML; MG/100ML
25 INJECTION, SOLUTION INTRAVENOUS CONTINUOUS
Status: DISCONTINUED | OUTPATIENT
Start: 2019-12-17 | End: 2019-12-17 | Stop reason: HOSPADM

## 2019-12-17 RX ORDER — PROPOFOL 10 MG/ML
INJECTION, EMULSION INTRAVENOUS AS NEEDED
Status: DISCONTINUED | OUTPATIENT
Start: 2019-12-17 | End: 2019-12-17 | Stop reason: HOSPADM

## 2019-12-17 RX ORDER — SODIUM CHLORIDE 9 MG/ML
25 INJECTION, SOLUTION INTRAVENOUS CONTINUOUS
Status: CANCELLED | OUTPATIENT
Start: 2019-12-17 | End: 2019-12-18

## 2019-12-17 RX ORDER — FENTANYL CITRATE 50 UG/ML
25 INJECTION, SOLUTION INTRAMUSCULAR; INTRAVENOUS
Status: DISCONTINUED | OUTPATIENT
Start: 2019-12-17 | End: 2019-12-17 | Stop reason: HOSPADM

## 2019-12-17 RX ADMIN — ACETAMINOPHEN 1000 MG: 10 INJECTION, SOLUTION INTRAVENOUS at 07:47

## 2019-12-17 RX ADMIN — DEXMEDETOMIDINE HYDROCHLORIDE 6 MCG: 100 INJECTION, SOLUTION, CONCENTRATE INTRAVENOUS at 09:00

## 2019-12-17 RX ADMIN — MIDAZOLAM HYDROCHLORIDE 2 MG: 1 INJECTION INTRAMUSCULAR; INTRAVENOUS at 08:54

## 2019-12-17 RX ADMIN — Medication 3 AMPULE: at 07:46

## 2019-12-17 RX ADMIN — FENTANYL CITRATE 25 MCG: 50 INJECTION, SOLUTION INTRAMUSCULAR; INTRAVENOUS at 10:35

## 2019-12-17 RX ADMIN — PROPOFOL 150 MG: 10 INJECTION, EMULSION INTRAVENOUS at 09:00

## 2019-12-17 RX ADMIN — ACETAMINOPHEN 1000 MG: 500 TABLET ORAL at 14:26

## 2019-12-17 RX ADMIN — ONDANSETRON 4 MG: 2 INJECTION INTRAMUSCULAR; INTRAVENOUS at 13:32

## 2019-12-17 RX ADMIN — DEXAMETHASONE SODIUM PHOSPHATE 8 MG: 10 INJECTION INTRAMUSCULAR; INTRAVENOUS at 09:36

## 2019-12-17 RX ADMIN — NEOSTIGMINE METHYLSULFATE 2 MG: 1 INJECTION INTRAVENOUS at 10:09

## 2019-12-17 RX ADMIN — FENTANYL CITRATE 25 MCG: 50 INJECTION, SOLUTION INTRAMUSCULAR; INTRAVENOUS at 11:02

## 2019-12-17 RX ADMIN — CEFAZOLIN 2 G: 1 INJECTION, POWDER, FOR SOLUTION INTRAMUSCULAR; INTRAVENOUS; PARENTERAL at 09:09

## 2019-12-17 RX ADMIN — SODIUM CHLORIDE 125 ML/HR: 900 INJECTION, SOLUTION INTRAVENOUS at 11:02

## 2019-12-17 RX ADMIN — GLYCOPYRROLATE 0.3 MG: 0.2 INJECTION, SOLUTION INTRAMUSCULAR; INTRAVENOUS at 10:09

## 2019-12-17 RX ADMIN — CIPROFLOXACIN 500 MG: 500 TABLET, FILM COATED ORAL at 18:31

## 2019-12-17 RX ADMIN — ONDANSETRON HYDROCHLORIDE 4 MG: 2 INJECTION, SOLUTION INTRAMUSCULAR; INTRAVENOUS at 09:36

## 2019-12-17 RX ADMIN — VECURONIUM BROMIDE 1 MG: 10 INJECTION, POWDER, LYOPHILIZED, FOR SOLUTION INTRAVENOUS at 09:00

## 2019-12-17 RX ADMIN — PREGABALIN 150 MG: 75 CAPSULE ORAL at 07:45

## 2019-12-17 RX ADMIN — SENNOSIDES AND DOCUSATE SODIUM 1 TABLET: 8.6; 5 TABLET ORAL at 18:02

## 2019-12-17 RX ADMIN — GABAPENTIN 100 MG: 100 CAPSULE ORAL at 17:26

## 2019-12-17 RX ADMIN — MUPIROCIN 1 G: 20 OINTMENT TOPICAL at 18:32

## 2019-12-17 RX ADMIN — SODIUM CHLORIDE 125 ML/HR: 900 INJECTION, SOLUTION INTRAVENOUS at 18:31

## 2019-12-17 RX ADMIN — GABAPENTIN 100 MG: 100 CAPSULE ORAL at 21:45

## 2019-12-17 RX ADMIN — WATER 2 G: 1 INJECTION INTRAMUSCULAR; INTRAVENOUS; SUBCUTANEOUS at 17:26

## 2019-12-17 RX ADMIN — ONDANSETRON 4 MG: 2 INJECTION INTRAMUSCULAR; INTRAVENOUS at 15:11

## 2019-12-17 RX ADMIN — SODIUM CHLORIDE, POTASSIUM CHLORIDE, SODIUM LACTATE AND CALCIUM CHLORIDE: 600; 310; 30; 20 INJECTION, SOLUTION INTRAVENOUS at 08:30

## 2019-12-17 RX ADMIN — FENTANYL CITRATE 25 MCG: 50 INJECTION, SOLUTION INTRAMUSCULAR; INTRAVENOUS at 11:10

## 2019-12-17 RX ADMIN — ACETAMINOPHEN 1000 MG: 500 TABLET ORAL at 20:17

## 2019-12-17 RX ADMIN — OXYCODONE HYDROCHLORIDE 5 MG: 5 TABLET ORAL at 11:35

## 2019-12-17 RX ADMIN — FENTANYL CITRATE 100 MCG: 50 INJECTION, SOLUTION INTRAMUSCULAR; INTRAVENOUS at 08:59

## 2019-12-17 RX ADMIN — SUCCINYLCHOLINE CHLORIDE 160 MG: 20 INJECTION, SOLUTION INTRAMUSCULAR; INTRAVENOUS at 09:00

## 2019-12-17 RX ADMIN — LIDOCAINE HYDROCHLORIDE 20 MG: 20 INJECTION, SOLUTION EPIDURAL; INFILTRATION; INTRACAUDAL; PERINEURAL at 09:00

## 2019-12-17 RX ADMIN — KETAMINE HYDROCHLORIDE 30 MG: 10 INJECTION, SOLUTION INTRAMUSCULAR; INTRAVENOUS at 09:00

## 2019-12-17 NOTE — ANESTHESIA PREPROCEDURE EVALUATION
Anesthetic History   No history of anesthetic complications            Review of Systems / Medical History  Patient summary reviewed, nursing notes reviewed and pertinent labs reviewed    Pulmonary  Within defined limits                 Neuro/Psych   Within defined limits           Cardiovascular    Hypertension        Dysrhythmias (palpitations; controlled)   Hyperlipidemia    Exercise tolerance: >4 METS  Comments: 03/19 EKG= SR, NSSTTW changes   GI/Hepatic/Renal     GERD: well controlled           Endo/Other        Arthritis     Other Findings   Comments: RLS  Low back pain  H/o Compression Fracture           Physical Exam    Airway  Mallampati: II  TM Distance: 4 - 6 cm  Neck ROM: normal range of motion   Mouth opening: Normal     Cardiovascular    Rhythm: regular  Rate: normal         Dental    Dentition: Full upper dentures and Implants  Comments: implants across lower front   Pulmonary  Breath sounds clear to auscultation               Abdominal  GI exam deferred       Other Findings            Anesthetic Plan    ASA: 2  Anesthesia type: general    Monitoring Plan: BIS      Induction: Intravenous  Anesthetic plan and risks discussed with: Patient

## 2019-12-17 NOTE — PERIOP NOTES
TRANSFER - OUT REPORT:    Verbal report given to Alethia Dakin., RN on [address]  being transferred to [facility], [address] for routine post - op       Report consisted of patients Situation, Background, Assessment and   Recommendations(SBAR). Information from the following report(s) SBAR, Kardex, OR Summary, Intake/Output, MAR and Cardiac Rhythm NSR was reviewed with the receiving nurse. Opportunity for questions and clarification was provided. Patient transported with:   O2 @ 2 liters  Tech   Updated patient's family at [phone], 56, and at time of transfer.

## 2019-12-17 NOTE — BRIEF OP NOTE
BRIEF OPERATIVE NOTE    Date of Procedure: 12/17/2019   Preoperative Diagnosis: Synovial cyst of lumbar facet joint [M71.38]  Sciatica of left side [M54.32]  Chronic left-sided low back pain with left-sided sciatica [M54.42, G89.29]  Lumbosacral spondylosis without myelopathy [M47.817]  Postoperative Diagnosis: Synovial cyst of lumbar facet joint [M71.38]  Sciatica of left side [M54.32]  Chronic left-sided low back pain with left-sided sciatica [M54.42, G89.29]  Lumbosacral spondylosis without myelopathy [M47.817]    Procedure(s):  LEFT L4-5 SYNOVIAL CYST EXCISION  Surgeon(s) and Role:     Tres Fraser MD - Primary         Surgical Assistant: Weston Talbot    Surgical Staff:  Circ-1: Lazaro Nicholas  Physician Assistant: MEGAN Arzate  Radiology Technician: Kina Rivera  Scrub Tech-1: Sammy Ortega  Event Time In Time Out   Incision Start 1635    Incision Close       Anesthesia: General   Estimated Blood Loss: 25cc  Specimens: * No specimens in log *   Findings: Stenosis   Complications: None  Implants: * No implants in log *

## 2019-12-17 NOTE — PERIOP NOTES
perrl- right and left temporal pulse palp. Smile and tongue pertrussion symmetrical ,grasp strong and equal, right and left radial pulse palp. Brisk cap refill right and left dp and pt pulse palp. Brisk cap refill  Pt alert and oriented. Reports some occassional tingling and numbness  From lower back to left leg.

## 2019-12-17 NOTE — H&P
Progress notes        Subjective:      Patient ID: Hermes Ochoa is a 76 y.o. female.     Chief Complaint: Pain and Follow-up of the Lower Back           HPI:  Hermes Ochoa is a 76 y.o. female with complaints of lower back pain. It is rated 4 out of 10 on the VAS. She presents today to discuss the results of her MRI. She reports relief for a few weeks with a Left L4-L5 DAXA on 10/17/19, she reports relief for the right-sided symptoms but no significant relief for the left-sided symptoms. She reports that she still has pain, numbness, and tingling which radiates down the left leg and she has swelling in the left lower leg and ankle. She reports that the pain can get so severe that she cannot turn in bed without pain. She also has pain on the bottom and lateral aspect of her foot. The pain is worsened when standing for a short amount of time.     There are no active problems to display for this patient.           Current Outpatient Medications:     gabapentin (NEURONTIN) 100 MG capsule, 100mg hs x 3 nights, then 200mg hs x 3 nights, then 300mg hs, Disp: , Rfl:     acetaminophen (TYLENOL) 325 MG tablet, Take by mouth, Disp: , Rfl:     albuterol HFA (PROVENTIL HFA;VENTOLIN HFA) 108 (90 Base) MCG/ACT inhaler, albuterol sulfate HFA 90 mcg/actuation aerosol inhaler  Inhale 2 puffs every 4-6 hours by inhalation route as needed. , Disp: , Rfl:     alendronate (FOSAMAX) 70 MG tablet, TAKE 1 TABLET EVERY SUNDAY, Disp: , Rfl:     Alendronate Sodium 70 MG effervescent tablet, alendronate 70 mg effervescent tablet  Take 1 tablet every week by oral route., Disp: , Rfl:     aspirin 81 MG EC tablet, Take 81 mg by mouth., Disp: , Rfl:     Aspirin Buf,CaCarb-MgCarb-MgO, (BUFFERIN LOW DOSE) 81 MG tablet, Take 81 mg by mouth daily, Disp: , Rfl:     atorvastatin (LIPITOR) 40 MG tablet, TAKE 1 TABLET EVERY DAY, Disp: , Rfl:     benzonatate (TESSALON) 200 MG capsule, every 8 hours, Disp: , Rfl:     doxycycline (VIBRA-TABS) 100 MG tablet, , Disp: , Rfl:     famotidine (PEPCID) 20 MG tablet, every 12 hours, Disp: , Rfl:     furosemide (LASIX) 20 MG tablet, Take 20 mg by mouth, Disp: , Rfl:     hydrOXYzine (ATARAX) 25 MG tablet, daily, Disp: , Rfl:     ipratropium-albuterol (DUO-NEB) 0.5-2.5 mg/3 mL nebulizer solution, ipratropium-albuterol 0.5 mg-3 mg(2.5 mg base)/3 mL nebulization soln  3 mL now, Disp: , Rfl:     ketorolac (ACULAR) 0.5 % ophthalmic solution, Administer 1 drop into affected eye(s) 4 times daily, Disp: , Rfl:     metoprolol succinate XL (TOPROL-XL) 25 MG 24 hr tablet, TAKE ONE TABLET BY MOUTH NIGHTLY, Disp: , Rfl:     metoprolol succinate XL (TOPROL-XL) 50 MG 24 hr tablet, Take 50 mg by mouth daily, Disp: , Rfl:     moxifloxacin (VIGAMOX) 0.5 % ophthalmic solution, , Disp: , Rfl:     nitrofurantoin, macrocrystal-monohydrate, (MACROBID) 100 MG capsule, every 12 hours, Disp: , Rfl:     omeprazole (PriLOSEC) 20 MG capsule, , Disp: , Rfl:     pramipexole (MIRAPEX) 0.5 MG tablet, TAKE 1/2 TABLET EVERY NIGHT, Disp: , Rfl:     prednisoLONE acetate (PRED FORTE) 1 % ophthalmic suspension, prednisolone acetate 1 % eye drops,suspension, Disp: , Rfl:     pregabalin (LYRICA) 50 MG capsule, Take 1 capsule (50 mg total) by mouth 2 (two) times a day, Disp: 60 capsule, Rfl: 3    sulindac (CLINORIL) 200 MG tablet, , Disp: , Rfl: 0    triamcinolone (KENALOG) 0.1 % cream, triamcinolone acetonide 0.1 % topical cream  APPLY A THIN LAYER TO THE AFFECTED AREA(S) BY TOPICAL ROUTE 2 TIMES PER DAY, Disp: , Rfl:           Allergies   Allergen Reactions    Alendronate Other (see comments)    Other           ROS:   No new bowel or bladder incontinence. No fever. No saddle anesthesia.     Objective: There were no vitals filed for this visit.     Body mass index is 28.71 kg/m². , a BMI over 30 is considered obese and a BMI over 40 has been associated with a higher risk of surgical complications.     Constitutional: No acute distress. Well nourished. HEENT: Normocephalic. Respiratory:  No labored breathing. Cardiovascular:  No marked cyanosis. Skin:  No marked skin ulcers/lesions on bilateral upper or lower extremities. Psychiatric: Alert and oriented x3. Inspection: No gross deformity of bilateral upper or lower extremities. Musculoskeletal/Neurological:   Gait/balance:  - Normal. Able to walk on heels and toes. Thoracolumbar spine:  - No tenderness to palpation  - Full range of motion. Right lower extremity:  - No tenderness to palpation   - Full range of motion  - No pain with internal/external rotation of the hip  - Strength:  - 5 out of 5 to hip flexors  - 5 out of 5 to quads  - 5 out of 5 to TA  - 5 out of 5 to EHL  - 5 out of 5 to Gastroc/Soleus  - Negative straight leg raise  Left lower extremity:  - No tenderness to palpation   - Full range of motion  - No pain with internal/external rotation of the hip  - Strength:  - 5 out of 5 to hip flexors  - 5 out of 5 to quads  - 5 out of 5 to TA  - 5 out of 5 to EHL  - 5 out of 5 to Gastroc/Soleus  - Negative straight leg raise  Sensation:  - Intact to light touch  Reflexes:  - +2 Patellar tendon   - +2 Achilles tendon            Radiographs:           No imaging obtained   MRI Lumbar Spine 10/8/19  I have personally and independently reviewed Lumbar Spine MRI which shows spinal stenosis L4-L5 on the left.      X-Ray Pelvis and Hips 11/6/19  Mild degen changes from previoous compression at T12     Assessment:          ICD-10-CM   1. Synovial cyst of lumbar facet joint M71.38   2. Sciatica of left side M54.32   3. Chronic left-sided low back pain with left-sided sciatica M54.42     G89.29   4. Lumbosacral spondylosis without myelopathy M47.817   5. Compression fracture of T12 vertebra, sequela S22.080S         Plan:      I discussed with Ms. Cortez about her symptoms and further treatment options. She is a good candidate for left L4-L5 Synovial cyst excision.  She will follow up after surgery.     I have discussed the procedure in detail with the patient and mentioned complications, including but not limited to: death, permanent disability, heart attack, stroke, lung injury or infection, blindness, ileus, bladder or bowel problems, ureter injury, bleeding, nerve injury (including numbness, pain and weakness), paralysis (which may be permanent), failure to heal, failure to fuse bone together in fusion procedures, failure to relief symptoms, failure to relief pain, increased pain, need for further surgeries, failure or breakage or hardware, malpositioning of hardware, need to fuse or operate on additional levels determined either during or after surgery, destabilization of the spine (which may require fusion or later surgery), infections (which may or may not require additional surgery), dural tears (tears of the sac holding in nerves and spinal fluid), meningitis, voice changes, vocal cord injury, hoarseness, blood clots, pulmonary embolus, Chapo syndrome, recurrent disc herniation, diaphragm paralysis, and anesthetic complications. Comorbidities such as obesity, smoking, rheumatoid arthritis, chronic steroid use and diabetes increase these risks. The patient understands and wants to proceed.      The patient has been prescribed a LSO spinal orthosis pre-operatively for pain relief. The orthosis is medically necessary to reduce pain by restricting mobility of the trunk and to otherwise support weak spinal muscles and/or deformed spine. The patient will meet with our bracing coordinator to be fit for the brace.             Orders Placed This Encounter    Surgical Posting Sheet    BMI >=25 PATIENT INSTRUCTIONS & EDUCATION      Return for Follow up 2-3 weeks after surgery.      I, Chantal Braxton MD, personally, performed the services described in this documentation, as scribed in my presence, and it is both accurate and complete.   Scribed by: Lester Montalvo      This note has been transcribed electronically using voice recognition and a trained scribe. It is believed to be accurate, but may contain errors secondary to technological limitations and other factors.

## 2019-12-17 NOTE — ANESTHESIA POSTPROCEDURE EVALUATION
Post-Anesthesia Evaluation and Assessment    Patient: Mayda Kline MRN: [id number]  SSN: [id number]    YOB: 1945  Age: 76 y.o. Sex: female       Cardiovascular Function/Vital Signs  Visit Vitals  /72 (BP 1 Location: Right arm, BP Patient Position: At rest)   Pulse 65   Temp 36.3 °C (97.4 °F)   Resp 11   Ht 5' (1.524 m)   Wt 69.7 kg (153 lb 10.6 oz)   SpO2 95%   BMI 30.01 kg/m²       Patient is status post General anesthesia for Procedure(s):  LEFT L4-5 SYNOVIAL CYST EXCISION. Nausea/Vomiting: None    Postoperative hydration reviewed and adequate. Pain:  Pain Scale 1: Numeric (0 - 10) (12/17/19 1035)  Pain Intensity 1: 6 (12/17/19 1035)   Managed    Neurological Status:   Neuro (WDL): Exceptions to WDL (12/17/19 1030)  Neuro  Neurologic State: Drowsy; Eyes open to voice (12/17/19 1030)  Orientation Level: Oriented X4 (12/17/19 1030)  Cognition: Follows commands (12/17/19 1030)  Speech: Delayed responses;Slurred (12/17/19 1030)  Assessment L Pupil: Deferred (12/17/19 1030)  Assessment R Pupil: Deferred (12/17/19 1030)  LUE Motor Response: Purposeful (12/17/19 1030)  LLE Motor Response: Purposeful (12/17/19 1030)  RUE Motor Response: Purposeful (12/17/19 1030)  RLE Motor Response: Purposeful (12/17/19 1030)   At baseline    Mental Status and Level of Consciousness: Alert and oriented to person, place, and time    Pulmonary Status:   O2 Device: Nasal cannula (12/17/19 1045)   Adequate oxygenation and airway patent    Complications related to anesthesia: None    Post-anesthesia assessment completed. No concerns    Signed By: Oj Koo MD     December 17, 2019              Procedure(s):  LEFT L4-5 SYNOVIAL CYST EXCISION.     general    <BSHSIANPOST>    Vitals Value Taken Time   /72 12/17/2019 10:45 AM   Temp 36.3 °C (97.4 °F) 12/17/2019 10:22 AM   Pulse 63 12/17/2019 10:59 AM   Resp 14 12/17/2019 10:59 AM   SpO2 95 % 12/17/2019 10:59 AM   Vitals shown include unvalidated device data.

## 2019-12-17 NOTE — PROGRESS NOTES
TRANSFER - IN REPORT:    Verbal report received from D(name) on [id number]  being received from PACU(unit) for routine post - op      Report consisted of patients Situation, Background, Assessment and   Recommendations(SBAR). Information from the following report(s) SBAR, Kardex, Intake/Output and MAR was reviewed with the receiving nurse. Opportunity for questions and clarification was provided. Assessment completed upon patients arrival to unit and care assumed.

## 2019-12-18 ENCOUNTER — OFFICE VISIT (OUTPATIENT)
Dept: SURGERY | Age: 74
End: 2019-12-18

## 2019-12-18 VITALS
HEIGHT: 60 IN | BODY MASS INDEX: 30.04 KG/M2 | WEIGHT: 153 LBS | DIASTOLIC BLOOD PRESSURE: 74 MMHG | HEART RATE: 79 BPM | SYSTOLIC BLOOD PRESSURE: 132 MMHG

## 2019-12-18 VITALS
DIASTOLIC BLOOD PRESSURE: 58 MMHG | RESPIRATION RATE: 16 BRPM | TEMPERATURE: 97.8 F | OXYGEN SATURATION: 96 % | SYSTOLIC BLOOD PRESSURE: 116 MMHG | WEIGHT: 153.66 LBS | HEART RATE: 75 BPM | BODY MASS INDEX: 30.17 KG/M2 | HEIGHT: 60 IN

## 2019-12-18 DIAGNOSIS — N64.52 BLOODY DISCHARGE FROM NIPPLE: Primary | ICD-10-CM

## 2019-12-18 PROCEDURE — 97535 SELF CARE MNGMENT TRAINING: CPT

## 2019-12-18 PROCEDURE — 97116 GAIT TRAINING THERAPY: CPT

## 2019-12-18 PROCEDURE — 74011000250 HC RX REV CODE- 250: Performed by: ORTHOPAEDIC SURGERY

## 2019-12-18 PROCEDURE — 94760 N-INVAS EAR/PLS OXIMETRY 1: CPT

## 2019-12-18 PROCEDURE — 97165 OT EVAL LOW COMPLEX 30 MIN: CPT

## 2019-12-18 PROCEDURE — 74011250636 HC RX REV CODE- 250/636: Performed by: ORTHOPAEDIC SURGERY

## 2019-12-18 PROCEDURE — 77010033678 HC OXYGEN DAILY

## 2019-12-18 PROCEDURE — 97161 PT EVAL LOW COMPLEX 20 MIN: CPT

## 2019-12-18 PROCEDURE — 74011250637 HC RX REV CODE- 250/637: Performed by: ORTHOPAEDIC SURGERY

## 2019-12-18 RX ORDER — OXYCODONE HYDROCHLORIDE 5 MG/1
2.5-5 TABLET ORAL
Qty: 30 TAB | Refills: 0 | Status: SHIPPED | OUTPATIENT
Start: 2019-12-18 | End: 2020-01-01

## 2019-12-18 RX ORDER — ACETAMINOPHEN 500 MG
1000 TABLET ORAL EVERY 6 HOURS
Qty: 112 TAB | Refills: 0 | Status: SHIPPED | OUTPATIENT
Start: 2019-12-18 | End: 2020-01-01

## 2019-12-18 RX ORDER — POLYETHYLENE GLYCOL 3350 17 G/17G
17 POWDER, FOR SOLUTION ORAL DAILY
Qty: 14 PACKET | Refills: 0 | Status: SHIPPED | OUTPATIENT
Start: 2019-12-19 | End: 2020-01-02

## 2019-12-18 RX ORDER — AMOXICILLIN 250 MG
1 CAPSULE ORAL 2 TIMES DAILY
Qty: 28 TAB | Refills: 0 | Status: SHIPPED | OUTPATIENT
Start: 2019-12-18 | End: 2020-01-01

## 2019-12-18 RX ADMIN — METOPROLOL SUCCINATE 50 MG: 50 TABLET, EXTENDED RELEASE ORAL at 08:41

## 2019-12-18 RX ADMIN — PRAMIPEXOLE DIHYDROCHLORIDE 0.25 MG: 0.25 TABLET ORAL at 08:41

## 2019-12-18 RX ADMIN — ACETAMINOPHEN 1000 MG: 500 TABLET ORAL at 08:41

## 2019-12-18 RX ADMIN — GABAPENTIN 100 MG: 100 CAPSULE ORAL at 08:41

## 2019-12-18 RX ADMIN — SENNOSIDES AND DOCUSATE SODIUM 1 TABLET: 8.6; 5 TABLET ORAL at 08:42

## 2019-12-18 RX ADMIN — MUPIROCIN 1 G: 20 OINTMENT TOPICAL at 08:41

## 2019-12-18 RX ADMIN — WATER 2 G: 1 INJECTION INTRAMUSCULAR; INTRAVENOUS; SUBCUTANEOUS at 01:24

## 2019-12-18 RX ADMIN — ACETAMINOPHEN 1000 MG: 500 TABLET ORAL at 01:24

## 2019-12-18 RX ADMIN — CIPROFLOXACIN 500 MG: 500 TABLET, FILM COATED ORAL at 08:41

## 2019-12-18 RX ADMIN — POLYETHYLENE GLYCOL 3350 17 G: 17 POWDER, FOR SOLUTION ORAL at 08:41

## 2019-12-18 RX ADMIN — ASPIRIN 81 MG 81 MG: 81 TABLET ORAL at 08:41

## 2019-12-18 RX ADMIN — ATORVASTATIN CALCIUM 40 MG: 40 TABLET, FILM COATED ORAL at 08:41

## 2019-12-18 RX ADMIN — PANTOPRAZOLE SODIUM 40 MG: 40 TABLET, DELAYED RELEASE ORAL at 08:41

## 2019-12-18 NOTE — PROGRESS NOTES
Orthopedic End of Shift Note    Bedside and Verbal shift change report given to [facility] (oncoming nurse) by Dawood Flores (offgoing nurse). Report included the following information SBAR, Kardex, OR Summary, Procedure Summary, Intake/Output, MAR, Recent Results and Med Rec Status. POD#  1    Significant issues during shift:     Issues for Physician to address:      Activity This Shift  (check all that apply) [] chair  [] dangle   [] bathroom  [x] bedside commode [] hallway  [] bedrest   Nausea/Vomiting [] yes [x] no     Voiding Status [x] void [] [place] [] I&O Cath   Bowel Movements [] yes [x] no     Foot Pumps or SCD [x] yes [] no    Ice Pack [] yes    [] no    Incentive Spirometer [] yes [] no Volume:      Telemetry Monitoring   [] yes [x] no Rhythm:   Supplemental O2 [] yes [x] no Sat off O2:   %

## 2019-12-18 NOTE — PROGRESS NOTES
NP Ortho Note:     Pt sitting up in chair, reading newspaper. Feels well. No complaints, pain controlled. Pineo dressing c/d/i  Voiding  Tolerating food  Cleared PT/OT  Discharge home today with daughter.      Jean Carlos Winters, KAMI

## 2019-12-18 NOTE — DISCHARGE SUMMARY
Spine Discharge Summary    Patient ID:  Ashly Dailey  [id number]  female  76 y.o.  1945    Admit date: 12/17/2019    Discharge date: 12/18/2019    Admitting Physician: Rosie Deluna MD     Consulting Physician(s):   Treatment Team: Attending Provider: Narayan Taylor MD; Utilization Review: Juan Olivares RN    Date of Surgery:   12/17/2019     Preoperative Diagnosis:  Synovial cyst of lumbar facet joint [M71.38]  Sciatica of left side [M54.32]  Chronic left-sided low back pain with left-sided sciatica [M54.42, G89.29]  Lumbosacral spondylosis without myelopathy [M47.817]    Postoperative Diagnosis:   Synovial cyst of lumbar facet joint [M71.38]  Sciatica of left side [M54.32]  Chronic left-sided low back pain with left-sided sciatica [M54.42, G89.29]  Lumbosacral spondylosis without myelopathy [M47.817]    Procedure(s):  LEFT L4-5 SYNOVIAL CYST EXCISION     Anesthesia Type:   General     Surgeon: Narayan Taylor MD                            HPI:  Pt is a 76 y.o. female who has a history of Synovial cyst of lumbar facet joint [M71.38]  Sciatica of left side [M54.32]  Chronic left-sided low back pain with left-sided sciatica [M54.42, G89.29]  Lumbosacral spondylosis without myelopathy [M47.817]  with pain and limitations of activities of daily living who presents at this time for a L4-L5 Synovial Cyst Excision following the failure of conservative management.     PMH:   Past Medical History:   Diagnosis Date    Arthritis     bilateral hands, hips, lower back    At risk for sleep apnea 03/18/2019    on phone assessment with PAT, kathia sleep scoring tool, scored 4    Chronic kidney disease 2017    kidney stones    GERD (gastroesophageal reflux disease)     being treated    High cholesterol     Hypertension     being treated    Kidney stones     Menopause 1995    Palpitations     as of 6/24/19 pt reports was evaluated by cardiology and cleared, now followed by cardiology, no further symptoms per pt  Pneumonia 2006 (approx)    RLS (restless legs syndrome)     Shoulder fracture 2009 -  Right side        Body mass index is 30.01 kg/m². : A BMI > 30 is classified as obesity and > 40 is classified as morbid obesity. Medications upon admission :   Prior to Admission Medications   Prescriptions Last Dose Informant Patient Reported? Taking? Aspirin, Buffered 81 mg tab 2019  Yes No   Sig: Take 81 mg by mouth daily. Indications: \"for prevention\"   acetaminophen (TYLENOL) 325 mg tablet Not Taking at Unknown time  Yes No   Sig: Take 650 mg by mouth every four (4) hours as needed for Pain. atorvastatin (LIPITOR) 40 mg tablet 2019 at 2000  No No   Sig: Take 1 Tab by mouth daily. Patient taking differently: Take 40 mg by mouth nightly. ciprofloxacin HCl (CIPRO) 500 mg tablet 2019 at 0500  No Yes   Sig: Take 1 Tab by mouth two (2) times a day for 7 days. Indications: urinary tract infection due to E. coli bacteria   gabapentin (NEURONTIN) 100 mg capsule 2019  No No   Simg hs x 3 nights, then 200mg hs x 3 nights, then 300mg hs   Patient taking differently: 100 mg as needed. 100mg hs x 3 nights, then 200mg hs x 3 nights, then 300mg hs   metoprolol succinate (TOPROL-XL) 50 mg XL tablet 2019 at 0600  No Yes   Sig: Take 1 Tab by mouth daily. mupirocin calcium (BACTROBAN) 2 % nasal ointment 2019 at 0545  Yes Yes   Sig: by Both Nostrils route two (2) times a day. Surgeon office to contact patient and give instructions 19   omeprazole (PRILOSEC) 20 mg capsule 2019 at 0545     No No   Sig: Take 1 Cap by mouth daily. pramipexole (MIRAPEX) 0.5 mg tablet 2019 at 2000  No Yes   Sig: Take 0.5 Tabs by mouth daily. Patient taking differently: Take 0.25 mg by mouth daily. Facility-Administered Medications: None        Allergies:  No Known Allergies     Hospital Course: The patient underwent surgery.   Complications:  None; patient tolerated the procedure well. Was taken to the PACU in stable condition and then transferred to the ortho floor. Perioperative Antibiotics:  Ancef     Postoperative Pain Management:  Tylenol & Oxycodone    Postoperative transfusions:    Number of units banked PRBCs =   none     Post Op complications: none    Hemoglobin at discharge:    Lab Results   Component Value Date/Time    HGB 13.6 12/12/2019 09:26 AM    INR 1.0 12/12/2019 09:26 AM       Prineo dressing remained clean, dry and intact. No significant erythema or swelling. Neurovascular exam found to be within normal limits. Wound appears to be healing without any evidence of infection. Physical Therapy started following surgery and participated in bed mobility, transfers and ambulation. Discharged to: Home. Condition on Discharge:   stable    Discharge instructions:  - Take pain medications as prescribed  - Resume pre hospital diet      - Discharge activity: activity as tolerated  - Ambulate as tolerated  - Wound Care Keep wound clean and dry. See discharge instruction sheet. -DISCHARGE MEDICATION LIST     Current Discharge Medication List      START taking these medications    Details   oxyCODONE IR (ROXICODONE) 5 mg immediate release tablet Take 0.5-1 Tabs by mouth every four (4) hours as needed for Pain for up to 14 days. Max Daily Amount: 30 mg. Half tab for mild to moderate pain level 1-6, or 1 tab for severe pain level 7-10  Qty: 30 Tab, Refills: 0    Associated Diagnoses: Synovial cyst of lumbar facet joint      polyethylene glycol (MIRALAX) 17 gram packet Take 1 Packet by mouth daily for 14 days. Indications: While taking oxycodone  Qty: 14 Packet, Refills: 0      senna-docusate (PERICOLACE) 8.6-50 mg per tablet Take 1 Tab by mouth two (2) times a day for 14 days.   Qty: 28 Tab, Refills: 0         CONTINUE these medications which have CHANGED    Details   acetaminophen (TYLENOL) 500 mg tablet Take 2 Tabs by mouth every six (6) hours for 14 days. Qty: 112 Tab, Refills: 0         CONTINUE these medications which have NOT CHANGED    Details   ciprofloxacin HCl (CIPRO) 500 mg tablet Take 1 Tab by mouth two (2) times a day for 7 days. Indications: urinary tract infection due to E. coli bacteria  Qty: 14 Tab, Refills: 0      mupirocin calcium (BACTROBAN) 2 % nasal ointment by Both Nostrils route two (2) times a day. Surgeon office to contact patient and give instructions 12/13/19      metoprolol succinate (TOPROL-XL) 50 mg XL tablet Take 1 Tab by mouth daily. Qty: 90 Tab, Refills: 3    Associated Diagnoses: Essential hypertension      pramipexole (MIRAPEX) 0.5 mg tablet Take 0.5 Tabs by mouth daily. Qty: 45 Tab, Refills: 3    Associated Diagnoses: RLS (restless legs syndrome)      gabapentin (NEURONTIN) 100 mg capsule 100mg hs x 3 nights, then 200mg hs x 3 nights, then 300mg hs  Qty: 90 Cap, Refills: 1    Associated Diagnoses: Lumbar radiculopathy      atorvastatin (LIPITOR) 40 mg tablet Take 1 Tab by mouth daily. Qty: 90 Tab, Refills: 3    Associated Diagnoses: Hyperlipidemia, unspecified hyperlipidemia type      omeprazole (PRILOSEC) 20 mg capsule Take 1 Cap by mouth daily. Qty: 90 Cap, Refills: 3    Associated Diagnoses: Gastroesophageal reflux disease, esophagitis presence not specified      Aspirin, Buffered 81 mg tab Take 81 mg by mouth daily. Indications: \"for prevention\"          per medical continuation form      -Follow up in office in 2 weeks      Signed:  Alexandra Sol.  Glenys Baez, ACNP-BC, ONP-C  Orthopaedic Nurse Practitioner    12/18/2019  10:02 AM

## 2019-12-18 NOTE — PROGRESS NOTES
Bedside shift change report given to Raya Estrada RN (oncoming nurse) by Char Curran RN (offgoing nurse). Report included the following information OR Summary and Intake/Output.

## 2019-12-18 NOTE — PROGRESS NOTES
ORTHO POST OP SPINE PROGRESS NOTE    2019  Admit Date: 2019  Admit Diagnosis: Synovial cyst of lumbar facet joint [M71.38]  Sciatica of left side [M54.32]  Chronic left-sided low back pain with left-sided sciatica [M54.42, G89.29]  Lumbosacral spondylosis without myelopathy [M47.817]  Synovial cyst of lumbar facet joint [M71.38]  Procedure: Procedure(s):  LEFT L4-5 SYNOVIAL CYST EXCISION  Post Op day: 1 Day Post-Op    Subjective:     Sachin Rodriges is a patient who has complaints Of mild low back pain with improved left lower extremity pain status post left  L4-5 synovial cyst excision. States nausea vomiting has improved and able to void. Review of Systems: Pertinent items are noted in HPI. Objective:     PT/OT:   Distance Ambulated:           Time Ambulated (min):        Ambulation Response: Activity Response: Fairly tolerated  Assistive Device:              Assistive Device: Fall prevention device    Vital Signs:    Blood pressure 113/68, pulse 72, temperature 98.2 °F (36.8 °C), resp. rate 15, height 5' (1.524 m), weight 69.7 kg (153 lb 10.6 oz), last menstrual period 10/26/1995, SpO2 94 %. Temp (24hrs), Av.6 °F (36.4 °C), Min:97.4 °F (36.3 °C), Max:98.2 °F (36.8 °C)      No intake/output data recorded.  1901 -  0700  In: 920 [P.O.:120; I.V.:800]  Out: 725 [Urine:700]    LAB:    No results for input(s): HGB, HGBEXT, WBC, PLT, PLTEXT, HGBEXT, PLTEXT in the last 72 hours.     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   Today

## 2019-12-18 NOTE — PROGRESS NOTES
OCCUPATIONAL THERAPY EVALUATION/DISCHARGE  Patient: Darlyn Craig (43 y.o. female)  Date: 12/18/2019  Primary Diagnosis: Synovial cyst of lumbar facet joint [M71.38]  Sciatica of left side [M54.32]  Chronic left-sided low back pain with left-sided sciatica [M54.42, G89.29]  Lumbosacral spondylosis without myelopathy [M47.817]  Synovial cyst of lumbar facet joint [M71.38]  Procedure(s) (LRB):  LEFT L4-5 SYNOVIAL CYST EXCISION (Left) 1 Day Post-Op   Precautions:   Fall, Back    ASSESSMENT  Based on the objective data described below, the patient presents with ability to complete basic ADL tasks with SBA to mod indep s/p left L4-5 synovial cyst excision POD 1. She reports pre-op pain has resolved. Provided ed on back precautions, ADL compensatory techniques and home safety with excellent understanding. Log roll with SBA, bathroom mob without AD with SBA, and LB dressing with crossed leg dressing with supervision. Patient plans to dc home with daughter and has no concerns with dc home. Current Level of Function (ADLs/self-care): SBA-mod indep    Functional Outcome Measure: The patient scored 85/100 on the Barthel Index outcome measure which is indicative of mild impairment with ADL tasks and ADL tasks. Other factors to consider for discharge: Patient with high level of function PTA and supportive daughter who she plans to dc to her house. PLAN :  Recommendation for discharge: (in order for the patient to meet his/her long term goals)  No skilled occupational therapy/ follow up rehabilitation needs identified at this time.     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.    OBJECTIVE DATA SUMMARY:   HISTORY:   Past Medical History:   Diagnosis Date    Arthritis bilateral hands, hips, lower back    At risk for sleep apnea 03/18/2019    on phone assessment with PAT, kathia sleep scoring tool, scored 4    Chronic kidney disease 2017    kidney stones    GERD (gastroesophageal reflux disease)     being treated    High cholesterol     Hypertension     being treated    Kidney stones     Menopause 1995    Palpitations     as of 6/24/19 pt reports was evaluated by cardiology and cleared, now followed by cardiology, no further symptoms per pt    Pneumonia 2006 (approx)    RLS (restless legs syndrome)     Shoulder fracture 2009 [facility] -Jan 09  Right side      Past Surgical History:   Procedure Laterality Date    COLONOSCOPY  7/30/2010    Dr. Jurline Hashimoto, diverticulosis, repeat due 7/30/2015    COLONOSCOPY,REMV LESN,FORCEP/CAUTERY  9/4/2015         HX CATARACT REMOVAL Bilateral 03/2019    HX LITHOTRIPSY  10/2017    HX OPEN REDUCTION INTERNAL FIXATION Left 1982    ankle    HX ROTATOR CUFF REPAIR Right 2008       Prior Level of Function/Environment/Context: Home alone. Indep ADL tasks. 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 Expanded or extensive additional review of patient history:     Home Situation  Home Environment: Private residence  # Steps to Enter: 4  Rails to Enter: Yes  Hand Rails : Bilateral  One/Two Story Residence: One story  Living Alone: Yes  Support Systems: Family member(s)  Patient Expects to be Discharged to[de-identified] Private residence  Current DME Used/Available at Home: None  Tub or Shower Type: Tub/Shower combination      EXAMINATION OF PERFORMANCE DEFICITS:  Cognitive/Behavioral Status:  Neurologic State: Alert  Orientation Level: Oriented X4  Cognition: Follows commands     Hearing: Auditory  Auditory Impairment: None    Vision/Perceptual:                                     Range of Motion:  AROM: Generally decreased, functional  PROM: Generally decreased, functional                      Strength:  Strength:  Within functional limits Coordination:  Coordination: Within functional limits  Fine Motor Skills-Upper: Left Intact; Right Intact    Gross Motor Skills-Upper: Left Intact; Right Intact    Tone & Sensation:  Tone: Normal  Sensation: Intact                      Balance:  Sitting: Intact; Without support  Standing: Intact; Without support    Functional Mobility and Transfers for ADLs:  Bed Mobility:  Supine to Sit: Modified independent  Scooting: Modified independent    Transfers:  Sit to Stand: Contact guard assistance  Stand to Sit: Contact guard assistance  Bed to Chair: Stand-by assistance  Bathroom Mobility: Stand-by assistance  Toilet Transfer : Stand-by assistance    ADL Assessment:  Feeding: Independent  Oral Facial Hygiene/Grooming: Independent  Bathing: Stand-by assistance  Upper Body Dressing: Independent  Lower Body Dressing: Supervision  Toileting: Stand by assistance                ADL Intervention and task modifications:       Lower Body Dressing Assistance  Socks: Supervision; Compensatory technique training  Leg Crossed Method Used: Yes    Toileting  Bowel Hygiene: Compensatory technique training    Patient instructed and demonstrated 3/3 spine precautions with minimal cues. Patient instructed and indicated understanding the benefits of maintaining activity tolerance, functional mobility, and independence with self care tasks during acute stay  to ensure safe return home and to baseline. Encouraged patient to increase frequency and duration OOB, not sitting longer than 30 mins without marching/walking with staff, be out of bed for all meals, perform daily ADLs (as approved by RN/MD regarding bathing etc), and performing functional mobility to/from bathroom. Patient instruction and indicated understanding on body mechanics, ergonomics and gravitational force on the spine during different body positions to plan activities in prep for return home to complete basic ADLs, instrumental ADLs and back to work safely.    Bathing: Patient instructed and indicated understanding when bathing to not submerge wound in water, stand to shower or sponge bathe, follow instructions from MD for returning to showering. Dressing lower body: Patient instructed on the benefits to remain seated to don all clothing to increase independence with precautions and pain management. Patient instructed and demonstrated tailor sitting for lower body dressing. Toileting: Patient instructed on the benefits of using flushable wet wipes and toilet tongs if decreased reach or pain for ok care. Also, the benefits of a reacher to aid in clothing management. Patient instruction and indicated understanding to not strain i.e. holding breath to bear down during a bowel movement, lifting/activity, and sexual activity. Home safety: Patient instructed and indicated understanding on home modifications and safety [raise height of ADL objects (i.e. clothing, sink items, fridge items, items to mouth when grooming), appropriate height of chair surfaces, recliner safety, change of floor surfaces, clear pathways] to increase independence and fall prevention. Standing: Patient instructed and indicated understanding to walk up to sink/counter top/surfaces, step into walker, square off while using objects, slide objects along surfaces, to increase adherence to back precautions and fall prevention. Patient instructed to increase amount of time standing in order to increase independence and tolerance with ADLs. During prolonged standing, can open cabinet door or place foot on stool to decrease spinal pressure/increase pain. Functional Measure:  Barthel Index:    Bathin  Bladder: 10  Bowels: 10  Groomin  Dressing: 10  Feeding: 10  Mobility: 10  Stairs: 5  Toilet Use: 10  Transfer (Bed to Chair and Back): 10  Total: 85/100        The Barthel ADL Index: Guidelines  1.  The index should be used as a record of what a patient does, not as a record of what a patient could do. 2. The main aim is to establish degree of independence from any help, physical or verbal, however minor and for whatever reason. 3. The need for supervision renders the patient not independent. 4. A patient's performance should be established using the best available evidence. Asking the patient, friends/relatives and nurses are the usual sources, but direct observation and common sense are also important. However direct testing is not needed. 5. Usually the patient's performance over the preceding 24-48 hours is important, but occasionally longer periods will be relevant. 6. Middle categories imply that the patient supplies over 50 per cent of the effort. 7. Use of aids to be independent is allowed. Annemarie Newell., Barthel, DNelsonW. ([id number]). Functional evaluation: the Barthel Index. [facility] (14)2. YADIRA Wei, Beatriz Weinstein., Edna Richardson., [place], [address] [facility] (1999). Measuring the change indisability after inpatient rehabilitation; comparison of the responsiveness of the Barthel Index and Functional Price Measure. Journal of Neurology, Neurosurgery, and Psychiatry, 66(4), 628-977. Eliza Bernardo, N.J.A, JOHNNY Carrillo.FE.ZENOBIA, & Aggie Johnson, M.A. (2004.) Assessment of post-stroke quality of life in cost-effectiveness studies: The usefulness of the Barthel Index and the EuroQoL-5D.  Quality of Life Research, 15, 380-97         Occupational Therapy Evaluation Charge Determination   History Examination Decision-Making   LOW Complexity : Brief history review  LOW Complexity : 1-3 performance deficits relating to physical, cognitive , or psychosocial skils that result in activity limitations and / or participation restrictions  LOW Complexity : No comorbidities that affect functional and no verbal or physical assistance needed to complete eval tasks       Based on the above components, the patient evaluation is determined to be of the following complexity level: LOW   Pain Rating:  Denies pain with activity    Activity Tolerance:   Good  Please refer to the flowsheet for vital signs taken during this treatment. After treatment patient left in no apparent distress:    Sitting in chair and Call bell within reach    COMMUNICATION/EDUCATION:   The patients plan of care was discussed with: Physical Therapist, Registered Nurse and ortho PA.     Thank you for this referral.  Pranav Wilkins OT  Time Calculation: 19 mins

## 2019-12-18 NOTE — PATIENT INSTRUCTIONS
Nipple Discharge: Care Instructions  Your Care Instructions  Fluid leaking from one or both nipples when you are not breastfeeding is called nipple discharge. Clear, cloudy, or white discharge that appears only when you press on your nipple is usually normal. The more the nipple is pressed or stimulated, the more fluid appears. Yellow, green, or brown discharge is not normal and may be a symptom of an infection or other problem. Spontaneous discharge appears without pressing or stimulating the nipple. This is not normal unless you are pregnant or breastfeeding. It may be a side effect of a medicine, or it may be caused by other health problems. The treatment of spontaneous nipple discharge depends on what is causing it. You may need additional tests to find out what is causing the nipple discharge. Follow-up care is a key part of your treatment and safety. Be sure to make and go to all appointments, and call your doctor if you are having problems. It's also a good idea to know your test results and keep a list of the medicines you take. How can you care for yourself at home? · If your doctor gave you medicine, take it exactly as prescribed. Call your doctor if you think you are having a problem with your medicine. · Wear a supportive bra, such as a sports bra or jog bra. · Avoid stimulating your breast until you have your follow-up appointment. When should you call for help? Call your doctor now or seek immediate medical care if:    · You have symptoms of a breast infection, such as:  ? Increased pain, swelling, redness, or warmth around a breast.  ? Red streaks extending from the breast.  ? Pus draining from a breast.  ? A fever.    Watch closely for changes in your health, and be sure to contact your doctor if:    · You notice any changes in your breast or discharge.     · You do not get better as expected. Where can you learn more? Go to http://grace-gómez.info/.   Enter A709 in the search box to learn more about \"Nipple Discharge: Care Instructions. \"  Current as of: June 26, 2019  Content Version: 12.2  © 2455-0708 [employer]. Care instructions adapted under license by [employer] (which disclaims liability or warranty for this information). If you have questions about a medical condition or this instruction, always ask your healthcare professional. [place] [address] any warranty or liability for your use of this information.

## 2019-12-18 NOTE — OP NOTES
[address]  OPERATIVE REPORT    Name:  Yassine Randall  MR#:  [id number]  :  1945  ACCOUNT #:  [de-identified]  DATE OF SERVICE:  2019    PREOPERATIVE DIAGNOSES:  1. Synovial cyst, L4-L5 on the left. 2.  Lumbago. 3.  Sciatica. 4.  Spinal stenosis. POSTOPERATIVE DIAGNOSES:  1. Synovial cyst, L4-L5 on the left. 2.  Lumbago. 3.  Sciatica. 4.  Spinal stenosis. PROCEDURE PERFORMED:  An L4-L5 left-sided laminectomy, facetectomy, foraminotomy for evacuation of synovial cyst on the left. SURGEON:  Maciel Lee MD    FIRST ASSISTANT:  Demetrius Diana PA-C    ANESTHESIA:  General.    COMPLICATIONS:  None. SPECIMENS REMOVED:  None. IMPLANTS:  None. ESTIMATED BLOOD LOSS:  25 mL. DRAINS:  None. INDICATIONS FOR THE PROCEDURE:  The patient is a pleasant 66-year-old lady with lumbar spinal stenosis, lumbago, and sciatica due to a synovial cyst.  She has failed to improve with nonoperative treatment and at this point, would like to proceed with surgical intervention. I have given her warnings about possible complications including, but not limited to, pain, scar, bleeding, infection, nonunion, damage to surrounding structures, death, paralysis, blindness, and stroke. She understands and wants to proceed. NARRATIVE OF THE PROCEDURE:  Informed consent was obtained and the operative site was properly marked. The patient moved back to operating room and underwent general endotracheal anesthesia. She was positioned prone on the operating room table using the [employer] frame. Her arms were placed in a 90:90 position. The knees were gently bent with pillows. Fluoroscopy was used to wilma the level of the incision. We then proceeded to prep and drape in the usual manner.   Time-out was obtained verifying that this was the correct patient, the correct surgery, the correct site as well as that she had received IV antibiotics within 30 minutes of the incision. I then proceeded to perform a standard posterior approach exposing the left side at L4-L5, preparing for an L4-L5 laminectomy. Once the area was exposed and hemostasis was obtained, fluoroscopy was used to verify that we were in the correct level. I proceeded then to place a self-retaining retractor and then using a Midas Jamie, I proceeded to perform a U-cut laminectomy at L4 and a J-cut at L5 using a high-speed bur. I then detached ligamentum flavum from superior leading edge with a curved curette and then proceeded to flip it into the bony defect. I then removed with a Kerrison #3, followed by Eveleen Callahan #4, removing in the process of the medial overhang of the facet with the attached ligament and the synovial cyst.  Once we completed denuding the pedicle by removing the medial ring of the facet, I followed the nerve root distally into the foramen with a Argueta ball verifying that it was fully decompressed. Once satisfied with the decompression, I proceeded to irrigate the wound with normal saline, obtained hemostasis, closed the fascia with #1 Vicryl figure-of-eight interrupted sutures followed by irrigating subcu, closed the subcu with 2-0 Vicryl and the skin with a 3-0 running Monocryl and Dermabond. Sterile dressing was applied. The patient was then awakened and transferred to PACU in stable condition. POSTOPERATIVE PLAN:  This patient is going to remain here overnight. We are going to give her SCDs and RICHARD hoses for DVT prophylaxis and Ancef for infection prophylaxis. Sonia Collado MD      [id number]  D:  12/17/2019 12:46  T:  12/17/2019 20:41  JOB #:  [id number]  CC:  Valentin Altamirano.  The [employer] MD Hannah

## 2019-12-18 NOTE — DISCHARGE INSTRUCTIONS
[facility]    Discharge Instruction Sheet :   Synovial Cyst Excision    DR. Rosalina Davidson    Pain control:  Typically, we will prescribe a narcotic usually 1-2 tabs every four hours is sufficient for the pain. Most patients need this only for the first few weeks. You should discontinue this as the pain decreases. You should not drive while taking any narcotic pain medications. Constipation  Pain medicines and anesthesia can be constipating-this can be prevented by gentle physical activity and drinking plenty of fluid. It should be treated with over-the-counter medications such as Miralax or suppositories, and/or Fleets enema. You should have a bowel movement at least every other day following surgery. Incision care    Keep this area clean and dry. Leave the current dressing in place for 7 days. You may shower with this dressing, but DO NOT take a tub bath or go swimming until cleared by your doctor. DO NOT apply lotions, oils, or creams to incision. After 7 days, remove this dressing and apply a new opsite (impermiable)  Dressing over the incision. This dressing may stay on for 7 more days (until your follow up visit with your surgeon). If staples are in place, they should be removed about 14-20 days after surgery. To increase and promote healing:   Stop Smoking (or at least cut back on smoking).  Eat a well-balanced diet (high in protein and vitamin C)   If your appetite is poor, consider nutritional supplements like Ensure, Glucerna, or [place] Instant Breakfast.   If you are diabetic, controlling you blood sugars is very important to prevent infection and promote wound healing. Nutrition:   If you were on a supplement such as Ensure or Glucerna) while in the hospital, please continue using them with each meal for the next 30 days.    Eat a well-balanced diet - High in protein, high in vitamins and minerals, especially vitamin C and zinc.     Restrictions:  Limited bending at waist  Lift no more than 10 pounds    Warning signs : Please call your physician immediately at [phone] if you have   Bleeding from incision that is constant.  Change in mental status (unusual behavior or confusion)   If your incision develops redness or swelling   Change in wound drainage (increase in amount, color, or foul odor)   [place] over 101.5 degrees Fahrenheit    Headache that is not relieved with pain medication   Tenderness or redness in the calf of your leg    Emergency: CALL 911 if you have   Shortness of breath   Chest pain   Localized chest pain when coughing or taking a deep breath    Follow-up  Please call Dr. Lucita Persaud office for a follow up appointment in 2-3 weeks at [phone]. You can return to work when cleared by a physician. During normal business hours you may reach Dr. Dayton Medrano' team directly at [phone] if you have concerns or questions.       [id number]

## 2019-12-18 NOTE — PROGRESS NOTES
Problem: Falls - Risk of  Goal: *Absence of Falls  Description  Document Nadine Savage Fall Risk and appropriate interventions in the flowsheet.   Outcome: Progressing Towards Goal  Note: Fall Risk Interventions:  Mobility Interventions: OT consult for ADLs, Patient to call before getting OOB, PT Consult for mobility concerns, PT Consult for assist device competence    Mentation Interventions: Bed/chair exit alarm, Evaluate medications/consider consulting pharmacy, Door open when patient unattended, Toileting rounds, Reorient patient, More frequent rounding, Increase mobility, Family/sitter at bedside    Medication Interventions: Patient to call before getting OOB    Elimination Interventions: Call light in reach, Patient to call for help with toileting needs    History of Falls Interventions: Bed/chair exit alarm, Door open when patient unattended, Evaluate medications/consider consulting pharmacy, Room close to nurse's station

## 2019-12-18 NOTE — PROGRESS NOTES
Discharge instructions given per written order. Opportunity for questions given, all questions answered. IV removed per policy, catheter tip intact. Patient being transported home via her daughter in private vehicle.

## 2019-12-18 NOTE — PROGRESS NOTES
Problem: Mobility Impaired (Adult and Pediatric)  Goal: *Acute Goals and Plan of Care (Insert Text)  Description  FUNCTIONAL STATUS PRIOR TO ADMISSION: Patient was independent and active without use of DME.    HOME SUPPORT PRIOR TO ADMISSION: The patient lived alone with local family to provide assistance. Physical Therapy Goals  Initiated 12/18/2019    1. Patient will move from supine to sit and sit to supine  in bed with independence within 4 days. 2. Patient will perform sit to stand with modified independence within 4 days. 3. Patient will ambulate with independence for 150 feet with the least restrictive device within 4 days. 4. Patient will ascend/descend 4 stairs with 1 handrail(s) with modified independence within 4 days. 5. Patient will verbalize and demonstrate understanding of spinal precautions (No bending, lifting greater than 5 lbs, or twisting; log-roll technique; frequent repositioning as instructed) within 4 days. Outcome: Progressing Towards Goal   PHYSICAL THERAPY EVALUATION  Patient: Hyun Jalloh (35 y.o. female)  Date: 12/18/2019  Primary Diagnosis: Synovial cyst of lumbar facet joint [M71.38]  Sciatica of left side [M54.32]  Chronic left-sided low back pain with left-sided sciatica [M54.42, G89.29]  Lumbosacral spondylosis without myelopathy [M47.817]  Synovial cyst of lumbar facet joint [M71.38]  Procedure(s) (LRB):  LEFT L4-5 SYNOVIAL CYST EXCISION (Left) 1 Day Post-Op   Precautions:   Fall, Back      ASSESSMENT  Based on the objective data described below, the patient presents with decreased ROM (secondary to new back precautions), and decreased independence with functional mobility S/P L4-L5 left-sided laminectomy, facetectomy, foraminotomy for evacuation of synovial cyst on the left. Patient education is provided on back precautions including sitting time. Patient demonstrates good log roll technique with HOB completely flat.  She demonstrates the ability to ambulate increased distance and ascend and descend 10 stairs safely. Current Level of Function Impacting Discharge (mobility/balance): CGA- SBA without an assistive device     Functional Outcome Measure: The patient scored 26/28 on the Tinetti outcome measure which is indicative of a low risk for falls . Other factors to consider for discharge: medical stability      Patient will benefit from skilled therapy intervention to address the above noted impairments. PLAN :  Recommendations and Planned Interventions: bed mobility training, transfer training, gait training, therapeutic exercises, neuromuscular re-education, edema management/control, patient and family training/education, and therapeutic activities      Frequency/Duration: Patient will be followed by physical therapy:  twice daily to address goals. Recommendation for discharge: (in order for the patient to meet his/her long term goals)  No skilled physical therapy/ follow up rehabilitation needs identified at this time. This discharge recommendation:  Has been made in collaboration with the attending provider and/or case management    IF patient discharges home will need the following DME: none         SUBJECTIVE:   Patient stated \"My daughter has a bed set up on the first floor for me.     OBJECTIVE DATA SUMMARY:   HISTORY:    Past Medical History:   Diagnosis Date    Arthritis     bilateral hands, hips, lower back    At risk for sleep apnea 03/18/2019    on phone assessment with PAT, kathia sleep scoring tool, scored 4    Chronic kidney disease 2017    kidney stones    GERD (gastroesophageal reflux disease)     being treated    High cholesterol     Hypertension     being treated    Kidney stones     Menopause 1995    Palpitations     as of 6/24/19 pt reports was evaluated by cardiology and cleared, now followed by cardiology, no further symptoms per pt    Pneumonia 2006 (approx)    RLS (restless legs syndrome)     Shoulder fracture 2009    [facility] -Jan 09  Right side      Past Surgical History:   Procedure Laterality Date    COLONOSCOPY  7/30/2010    Dr. Ana Ji, diverticulosis, repeat due 7/30/2015    COLONOSCOPY,JONAH CALHOUN,FORCEP/CAUTERY  9/4/2015         HX CATARACT REMOVAL Bilateral 03/2019    HX LITHOTRIPSY  10/2017    HX OPEN REDUCTION INTERNAL FIXATION Left 1982    ankle    HX ROTATOR CUFF REPAIR Right 2008       Personal factors and/or comorbidities impacting plan of care: please see above    Home Situation  Home Environment: Private residence  # Steps to Enter: 4  Rails to Enter: Yes  Hand Rails : Bilateral  One/Two Story Residence: One story  Living Alone: Yes  Support Systems: Family member(s)  Patient Expects to be Discharged to[de-identified] Private residence  Current DME Used/Available at Home: None  Tub or Shower Type: Tub/Shower combination    EXAMINATION/PRESENTATION/DECISION MAKING:   Critical Behavior:  Neurologic State: Alert  Orientation Level: Oriented X4  Cognition: Follows commands     Hearing: Auditory  Auditory Impairment: None  Skin:    Edema:   Range Of Motion:  AROM: Generally decreased, functional           PROM: Generally decreased, functional           Strength:    Strength: Within functional limits                    Tone & Sensation:   Tone: Normal              Sensation: Intact               Coordination:  Coordination: Within functional limits  Vision:      Functional Mobility:  Bed Mobility:     Supine to Sit: Modified independent     Scooting: Modified independent  Transfers:  Sit to Stand: Contact guard assistance  Stand to Sit: Contact guard assistance                       Balance:   Sitting: Intact; Without support  Standing: Intact; Without support  Ambulation/Gait Training:  Distance (ft): 150 Feet (ft)  Assistive Device: Gait belt  Ambulation - Level of Assistance: Stand-by assistance;Contact guard assistance                                                Stairs:  Number of Stairs Trained: 10  Stairs - Level of Assistance: Contact guard assistance   Rail Use: Right     Therapeutic Exercises:       Functional Measure:  Tinetti test:    Sitting Balance: 1  Arises: 1  Attempts to Rise: 2  Immediate Standing Balance: 2  Standing Balance: 2  Nudged: 2  Eyes Closed: 1  Turn 360 Degrees - Continuous/Discontinuous: 1  Turn 360 Degrees - Steady/Unsteady: 1  Sitting Down: 1  Balance Score: 14 Balance total score  Indication of Gait: 1  R Step Length/Height: 1  L Step Length/Height: 1  R Foot Clearance: 1  L Foot Clearance: 1  Step Symmetry: 1  Step Continuity: 1  Path: 2  Trunk: 2  Walking Time: 1  Gait Score: 12 Gait total score  Total Score: 26/28 Overall total score         Tinetti Tool Score Risk of Falls  <19 = High Fall Risk  19-24 = Moderate Fall Risk  25-28 = Low Fall Risk  Tinetti ME. Performance-Oriented Assessment of Mobility Problems in Elderly Patients. [facility] 66; [id number]. (Scoring Description: PT Bulletin Feb. 10, 1993)    Older adults: Jacob Horner et al, 2009; n = 1000 [facility] elderly evaluated with ABC, STAS, ADL, and IADL)  · Mean STAS score for males aged 69-68 years = 26.21(3.40)  · Mean STAS score for females age 69-68 years = 25.16(4.30)  · Mean STAS score for males over 80 years = 23.29(6.02)  · Mean STAS score for females over 80 years = 17.20(8.32)            Physical Therapy Evaluation Charge Determination   History Examination Presentation Decision-Making   MEDIUM  Complexity : 1-2 comorbidities / personal factors will impact the outcome/ POC  LOW Complexity : 1-2 Standardized tests and measures addressing body structure, function, activity limitation and / or participation in recreation  LOW Complexity : Stable, uncomplicated  Other outcome measures Tinetti  LOW       Based on the above components, the patient evaluation is determined to be of the following complexity level: LOW     Pain Rating:      Activity Tolerance:   Good  Please refer to the flowsheet for vital signs taken during this treatment.     After treatment patient left in no apparent distress:   Sitting in chair and Call bell within reach    COMMUNICATION/EDUCATION:   The patients plan of care was discussed with: Occupational Therapist and Registered Nurse. Fall prevention education was provided and the patient/caregiver indicated understanding., Patient/family have participated as able in goal setting and plan of care. , and Patient/family agree to work toward stated goals and plan of care.     Thank you for this referral.  Suleman De Anda, PT   Time Calculation: 19 mins

## 2019-12-18 NOTE — PROGRESS NOTES
HISTORY OF PRESENT ILLNESS  [id number] is a 76 y.o. female. HPI NEW Patient presents for consultation at the request of Dr. Tarik Macedo for RIGHT nipple discharge. Sometimes bloody, sometimes clear. Occurs when breast is pressed upon. No history of breast biopsies or breast surgeries. She just had a mass removed from her back yesterday, done by Dr. Aarti Shaffer. Family history-  No breast or ovarian cancer. Father had colon cancer. Breast imaging-  [facility] Results (most recent):  Results from Hospital Encounter encounter on 08/30/19   [facility] 3D KRISTINE W MAMMO BI SCREENING INCL CAD    Narrative STUDY: Bilateral digital screening mammogram with 3-D tomosynthesis    INDICATION:  Screening. COMPARISON: [id number]    BREAST COMPOSITION: There are scattered areas of fibroglandular density. FINDINGS: Bilateral digital screening mammography was performed and is  interpreted in conjunction with a computer assisted detection (CAD) system. Additionally, tomosynthesis of both breasts in the CC and MLO projections was  performed. No suspicious masses or calcifications are identified. There has been  no significant change. Impression IMPRESSION:  BI-RADS 1: Negative. No mammographic evidence of malignancy. RECOMMENDATIONS:  Next screening mammogram is recommended in one year. The patient will be notified of these results.        Past Medical History:   Diagnosis Date    Arthritis     bilateral hands, hips, lower back    At risk for sleep apnea 03/18/2019    on phone assessment with VEENA, kathia sleep scoring tool, scored 4    Chronic kidney disease 2017    kidney stones    GERD (gastroesophageal reflux disease)     being treated    High cholesterol     Hypertension     being treated    Kidney stones     Menopause 1995    Palpitations     as of 6/24/19 pt reports was evaluated by cardiology and cleared, now followed by cardiology, no further symptoms per pt    Pneumonia 2006 (approx)    RLS (restless legs syndrome)     Shoulder fracture 2009 [facility] -Jan 09  Right side      Past Surgical History:   Procedure Laterality Date    COLONOSCOPY  7/30/2010    Dr. Aura Galloway, diverticulosis, repeat due 7/30/2015    COLONOSCOPY,JONAH CALHOUN,FORCEP/CAUTERY  9/4/2015         HX CATARACT REMOVAL Bilateral 03/2019    HX LITHOTRIPSY  10/2017    HX OPEN REDUCTION INTERNAL FIXATION Left 1982    ankle    HX ROTATOR CUFF REPAIR Right 2008     Social History     Socioeconomic History    Marital status:      Spouse name: Not on file    Number of children: Not on file    Years of education: Not on file    Highest education level: Not on file   Occupational History    Not on file   Social Needs    Financial resource strain: Not on file    Food insecurity:     Worry: Not on file     Inability: Not on file    Transportation needs:     Medical: Not on file     Non-medical: Not on file   Tobacco Use    Smoking status: Never Smoker    Smokeless tobacco: Never Used   Substance and Sexual Activity    Alcohol use: No     Alcohol/week: 0.0 standard drinks    Drug use: Yes     Types: Prescription, OTC    Sexual activity: Not on file   Lifestyle    Physical activity:     Days per week: Not on file     Minutes per session: Not on file    Stress: Not on file   Relationships    Social connections:     Talks on phone: Not on file     Gets together: Not on file     Attends Congregational service: Not on file     Active member of club or organization: Not on file     Attends meetings of clubs or organizations: Not on file     Relationship status: Not on file    Intimate partner violence:     Fear of current or ex partner: Not on file     Emotionally abused: Not on file     Physically abused: Not on file     Forced sexual activity: Not on file   Other Topics Concern    Not on file   Social History Narrative    Not on file     OB History    No obstetric history on file. Obstetric Comments   Menarche:  15.    LMP: 49.  # of Children: 7.  Age at Delivery of First Child:  16.   Hysterectomy/oophorectomy:  NO/NO. Breast Bx:  no.  Hx of Breast Feeding:  no. BCP:  no. Hormone therapy:  no.                  Current Outpatient Medications:     acetaminophen (TYLENOL) 500 mg tablet, Take 2 Tabs by mouth every six (6) hours for 14 days. , Disp: 112 Tab, Rfl: 0    oxyCODONE IR (ROXICODONE) 5 mg immediate release tablet, Take 0.5-1 Tabs by mouth every four (4) hours as needed for Pain for up to 14 days. Max Daily Amount: 30 mg. Half tab for mild to moderate pain level 1-6, or 1 tab for severe pain level 7-10, Disp: 30 Tab, Rfl: 0    polyethylene glycol (MIRALAX) 17 gram packet, Take 1 Packet by mouth daily for 14 days. Indications: While taking oxycodone, Disp: 14 Packet, Rfl: 0    senna-docusate (PERICOLACE) 8.6-50 mg per tablet, Take 1 Tab by mouth two (2) times a day for 14 days. , Disp: 28 Tab, Rfl: 0    ciprofloxacin HCl (CIPRO) 500 mg tablet, Take 1 Tab by mouth two (2) times a day for 7 days. Indications: urinary tract infection due to E. coli bacteria, Disp: 14 Tab, Rfl: 0    mupirocin calcium (BACTROBAN) 2 % nasal ointment, by Both Nostrils route two (2) times a day. Surgeon office to contact patient and give instructions 12/13/19, Disp: , Rfl:     gabapentin (NEURONTIN) 100 mg capsule, 100mg hs x 3 nights, then 200mg hs x 3 nights, then 300mg hs (Patient taking differently: 100 mg as needed. 100mg hs x 3 nights, then 200mg hs x 3 nights, then 300mg hs), Disp: 90 Cap, Rfl: 1    metoprolol succinate (TOPROL-XL) 50 mg XL tablet, Take 1 Tab by mouth daily. , Disp: 90 Tab, Rfl: 3    atorvastatin (LIPITOR) 40 mg tablet, Take 1 Tab by mouth daily. (Patient taking differently: Take 40 mg by mouth nightly.), Disp: 90 Tab, Rfl: 3    pramipexole (MIRAPEX) 0.5 mg tablet, Take 0.5 Tabs by mouth daily. (Patient taking differently: Take 0.25 mg by mouth daily.), Disp: 45 Tab, Rfl: 3    omeprazole (PRILOSEC) 20 mg capsule, Take 1 Cap by mouth daily. , Disp: 90 Cap, Rfl: 3    Aspirin, Buffered 81 mg tab, Take 81 mg by mouth daily. Indications: \"for prevention\", Disp: , Rfl:   No Known Allergies    Review of Systems   Constitutional: Negative. HENT: Negative. Eyes: Negative. Respiratory: Negative. Cardiovascular: Negative. Gastrointestinal: Negative. Genitourinary: Negative. Musculoskeletal: Positive for back pain. Skin: Negative. Neurological: Negative. Endo/Heme/Allergies: Negative. Psychiatric/Behavioral: Negative. All other systems reviewed and are negative. Physical Exam  Vitals signs and nursing note reviewed. Constitutional:       Appearance: She is well-developed. HENT:      Head: Normocephalic. Neck:      Thyroid: No thyromegaly. Pulmonary:      Effort: Pulmonary effort is normal.   Chest:      Breasts: Breasts are symmetrical.         Right: Nipple discharge (clear discharge ) present. No inverted nipple, mass, skin change or tenderness. Left: No inverted nipple, mass, nipple discharge, skin change or tenderness. Abdominal:      Palpations: Abdomen is soft. Musculoskeletal: Normal range of motion. Lymphadenopathy:      Cervical: No cervical adenopathy. Skin:     General: Skin is warm and dry. Neurological:      Mental Status: She is alert and oriented to person, place, and time. ASSESSMENT and PLAN    ICD-10-CM ICD-9-CM    1. Bloody discharge from nipple N64.52 611.79      - no imaging correlate on us. Recommend duct excision on right. The procedure and risks were discussed. Risks include bleeding, bruising, scar, infection, need for more surgery and altered nipple function.  Will schedule for february

## 2019-12-19 ENCOUNTER — PATIENT OUTREACH (OUTPATIENT)
Dept: INTERNAL MEDICINE CLINIC | Age: 74
End: 2019-12-19

## 2019-12-19 NOTE — PROGRESS NOTES
Hospital Discharge Follow-Up      Date/Time:  2019 10:02 AM    Patient was admitted to [facility] on  and discharged on  for DX. SYNOVIAL CYST OF LUMBAR FACET JOINT. The physician discharge summary was available at the time of outreach. Patient was contacted within 2 business days of discharge. [facility]    Top Challenges reviewed with the provider     Advance Care Planning:   Does patient have an Advance Directive:  reviewed and current        Method of communication with provider :chart routing    Inpatient RRAT score: 12  Was this a readmission? no     Care Transition Nurse (CTN) contacted the patient by telephone to perform post hospital discharge assessment. Verified name and  with patient as identifiers. Provided introduction to self, and explanation of the CTN role. Patient received hospital discharge instructions. CTN reviewed discharge instructions and red flags with patient who verbalized understanding. Patient given an opportunity to ask questions and does not have any further questions or concerns at this time. The patient agrees to contact the PCP office for questions related to their healthcare. CTN provided contact information for future reference. Disease Specific:   N/A    Patients top risk factors for readmission:  none     Medication(s):   New Medications at Discharge:   oxyCODONE IR (ROXICODONE) 5 mg immediate release tablet Take 0.5-1 Tabs by mouth every four (4) hours as needed for Pain for up to 14 days. Max Daily Amount: 30 mg. Half tab for mild to moderate pain level 1-6, or 1 tab for severe pain level 7-10  Qty: 30 Tab, Refills: 0     Associated Diagnoses: Synovial cyst of lumbar facet joint       polyethylene glycol (MIRALAX) 17 gram packet Take 1 Packet by mouth daily for 14 days. Indications:  While taking oxycodone  Qty: 14 Packet, Refills: 0       senna-docusate (PERICOLACE) 8.6-50 mg per tablet Take 1 Tab by mouth two (2) times a day for 14 days. Qty: 28 Tab, Refills: 0         Changed Medications at Discharge:   acetaminophen (TYLENOL) 500 mg tablet Take 2 Tabs by mouth every six (6) hours for 14 days. Qty: 112 Tab, Refills: 0         Medication reconciliation was performed with patient, who verbalizes understanding of administration of home medications. There were no barriers to obtaining medications identified at this time. Referral to Pharm D needed: no     Current Outpatient Medications   Medication Sig    acetaminophen (TYLENOL) 500 mg tablet Take 2 Tabs by mouth every six (6) hours for 14 days.  oxyCODONE IR (ROXICODONE) 5 mg immediate release tablet Take 0.5-1 Tabs by mouth every four (4) hours as needed for Pain for up to 14 days. Max Daily Amount: 30 mg. Half tab for mild to moderate pain level 1-6, or 1 tab for severe pain level 7-10    polyethylene glycol (MIRALAX) 17 gram packet Take 1 Packet by mouth daily for 14 days. Indications: While taking oxycodone    senna-docusate (PERICOLACE) 8.6-50 mg per tablet Take 1 Tab by mouth two (2) times a day for 14 days.  ciprofloxacin HCl (CIPRO) 500 mg tablet Take 1 Tab by mouth two (2) times a day for 7 days. Indications: urinary tract infection due to E. coli bacteria    mupirocin calcium (BACTROBAN) 2 % nasal ointment by Both Nostrils route two (2) times a day. Surgeon office to contact patient and give instructions 12/13/19    gabapentin (NEURONTIN) 100 mg capsule 100mg hs x 3 nights, then 200mg hs x 3 nights, then 300mg hs (Patient taking differently: 100 mg as needed. 100mg hs x 3 nights, then 200mg hs x 3 nights, then 300mg hs)    metoprolol succinate (TOPROL-XL) 50 mg XL tablet Take 1 Tab by mouth daily.  atorvastatin (LIPITOR) 40 mg tablet Take 1 Tab by mouth daily. (Patient taking differently: Take 40 mg by mouth nightly.)    pramipexole (MIRAPEX) 0.5 mg tablet Take 0.5 Tabs by mouth daily.  (Patient taking differently: Take 0.25 mg by mouth daily.)    omeprazole (PRILOSEC) 20 mg capsule Take 1 Cap by mouth daily.  Aspirin, Buffered 81 mg tab Take 81 mg by mouth daily. Indications: \"for prevention\"     No current facility-administered medications for this visit. [facility] follow up appointment(s):   Future Appointments   Date Time Provider Rohini Sellersisti   4/10/2020  8:30 AM Mena Villafana MD [id number] [place]:  out of service area   Goals      Establish PCP relationships and regularly scheduled appointments. 12/19 Patient encouraged to  follow-up with PCP and specialist as directed, keep a list of her medications she take to bring to f/u appointments. Pt.reports she is doing well, will f/u with PCP, Lakisha Eckert as needed. CTN informed pt.of Lyndon Espino (virtual visit) medical provider 24 hours a day, seven days a week via mobile device, tablet or log into a secure website from your computer, at [email or web address]. CTN reminded pt.to call today to schedule f/u with (Ortho Surg.) Dr. Senia Bhandari ([phone]). Pt.reports she will call for f/u. CTN will f/u with pt.in 1-2 weeks. -[facility]               Understands red flags post discharge. 12/19 CTN reminded pt.to wash hands before coming in contact with surgical site, monitor   s/sof infection such as; increased pain, swelling, warmth, or redness; streaks leading from the area, pus or fever. CTN encourage pt.to notify surgeon or PCP office asap. CTN contact informatin provided, pt.verbalizes understanding. CTN will f/u with pt.in 1-2 weeks. -[address]

## 2019-12-23 NOTE — CDMP QUERY
Patient admitted for surgery related to Synovial cyst of lumbar facet joint. Noted documentation of Compression fracture of T12 vertebra, sequel. If possible, please document in progress notes and d/c summary if you are evaluating and /or treating any of the following: 
 
Non-traumatic Compression fracture of T12 vertebra, sequel. Traumatic Compression fracture of T12 vertebra, sequel. Unable to determine Compression fracture of T12 vertebra, sequel. Other Compression fracture of T12 vertebra, sequel. The medical record reflects the following: 
  Risk Factors: presents for surgery Clinical Indicators: noted Compression fracture of T12 vertebra, sequel., mild degen changes from previous compression at T12. Treatment: xrays, surgery. Thank you Ronda Davila RN/CCDS 
[phone]

## 2019-12-26 ENCOUNTER — TELEPHONE (OUTPATIENT)
Dept: INTERNAL MEDICINE CLINIC | Facility: CLINIC | Age: 74
End: 2019-12-26

## 2020-01-16 ENCOUNTER — PATIENT OUTREACH (OUTPATIENT)
Dept: INTERNAL MEDICINE CLINIC | Age: 75
End: 2020-01-16

## 2020-01-20 DIAGNOSIS — N64.52 BLOODY DISCHARGE FROM RIGHT NIPPLE: Primary | ICD-10-CM

## 2020-01-24 ENCOUNTER — HOSPITAL ENCOUNTER (OUTPATIENT)
Dept: PREADMISSION TESTING | Age: 75
Discharge: HOME | End: 2020-01-24
Payer: MEDICARE

## 2020-01-24 VITALS
SYSTOLIC BLOOD PRESSURE: 147 MMHG | HEART RATE: 74 BPM | WEIGHT: 151.5 LBS | HEIGHT: 60 IN | TEMPERATURE: 97.5 F | BODY MASS INDEX: 29.74 KG/M2 | DIASTOLIC BLOOD PRESSURE: 77 MMHG

## 2020-01-24 LAB
ATRIAL RATE: 61 BPM
CALCULATED P AXIS, ECG09: 32 DEGREES
CALCULATED R AXIS, ECG10: -30 DEGREES
CALCULATED T AXIS, ECG11: -27 DEGREES
DIAGNOSIS, 93000: NORMAL
P-R INTERVAL, ECG05: 156 MS
Q-T INTERVAL, ECG07: 430 MS
QRS DURATION, ECG06: 110 MS
QTC CALCULATION (BEZET), ECG08: 432 MS
VENTRICULAR RATE, ECG03: 61 BPM

## 2020-01-24 PROCEDURE — 93005 ELECTROCARDIOGRAM TRACING: CPT

## 2020-01-27 PROBLEM — R73.09 ELEVATED HEMOGLOBIN A1C: Status: ACTIVE | Noted: 2020-01-27

## 2020-01-28 ENCOUNTER — DOCUMENTATION ONLY (OUTPATIENT)
Dept: SURGERY | Age: 75
End: 2020-01-28

## 2020-01-28 NOTE — PROGRESS NOTES
Patient called [facility] answering service at 8:10am saying she had to cancel surgery for today 1/28/20. I called her back and she did not have a ride to the hospital and wants to reschedule at [facility]. I told her Linton Grabieler will work on it and it will be at least a couple weeks out.

## 2020-03-10 DIAGNOSIS — K21.9 GASTROESOPHAGEAL REFLUX DISEASE, ESOPHAGITIS PRESENCE NOT SPECIFIED: ICD-10-CM

## 2020-03-10 DIAGNOSIS — G25.81 RLS (RESTLESS LEGS SYNDROME): ICD-10-CM

## 2020-03-10 RX ORDER — PRAMIPEXOLE DIHYDROCHLORIDE 0.5 MG/1
0.25 TABLET ORAL
Qty: 90 TAB | Refills: 3 | Status: SHIPPED | OUTPATIENT
Start: 2020-03-10 | End: 2021-05-27

## 2020-03-10 RX ORDER — OMEPRAZOLE 20 MG/1
20 CAPSULE, DELAYED RELEASE ORAL DAILY
Qty: 90 CAP | Refills: 3 | Status: SHIPPED | OUTPATIENT
Start: 2020-03-10 | End: 2021-03-29 | Stop reason: SDUPTHER

## 2020-03-10 NOTE — TELEPHONE ENCOUNTER
[address] on file sent a fax requesting a refill of   Requested Prescriptions     Pending Prescriptions Disp Refills    omeprazole (PRILOSEC) 20 mg capsule 90 Cap 3     Sig: Take 1 Cap by mouth daily.  pramipexole (MIRAPEX) 0.5 mg tablet 45 Tab 3     Sig: Take 0.5 Tabs by mouth daily.

## 2020-06-02 ENCOUNTER — VIRTUAL VISIT (OUTPATIENT)
Dept: INTERNAL MEDICINE CLINIC | Facility: CLINIC | Age: 75
End: 2020-06-02

## 2020-06-02 VITALS — HEIGHT: 60 IN | WEIGHT: 151 LBS | BODY MASS INDEX: 29.64 KG/M2

## 2020-06-02 DIAGNOSIS — E55.9 VITAMIN D DEFICIENCY: ICD-10-CM

## 2020-06-02 DIAGNOSIS — R73.01 IFG (IMPAIRED FASTING GLUCOSE): ICD-10-CM

## 2020-06-02 DIAGNOSIS — Z13.31 SCREENING FOR DEPRESSION: ICD-10-CM

## 2020-06-02 DIAGNOSIS — I10 ESSENTIAL HYPERTENSION: ICD-10-CM

## 2020-06-02 DIAGNOSIS — Z00.00 MEDICARE ANNUAL WELLNESS VISIT, SUBSEQUENT: Primary | ICD-10-CM

## 2020-06-02 DIAGNOSIS — E78.2 MIXED HYPERLIPIDEMIA: ICD-10-CM

## 2020-06-02 DIAGNOSIS — N64.52 BLOODY DISCHARGE FROM RIGHT NIPPLE: ICD-10-CM

## 2020-06-02 RX ORDER — MELATONIN
1000 DAILY
Qty: 90 TAB | Refills: 3 | Status: SHIPPED | OUTPATIENT
Start: 2020-06-02 | End: 2021-06-07

## 2020-06-02 NOTE — PATIENT INSTRUCTIONS
Medicare Wellness Visit, Female The best way to live healthy is to have a lifestyle where you eat a well-balanced diet, exercise regularly, limit alcohol use, and quit all forms of tobacco/nicotine, if applicable. Regular preventive services are another way to keep healthy. Preventive services (vaccines, screening tests, monitoring & exams) can help personalize your care plan, which helps you manage your own care. Screening tests can find health problems at the earliest stages, when they are easiest to treat. [facility] follows the current, evidence-based guidelines published by the [facility] ([facility]) when recommending preventive services for our patients. Because we follow these guidelines, sometimes recommendations change over time as research supports it. (For example, mammograms used to be recommended annually. Even though Medicare will still pay for an annual mammogram, the newer guidelines recommend a mammogram every two years for women of average risk). Of course, you and your doctor may decide to screen more often for some diseases, based on your risk and your co-morbidities (chronic disease you are already diagnosed with). Preventive services for you include: - Medicare offers their members a free annual wellness visit, which is time for you and your primary care provider to discuss and plan for your preventive service needs. Take advantage of this benefit every year! 
-All adults over the age of 72 should receive the recommended pneumonia vaccines. Current USPSTF guidelines recommend a series of two vaccines for the best pneumonia protection.  
-All adults should have a flu vaccine yearly and a tetanus vaccine every 10 years.  
-All adults age 48 and older should receive the shingles vaccines (series of two vaccines). -All adults age 38-68 who are overweight should have a diabetes screening test once every three years. -All adults born between [birth year] and 1965 should be screened once for Hepatitis C. 
-Other screening tests and preventive services for persons with diabetes include: an eye exam to screen for diabetic retinopathy, a kidney function test, a foot exam, and stricter control over your cholesterol.  
-Cardiovascular screening for adults with routine risk involves an electrocardiogram (ECG) at intervals determined by your doctor.  
-Colorectal cancer screenings should be done for adults age 54-65 with no increased risk factors for colorectal cancer. There are a number of acceptable methods of screening for this type of cancer. Each test has its own benefits and drawbacks. Discuss with your doctor what is most appropriate for you during your annual wellness visit. The different tests include: colonoscopy (considered the best screening method), a fecal occult blood test, a fecal DNA test, and sigmoidoscopy. 
 
-A bone mass density test is recommended when a woman turns 65 to screen for osteoporosis. This test is only recommended one time, as a screening. Some providers will use this same test as a disease monitoring tool if you already have osteoporosis. -Breast cancer screenings are recommended every other year for women of normal risk, age 54-69. 
-Cervical cancer screenings for women over age 72 are only recommended with certain risk factors. Here is a list of your current Health Maintenance items (your personalized list of preventive services) with a due date: 
Health Maintenance Due Topic Date Due  
 Albumin Urine Test  06/06/1955 [facility] Eye Exam  06/06/1955  Shingles Vaccine (1 of 2) 06/06/1995  Pneumococcal Vaccine (2 of 2 - PPSV23) 01/31/2018  Glaucoma Screening   10/12/2019 [facility] Annual Well Visit  02/20/2020  Colonoscopy  09/04/2020

## 2020-06-02 NOTE — PROGRESS NOTES
[id number] [address] [place] [address]  Identified pt with two pt identifiers(name and ). Chief Complaint   Patient presents with    Blood Pressure Check    Annual Wellness Visit       Reviewed record In preparation for visit and have obtained necessary documentation. 1. Have you been to the ER, urgent care clinic or hospitalized since your last visit? No     2. Have you seen or consulted any other health care providers outside of the [address] [place] [address] since your last visit? Include any pap smears or colon screening. Yes. Jeanette Meza     Patient has an advance directive and it is on file. Vitals reviewed with provider.     Health Maintenance reviewed:     Health Maintenance Due   Topic    MICROALBUMIN Q1     Eye Exam Retinal or Dilated     Shingrix Vaccine Age 50> (1 of 2)    Pneumococcal 65+ years (2 of 2 - PPSV23)    GLAUCOMA SCREENING Q2Y     Medicare Yearly Exam     Colonoscopy           Wt Readings from Last 3 Encounters:   20 151 lb (68.5 kg)   20 151 lb 8 oz (68.7 kg)   19 153 lb (69.4 kg)        Temp Readings from Last 3 Encounters:   20 97.5 °F (36.4 °C)   19 97.8 °F (36.6 °C)   19 97.8 °F (36.6 °C)        BP Readings from Last 3 Encounters:   20 147/77   19 132/74   19 116/58        Pulse Readings from Last 3 Encounters:   20 74   19 79   19 75        Vitals:    20 0908   Weight: 151 lb (68.5 kg)   Height: 5' (1.524 m)   PainSc:   0 - No pain   LMP: 10/26/1995          Learning Assessment:   :       Learning Assessment 2019   PRIMARY LEARNER Patient Patient   HIGHEST LEVEL OF EDUCATION - PRIMARY LEARNER  - DID NOT GRADUATE HIGH SCHOOL   BARRIERS PRIMARY LEARNER - NONE   CO-LEARNER CAREGIVER - No   PRIMARY LANGUAGE ENGLISH ENGLISH   LEARNER PREFERENCE PRIMARY OTHER (COMMENT) DEMONSTRATION   ANSWERED BY patient patient   RELATIONSHIP SELF SELF        Depression Screening:   :       3 most recent PHQ Screens 6/2/2020   Little interest or pleasure in doing things Not at all   Feeling down, depressed, irritable, or hopeless Not at all   Total Score PHQ 2 0        Fall Risk Assessment:   :       Fall Risk Assessment, last 12 mths 6/2/2020   Able to walk? Yes   Fall in past 12 months? No        Abuse Screening:   :       Abuse Screening Questionnaire 6/2/2020 2/19/2019 1/8/2018 12/29/2016 9/14/2015   Do you ever feel afraid of your partner? N N N N N   Are you in a relationship with someone who physically or mentally threatens you? N N N N N   Is it safe for you to go home?  Y Y Y Y Y        ADL Screening:   :       ADL Assessment 1/8/2018   Feeding yourself No Help Needed   Getting from bed to chair No Help Needed   Getting dressed No Help Needed   Bathing or showering No Help Needed   Walk across the room (includes cane/walker) No Help Needed   Using the telphone No Help Needed   Taking your medications No Help Needed   Preparing meals No Help Needed   Managing money (expenses/bills) No Help Needed   Moderately strenuous housework (laundry) No Help Needed   Shopping for personal items (toiletries/medicines) No Help Needed   Shopping for groceries No Help Needed   Driving No Help Needed   Climbing a flight of stairs No Help Needed   Getting to places beyond walking distances No Help Needed

## 2020-06-02 NOTE — PROGRESS NOTES
Jose David Eduardo is a 76 y.o. female evaluated via audio only technology on 6/2/2020. Consent: She and/or her health care decision maker is aware that she may receive a bill for this audio only encounter, depending on her insurance coverage, and has provided verbal consent to proceed: Yes    I communicated with the patient and/or health care decision maker about the nature and details of the following:  Assessment & Plan:   Diagnoses and all orders for this visit:    1. Medicare annual wellness visit, subsequent    2. Screening for depression  -     DEPRESSION SCREEN ANNUAL    3. Vitamin D deficiency  -     VITAMIN D, 25 HYDROXY; Future  Will send rx for 1000iu daily to pharmacy  4. Mixed hyperlipidemia  -     METABOLIC PANEL, COMPREHENSIVE; Future  -     LIPID PANEL; Future    5. Essential hypertension  Controlled on current regimen, continue   6. Bloody discharge from right nipple  Patient will call Dr. Rodriguez Distance office to reschedule surgery  7. IFG (impaired fasting glucose)  -     HEMOGLOBIN A1C WITH EAG; Future  Last a1c 6.6 prior to back surgery in 12/2019 - recheck - [facility]    Follow-up and Dispositions    · Return in about 3 months (around 9/2/2020) for bp, ifg.         12  Subjective:   Jose David Eduardo is a 76 y.o. female who was seen for Blood Pressure Check and Annual Wellness Visit    Overall has been well. Some mild low back pain - better than prior to surgery. Says bp has been well controlled - 140-142/70s. No chest pain or sob. No dizziness, weakness. Was supposed to have breast duct excision for bloody discharge nipple in 2/2020. Had to cancel at last minute because her ride cancelled. Hasn't rescheduled. Says she will call for later this summer. Walks some for exercise. Baking for farmer's market. Occasional scant blood after firm BM, no abdominal pain. Due for colonoscopy in September.        Prior to Admission medications    Medication Sig Start Date End Date Taking? Authorizing Provider   omeprazole (PRILOSEC) 20 mg capsule Take 1 Cap by mouth daily. 3/10/20  Yes Rajeev Robertson MD   pramipexole (MIRAPEX) 0.5 mg tablet Take 0.5 Tabs by mouth nightly. Indications: restless legs syndrome, an extreme discomfort in the calf muscles when sitting or lying down 3/10/20  Yes Rajeev Robertson MD   gabapentin (NEURONTIN) 100 mg capsule 100mg hs x 3 nights, then 200mg hs x 3 nights, then 300mg hs  Patient taking differently: 100 mg as needed. 100mg hs x 3 nights, then 200mg hs x 3 nights, then 300mg hs 12/9/19  Yes Rajeev Robertson MD   metoprolol succinate (TOPROL-XL) 50 mg XL tablet Take 1 Tab by mouth daily. 9/10/19  Yes Rajeev Robertson MD   atorvastatin (LIPITOR) 40 mg tablet Take 1 Tab by mouth daily. Patient taking differently: Take 40 mg by mouth nightly. 2/19/19  Yes Rajeev Robertson MD   Aspirin, Buffered 81 mg tab Take 81 mg by mouth daily. Indications: \"for prevention\"   Yes Provider, Historical     No Known Allergies        ROS    I affirm this is a Patient-Initiated Episode with a Patient who has not had a related appointment within my department in the past 7 days or scheduled within the next 24 hours. Total Time: minutes: 11-20 minutes    Note: not billable if this call serves to triage the patient into an appointment for the relevant concern      Anthony Lagunas MD   This is the Subsequent Medicare Annual Wellness Exam, performed 12 months or more after the Initial AWV or the last Subsequent AWV    Consent: Kang Mi, who was seen by synchronous (real-time) audio only technology, and/or her healthcare decision maker, is aware that this patient-initiated, Telehealth encounter on 6/2/2020 is a billable service. While AWVs are fully covered by Medicare, any services rendered on this date that are not included in an AWV are subject to additional billing, with coverage as determined by her insurance carrier.  She is aware that she may receive a bill for any such additional services and has provided verbal consent to proceed: Yes. I have reviewed the patient's medical history in detail and updated the computerized patient record.      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    Traumatic complete tear of left rotator cuff S46.012A    Synovial cyst of lumbar facet joint M71.38    Elevated hemoglobin A1c R73.09     Past Medical History:   Diagnosis Date    Arthritis     bilateral hands, hips, lower back    At risk for sleep apnea 03/18/2019    on phone assessment with PAT, kathia sleep scoring tool, scored 4    Chronic kidney disease 2017    kidney stones    Elevated hemoglobin A1c 1/27/2020    GERD (gastroesophageal reflux disease)     being treated    High cholesterol     Hypertension     being treated    Kidney stones     Menopause 1995    Palpitations     as of 6/24/19 pt reports was evaluated by cardiology and cleared, now followed by cardiology, no further symptoms per pt    Pneumonia 2006 (approx)    Rheumatic fever     AGE 5    RLS (restless legs syndrome)     Shoulder fracture 2009 [facility] -Jan 09  Right side       Past Surgical History:   Procedure Laterality Date    COLONOSCOPY  7/30/2010    Dr. Sid Amin, diverticulosis, repeat due 7/30/2015    COLONOSCOPY,JONAH CALHOUN,FORCEP/CAUTERY  9/4/2015         HX CATARACT REMOVAL Bilateral 03/2019    HX LITHOTRIPSY  10/2017    HX OPEN REDUCTION INTERNAL FIXATION Left 1982    ankle    HX ORTHOPAEDIC  2017    L4-L5 BACK SURGERY    HX ROTATOR CUFF REPAIR Right 2008    HX ROTATOR CUFF REPAIR Left 2019     Current Outpatient Medications   Medication Sig Dispense Refill    omeprazole (PRILOSEC) 20 mg capsule Take 1 Cap by mouth daily. 90 Cap 3    pramipexole (MIRAPEX) 0.5 mg tablet Take 0.5 Tabs by mouth nightly. Indications: restless legs syndrome, an extreme discomfort in the calf muscles when sitting or lying down 90 Tab 3    gabapentin (NEURONTIN) 100 mg capsule 100mg hs x 3 nights, then 200mg hs x 3 nights, then 300mg hs (Patient taking differently: 100 mg as needed. 100mg hs x 3 nights, then 200mg hs x 3 nights, then 300mg hs) 90 Cap 1    metoprolol succinate (TOPROL-XL) 50 mg XL tablet Take 1 Tab by mouth daily. 90 Tab 3    atorvastatin (LIPITOR) 40 mg tablet Take 1 Tab by mouth daily. (Patient taking differently: Take 40 mg by mouth nightly.) 90 Tab 3    Aspirin, Buffered 81 mg tab Take 81 mg by mouth daily. Indications: \"for prevention\"       No Known Allergies    Family History   Problem Relation Age of Onset    Cancer Father         Colon     Diabetes Grandchild     Anesth Problems Neg Hx      Social History     Tobacco Use    Smoking status: Never Smoker    Smokeless tobacco: Never Used   Substance Use Topics    Alcohol use: No     Alcohol/week: 0.0 standard drinks       Depression Risk Factor Screening:     3 most recent PHQ Screens 6/2/2020   Little interest or pleasure in doing things Not at all   Feeling down, depressed, irritable, or hopeless Not at all   Total Score PHQ 2 0       Alcohol Risk Factor Screening:   Do you average 1 drink per night or more than 7 drinks a week:  No    On any one occasion in the past three months have you have had more than 3 drinks containing alcohol:  No      Functional Ability and Level of Safety:   Hearing: Hearing is good. Activities of Daily Living: The home contains: no safety equipment.   Patient does total self care    Ambulation: with no difficulty    Fall Risk:  Fall Risk Assessment, last 12 mths 6/2/2020   Able to walk? Yes   Fall in past 12 months? No       Abuse Screen:  Patient is not abused    Cognitive Screening   Has your family/caregiver stated any concerns about your memory: no  Cognitive Screening: normal    Patient Care Team   Patient Care Team:  Lilliam Mendoza MD as PCP - Kina Charles MD as PCP - [facility]  Mary Grace Levine MD as Physician (Cardiology)  Corinna Stephenson MD (Physical Medicine and Rehab)  Liss Denton MD (Breast Surgery)    Assessment/Plan   Education and counseling provided:  Are appropriate based on today's review and evaluation    Diagnoses and all orders for this visit:    1. Medicare annual wellness visit, subsequent    2. Screening for depression  -     DEPRESSION SCREEN ANNUAL    3. Vitamin D deficiency  -     VITAMIN D, 25 HYDROXY; Future    4. Mixed hyperlipidemia  -     METABOLIC PANEL, COMPREHENSIVE; Future  -     LIPID PANEL; Future    5. Essential hypertension    6. Bloody discharge from right nipple    7. IFG (impaired fasting glucose)  -     HEMOGLOBIN A1C WITH EAG; Future        Health Maintenance Due   Topic Date Due    MICROALBUMIN Q1  06[birth year]06/1955    Eye Exam Retinal or Dilated  06[birth year]06/1955    Shingrix Vaccine Age 50> (1 of 2) 06/06/1995    Pneumococcal 65+ years (2 of 2 - PPSV23) 01/31/2018    GLAUCOMA SCREENING Q2Y  10/12/2019    Colonoscopy  09/04/2020       Ashish Heck is a 76 y.o. female who was evaluated by an audio only encounter for concerns as above. Patient identification was verified prior to start of the visit. A caregiver was present when appropriate. Due to this being a TeleHealth encounter (During [place] [address] public [facility] emergency), evaluation of the following organ systems was limited: Vitals/Constitutional/EENT/Resp/CV/GI//MS/Neuro/Skin/Heme-Lymph-Imm.   Pursuant to the emergency declaration under the [facility], [address]5 waiver authority and the Coronavirus Preparedness and Response Supplemental Appropriations Act, this Virtual Visit was conducted, with patient's (and/or legal guardian's) consent, to reduce the patient's risk of exposure to COVID-19 and provide necessary medical care. Services were provided through a synchronous discussion virtually to substitute for in-person clinic visit. I was at home. The patient was at home.       Ashlie Morris MD

## 2020-06-02 NOTE — PROGRESS NOTES
Charly Flower is a 76 y.o. female who was seen by synchronous (real-time) audio-video technology on 6/2/2020. Consent: Charly Flower, who was seen by synchronous (real-time) audio-video technology, and/or her healthcare decision maker, is aware that this patient-initiated, Telehealth encounter on 6/2/2020 is a billable service, with coverage as determined by her insurance carrier. She is aware that she may receive a bill and has provided verbal consent to proceed: Yes. Assessment & Plan:  
{There are no diagnoses linked to this encounter. (Refresh or delete this SmartLink)} I spent at least  minutes on this visit with this {established new:38331::\"established\"} patient. Subjective:  
Charly Flower is a 76 y.o. female who was seen for Blood Pressure Check and Annual Wellness Visit Was supposed to have breast duct excision in feb 2020 per Dr. Daniel Monroy for bloody nipple discharge. Prior to Admission medications Medication Sig Start Date End Date Taking? Authorizing Provider  
omeprazole (PRILOSEC) 20 mg capsule Take 1 Cap by mouth daily. 3/10/20  Yes Talia Rios MD  
pramipexole (MIRAPEX) 0.5 mg tablet Take 0.5 Tabs by mouth nightly. Indications: restless legs syndrome, an extreme discomfort in the calf muscles when sitting or lying down 3/10/20  Yes Talia Rios MD  
gabapentin (NEURONTIN) 100 mg capsule 100mg hs x 3 nights, then 200mg hs x 3 nights, then 300mg hs 
Patient taking differently: 100 mg as needed. 100mg hs x 3 nights, then 200mg hs x 3 nights, then 300mg hs 12/9/19  Yes Talia Rios MD  
metoprolol succinate (TOPROL-XL) 50 mg XL tablet Take 1 Tab by mouth daily. 9/10/19  Yes Talia Rios MD  
atorvastatin (LIPITOR) 40 mg tablet Take 1 Tab by mouth daily. Patient taking differently: Take 40 mg by mouth nightly. 2/19/19  Yes Talia Rios MD  
Aspirin, Buffered 81 mg tab Take 81 mg by mouth daily.  Indications: \"for prevention\"   Yes Provider, Historical  
 
No Known Allergies {History SmartLink choices - disappears if left unselected (Optional):29405} ROS Objective:  
Vital Signs: (As obtained by patient/caregiver at home) Visit Vitals Ht 5' (1.524 m) Wt 151 lb (68.5 kg) Comment: Patient Reported LMP 10/26/1995 BMI 29.49 kg/m² [INSTRUCTIONS:  \"[x]\" Indicates a positive item  \"[]\" Indicates a negative item  -- DELETE ALL ITEMS NOT EXAMINED] Constitutional: [x] Appears well-developed and well-nourished [x] No apparent distress   
  [] Abnormal - Mental status: [x] Alert and awake  [x] Oriented to person/place/time [x] Able to follow commands   
[] Abnormal - Eyes:   EOM    [x]  Normal    [] Abnormal -  
Sclera  [x]  Normal    [] Abnormal - 
        Discharge [x]  None visible   [] Abnormal - HENT: [x] Normocephalic, atraumatic  [] Abnormal -  
[x] Mouth/Throat: Mucous membranes are moist 
 
External Ears [x] Normal  [] Abnormal - Neck: [x] No visualized mass [] Abnormal - Pulmonary/Chest: [x] Respiratory effort normal   [x] No visualized signs of difficulty breathing or respiratory distress 
      [] Abnormal - Musculoskeletal:   [x] Normal gait with no signs of ataxia [x] Normal range of motion of neck [] Abnormal -  
 
Neurological:        [x] No Facial Asymmetry (Cranial nerve 7 motor function) (limited exam due to video visit) [x] No gaze palsy  
     [] Abnormal -   
     
Skin:        [x] No significant exanthematous lesions or discoloration noted on facial skin   
     [] Abnormal - Psychiatric:       [x] Normal Affect [] Abnormal -  
     [x] No Hallucinations Other pertinent observable physical exam findings:- 
 
 
 
We discussed the expected course, resolution and complications of the diagnosis(es) in detail. Medication risks, benefits, costs, interactions, and alternatives were discussed as indicated.   I advised her to contact the office if her condition worsens, changes or fails to improve as anticipated. She expressed understanding with the diagnosis(es) and plan. Jessica Dewitt is a 76 y.o. female who was evaluated by a video visit encounter for concerns as above. Patient identification was verified prior to start of the visit. A caregiver was present when appropriate. Due to this being a TeleHealth encounter (During [place] public health emergency), evaluation of the following organ systems was limited: Vitals/Constitutional/EENT/Resp/CV/GI//MS/Neuro/Skin/Heme-Lymph-Imm. Pursuant to the emergency declaration under the [facility], 1135 waiver authority and the [employer] Act, this Virtual  Visit was conducted, with patient's (and/or legal guardian's) consent, to reduce the patient's risk of exposure to COVID-19 and provide necessary medical care. Services were provided through a video synchronous discussion virtually to substitute for in-person clinic visit. Patient and provider were located at their individual homes.  
 
 
Edie Hernandez MD

## 2020-06-30 LAB
25(OH)D3+25(OH)D2 SERPL-MCNC: 18.7 NG/ML (ref 30–100)
ALBUMIN SERPL-MCNC: 4.1 G/DL (ref 3.7–4.7)
ALBUMIN/GLOB SERPL: 1.8 {RATIO} (ref 1.2–2.2)
ALP SERPL-CCNC: 99 IU/L (ref 39–117)
ALT SERPL-CCNC: 20 IU/L (ref 0–32)
AST SERPL-CCNC: 21 IU/L (ref 0–40)
BILIRUB SERPL-MCNC: 0.3 MG/DL (ref 0–1.2)
BUN SERPL-MCNC: 13 MG/DL (ref 8–27)
BUN/CREAT SERPL: 12 (ref 12–28)
CALCIUM SERPL-MCNC: 9.3 MG/DL (ref 8.7–10.3)
CHLORIDE SERPL-SCNC: 107 MMOL/L (ref 96–106)
CHOLEST SERPL-MCNC: 166 MG/DL (ref 100–199)
CO2 SERPL-SCNC: 21 MMOL/L (ref 20–29)
CREAT SERPL-MCNC: 1.09 MG/DL (ref 0.57–1)
EST. AVERAGE GLUCOSE BLD GHB EST-MCNC: 137 MG/DL
GLOBULIN SER CALC-MCNC: 2.3 G/DL (ref 1.5–4.5)
GLUCOSE SERPL-MCNC: 93 MG/DL (ref 65–99)
HBA1C MFR BLD: 6.4 % (ref 4.8–5.6)
HDLC SERPL-MCNC: 29 MG/DL
LDLC SERPL CALC-MCNC: 83 MG/DL (ref 0–99)
POTASSIUM SERPL-SCNC: 5.1 MMOL/L (ref 3.5–5.2)
PROT SERPL-MCNC: 6.4 G/DL (ref 6–8.5)
SODIUM SERPL-SCNC: 140 MMOL/L (ref 134–144)
TRIGL SERPL-MCNC: 271 MG/DL (ref 0–149)
VLDLC SERPL CALC-MCNC: 54 MG/DL (ref 5–40)

## 2020-07-30 RX ORDER — ERGOCALCIFEROL 1.25 MG/1
50000 CAPSULE ORAL
Qty: 8 CAP | Refills: 0 | Status: SHIPPED | OUTPATIENT
Start: 2020-07-30 | End: 2020-09-18

## 2020-07-31 NOTE — PROGRESS NOTES
Cholesterol okay other than slightly high triglycerides . Vit d low - rx sent to pharmacy. a1c improved - meaning glucose is lower.

## 2020-09-30 ENCOUNTER — HOSPITAL ENCOUNTER (OUTPATIENT)
Dept: MAMMOGRAPHY | Age: 75
Discharge: HOME | End: 2020-09-30
Attending: INTERNAL MEDICINE
Payer: MEDICARE

## 2020-09-30 DIAGNOSIS — Z12.31 VISIT FOR SCREENING MAMMOGRAM: ICD-10-CM

## 2020-09-30 PROCEDURE — 77063 BREAST TOMOSYNTHESIS BI: CPT

## 2020-10-07 ENCOUNTER — OFFICE VISIT (OUTPATIENT)
Dept: SURGERY | Age: 75
End: 2020-10-07
Payer: MEDICARE

## 2020-10-07 VITALS
SYSTOLIC BLOOD PRESSURE: 151 MMHG | BODY MASS INDEX: 29.64 KG/M2 | HEART RATE: 84 BPM | DIASTOLIC BLOOD PRESSURE: 87 MMHG | TEMPERATURE: 97.7 F | HEIGHT: 60 IN | WEIGHT: 151 LBS

## 2020-10-07 DIAGNOSIS — N64.52 DISCHARGE FROM RIGHT NIPPLE: Primary | ICD-10-CM

## 2020-10-07 PROCEDURE — 1101F PT FALLS ASSESS-DOCD LE1/YR: CPT | Performed by: SURGERY

## 2020-10-07 PROCEDURE — G8427 DOCREV CUR MEDS BY ELIG CLIN: HCPCS | Performed by: SURGERY

## 2020-10-07 PROCEDURE — G8754 DIAS BP LESS 90: HCPCS | Performed by: SURGERY

## 2020-10-07 PROCEDURE — G8753 SYS BP > OR = 140: HCPCS | Performed by: SURGERY

## 2020-10-07 PROCEDURE — G8536 NO DOC ELDER MAL SCRN: HCPCS | Performed by: SURGERY

## 2020-10-07 PROCEDURE — 1090F PRES/ABSN URINE INCON ASSESS: CPT | Performed by: SURGERY

## 2020-10-07 PROCEDURE — 99213 OFFICE O/P EST LOW 20 MIN: CPT | Performed by: SURGERY

## 2020-10-07 PROCEDURE — 3017F COLORECTAL CA SCREEN DOC REV: CPT | Performed by: SURGERY

## 2020-10-07 PROCEDURE — G8432 DEP SCR NOT DOC, RNG: HCPCS | Performed by: SURGERY

## 2020-10-07 PROCEDURE — G8417 CALC BMI ABV UP PARAM F/U: HCPCS | Performed by: SURGERY

## 2020-10-07 NOTE — PROGRESS NOTES
HISTORY OF PRESENT ILLNESS  Orrie Cranker is a 76 y.o. female. HPI ESTABLISHED patient here to discuss surgery for RIGHT breast nipple discharge. She reports no new changes since her last visit in December 2019.      Family history-  No breast or ovarian cancer. Father had colon cancer.     Breast imaging-  [facility] Results (most recent):  Results from Hospital Encounter encounter on 09/30/20   [facility] 3D KRISTINE W MAMMO BI SCREENING INCL CAD    Narrative STUDY: Bilateral digital screening mammogram with 3-D tomosynthesis    INDICATION:  Screening. COMPARISON: 2019, 2018, 2016, 2015    BREAST COMPOSITION: There are scattered areas of fibroglandular density. FINDINGS: Bilateral digital screening mammography was performed and is  interpreted in conjunction with a computer assisted detection (CAD) system. Additionally, tomosynthesis of both breasts in the CC and MLO projections was  performed. No suspicious masses or calcifications are identified. There has been  no significant change. Impression IMPRESSION:  BI-RADS 1: Negative. No mammographic evidence of malignancy. RECOMMENDATIONS:  Next screening mammogram is recommended in one year. The patient will be notified of these results. Review of Systems   All other systems reviewed and are negative. Physical Exam  Vitals signs and nursing note reviewed. Chest:      Breasts: Breasts are symmetrical.         Right: No inverted nipple, mass, nipple discharge, skin change or tenderness. Left: No inverted nipple, mass, nipple discharge, skin change or tenderness. Lymphadenopathy:      Cervical: No cervical adenopathy. ASSESSMENT and PLAN    ICD-10-CM ICD-9-CM    1. Discharge from right nipple  N64.52 611.79      77 yo female with right nipple discharge. Discussed duct excision right nipple  She would like to do this  Will schedule. The procedure and risks were discussed.    Risks include bleeding, bruising, scar, infection, need for more surgery and altered nipple appearance and function.

## 2020-10-07 NOTE — LETTER
10/9/20 Patient: Christianne Grayson YOB: 1945 Date of Visit: 10/7/2020 Sharda Elizondo MD 
[address] [address] VIA In Basket Dear Sharda Elizondo MD, Thank you for referring Ms. Gonzales Speaks to [id number] [address] [facility] for evaluation. My notes for this consultation are attached. If you have questions, please do not hesitate to call me. I look forward to following your patient along with you. Sincerely, Sunshine Soni MD

## 2020-10-07 NOTE — PATIENT INSTRUCTIONS
Learning About Breast Cancer Surgery What is breast cancer surgery? Surgery is a key part of treatment for breast cancer. The type of surgery you have depends on the size, location, and type of the cancer. It also depends on your health and what is important to you. Your doctor may combine treatments. This is a common way to treat breast cancer. You may have surgery to remove all the cancer that can be seen. After surgery you may also need radiation, chemotherapy, or hormone therapy. These treatments get rid of any cancer cells that may be left. During the surgery, the doctor may remove lymph nodes from the armpit. The lymph nodes will be looked at under a microscope. This is used to check if cancer has spread from the breast into the lymph nodes. What surgeries are used? Lumpectomy Lumpectomy removes the tumor in the breast. The incision is made close to the tumor. This shows common places where the cut is made. Simple mastectomy Simple mastectomy removes the whole breast, including nipple and skin. This shows where the cut is often made. Nipple-sparing mastectomy with inframammary cut Nipple-sparing mastectomy removes the whole breast but leaves the skin and nipple. This shows the cut in the curve under the breast (inframammary). Nipple-sparing mastectomy with lateral cut Nipple-sparing mastectomy removes the whole breast but leaves the skin and nipple. This shows the cut from the nipple along the side of the breast toward the armpit (lateral). Nipple-sparing mastectomy with periareolar cut Nipple-sparing mastectomy removes the whole breast but leaves the skin and nipple. This shows the cut around the top of the nipple and along the side of the breast toward the armpit (periareolar). Skin-sparing mastectomy with periareolar cut Skin-sparing mastectomy removes the whole breast and the nipple, but it keeps the skin that covers the breast. This shows the cut around the nipple (periareolar). Skin-sparing mastectomy with teardrop cut Skin-sparing mastectomy removes the whole breast and the nipple, but it keeps the skin that covers the breast. This shows the cut around the nipple in a teardrop shape. Skin-sparing mastectomy with tennis racket cut Skin-sparing mastectomy removes the whole breast and the nipple, but it keeps the skin that covers the breast. This shows the cut around the nipple and along the side of the breast toward the armpit (tennis racket shape). What can you expect after surgery? The amount of time you will need to recover depends on the type of surgery you had. It also depends on whether you need any more treatment. If you had a lumpectomy, you will probably look the same in a bra. But your breasts may not match in size or shape after surgery. This depends on the size of your breasts and how much tissue was removed. If you had a mastectomy or had a lot of your breast removed in a lumpectomy, you may want to consider breast reconstruction. This is surgery to create a new breast. This may be done at the same time as your breast surgery. Or it may be done later. Some women choose not to do reconstruction at all. You choose what feels right for you. No matter what kind of surgery you have, you will get information about your treatment. This includes how to prepare, what to expect, and what to do afterward. Follow-up care is a key part of your treatment and safety. Be sure to make and go to all appointments, and call your doctor if you are having problems. It's also a good idea to know your test results and keep a list of the medicines you take. Where can you learn more? Go to http://www.Olapic.com/ Enter P020 in the search box to learn more about \"Learning About Breast Cancer Surgery. \" Current as of: April 29, 2020               Content Version: 12.6 © 7899-7161 [employer]. Care instructions adapted under license by [employer] (which disclaims liability or warranty for this information). If you have questions about a medical condition or this instruction, always ask your healthcare professional. [address] any warranty or liability for your use of this information. Learning About Breast Cancer Treatments Your Care Instructions Breast cancer means abnormal cells grow out of control in one or both breasts. These cancer cells can spread from the breast to nearby lymph nodes and other tissues. They can also spread to other parts of the body. The type and stage of cancer you have is based on: · Where in the breast the cancer started. · The genetics of those cancer cells. · How far cancer has spread within the breast, to nearby tissues, or to other organs. · What the cancer cells look like under a microscope. · Whether there is cancer in the nearby lymph nodes. Tests that help find the stage of your cancer can help choose the right treatment for you. These tests can include a breast biopsy, lymph node biopsy, blood tests, and X-rays. You may need other tests as well, such as a bone, CT, or MRI scan. Whether you have more tests depends on your symptoms and the stage of the cancer. When you find out that you have cancer, you may feel many emotions and may need some help coping. Seek out family, friends, and counselors for support. You also can do things at home to make yourself feel better while you go through treatment. Call the [facility] ([phone]) or visit its website at [address]. Syndero for more information. How is breast cancer treated? Your doctor may combine treatments. This is a common way to treat breast cancer. Treatment depends on what type and stage of cancer you have. You may have: · Surgery to remove the cancer. · Radiation. This uses high-dose X-rays to kill cancer cells and shrink tumors. · Chemotherapy. This uses medicine to kill cancer cells. · Hormone therapy. This uses medicines such as tamoxifen. It limits the effect of the hormone estrogen. This hormone can help some types of breast cancer cells to grow. · Targeted therapy. This uses medicines such as trastuzumab (Herceptin) to help your immune system fight the cancer. What surgeries are done for breast cancer? Surgery is a key part of treatment for breast cancer. The main types of surgeries are: · Breast-conserving surgery, such as lumpectomy. It removes the cancer in the breast and just enough tissue to make sure that all of the cancer was removed. · Surgery to remove the breast. This includes: ? Total mastectomy. This removes the whole breast. 
? Nipple-sparing mastectomy. This removes the whole breast but leaves the nipple. ? Modified radical mastectomy. This removes the whole breast and the lymph nodes under the arm (axillary lymph nodes). ? Radical mastectomy. This removes the whole breast, the chest muscles, and all the lymph nodes under the arm. If lymph nodes under the arm are removed, they are looked at under the microscope to check for cancer. There are two types of lymph node removal: · [place] lymph node biopsy removes the lymph node that is the first to receive lymph fluid from the tumor. If cancer has spread from the breast to the lymph nodes, cancer cells most often show up in the sentinel node first. 
· Axillary lymph node dissection removes most of the lymph nodes in the armpit. The type of surgery you have depends on the size, location, and type of the cancer. It also depends on your overall health and personal preferences. Even if your doctor removes all the cancer that can be seen at the time of your surgery, you may still need more treatment. You may get radiation, chemotherapy, or hormone therapy after surgery to try to kill any cancer cells that may be left. No matter what kind of surgery you have, you will get information about why you are having it, what its risks are, how to prepare, and what to do after surgery. Follow-up care is a key part of your treatment and safety. Be sure to make and go to all appointments, and call your doctor if you are having problems. It's also a good idea to know your test results and keep a list of the medicines you take. Where can you learn more? Go to http://www.gray.com/ Enter X810 in the search box to learn more about \"Learning About Breast Cancer Treatments. \" Current as of: April 29, 2020               Content Version: 12.6 © 3149-1897 EmergenSee, Incorporated. Care instructions adapted under license by [employer] (which disclaims liability or warranty for this information). If you have questions about a medical condition or this instruction, always ask your healthcare professional. [address] any warranty or liability for your use of this information.

## 2020-10-13 DIAGNOSIS — E78.5 HYPERLIPIDEMIA, UNSPECIFIED HYPERLIPIDEMIA TYPE: ICD-10-CM

## 2020-10-13 RX ORDER — ATORVASTATIN CALCIUM 40 MG/1
40 TABLET, FILM COATED ORAL DAILY
Qty: 90 TAB | Refills: 3 | Status: SHIPPED | OUTPATIENT
Start: 2020-10-13 | End: 2021-11-08 | Stop reason: SDUPTHER

## 2020-10-13 NOTE — TELEPHONE ENCOUNTER
[facility] on file sent a fax requesting a refill of   Requested Prescriptions     Pending Prescriptions Disp Refills    atorvastatin (LIPITOR) 40 mg tablet 90 Tab 3     Sig: Take 1 Tab by mouth daily.

## 2020-10-13 NOTE — TELEPHONE ENCOUNTER
PCP: Bob Solano MD     Last appt: 9/28/2020   No future appointments. Requested Prescriptions     Pending Prescriptions Disp Refills    atorvastatin (LIPITOR) 40 mg tablet 90 Tab 3     Sig: Take 1 Tab by mouth daily.

## 2020-10-15 ENCOUNTER — DOCUMENTATION ONLY (OUTPATIENT)
Dept: SURGERY | Age: 75
End: 2020-10-15

## 2020-10-15 NOTE — PROGRESS NOTES
Called and spoke with patient. The patient is trying to help her [de-identified]year old brother in [address] [place] [address]. She will call us when he gets settled,and she is not driving back and forth so much. I told her I would let Dr Watson Halsted know.

## 2021-03-11 ENCOUNTER — OFFICE VISIT (OUTPATIENT)
Dept: INTERNAL MEDICINE CLINIC | Age: 76
End: 2021-03-11
Payer: MEDICARE

## 2021-03-11 ENCOUNTER — HOSPITAL ENCOUNTER (OUTPATIENT)
Dept: GENERAL RADIOLOGY | Age: 76
Discharge: HOME | End: 2021-03-11
Payer: MEDICARE

## 2021-03-11 VITALS
SYSTOLIC BLOOD PRESSURE: 152 MMHG | HEIGHT: 60 IN | BODY MASS INDEX: 30.82 KG/M2 | OXYGEN SATURATION: 96 % | RESPIRATION RATE: 16 BRPM | DIASTOLIC BLOOD PRESSURE: 84 MMHG | WEIGHT: 157 LBS | TEMPERATURE: 98.1 F | HEART RATE: 86 BPM

## 2021-03-11 DIAGNOSIS — M25.551 BILATERAL HIP PAIN: ICD-10-CM

## 2021-03-11 DIAGNOSIS — M25.552 BILATERAL HIP PAIN: Primary | ICD-10-CM

## 2021-03-11 DIAGNOSIS — M25.552 BILATERAL HIP PAIN: ICD-10-CM

## 2021-03-11 DIAGNOSIS — M25.551 BILATERAL HIP PAIN: Primary | ICD-10-CM

## 2021-03-11 PROCEDURE — G8753 SYS BP > OR = 140: HCPCS | Performed by: NURSE PRACTITIONER

## 2021-03-11 PROCEDURE — G8754 DIAS BP LESS 90: HCPCS | Performed by: NURSE PRACTITIONER

## 2021-03-11 PROCEDURE — 1101F PT FALLS ASSESS-DOCD LE1/YR: CPT | Performed by: NURSE PRACTITIONER

## 2021-03-11 PROCEDURE — G8536 NO DOC ELDER MAL SCRN: HCPCS | Performed by: NURSE PRACTITIONER

## 2021-03-11 PROCEDURE — 3017F COLORECTAL CA SCREEN DOC REV: CPT | Performed by: NURSE PRACTITIONER

## 2021-03-11 PROCEDURE — 73522 X-RAY EXAM HIPS BI 3-4 VIEWS: CPT

## 2021-03-11 PROCEDURE — 1090F PRES/ABSN URINE INCON ASSESS: CPT | Performed by: NURSE PRACTITIONER

## 2021-03-11 PROCEDURE — G8432 DEP SCR NOT DOC, RNG: HCPCS | Performed by: NURSE PRACTITIONER

## 2021-03-11 PROCEDURE — 99213 OFFICE O/P EST LOW 20 MIN: CPT | Performed by: NURSE PRACTITIONER

## 2021-03-11 PROCEDURE — G8427 DOCREV CUR MEDS BY ELIG CLIN: HCPCS | Performed by: NURSE PRACTITIONER

## 2021-03-11 PROCEDURE — G8417 CALC BMI ABV UP PARAM F/U: HCPCS | Performed by: NURSE PRACTITIONER

## 2021-03-11 NOTE — PROGRESS NOTES
Room: 1C    Identified pt with two pt identifiers(name and ). Reviewed record in preparation for visit and have obtained necessary documentation. All patient medications has been reviewed. 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 Have you been to the ER, urgent care clinic since your last visit? Hospitalized since your last visit? No    5. Have you seen or consulted any other health care providers outside of the [address] [place] [address] since your last visit? Include any pap smears or colon screening. No    Patient is accompanied by self I have received verbal consent from Candice El to discuss any/all medical information while they are present in the room.

## 2021-03-11 NOTE — PATIENT INSTRUCTIONS
Hip Pain: Care Instructions Your Care Instructions Hip pain may be caused by many things, including overuse, a fall, or a twisting movement. Another cause of hip pain is arthritis. Your pain may increase when you stand up, walk, or squat. The pain may come and go or may be constant. Home treatment can help relieve hip pain, swelling, and stiffness. If your pain is ongoing, you may need more tests and treatment. Follow-up care is a key part of your treatment and safety. Be sure to make and go to all appointments, and call your doctor if you are having problems. It's also a good idea to know your test results and keep a list of the medicines you take. How can you care for yourself at home? · Take pain medicines exactly as directed. ? If the doctor gave you a prescription medicine for pain, take it as prescribed. ? If you are not taking a prescription pain medicine, ask your doctor if you can take an over-the-counter medicine. · Rest and protect your hip. Take a break from any activity, including standing or walking, that may cause pain. · Put ice or a cold pack against your hip for 10 to 20 minutes at a time. Try to do this every 1 to 2 hours for the next 3 days (when you are awake) or until the swelling goes down. Put a thin cloth between the ice and your skin. · Sleep on your healthy side with a pillow between your knees, or sleep on your back with pillows under your knees. · If there is no swelling, you can put moist heat, a heating pad, or a warm cloth on your hip. Do gentle stretching exercises to help keep your hip flexible. · Learn how to prevent falls. Have your vision and hearing checked regularly. Wear slippers or shoes with a nonskid sole. · Stay at a healthy weight. · Wear comfortable shoes. When should you call for help? Call 911 anytime you think you may need emergency care.  For example, call if: 
  · You have sudden chest pain and shortness of breath, or you cough up blood.  
  · You are not able to stand or walk or bear weight.  
  · Your buttocks, legs, or feet feel numb or tingly.  
  · Your leg or foot is cool or pale or changes color.  
  · You have severe pain. Call your doctor now or seek immediate medical care if: 
  · You have signs of infection, such as: 
? Increased pain, swelling, warmth, or redness in the hip area. ? Red streaks leading from the hip area. ? Pus draining from the hip area. ? A fever.  
  · You have signs of a blood clot, such as: 
? Pain in your calf, back of the knee, thigh, or groin. ? Redness and swelling in your leg or groin.  
  · You are not able to bend, straighten, or move your leg normally.  
  · You have trouble urinating or having bowel movements. Watch closely for changes in your health, and be sure to contact your doctor if: 
  · You do not get better as expected. Where can you learn more? Go to http://www.gray.com/ Enter G504 in the search box to learn more about \"Hip Pain: Care Instructions. \" Current as of: June 26, 2019               Content Version: 12.6 © 3453-9821 GREE. Care instructions adapted under license by [employer] (which disclaims liability or warranty for this information). If you have questions about a medical condition or this instruction, always ask your healthcare professional. [place] [address] any warranty or liability for your use of this information. Hip Bursitis: Care Instructions Your Care Instructions Bursitis is inflammation of the bursa. A bursa is a small sac of fluid that cushions a joint and helps it move easily. A bursa sits between a bone in the hip and the muscles and tendons in the thigh and buttock. Injury or overuse of the hip can cause bursitis. Activities that can lead to bursitis include twisting and rapid joint movement. Bursitis can cause hip pain.  
Bursitis usually gets better if you avoid the activity that caused it. If pain lasts or gets worse despite home treatment, your doctor may draw fluid from the bursa through a needle. This may relieve your pain and help your doctor know if you have an infection. If so, your doctor will prescribe antibiotics. If you have inflammation only, you may get a corticosteroid shot to reduce swelling and pain. Sometimes surgery is needed to drain or remove the bursa. Follow-up care is a key part of your treatment and safety. Be sure to make and go to all appointments, and call your doctor if you are having problems. It's also a good idea to know your test results and keep a list of the medicines you take. How can you care for yourself at home? · Put ice or a cold pack on your hip for 10 to 20 minutes at a time. Put a thin cloth between the ice and your skin. · After 3 days of using ice, you may use heat on your hip. You can use a hot water bottle, a heating pad set on low, or a warm, moist towel. · Rest your hip. Stop any activities that cause pain. Switch to activities that do not stress your hip. · Take your medicines exactly as prescribed. Call your doctor if you think you are having a problem with your medicine. · Ask your doctor if you can take an over-the-counter pain medicine, such as acetaminophen (Tylenol), ibuprofen (Advil, Motrin), or naproxen (Aleve). Be safe with medicines. Read and follow all instructions on the label. · To prevent stiffness, gently move the hip joint as much as you can without pain every day. As the pain gets better, keep doing range-of-motion exercises. Ask your doctor for exercises that will make the muscles around the hip joint stronger. Do these as directed. · You can slowly return to the activity that caused the pain, but do it with less effort until you can do it without pain or swelling. Be sure to warm up before and stretch after you do the activity. When should you call for help?  
 Call your doctor now or seek immediate medical care if: 
  · You have a fever.  
  · You have increased swelling or redness in your hip.  
  · You cannot use your hip, or the pain in your hip gets worse. Watch closely for changes in your health, and be sure to contact your doctor if: 
  · You have pain for 2 weeks or longer despite home treatment. Where can you learn more? Go to http://www.gray.com/ Enter C621 in the search box to learn more about \"Hip Bursitis: Care Instructions. \" Current as of: March 2, 2020               Content Version: 12.6 © 5993-9337 [employer]. Care instructions adapted under license by View[employer] (which disclaims liability or warranty for this information). If you have questions about a medical condition or this instruction, always ask your healthcare professional. [place] [address] any warranty or liability for your use of this information. Hip Bursitis: Exercises Introduction Here are some examples of exercises for you to try. The exercises may be suggested for a condition or for rehabilitation. Start each exercise slowly. Ease off the exercises if you start to have pain. You will be told when to start these exercises and which ones will work best for you. How to do the exercises Hip rotator stretch 1. Lie on your back with both knees bent and your feet flat on the floor. 2. Put the ankle of your affected leg on your opposite thigh near your knee. 3. Use your hand to gently push your knee away from your body until you feel a gentle stretch around your hip. 4. Hold the stretch for 15 to 30 seconds. 5. Repeat 2 to 4 times. 6. Repeat steps 1 through 5, but this time use your hand to gently pull your knee toward your opposite shoulder. Iliotibial band stretch 1. Lean sideways against a wall. If you are not steady on your feet, hold on to a chair or counter. 2. Stand on the leg with the affected hip, with that leg close to the wall.  Then cross your other leg in front of it. 3. Let your affected hip drop out to the side of your body and against wall. Then lean away from your affected hip until you feel a stretch. 4. Hold the stretch for 15 to 30 seconds. 5. Repeat 2 to 4 times. Straight-leg raises to the outside 1. Lie on your side, with your affected hip on top. 2. Tighten the front thigh muscles of your top leg to keep your knee straight. 3. Keep your hip and your leg straight in line with the rest of your body, and keep your knee pointing forward. Do not drop your hip back. 4. Lift your top leg straight up toward the ceiling, about 12 inches off the floor. Hold for about 6 seconds, then slowly lower your leg. 5. Repeat 8 to 12 times. Clamshell 1. Lie on your side, with your affected hip on top and your head propped on a pillow. Keep your feet and knees together and your knees bent. 2. Raise your top knee, but keep your feet together. Do not let your hips roll back. Your legs should open up like a clamshell. 3. Hold for 6 seconds. 4. Slowly lower your knee back down. Rest for 10 seconds. 5. Repeat 8 to 12 times. Follow-up care is a key part of your treatment and safety. Be sure to make and go to all appointments, and call your doctor if you are having problems. It's also a good idea to know your test results and keep a list of the medicines you take. Where can you learn more? Go to http://www.gray.com/ Enter F358 in the search box to learn more about \"Hip Bursitis: Exercises. \" Current as of: March 2, 2020               Content Version: 12.6 © 7067-6304 [employer] Incorporated. Care instructions adapted under license by [employer]aleo (which disclaims liability or warranty for this information).  If you have questions about a medical condition or this instruction, always ask your healthcare professional. [address] any warranty or liability for your use of this information.

## 2021-03-11 NOTE — PROGRESS NOTES
HPI  Hortencia Fournier is a 76y.o. year old female patient of Eduardo Kruse MD who presents with c/o hip pain. Pt has history of has Osteoporosis, Vitamin D deficiency, RLS (restless legs syndrome), Hyperlipidemia, Abnormal stress test, Compression fracture, Low back pain, Hypertension, Advanced care planning/counseling discussion, Chronic midline low back pain without sciatica, Combined forms of age-related cataract of right eye, Traumatic complete tear of left rotator cuff, Synovial cyst of lumbar facet joint, and Elevated hemoglobin A1c on their problem list..      C/o bilateral hip pain. Also has pain in right knee and pain in both hands, has known arthritis. Getting up from lying or seated is the worst pain. Pain is worse first thing in the AM, feels very stiff in her hips. Taking tylenol arthritis which helps some. Takes medicine for RLS also. Was taking gabapentin for her back , hasn't had in over a year. Follows with Dr. Augusto Guardado for chronic left sided low back pain. Per chart review has seen Dr. Marlen Vazquez at [facility] in the past and dx with troch bursitis.              Patient Active Problem List   Diagnosis Code    Osteoporosis M81.0    Vitamin D deficiency E55.9    RLS (restless legs syndrome) G25.81    Hyperlipidemia E78.5    Abnormal stress test R94.39    Compression fracture [id number]    Low back pain M54.5    Hypertension I10    Advanced care planning/counseling discussion Z71.89    Chronic midline low back pain without sciatica M54.5, G89.29    Combined forms of age-related cataract of right eye H25.811    Traumatic complete tear of left rotator cuff S46.012A    Synovial cyst of lumbar facet joint M71.38    Elevated hemoglobin A1c R73.09     Past Medical History:   Diagnosis Date    Arthritis     bilateral hands, hips, lower back    At risk for sleep apnea 03/18/2019    on phone assessment with VEENA, kathia sleep scoring tool, scored 4    Chronic kidney disease 2017 kidney stones    Elevated hemoglobin A1c 1/27/2020    GERD (gastroesophageal reflux disease)     being treated    High cholesterol     Hypertension     being treated    Kidney stones     Menopause 1995    Palpitations     as of 6/24/19 pt reports was evaluated by cardiology and cleared, now followed by cardiology, no further symptoms per pt    Pneumonia 2006 (approx)    Rheumatic fever     AGE 5    RLS (restless legs syndrome)     Shoulder fracture 2009 [facility] -Jan 09  Right side      Past Surgical History:   Procedure Laterality Date    COLONOSCOPY  7/30/2010    Dr. Vasquez Gibbons, diverticulosis, repeat due 7/30/2015    COLONOSCOPY,JONAH CALHOUN,FORCEP/CAUTERY  9/4/2015         HX CATARACT REMOVAL Bilateral 03/2019    HX LITHOTRIPSY  10/2017    HX OPEN REDUCTION INTERNAL FIXATION Left 1982    ankle    HX ORTHOPAEDIC  2017    L4-L5 BACK SURGERY    HX ROTATOR CUFF REPAIR Right 2008    HX ROTATOR CUFF REPAIR Left 2019     Social History     Socioeconomic History    Marital status:      Spouse name: Not on file    Number of children: Not on file    Years of education: Not on file    Highest education level: Not on file   Tobacco Use    Smoking status: Never Smoker    Smokeless tobacco: Never Used   Substance and Sexual Activity    Alcohol use: No     Alcohol/week: 0.0 standard drinks    Drug use: Not Currently     Family History   Problem Relation Age of Onset    Cancer Father         Colon     Diabetes Grandchild     Anesth Problems Neg Hx      No Known Allergies    MEDICATIONS  Current Outpatient Medications   Medication Sig    atorvastatin (LIPITOR) 40 mg tablet Take 1 Tab by mouth daily.  cholecalciferol (VITAMIN D3) (1000 Units /25 mcg) tablet Take 1 Tab by mouth daily.  omeprazole (PRILOSEC) 20 mg capsule Take 1 Cap by mouth daily.  pramipexole (MIRAPEX) 0.5 mg tablet Take 0.5 Tabs by mouth nightly.  Indications: restless legs syndrome, an extreme discomfort in the calf muscles when sitting or lying down    gabapentin (NEURONTIN) 100 mg capsule 100mg hs x 3 nights, then 200mg hs x 3 nights, then 300mg hs (Patient taking differently: 100 mg as needed. 100mg hs x 3 nights, then 200mg hs x 3 nights, then 300mg hs)    metoprolol succinate (TOPROL-XL) 50 mg XL tablet Take 1 Tab by mouth daily.  Aspirin, Buffered 81 mg tab Take 81 mg by mouth daily. Indications: \"for prevention\"     No current facility-administered medications for this visit. REVIEW OF SYSTEMS  Per HPI        Visit Vitals  BP (!) 152/84 (BP 1 Location: Left arm, BP Patient Position: Sitting)   Pulse 86   Temp 98.1 °F (36.7 °C) (Oral)   Resp 16   Ht 5' (1.524 m)   Wt 157 lb (71.2 kg)   LMP 10/26/1995   SpO2 96%   BMI 30.66 kg/m²         General: Well-developed, well-nourished. In no distress. A&O x 3. Head: Normocephalic, atraumatic. Eyes: Conjunctiva clear. Back: Symmetric. ROM intact. Skin: No rashes or lesions. Musculoskeletal: Gait normal. Reduced external ROM bilat hips and reports pain, denies pain with internal rotation. She is TTP over troch bursa bilaterally. Negative SLR. Psychiatric: Normal mood and affect. Behavior is normal.       No results found for any visits on 03/11/21. ASSESSMENT and PLAN  Diagnoses and all orders for this visit:    1. Bilateral hip pain  -     XR HIPS BI W PELV 3 OR 4 VWS; Future  Suspect troch bursitis vs worsening OA  Recommend xrays and fu with Julian Griffith  She will continue tylenol arthritis for pain prn          Patient Instructions          Hip Pain: Care Instructions  Your Care Instructions     Hip pain may be caused by many things, including overuse, a fall, or a twisting movement. Another cause of hip pain is arthritis. Your pain may increase when you stand up, walk, or squat. The pain may come and go or may be constant. Home treatment can help relieve hip pain, swelling, and stiffness.  If your pain is ongoing, you may need more tests and treatment. Follow-up care is a key part of your treatment and safety. Be sure to make and go to all appointments, and call your doctor if you are having problems. It's also a good idea to know your test results and keep a list of the medicines you take. How can you care for yourself at home? · Take pain medicines exactly as directed. ? If the doctor gave you a prescription medicine for pain, take it as prescribed. ? If you are not taking a prescription pain medicine, ask your doctor if you can take an over-the-counter medicine. · Rest and protect your hip. Take a break from any activity, including standing or walking, that may cause pain. · Put ice or a cold pack against your hip for 10 to 20 minutes at a time. Try to do this every 1 to 2 hours for the next 3 days (when you are awake) or until the swelling goes down. Put a thin cloth between the ice and your skin. · Sleep on your healthy side with a pillow between your knees, or sleep on your back with pillows under your knees. · If there is no swelling, you can put moist heat, a heating pad, or a warm cloth on your hip. Do gentle stretching exercises to help keep your hip flexible. · Learn how to prevent falls. Have your vision and hearing checked regularly. Wear slippers or shoes with a nonskid sole. · Stay at a healthy weight. · Wear comfortable shoes. When should you call for help? Call 911 anytime you think you may need emergency care. For example, call if:    · You have sudden chest pain and shortness of breath, or you cough up blood.     · You are not able to stand or walk or bear weight.     · Your buttocks, legs, or feet feel numb or tingly.     · Your leg or foot is cool or pale or changes color.     · You have severe pain. Call your doctor now or seek immediate medical care if:    · You have signs of infection, such as:  ? Increased pain, swelling, warmth, or redness in the hip area. ? Red streaks leading from the hip area. ?  Pus draining from the hip area. ? A fever.     · You have signs of a blood clot, such as:  ? Pain in your calf, back of the knee, thigh, or groin. ? Redness and swelling in your leg or groin.     · You are not able to bend, straighten, or move your leg normally.     · You have trouble urinating or having bowel movements. Watch closely for changes in your health, and be sure to contact your doctor if:    · You do not get better as expected. Where can you learn more? Go to http://www.gray.com/  Enter Z720 in the search box to learn more about \"Hip Pain: Care Instructions. \"  Current as of: June 26, 2019               Content Version: 12.6  © 5605-5000 [employer]. Care instructions adapted under license by [employer] (which disclaims liability or warranty for this information). If you have questions about a medical condition or this instruction, always ask your healthcare professional. [place] [address] any warranty or liability for your use of this information. Hip Bursitis: Care Instructions  Your Care Instructions     Bursitis is inflammation of the bursa. A bursa is a small sac of fluid that cushions a joint and helps it move easily. A bursa sits between a bone in the hip and the muscles and tendons in the thigh and buttock. Injury or overuse of the hip can cause bursitis. Activities that can lead to bursitis include twisting and rapid joint movement. Bursitis can cause hip pain. Bursitis usually gets better if you avoid the activity that caused it. If pain lasts or gets worse despite home treatment, your doctor may draw fluid from the bursa through a needle. This may relieve your pain and help your doctor know if you have an infection. If so, your doctor will prescribe antibiotics. If you have inflammation only, you may get a corticosteroid shot to reduce swelling and pain.  Sometimes surgery is needed to drain or remove the bursa.  Follow-up care is a key part of your treatment and safety. Be sure to make and go to all appointments, and call your doctor if you are having problems. It's also a good idea to know your test results and keep a list of the medicines you take. How can you care for yourself at home? · Put ice or a cold pack on your hip for 10 to 20 minutes at a time. Put a thin cloth between the ice and your skin. · After 3 days of using ice, you may use heat on your hip. You can use a hot water bottle, a heating pad set on low, or a warm, moist towel. · Rest your hip. Stop any activities that cause pain. Switch to activities that do not stress your hip. · Take your medicines exactly as prescribed. Call your doctor if you think you are having a problem with your medicine. · Ask your doctor if you can take an over-the-counter pain medicine, such as acetaminophen (Tylenol), ibuprofen (Advil, Motrin), or naproxen (Aleve). Be safe with medicines. Read and follow all instructions on the label. · To prevent stiffness, gently move the hip joint as much as you can without pain every day. As the pain gets better, keep doing range-of-motion exercises. Ask your doctor for exercises that will make the muscles around the hip joint stronger. Do these as directed. · You can slowly return to the activity that caused the pain, but do it with less effort until you can do it without pain or swelling. Be sure to warm up before and stretch after you do the activity. When should you call for help? Call your doctor now or seek immediate medical care if:    · You have a fever.     · You have increased swelling or redness in your hip.     · You cannot use your hip, or the pain in your hip gets worse. Watch closely for changes in your health, and be sure to contact your doctor if:    · You have pain for 2 weeks or longer despite home treatment. Where can you learn more?   Go to http://www.gray.com/  Enter I156 in the search box to learn more about \"Hip Bursitis: Care Instructions. \"  Current as of: March 2, 2020               Content Version: 12.6  © 2006-2020 [employer]. Care instructions adapted under license by [employer] (which disclaims liability or warranty for this information). If you have questions about a medical condition or this instruction, always ask your healthcare professional. [facility] any warranty or liability for your use of this information. Hip Bursitis: Exercises  Introduction  Here are some examples of exercises for you to try. The exercises may be suggested for a condition or for rehabilitation. Start each exercise slowly. Ease off the exercises if you start to have pain. You will be told when to start these exercises and which ones will work best for you. How to do the exercises  Hip rotator stretch   1. Lie on your back with both knees bent and your feet flat on the floor. 2. Put the ankle of your affected leg on your opposite thigh near your knee. 3. Use your hand to gently push your knee away from your body until you feel a gentle stretch around your hip. 4. Hold the stretch for 15 to 30 seconds. 5. Repeat 2 to 4 times. 6. Repeat steps 1 through 5, but this time use your hand to gently pull your knee toward your opposite shoulder. Iliotibial band stretch   1. Lean sideways against a wall. If you are not steady on your feet, hold on to a chair or counter. 2. Stand on the leg with the affected hip, with that leg close to the wall. Then cross your other leg in front of it. 3. Let your affected hip drop out to the side of your body and against wall. Then lean away from your affected hip until you feel a stretch. 4. Hold the stretch for 15 to 30 seconds. 5. Repeat 2 to 4 times. Straight-leg raises to the outside   1. Lie on your side, with your affected hip on top.   2. Tighten the front thigh muscles of your top leg to keep your knee straight. 3. Keep your hip and your leg straight in line with the rest of your body, and keep your knee pointing forward. Do not drop your hip back. 4. Lift your top leg straight up toward the ceiling, about 12 inches off the floor. Hold for about 6 seconds, then slowly lower your leg. 5. Repeat 8 to 12 times. Clamshell   1. Lie on your side, with your affected hip on top and your head propped on a pillow. Keep your feet and knees together and your knees bent. 2. Raise your top knee, but keep your feet together. Do not let your hips roll back. Your legs should open up like a clamshell. 3. Hold for 6 seconds. 4. Slowly lower your knee back down. Rest for 10 seconds. 5. Repeat 8 to 12 times. Follow-up care is a key part of your treatment and safety. Be sure to make and go to all appointments, and call your doctor if you are having problems. It's also a good idea to know your test results and keep a list of the medicines you take. Where can you learn more? Go to http://www.vieira.com/  Enter H674 in the search box to learn more about \"Hip Bursitis: Exercises. \"  Current as of: March 2, 2020               Content Version: 12.6  © 4781-2342 [employer]. Care instructions adapted under license by [employer] (which disclaims liability or warranty for this information). If you have questions about a medical condition or this instruction, always ask your healthcare professional. [place] [address] any warranty or liability for your use of this information.           Please keep your follow-up appointment with Deedee Brannon MD.         Health Maintenance Due   Topic Date Due    COVID-19 Vaccine (1 of 2) Never done    Shingrix Vaccine Age 50> (1 of 2) Never done    Pneumococcal 65+ years (2 of 2 - PPSV23) 01/31/2018    Flu Vaccine (1) 09/01/2020    Colorectal Cancer Screening Combo  09/04/2020       I have discussed the diagnosis with the patient and the intended plan as seen in the above orders. Patient is in agreement. The patient has received an after-visit summary and questions were answered concerning future plans. I have discussed medication side effects and warnings with the patient as well. Warning signs for the above conditions were discussed including when to call our office or go to the emergency room. The nurse provided the patient and/or family with advanced directive information if needed and encouraged the patient to provide a copy to the office when available.

## 2021-03-12 NOTE — PROGRESS NOTES
Pls let pt know xray shows osteoarthritis that is moderate in right hip and mild in left. I suspect her pain is from trochanteric bursitis (which she had before and saw Dr. Patti Yañez in 2019). This responds well to physical therapy ands steroid injections. I recommend she fu with Dr. Patti Yañez. If she would like try PT I am happy to refer her for that.

## 2021-03-29 DIAGNOSIS — K21.9 GASTROESOPHAGEAL REFLUX DISEASE: ICD-10-CM

## 2021-03-29 DIAGNOSIS — K21.9 GASTROESOPHAGEAL REFLUX DISEASE, UNSPECIFIED WHETHER ESOPHAGITIS PRESENT: Primary | ICD-10-CM

## 2021-03-29 RX ORDER — OMEPRAZOLE 20 MG/1
20 CAPSULE, DELAYED RELEASE ORAL DAILY
Qty: 90 CAP | Refills: 3 | Status: SHIPPED | OUTPATIENT
Start: 2021-03-29 | End: 2021-04-12 | Stop reason: SDUPTHER

## 2021-03-29 NOTE — TELEPHONE ENCOUNTER
PCP: Kvng Barrera MD     Last appt: 3/11/2021   No future appointments. Requested Prescriptions     Pending Prescriptions Disp Refills    omeprazole (PRILOSEC) 20 mg capsule 90 Cap 3     Sig: Take 1 Cap by mouth daily.

## 2021-04-12 DIAGNOSIS — K21.9 GASTROESOPHAGEAL REFLUX DISEASE, UNSPECIFIED WHETHER ESOPHAGITIS PRESENT: Primary | ICD-10-CM

## 2021-04-13 RX ORDER — OMEPRAZOLE 20 MG/1
20 CAPSULE, DELAYED RELEASE ORAL DAILY
Qty: 90 CAP | Refills: 3 | Status: SHIPPED | OUTPATIENT
Start: 2021-04-13 | End: 2022-07-12

## 2021-04-13 NOTE — TELEPHONE ENCOUNTER
PCP: Olivia Eugene MD    Last appt: 3/11/2021  No future appointments. Requested Prescriptions     Pending Prescriptions Disp Refills    omeprazole (PRILOSEC) 20 mg capsule 90 Cap 3     Sig: Take 1 Cap by mouth daily.

## 2021-05-27 DIAGNOSIS — G25.81 RLS (RESTLESS LEGS SYNDROME): ICD-10-CM

## 2021-05-27 RX ORDER — PRAMIPEXOLE DIHYDROCHLORIDE 0.5 MG/1
TABLET ORAL
Qty: 45 TABLET | Refills: 0 | Status: SHIPPED | OUTPATIENT
Start: 2021-05-27 | End: 2021-08-23

## 2021-06-07 ENCOUNTER — HOSPITAL ENCOUNTER (OUTPATIENT)
Dept: PREADMISSION TESTING | Age: 76
Discharge: HOME | End: 2021-06-07
Attending: ORTHOPAEDIC SURGERY
Payer: MEDICARE

## 2021-06-07 VITALS
SYSTOLIC BLOOD PRESSURE: 156 MMHG | BODY MASS INDEX: 29.43 KG/M2 | TEMPERATURE: 98 F | DIASTOLIC BLOOD PRESSURE: 69 MMHG | HEART RATE: 66 BPM | WEIGHT: 149.91 LBS | HEIGHT: 60 IN | OXYGEN SATURATION: 98 %

## 2021-06-07 LAB
ABO + RH BLD: NORMAL
ALBUMIN SERPL-MCNC: 3.4 G/DL (ref 3.5–5)
ALBUMIN/GLOB SERPL: 0.9 {RATIO} (ref 1.1–2.2)
ALP SERPL-CCNC: 115 U/L (ref 45–117)
ALT SERPL-CCNC: 22 U/L (ref 12–78)
ANION GAP SERPL CALC-SCNC: 0 MMOL/L (ref 5–15)
APPEARANCE UR: CLEAR
AST SERPL-CCNC: 17 U/L (ref 15–37)
ATRIAL RATE: 61 BPM
BACTERIA URNS QL MICRO: ABNORMAL /HPF
BILIRUB SERPL-MCNC: 0.5 MG/DL (ref 0.2–1)
BILIRUB UR QL CFM: NEGATIVE
BLOOD GROUP ANTIBODIES SERPL: NORMAL
BUN SERPL-MCNC: 16 MG/DL (ref 6–20)
BUN/CREAT SERPL: 15 (ref 12–20)
CALCIUM SERPL-MCNC: 8.8 MG/DL (ref 8.5–10.1)
CALCULATED P AXIS, ECG09: 31 DEGREES
CALCULATED R AXIS, ECG10: -24 DEGREES
CALCULATED T AXIS, ECG11: -26 DEGREES
CHLORIDE SERPL-SCNC: 111 MMOL/L (ref 97–108)
CO2 SERPL-SCNC: 29 MMOL/L (ref 21–32)
COLOR UR: ABNORMAL
CREAT SERPL-MCNC: 1.04 MG/DL (ref 0.55–1.02)
DIAGNOSIS, 93000: NORMAL
EPITH CASTS URNS QL MICRO: ABNORMAL /LPF
ERYTHROCYTE [DISTWIDTH] IN BLOOD BY AUTOMATED COUNT: 13.2 % (ref 11.5–14.5)
EST. AVERAGE GLUCOSE BLD GHB EST-MCNC: 134 MG/DL
GLOBULIN SER CALC-MCNC: 3.8 G/DL (ref 2–4)
GLUCOSE SERPL-MCNC: 111 MG/DL (ref 65–100)
GLUCOSE UR STRIP.AUTO-MCNC: NEGATIVE MG/DL
HBA1C MFR BLD: 6.3 % (ref 4–5.6)
HCT VFR BLD AUTO: 40.7 % (ref 35–47)
HGB BLD-MCNC: 12.8 G/DL (ref 11.5–16)
HGB UR QL STRIP: ABNORMAL
HYALINE CASTS URNS QL MICRO: ABNORMAL /LPF (ref 0–5)
INR PPP: 1 (ref 0.9–1.1)
KETONES UR QL STRIP.AUTO: NEGATIVE MG/DL
LEUKOCYTE ESTERASE UR QL STRIP.AUTO: ABNORMAL
MCH RBC QN AUTO: 30.3 PG (ref 26–34)
MCHC RBC AUTO-ENTMCNC: 31.4 G/DL (ref 30–36.5)
MCV RBC AUTO: 96.2 FL (ref 80–99)
NITRITE UR QL STRIP.AUTO: NEGATIVE
NRBC # BLD: 0 K/UL (ref 0–0.01)
NRBC BLD-RTO: 0 PER 100 WBC
P-R INTERVAL, ECG05: 148 MS
PH UR STRIP: 5 [PH] (ref 5–8)
PLATELET # BLD AUTO: 259 K/UL (ref 150–400)
PMV BLD AUTO: 10 FL (ref 8.9–12.9)
POTASSIUM SERPL-SCNC: 4 MMOL/L (ref 3.5–5.1)
PROT SERPL-MCNC: 7.2 G/DL (ref 6.4–8.2)
PROT UR STRIP-MCNC: NEGATIVE MG/DL
PROTHROMBIN TIME: 10.5 SEC (ref 9–11.1)
Q-T INTERVAL, ECG07: 426 MS
QRS DURATION, ECG06: 102 MS
QTC CALCULATION (BEZET), ECG08: 428 MS
RBC # BLD AUTO: 4.23 M/UL (ref 3.8–5.2)
RBC #/AREA URNS HPF: ABNORMAL /HPF (ref 0–5)
SODIUM SERPL-SCNC: 140 MMOL/L (ref 136–145)
SP GR UR REFRACTOMETRY: 1.03 (ref 1–1.03)
SPECIMEN EXP DATE BLD: NORMAL
UA: UC IF INDICATED,UAUC: ABNORMAL
UROBILINOGEN UR QL STRIP.AUTO: 1 EU/DL (ref 0.2–1)
VENTRICULAR RATE, ECG03: 61 BPM
WBC # BLD AUTO: 9.4 K/UL (ref 3.6–11)
WBC URNS QL MICRO: ABNORMAL /HPF (ref 0–4)

## 2021-06-07 PROCEDURE — 36415 COLL VENOUS BLD VENIPUNCTURE: CPT

## 2021-06-07 PROCEDURE — 85610 PROTHROMBIN TIME: CPT

## 2021-06-07 PROCEDURE — 93005 ELECTROCARDIOGRAM TRACING: CPT

## 2021-06-07 PROCEDURE — 81001 URINALYSIS AUTO W/SCOPE: CPT

## 2021-06-07 PROCEDURE — 87086 URINE CULTURE/COLONY COUNT: CPT

## 2021-06-07 PROCEDURE — 86850 RBC ANTIBODY SCREEN: CPT

## 2021-06-07 PROCEDURE — 87077 CULTURE AEROBIC IDENTIFY: CPT

## 2021-06-07 PROCEDURE — 80053 COMPREHEN METABOLIC PANEL: CPT

## 2021-06-07 PROCEDURE — 83036 HEMOGLOBIN GLYCOSYLATED A1C: CPT

## 2021-06-07 PROCEDURE — 85027 COMPLETE CBC AUTOMATED: CPT

## 2021-06-07 PROCEDURE — 87186 SC STD MICRODIL/AGAR DIL: CPT

## 2021-06-07 RX ORDER — PREGABALIN 75 MG/1
75 CAPSULE ORAL ONCE
Status: CANCELLED | OUTPATIENT
Start: 2021-06-14 | End: 2021-06-14

## 2021-06-07 RX ORDER — ACETAMINOPHEN 500 MG
1000 TABLET ORAL ONCE
Status: CANCELLED | OUTPATIENT
Start: 2021-06-14 | End: 2021-06-14

## 2021-06-07 RX ORDER — CELECOXIB 200 MG/1
400 CAPSULE ORAL ONCE
Status: CANCELLED | OUTPATIENT
Start: 2021-06-14 | End: 2021-06-14

## 2021-06-07 RX ORDER — SODIUM CHLORIDE, SODIUM LACTATE, POTASSIUM CHLORIDE, CALCIUM CHLORIDE 600; 310; 30; 20 MG/100ML; MG/100ML; MG/100ML; MG/100ML
25 INJECTION, SOLUTION INTRAVENOUS CONTINUOUS
Status: CANCELLED | OUTPATIENT
Start: 2021-06-14

## 2021-06-07 RX ORDER — ACETAMINOPHEN AND CODEINE PHOSPHATE 300; 30 MG/1; MG/1
1 TABLET ORAL
COMMUNITY
End: 2021-06-08 | Stop reason: SDUPTHER

## 2021-06-07 NOTE — ADVANCED PRACTICE NURSE
PAT Nurse Practitioner   Pre-Operative Chart Review/Assessment:-ORTHOPEDIC/NEUROSURGICAL SPINE                Patient Name:  Kath Rocha                                                         Age:   68 y.o.    :  1945     Today's Date:  2021     Date of PAT:   21      Date of Surgery:    21     Procedure(s):  Right  Total Hip Arthroplasty     Surgeon:   Marcel Henderson     Medical Clearance:  Scheduled with Tim Oates NP  21 at 11:00                   PLAN:      1)  Cardiac Clearance:  Not requested       2)  Diabetic Treatment Consult:  Not indicated-A1C 6.3      3)  Sleep Apnea evaluation:   Not indicated-BRENNAN 1      4) Treatment for MRSA/Staph Aureus:  + MSSA.  Tx w/ Mupirocin ointment BID x 5 days to B nostrils starting 21      5) Additional Concerns:  RLS, osteoporosis, elevated A1C                Vital Signs:         Vitals:    21 0813   BP: (!) 156/69   Pulse: 66   Temp: 98 °F (36.7 °C)   SpO2: 98%   Weight: 68 kg (149 lb 14.6 oz)   Height: 5' (1.524 m)   LMP: 10/26/1995            ____________________________________________  PAST MEDICAL HISTORY  Past Medical History:   Diagnosis Date    Arthritis     bilateral hands, hips, lower back    At risk for sleep apnea 2019    on phone assessment with PAT, brennan sleep scoring tool, scored 4    Chronic kidney disease     kidney stones    Elevated hemoglobin A1c 2020    GERD (gastroesophageal reflux disease)     being treated    High cholesterol     Kidney stones     Menopause     Palpitations     as of 19 pt reports was evaluated by cardiology and cleared, now followed by cardiology, no further symptoms per pt    Pneumonia 2006 (approx)    Rheumatic fever     AGE 5    RLS (restless legs syndrome)     Shoulder fracture 2009 -  Right side       ____________________________________________  PAST SURGICAL HISTORY  Past Surgical History:   Procedure Laterality Date    COLONOSCOPY  2010 Dr. Britt Repress, diverticulosis, repeat due 7/30/2015    COLONOSCOPY,JONAH CALHOUN,FORCEP/CAUTERY  9/4/2015         HX CATARACT REMOVAL Bilateral 03/2019    HX OPEN REDUCTION INTERNAL FIXATION Left 1982    ankle    HX ORTHOPAEDIC  2019    L4-L5 BACK SURGERY - synovial cyst removal    HX ROTATOR CUFF REPAIR Right 2008    HX ROTATOR CUFF REPAIR Left 2012    HX UROLOGICAL  2017    stent for kidney stone      ____________________________________________  HOME MEDICATIONS    Current Outpatient Medications   Medication Sig    nitrofurantoin, macrocrystal-monohydrate, (Macrobid) 100 mg capsule Take 1 Capsule by mouth two (2) times a day for 7 days. Indications: urinary tract infection due to E. coli bacteria    mupirocin (BACTROBAN) 2 % ointment by Both Nostrils route two (2) times a day for 5 days.  mupirocin (BACTROBAN) 2 % ointment by Both Nostrils route two (2) times a day.  MAGNESIUM GLYCINATE PO Take  by mouth daily.  pramipexole (MIRAPEX) 0.5 mg tablet TAKE 1/2 (ONE-HALF) TABLET BY MOUTH NIGHTLY FOR RESTLESS LEG SYNDROME    omeprazole (PRILOSEC) 20 mg capsule Take 1 Cap by mouth daily.  atorvastatin (LIPITOR) 40 mg tablet Take 1 Tab by mouth daily. (Patient taking differently: Take 40 mg by mouth every evening.)    acetaminophen-codeine (TYLENOL #3) 300-30 mg per tablet Take 1 Tablet by mouth every six (6) hours as needed for Pain for up to 5 days. Max Daily Amount: 4 Tablets.      No current facility-administered medications for this encounter.      ____________________________________________  ALLERGIES  No Known Allergies   ____________________________________________  SOCIAL HISTORY  Social History     Tobacco Use    Smoking status: Never Smoker    Smokeless tobacco: Never Used   Substance Use Topics    Alcohol use: No     Alcohol/week: 0.0 standard drinks      ____________________________________________        Labs:     Hospital Outpatient Visit on 06/07/2021   Component Date Value Ref Range Status    Crossmatch Expiration 06/07/2021 06/17/2021,2359   Final    ABO/Rh(D) 06/07/2021 A POSITIVE   Final    Antibody screen 06/07/2021 NEG   Final    WBC 06/07/2021 9.4  3.6 - 11.0 K/uL Final    RBC 06/07/2021 4.23  3.80 - 5.20 M/uL Final    HGB 06/07/2021 12.8  11.5 - 16.0 g/dL Final    HCT 06/07/2021 40.7  35.0 - 47.0 % Final    MCV 06/07/2021 96.2  80.0 - 99.0 FL Final    MCH 06/07/2021 30.3  26.0 - 34.0 PG Final    MCHC 06/07/2021 31.4  30.0 - 36.5 g/dL Final    RDW 06/07/2021 13.2  11.5 - 14.5 % Final    PLATELET 56/21/3833 573  150 - 400 K/uL Final    MPV 06/07/2021 10.0  8.9 - 12.9 FL Final    NRBC 06/07/2021 0.0  0  WBC Final    ABSOLUTE NRBC 06/07/2021 0.00  0.00 - 0.01 K/uL Final    INR 06/07/2021 1.0  0.9 - 1.1   Final    A single therapeutic range for Vit K antagonists may not be optimal for all indications - see June, 2008 issue of Chest, American College of Chest [employer] Evidence-Based Clinical Practice Guidelines, 8th Edition.  Prothrombin time 06/07/2021 10.5  9.0 - 11.1 sec Final    Color 06/07/2021 YELLOW/STRAW    Final    Color Reference Range: Straw, Yellow or Dark Yellow    Appearance 06/07/2021 CLEAR  CLEAR   Final    Specific gravity 06/07/2021 1.028  1.003 - 1.030   Final    pH (UA) 06/07/2021 5.0  5.0 - 8.0   Final    Protein 06/07/2021 Negative  NEG mg/dL Final    Glucose 06/07/2021 Negative  NEG mg/dL Final    Ketone 06/07/2021 Negative  NEG mg/dL Final    Blood 06/07/2021 TRACE* NEG   Final    Urobilinogen 06/07/2021 1.0  0.2 - 1.0 EU/dL Final    Nitrites 06/07/2021 Negative  NEG   Final    Leukocyte Esterase 06/07/2021 SMALL* NEG   Final    WBC 06/07/2021 20-50  0 - 4 /hpf Final    RBC 06/07/2021 0-5  0 - 5 /hpf Final    Epithelial cells 06/07/2021 FEW  FEW /lpf Final    Epithelial cell category consists of squamous cells and /or transitional urothelial cells. Renal tubular cells, if present, are separately identified as such.     Bacteria 06/07/2021 1+* NEG /hpf Final    UA:UC IF INDICATED 06/07/2021 URINE CULTURE ORDERED* CNI   Final    Hyaline cast 06/07/2021 0-2  0 - 5 /lpf Final    Ventricular Rate 06/07/2021 61  BPM Final    Atrial Rate 06/07/2021 61  BPM Final    P-R Interval 06/07/2021 148  ms Final    QRS Duration 06/07/2021 102  ms Final    Q-T Interval 06/07/2021 426  ms Final    QTC Calculation (Bezet) 06/07/2021 428  ms Final    Calculated P Axis 06/07/2021 31  degrees Final    Calculated R Axis 06/07/2021 -24  degrees Final    Calculated T Axis 06/07/2021 -26  degrees Final    Diagnosis 06/07/2021    Final                    Value:Normal sinus rhythm  Voltage criteria for left ventricular hypertrophy  T wave abnormality, consider anterolateral ischemia  When compared with ECG of 24-JAN-2020 11:08,  No significant change was found  Confirmed by Bethany Powell ([id number]) on 6/7/2021 10:23:06 AM      Hemoglobin A1c 06/07/2021 6.3* 4.0 - 5.6 % Final    Comment: NEW METHOD  PLEASE NOTE NEW REFERENCE RANGE  (NOTE)  HbA1C Interpretive Ranges  <5.7              Normal  5.7 - 6.4         Consider Prediabetes  >6.5              Consider Diabetes      Est. average glucose 06/07/2021 134  mg/dL Final    Special Requests: 06/07/2021 NO SPECIAL REQUESTS    Final    Culture result: 06/07/2021 Apparent Staphylococcus aureus (not MRSA) noted. Final    Culture result: 06/07/2021 Screening of patient nares for MRSA is for surveillance purposes and, if positive, to facilitate isolation considerations in high risk settings. It is not intended for automatic decolonization interventions per se as regimens are not sufficiently effective to warrant routine use.     Final    Sodium 06/07/2021 140  136 - 145 mmol/L Final    Potassium 06/07/2021 4.0  3.5 - 5.1 mmol/L Final    Chloride 06/07/2021 111* 97 - 108 mmol/L Final    CO2 06/07/2021 29  21 - 32 mmol/L Final    Anion gap 06/07/2021 0* 5 - 15 mmol/L Final    Glucose 06/07/2021 111* 65 - 100 mg/dL Final    BUN 06/07/2021 16  6 - 20 MG/DL Final    Creatinine 06/07/2021 1.04* 0.55 - 1.02 MG/DL Final    BUN/Creatinine ratio 06/07/2021 15  12 - 20   Final    GFR est AA 06/07/2021 >60  >60 ml/min/1.73m2 Final    GFR est non-AA 06/07/2021 52* >60 ml/min/1.73m2 Final    Estimated GFR is calculated using the IDMS-traceable Modification of Diet in Renal Disease (MDRD) Study equation, reported for both  Americans (GFRAA) and non- Americans (GFRNA), and normalized to 1.73m2 body surface area. The physician must decide which value applies to the patient.  Calcium 06/07/2021 8.8  8.5 - 10.1 MG/DL Final    Bilirubin, total 06/07/2021 0.5  0.2 - 1.0 MG/DL Final    ALT (SGPT) 06/07/2021 22  12 - 78 U/L Final    AST (SGOT) 06/07/2021 17  15 - 37 U/L Final    Alk.  phosphatase 06/07/2021 115  45 - 117 U/L Final    Protein, total 06/07/2021 7.2  6.4 - 8.2 g/dL Final    Albumin 06/07/2021 3.4* 3.5 - 5.0 g/dL Final    Globulin 06/07/2021 3.8  2.0 - 4.0 g/dL Final    A-G Ratio 06/07/2021 0.9* 1.1 - 2.2   Final    Bilirubin UA, confirm 06/07/2021 Negative  NEG   Final    Special Requests: 06/07/2021     Final                    Value:NO SPECIAL REQUESTS  Reflexed from [id number]      McRoberts Count 06/07/2021     Final                    Value:>100,000  COLONIES/mL      Culture result: 06/07/2021 KLEBSIELLA PNEUMONIAE*   Final      CULTURE, URINE [[id number]] (Order [id number])  Microbiology  Date: 6/7/2021 Department: [facility] Released By:  (auto-released) Authorizing: Duane Mylar, MD   6/9/2021 11:51 AM - [facility], [facility]    Specimen Information: Urine        Component Value Flag Ref Range Units Status   Special Requests:      Final   NO SPECIAL REQUESTS   Reflexed from [id number]    McRoberts Count >100,000   COLONIES/mL      Final   Culture result: Abnormal       Final   KLEBSIELLA PNEUMONIAE    Susceptibility    Klebsiella pneumoniae    Antibiotic Interpretation Value Method Comment   Amikacin ($) Susceptible <=2 ug/mL HARVEY    Ampicillin/sulbactam ($) Susceptible 4 ug/mL HARVEY    Cefazolin ($) Susceptible <=4 ug/mL HARVEY    Cefepime ($$) Susceptible <=1 ug/mL HARVEY    Cefoxitin Susceptible <=4 ug/mL HARVEY    Ceftazidime ($) Susceptible <=1 ug/mL HARVEY    Ceftriaxone ($) Susceptible <=1 ug/mL HARVEY    Ciprofloxacin ($) Susceptible <=0.25 ug/mL HARVEY    Gentamicin ($) Susceptible <=1 ug/mL HARVEY    Levofloxacin ($) Susceptible <=0.12 ug/mL HARVEY    Meropenem ($$) Susceptible <=0.25 ug/mL HARVEY    Nitrofurantoin Susceptible <=16 ug/mL HARVEY    Piperacillin/Tazobac ($) Susceptible <=4 ug/mL HAVREY    Tobramycin ($) Susceptible <=1 ug/mL HARVEY    Trimeth/Sulfa Susceptible <=20 ug/mL HARVEY    Susceptibility    Klebsiella pneumoniae (1)    Antibiotic Interpretation Method Status    Amikacin ($) Susceptible HARVEY Final    Ampicillin ($) Resistant HARVEY Final    Ampicillin/sulbactam ($) Susceptible HARVEY Final    Cefazolin ($) Susceptible HARVEY Final    Cefepime ($$) Susceptible HARVEY Final    Cefoxitin Susceptible HARVEY Final    Ceftazidime ($) Susceptible HARVEY Final    Ceftriaxone ($) Susceptible HARVEY Final    Ciprofloxacin ($) Susceptible HARVEY Final    Gentamicin ($) Susceptible HARVEY Final    Levofloxacin ($) Susceptible HARVEY Final    Meropenem ($$) Susceptible HARVEY Final    Nitrofurantoin Susceptible HARVEY Final    Piperacillin/Tazobac ($) Susceptible HARVEY Final    Tobramycin ($) Susceptible HARVEY Final    Trimeth/Sulfa Susceptible HARVEY Final    Lab and Collection    CULTURE, URINE (Order: [id number]) - 6/7/2021  Result History    CULTURE, URINE (Order #[id number]) on 6/9/2021 - Order Result History Report   Specimen Information    Urine        Collected: 6/7/2021 [id number]       Final Report Date/time    Reported date Reported time   Jun 09, 2021 11:51 EDT       Skin:   Denies open wounds, cuts, sores, rashes or other areas of concern in PAT assessment.         Alyssa Chopra NP     6/7/21 EKG from 6/7/21 reviewed with Dr. Evangelina Whalen, anesthesiologist and compared with previous EKG from 1/4/20. Planned procedure, PMHx, functional status reviewed. Pt ok to proceed with planned procedure per Dr. Elbert Robles    6/9/21 + MSSA nasal cx. Tx w/ Mupirocin ointment BID x 5 days to B nostrils. + UC. Macrobid 100 mg BID x 7 days escribed to pt's pharmacy on file.

## 2021-06-07 NOTE — PERIOP NOTES
[facility]  Joint/Spine Preoperative Instructions     COVID Test 6/10/21 0700 - 1130 am  [place]    Surgery Date 6/14/21          Time of Arrival:  To Be Called  Contact # [phone]    1. On the day of your surgery, please report to the Surgical Services Registration Desk and sign in at your designated time. The Surgery Center is located to the right of the Emergency Room. 2. You must have someone with you to drive you home. You should not drive a car for 24 hours following surgery. Please make arrangements for a friend or family member to stay with you for the first 24 hours after your surgery. 3. No food after midnight 6/13/21. Medications morning of surgery should be taken with a sip of water. Please follow pre-surgery drink instructions that were given at your Pre Admission Testing appointment. 4. We recommend you do not drink any alcoholic beverages for 24 hours before and after your surgery. 5. Contact your surgeons office for instructions on the following medications: non-steroidal anti-inflammatory drugs (i.e. Advil, Aleve), vitamins, and supplements. (Some surgeons will want you to stop these medications prior to surgery and others may allow you to take them)  **If you are currently taking Plavix, Coumadin, Aspirin and/or other blood-thinning agents, contact your surgeon for instructions. ** Your surgeon will partner with the physician prescribing these medications to determine if it is safe to stop or if you need to continue taking. Please do not stop taking these medications without instructions from your surgeon    6. Wear comfortable clothes. Wear glasses instead of contacts. Do not bring any money or jewelry. Please bring picture ID, insurance card, and any prearranged co-payment or hospital payment. Do not wear make-up, particularly mascara the morning of your surgery.   Do not wear nail polish, particularly if you are having foot /hand surgery. Wear your hair loose or down, no ponytails, buns, meghana pins or clips. All body piercings must be removed. Please shower with antibacterial soap for three consecutive days before and on the morning of surgery, but do not apply any lotions, powders or deodorants after the shower on the day of surgery. Please use a fresh towels after each shower. Please sleep in clean clothes and change bed linens the night before surgery. Please do not shave for 48 hours prior to surgery. Shaving of the face is acceptable. 7. You should understand that if you do not follow these instructions your surgery may be cancelled. If your physical condition changes (I.e. fever, cold or flu) please contact your surgeon as soon as possible. 8. It is important that you be on time. If a situation occurs where you may be late, please call [phone] (OR Holding Area). 9. If you have any questions and or problems, please call [phone] (Pre-admission Testing). 10. Your surgery time may be subject to change. You will receive a phone call the evening prior if your time changes. 11.  If having outpatient surgery, you must have someone to drive you here, stay with you during the duration of your stay, and to drive you home at time of discharge. 12. The following link is for the educational video for patients and/or families. [email or web address].org/. [email or web address]-aoquhnz-hkhbcbs/[email or web address]/educational-materials    Special Instructions:       TAKE ALL MEDICATIONS THE DAY OF SURGERY EXCEPT: None      I understand a pre-operative phone call will be made to verify my surgery time. In the event that I am not available, I give permission for a message to be left on my answering service and/or with another person?   yes         ___________________      __________   _________    (Signature of Patient)             (Witness)                (Date and Time)

## 2021-06-07 NOTE — PERIOP NOTES
Hibiclens/Chlorhexidine    Preventing Infections Before and After - Your Surgery    IMPORTANT INSTRUCTIONS    Please read and follow these instructions carefully. If you are unable to comply with the below instructions your procedure will be cancelled. Every Night for Three (3) nights before your surgery:  1. Shower with an antibacterial soap, such as Dial, or the soap provided at your preassessment appointment. A shower is better than a bath for cleaning your skin. 2. If needed, ask someone to help you reach all areas of your body. Dont forget to clean your belly button with every shower. The night before your surgery: If you lose your Hibiclens/chlorhexidine please contact surgery center or you can purchase it at a local pharmacy  1. On the night before your surgery, shower with an antibacterial soap, such as Dial, or the soap provided at your preassessment appointment. 2. With one packet of Hibiclens/Chlorhexidine in hand, turn water off.  3. Apply Hibiclens antiseptic skin cleanser with a clean, freshly washed washcloth. ? Gently apply to your body from chin to toes (except the genital area) and especially the area(s) where your incision(s) will be. ? Leave Hibiclens/Chlorhexidine on your skin for at least 20 seconds. CAUTION: If needed, Hibiclens/chlorhexidine may be used to clean the folds of skin of the legs (such as in the area of the groin) and on your buttocks and hips. However, do not use Hibiclens/Chlorhexidine above the neck or in the genital area (your bottom) or put inside any area of your body. 4. Turn the water back on and rinse. 5. Dry gently with a clean, freshly washed towel. 6. After your shower, do not use any powder, deodorant, perfumes or lotion. 7. Use clean, freshly washed towels and washcloths every time you shower. 8. Wear clean, freshly washed pajamas to bed the night before surgery. 9. Sleep on clean, freshly washed sheets.   10. Do not allow pets to sleep in your bed with you. The Morning of your surgery:  1. Shower again thoroughly with an antibacterial soap, such as Dial or the soap provided at your preassessment appointment. If needed, ask someone for help to reach all areas of your body. Dont forget to clean your belly button! Rinse. 2. Dry gently with a clean, freshly washed towel. 3. After your shower, do not use any powder, deodorant, perfumes or lotion prior to surgery. 4. Put on clean, freshly washed clothing. Tips to help prevent infections after your surgery:  1. Protect your surgical wound from germs:  ? Hand washing is the most important thing you and your caregivers can do to prevent infections. ? Keep your bandage clean and dry! ? Do not touch your surgical wound. 2. Use clean, freshly washed towels and washcloths every time you shower; do not share bath linens with others. 3. Until your surgical wound is healed, wear clothing and sleep on bed linens each day that are clean and freshly washed. 4. Do not allow pets to sleep in your bed with you or touch your surgical wound. 5. Do not smoke - smoking delays wound healing. This may be a good time to stop smoking. 6. If you have diabetes, it is important for you to manage your blood sugar levels properly before your surgery as well as after your surgery. Poorly managed blood sugar levels slow down wound healing and prevent you from healing completely. If you lose your Hibiclens/chlorhexidine, please call the [facility], or it is available for purchase at your pharmacy.                ___________________      ___________________      ________________  (Signature of Patient)          (Witness)                   (Date and Time)

## 2021-06-07 NOTE — PERIOP NOTES
Incentive Spirometer        Using the incentive spirometer helps expand the small air sacs of your lungs, helps you breathe deeply, and helps improve your lung function. Use your incentive spirometer twice a day (10 breaths each time) prior to surgery. How to Use Your Incentive Spirometer:  1. Hold the incentive spirometer in an upright position. 2. Breathe out as usual.   3. Place the mouthpiece in your mouth and seal your lips tightly around it. 4. Take a deep breath. Breathe in slowly and as deeply as possible. Keep the blue flow rate guide between the arrows. 5. Hold your breath as long as possible. Then exhale slowly and allow the piston to fall to the bottom of the column. 6. Rest for a few seconds and repeat steps one through five at least 10 times. PAT Tidal Volume____2000______________  x____2____________  Date___6/7/21____________________    [employer] THE INCENTIVE SPIROMETER WITH YOU TO THE HOSPITAL ON THE DAY OF YOUR SURGERY. Opportunity given to ask and answer questions as well as to observe return demonstration.     Patient signature_____________________________    Witness____________________________

## 2021-06-07 NOTE — PERIOP NOTES
Orthopedic and Spine Patients: Instructions on When You Can   Eat or Drink Before Surgery      You have been provided a pre-surgery drink received at your pre-admission testing appointment.  Night before surgery:  o You should drink 1/2 bottle of the  pre-surgery drink at bedtime. o No food after midnight!  Day of Surgery:  o Complete 2nd half of the bottle of the pre-surgery drink 1 hour prior to arrival at hospital.      For questions call Pre-Admission Testing at [phone]. They are available from 8:00am-5:00pm, Monday through Friday.

## 2021-06-08 ENCOUNTER — OFFICE VISIT (OUTPATIENT)
Dept: INTERNAL MEDICINE CLINIC | Age: 76
End: 2021-06-08
Payer: MEDICARE

## 2021-06-08 VITALS
RESPIRATION RATE: 16 BRPM | SYSTOLIC BLOOD PRESSURE: 138 MMHG | OXYGEN SATURATION: 96 % | WEIGHT: 150 LBS | TEMPERATURE: 97.7 F | HEIGHT: 60 IN | HEART RATE: 69 BPM | DIASTOLIC BLOOD PRESSURE: 65 MMHG | BODY MASS INDEX: 29.45 KG/M2

## 2021-06-08 DIAGNOSIS — M16.11 PRIMARY OSTEOARTHRITIS OF RIGHT HIP: Primary | ICD-10-CM

## 2021-06-08 DIAGNOSIS — Z01.818 PRE-OP EVALUATION: ICD-10-CM

## 2021-06-08 LAB
BACTERIA SPEC CULT: NORMAL
BACTERIA SPEC CULT: NORMAL
SERVICE CMNT-IMP: NORMAL

## 2021-06-08 PROCEDURE — G8417 CALC BMI ABV UP PARAM F/U: HCPCS | Performed by: NURSE PRACTITIONER

## 2021-06-08 PROCEDURE — 1090F PRES/ABSN URINE INCON ASSESS: CPT | Performed by: NURSE PRACTITIONER

## 2021-06-08 PROCEDURE — G8754 DIAS BP LESS 90: HCPCS | Performed by: NURSE PRACTITIONER

## 2021-06-08 PROCEDURE — 99214 OFFICE O/P EST MOD 30 MIN: CPT | Performed by: NURSE PRACTITIONER

## 2021-06-08 PROCEDURE — G8432 DEP SCR NOT DOC, RNG: HCPCS | Performed by: NURSE PRACTITIONER

## 2021-06-08 PROCEDURE — G8427 DOCREV CUR MEDS BY ELIG CLIN: HCPCS | Performed by: NURSE PRACTITIONER

## 2021-06-08 PROCEDURE — G8536 NO DOC ELDER MAL SCRN: HCPCS | Performed by: NURSE PRACTITIONER

## 2021-06-08 PROCEDURE — G8752 SYS BP LESS 140: HCPCS | Performed by: NURSE PRACTITIONER

## 2021-06-08 PROCEDURE — 1101F PT FALLS ASSESS-DOCD LE1/YR: CPT | Performed by: NURSE PRACTITIONER

## 2021-06-08 RX ORDER — MUPIROCIN 20 MG/G
OINTMENT TOPICAL 2 TIMES DAILY
COMMUNITY
Start: 2021-06-01 | End: 2021-06-15

## 2021-06-08 RX ORDER — MUPIROCIN 20 MG/G
OINTMENT TOPICAL 2 TIMES DAILY
Qty: 22 G | Refills: 0 | Status: SHIPPED | OUTPATIENT
Start: 2021-06-08 | End: 2021-06-15

## 2021-06-08 RX ORDER — ACETAMINOPHEN AND CODEINE PHOSPHATE 300; 30 MG/1; MG/1
1 TABLET ORAL
Qty: 20 TABLET | Refills: 0 | Status: SHIPPED | OUTPATIENT
Start: 2021-06-08 | End: 2021-06-15

## 2021-06-08 NOTE — PATIENT INSTRUCTIONS
Hip Replacement: Before Your Surgery What is hip replacement surgery? Hip replacement surgery uses metal, ceramic, or plastic parts to replace the ball at the top of the thighbone (femur). In a total hip replacement, the doctor also replaces the hip socket. In a partial hip replacement, the socket is not replaced. Hip replacement surgery is done through one or two cuts (incisions). The cuts may be toward the front (anterior) of your hip. Or they may be on the side or toward the back (posterior). Your doctor will talk with you which type of surgery might be best for you. Your doctor will let you know if you will stay in the hospital or if you can go home the day of surgery. Your physical therapy will start before you leave the hospital. [place] Largo may need physical therapy after you leave the hospital. At home you'll keep doing the exercises you learned. It usually takes a few months to get back to full activity. Your doctor will tell you when you can go back to work. This depends on the kind of surgery and the type of job you have. After you recover, you likely will have much less pain than before the surgery. You should be able to return to most of your normal activities. Your doctor may suggest that you avoid strenuous activities. These include running, tennis, and any type of skiing. Always tell your health professionals that you have an artificial hip so they will know how to care for you. Follow-up care is a key part of your treatment and safety. Be sure to make and go to all appointments, and call your doctor if you are having problems. It's also a good idea to know your test results and keep a list of the medicines you take. How do you prepare for surgery? Surgery can be stressful. This information will help you understand what you can expect. And it will help you safely prepare for surgery. Preparing for surgery 
  · Follow the instructions exactly about when to stop eating and drinking.  If you don't, your surgery may be canceled. If your doctor told you to take your medicines on the day of surgery, take them with only a sip of water.  
  · When you go home, you will need someone to help you until you have more energy and can move around better.  
  · Be sure you have someone to take you home. Anesthesia and pain medicine will make it unsafe for you to drive or get home on your own.  
  · Understand exactly what surgery is planned, along with the risks, benefits, and other options.  
  · Tell your doctor ALL the medicines, vitamins, supplements, and herbal remedies you take. Some may increase the risk of problems during your surgery. Your doctor will tell you if you should stop taking any of them before the surgery and how soon to do it.  
  · If you take aspirin or some other blood thinner, ask your doctor if you should stop taking it before your surgery. Make sure that you understand exactly what your doctor wants you to do. These medicines increase the risk of bleeding.  
  · Make sure your doctor and the hospital have a copy of your advance directive. If you don't have one, you may want to prepare one. It lets others know your health care wishes. It's a good thing to have before any type of surgery or procedure. What happens on the day of surgery? · Follow the instructions exactly about when to stop eating and drinking. If you don't, your surgery may be canceled. If your doctor told you to take your medicines on the day of surgery, take them with only a sip of water.  
  · Take a bath or shower before you come in for your surgery. Do not apply lotions, perfumes, deodorants, or nail polish.  
  · Do not shave the surgical site yourself.  
  · Take off all jewelry and piercings. And take out contact lenses, if you wear them. At the hospital or surgery center  · Bring a picture ID.  
  · The area for surgery is often marked to make sure there are no errors.  
  · You will get antibiotics through the IV tube before surgery. This lowers the risk of an infection.  
  · You will be kept comfortable and safe by your anesthesia provider. The anesthesia may make you sleep. Or it may just numb the area being worked on.  
  · The surgery usually takes 1 to 3 hours. When should you call your doctor? · You have questions or concerns.  
  · You don't understand how to prepare for your surgery.  
  · You become ill before the surgery (such as fever, flu, or a cold).  
  · You need to reschedule or have changed your mind about having the surgery. Where can you learn more? Go to http://www.gray.com/ Enter [phone] [id number] in the search box to learn more about \"Hip Replacement: Before Your Surgery. \" Current as of: November 16, 2020               Content Version: 12.8 © 2006-2021 [employer]. Care instructions adapted under license by [employer] (which disclaims liability or warranty for this information). If you have questions about a medical condition or this instruction, always ask your healthcare professional. [address] any warranty or liability for your use of this information.

## 2021-06-08 NOTE — PROGRESS NOTES
Preoperative Evaluation    Date of Exam: 2021     Visit Vitals  /65   Pulse 69   Temp 97.7 °F (36.5 °C) (Oral)   Resp 16   Ht 5' (1.524 m)   Wt 150 lb (68 kg)   LMP 10/26/1995   SpO2 96%   BMI 29.29 kg/m²        Daniella Adams is a 68 y.o. female (:1945) who presents for preoperative evaluation. Procedure/Surgery: Right total hip replacement  Date of Procedure/Surgery: 21  Surgeon: Dr. Dennis Kinneyty: [id number] [facility]  Primary Physician: Zohreh Fragoso MD    Questions:  -Normal state of health today? Yes  -Do you usually get chest pain or breathlessness when you climb up two flights of stairs at normal speed?  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    Traumatic complete tear of left rotator cuff S46.012A    Synovial cyst of lumbar facet joint M71.38    Elevated hemoglobin A1c R73.09     Past Medical History:   Diagnosis Date    Arthritis     bilateral hands, hips, lower back    At risk for sleep apnea 2019    on phone assessment with PAT, kathia sleep scoring tool, scored 4    Chronic kidney disease     kidney stones    Elevated hemoglobin A1c 2020    GERD (gastroesophageal reflux disease)     being treated    High cholesterol     Kidney stones     Menopause     Palpitations     as of 19 pt reports was evaluated by cardiology and cleared, now followed by cardiology, no further symptoms per pt    Pneumonia 2006 (approx)    Rheumatic fever     AGE 5    RLS (restless legs syndrome)     Shoulder fracture 2009 [facility] -Jan 09  Right side      Past Surgical History:   Procedure Laterality Date    COLONOSCOPY  7/30/2010    Dr. Ignacio Belcher, diverticulosis, repeat due 7/30/2015    COLONOSCOPY,JONAH CALHOUN,FORCEP/CAUTERY  9/4/2015         HX CATARACT REMOVAL Bilateral 03/2019    HX OPEN REDUCTION INTERNAL FIXATION Left 1982    ankle    HX ORTHOPAEDIC  2019    L4-L5 BACK SURGERY - synovial cyst removal    HX ROTATOR CUFF REPAIR Right 2008    HX ROTATOR CUFF REPAIR Left 2012    HX UROLOGICAL  2017    stent for kidney stone     Social History     Socioeconomic History    Marital status:      Spouse name: Not on file    Number of children: Not on file    Years of education: Not on file    Highest education level: Not on file   Tobacco Use    Smoking status: Never Smoker    Smokeless tobacco: Never Used   Vaping Use    Vaping Use: Never used   Substance and Sexual Activity    Alcohol use: No     Alcohol/week: 0.0 standard drinks    Drug use: Not Currently     Social Determinants of Health     Financial Resource Strain:     Difficulty of Paying Living Expenses:    Food Insecurity:     Worried About Running Out of Food in the Last Year:     Ran Out of Food in the Last Year:    Transportation Needs:     Lack of Transportation (Medical):      Lack of Transportation (Non-Medical):    Physical Activity:     Days of Exercise per Week:     Minutes of Exercise per Session:    Stress:     Feeling of Stress :    Social Connections:     Frequency of Communication with Friends and Family:     Frequency of Social Gatherings with Friends and Family:     Attends [employer] Services:     Active Member of Clubs or Organizations:     Attends Club or Organization Meetings:     Marital Status:      Family History   Problem Relation Age of Onset    Cancer Father         Colon     No Known Problems Mother     Diabetes Grandchild     Heart Disease Brother     Heart Attack Brother     Anesth Problems Neg Hx      No Known Allergies    Current Outpatient Medications   Medication Sig    acetaminophen-codeine (TYLENOL #3) 300-30 mg per tablet Take 1 Tablet by mouth every four (4) hours as needed for Pain.  MAGNESIUM GLYCINATE PO Take  by mouth daily.  pramipexole (MIRAPEX) 0.5 mg tablet TAKE 1/2 (ONE-HALF) TABLET BY MOUTH NIGHTLY FOR RESTLESS LEG SYNDROME    omeprazole (PRILOSEC) 20 mg capsule Take 1 Cap by mouth daily.  atorvastatin (LIPITOR) 40 mg tablet Take 1 Tab by mouth daily. (Patient taking differently: Take 40 mg by mouth every evening.)     No current facility-administered medications for this visit. Recent use of:  No use of blood thinning medications    Immunization History   Administered Date(s) Administered    (RETIRED) Pneumococcal Vaccine (Unspecified Type) 10/26/2003, 10/26/2010    Influenza High Dose Vaccine PF 09/14/2015, 11/03/2016, 11/06/2017    Influenza Vaccine 10/05/2013    Influenza Vaccine (Tri) Adjuvanted (>65 Yrs FLUAD TRI 42529) 10/15/2018, 10/08/2019    Influenza Vaccine Split 10/26/2010    Pneumococcal Conjugate (PCV-13) 01/31/2017    Tdap 12/19/2014    Zoster Vaccine, Live 01/23/2015         Anesthesia Complications: Denies  History of abnormal bleeding : Denies  History of Blood Transfusions: Denies    REVIEW OF SYSTEMS:  Constitutional: Negative for recent fever and chills. Skin: Negative for rash or skin lesions. HENT: Negative review. Eyes: Negative for visual changes. Cardiovascular:  Negative for chest pain, exertional dyspnea and palpitations. Respiratory: Negative for cough and hemoptysis, shortness of breath, orthopnea, PND. Gastrointestinal: Negative for nausea and vomitting. Negative for abdominal pain   diarrhea or constipation. No melena. Genitourinary: Negative for dysuria, blood in the urine, frequency or burning. Lymphoreticular: No lymphadenopathy.   Musculoskeletal: Bilateral hip pain  History of edema: gets occ le edema, hx of surgery to left lower leg  Neurological: Negative for dizziness, seizures or syncopal episodes   Psychiatric: Negative.      Physical Examination:   Vital signs:   Visit Vitals  /65   Pulse 69   Temp 97.7 °F (36.5 °C) (Oral)   Resp 16   Ht 5' (1.524 m)   Wt 150 lb (68 kg)   LMP 10/26/1995   SpO2 96%   BMI 29.29 kg/m²     General appearance - alert, well appearing, and in no distress  Mental status - alert, oriented to person, place, and time, normal mood, behavior, speech, dress, motor activity, and thought processes  Eyes - pupils equal and reactive, extraocular eye movements intact  Ears - bilateral TM's and external ear canals normal  Nose - normal and patent, no erythema, discharge or polyps  Mouth - mucous membranes moist, pharynx normal without lesions  Neck - supple, no significant adenopathy, no thyromegaly  Chest - clear to auscultation, no wheezes, rales or rhonchi, symmetric air entry  Heart - normal rate, regular rhythm, normal S1, S2, no murmurs, rubs, clicks or gallops  Abdomen - soft, nontender, nondistended, no masses or organomegaly, bowel sounds normal and present  Neurological - alert, oriented, normal speech, no focal findings or movement disorder noted, cranial nerves II through XII intact,  Musculoskeletal - no joint tenderness, deformity or swelling  Extremities - peripheral pulses normal, no pedal edema, no clubbing or cyanosis  Skin - normal coloration and turgor, no rashes, no suspicious skin lesions noted       DIAGNOSTICS:     6/7/2021 10:24 AM - Nico, Card Result In    Component Value Ref Range & Units Status   Ventricular Rate 61  BPM Final   Atrial Rate 61  BPM Final   P-R Interval 148  ms Final   QRS Duration 102  ms Final   Q-T Interval 426  ms Final   QTC Calculation (Bezet) 428  ms Final   Calculated P Axis 31  degrees Final   Calculated R Axis -24  degrees Final   Calculated T Axis -26  degrees Final   Diagnosis   Final   Normal sinus rhythm   Voltage criteria for left ventricular hypertrophy   T wave abnormality, consider anterolateral ischemia   When compared with ECG of 24-JAN-2020 11:08,   No significant change was found   Confirmed by Todd Bennett ([id number]) on 6/7/2021 10:23:06 AM          Lab Results   Component Value Date/Time    WBC 9.4 06/07/2021 08:21 AM    HGB 12.8 06/07/2021 08:21 AM    HCT 40.7 06/07/2021 08:21 AM    PLATELET 591 04/19/9607 08:21 AM    MCV 96.2 06/07/2021 08:21 AM     Lab Results   Component Value Date/Time    Sodium 140 06/07/2021 08:21 AM    Potassium 4.0 06/07/2021 08:21 AM    Chloride 111 (H) 06/07/2021 08:21 AM    CO2 29 06/07/2021 08:21 AM    Anion gap 0 (L) 06/07/2021 08:21 AM    Glucose 111 (H) 06/07/2021 08:21 AM    Glucose 98 08/05/2010 08:34 AM    BUN 16 06/07/2021 08:21 AM    Creatinine 1.04 (H) 06/07/2021 08:21 AM    BUN/Creatinine ratio 15 06/07/2021 08:21 AM    GFR est AA >60 06/07/2021 08:21 AM    GFR est non-AA 52 (L) 06/07/2021 08:21 AM    Calcium 8.8 06/07/2021 08:21 AM    Bilirubin, total 0.5 06/07/2021 08:21 AM    Alk. phosphatase 115 06/07/2021 08:21 AM    Protein, total 7.2 06/07/2021 08:21 AM    Albumin 3.4 (L) 06/07/2021 08:21 AM    Globulin 3.8 06/07/2021 08:21 AM    A-G Ratio 0.9 (L) 06/07/2021 08:21 AM    ALT (SGPT) 22 06/07/2021 08:21 AM    AST (SGOT) 17 06/07/2021 08:21 AM     Lab Results   Component Value Date/Time    Hemoglobin A1c 6.3 (H) 06/07/2021 08:21 AM     Lab Results   Component Value Date/Time    Bilirubin, total 0.5 06/07/2021 08:21 AM    Bilirubin NEGATIVE  12/12/2019 09:26 AM         No results found for any visits on 06/08/21. ASSESSMENT and PLAN  Diagnoses and all orders for this visit:    1. Primary osteoarthritis of right hip  -     acetaminophen-codeine (TYLENOL #3) 300-30 mg per tablet; Take 1 Tablet by mouth every six (6) hours as needed for Pain for up to 5 days. Max Daily Amount: 4 Tablets.   Temporary refill until surgery next week, future refills should come from Dr. Timi Thompson office  Based on ACC/AHA guidelines patient is at acceptable risk for scheduled level of surgery. 2. Pre-op evaluation                Patient Instructions          Hip Replacement: Before Your Surgery  What is hip replacement surgery? Hip replacement surgery uses metal, ceramic, or plastic parts to replace the ball at the top of the thighbone (femur). In a total hip replacement, the doctor also replaces the hip socket. In a partial hip replacement, the socket is not replaced. Hip replacement surgery is done through one or two cuts (incisions). The cuts may be toward the front (anterior) of your hip. Or they may be on the side or toward the back (posterior). Your doctor will talk with you which type of surgery might be best for you. Your doctor will let you know if you will stay in the hospital or if you can go home the day of surgery. Your physical therapy will start before you leave the hospital. [place] may need physical therapy after you leave the hospital. At home you'll keep doing the exercises you learned. It usually takes a few months to get back to full activity. Your doctor will tell you when you can go back to work. This depends on the kind of surgery and the type of job you have. After you recover, you likely will have much less pain than before the surgery. You should be able to return to most of your normal activities. Your doctor may suggest that you avoid strenuous activities. These include running, tennis, and any type of skiing. Always tell your health professionals that you have an artificial hip so they will know how to care for you. Follow-up care is a key part of your treatment and safety. Be sure to make and go to all appointments, and call your doctor if you are having problems. It's also a good idea to know your test results and keep a list of the medicines you take. How do you prepare for surgery? Surgery can be stressful.  This information will help you understand what you can expect. And it will help you safely prepare for surgery. Preparing for surgery    · Follow the instructions exactly about when to stop eating and drinking. If you don't, your surgery may be canceled. If your doctor told you to take your medicines on the day of surgery, take them with only a sip of water.     · When you go home, you will need someone to help you until you have more energy and can move around better.     · Be sure you have someone to take you home. Anesthesia and pain medicine will make it unsafe for you to drive or get home on your own.     · Understand exactly what surgery is planned, along with the risks, benefits, and other options.     · Tell your doctor ALL the medicines, vitamins, supplements, and herbal remedies you take. Some may increase the risk of problems during your surgery. Your doctor will tell you if you should stop taking any of them before the surgery and how soon to do it.     · If you take aspirin or some other blood thinner, ask your doctor if you should stop taking it before your surgery. Make sure that you understand exactly what your doctor wants you to do. These medicines increase the risk of bleeding.     · Make sure your doctor and the hospital have a copy of your advance directive. If you don't have one, you may want to prepare one. It lets others know your health care wishes. It's a good thing to have before any type of surgery or procedure. What happens on the day of surgery? · Follow the instructions exactly about when to stop eating and drinking. If you don't, your surgery may be canceled. If your doctor told you to take your medicines on the day of surgery, take them with only a sip of water.     · Take a bath or shower before you come in for your surgery. Do not apply lotions, perfumes, deodorants, or nail polish.     · Do not shave the surgical site yourself.     · Take off all jewelry and piercings. And take out contact lenses, if you wear them.    At the hospital or surgery center   · Bring a picture ID.     · The area for surgery is often marked to make sure there are no errors.     · You will get antibiotics through the IV tube before surgery. This lowers the risk of an infection.     · You will be kept comfortable and safe by your anesthesia provider. The anesthesia may make you sleep. Or it may just numb the area being worked on.     · The surgery usually takes 1 to 3 hours. When should you call your doctor? · You have questions or concerns.     · You don't understand how to prepare for your surgery.     · You become ill before the surgery (such as fever, flu, or a cold).     · You need to reschedule or have changed your mind about having the surgery. Where can you learn more? Go to http://www.gray.com/  Enter Q357 in the search box to learn more about \"Hip Replacement: Before Your Surgery. \"  Current as of: November 16, 2020               Content Version: 12.8  © 9824-6360 [employer]. Care instructions adapted under license by [employer] (which disclaims liability or warranty for this information). If you have questions about a medical condition or this instruction, always ask your healthcare professional. [place] [address] any warranty or liability for your use of this information. Please keep your follow-up appointment with Lisbet Babcock MD.         Health Maintenance Due   Topic Date Due    COVID-19 Vaccine (1) Never done    Shingrix Vaccine Age 50> (1 of 2) Never done    Pneumococcal 65+ years (2 of 2 - PPSV23) 01/31/2018    Medicare Yearly Exam  06/03/2021       I have discussed the diagnosis with the patient and the intended plan as seen in the above orders. Patient is in agreement. The patient has received an after-visit summary and questions were answered concerning future plans. I have discussed medication side effects and warnings with the patient as well. Warning signs for the above conditions were discussed including when to call our office or go to the emergency room. The nurse provided the patient and/or family with advanced directive information if needed and encouraged the patient to provide a copy to the office when available.

## 2021-06-09 LAB
BACTERIA SPEC CULT: ABNORMAL
CC UR VC: ABNORMAL
SERVICE CMNT-IMP: ABNORMAL

## 2021-06-09 RX ORDER — NITROFURANTOIN 25; 75 MG/1; MG/1
100 CAPSULE ORAL 2 TIMES DAILY
Qty: 14 CAPSULE | Refills: 0 | Status: SHIPPED | OUTPATIENT
Start: 2021-06-09 | End: 2021-06-16

## 2021-06-09 NOTE — PERIOP NOTES
Phone call to patient to inform her of Macrobid and mupirocin- left message for a call back    [phone] phone curt back from patient- instructed to start Macrobid twice a day for 7 days and Mupirocin ointment to nares twice day for 5 days- she verbalizes understanding

## 2021-06-10 ENCOUNTER — HOSPITAL ENCOUNTER (OUTPATIENT)
Dept: PREADMISSION TESTING | Age: 76
Discharge: HOME | End: 2021-06-10
Payer: MEDICARE

## 2021-06-10 PROCEDURE — U0003 INFECTIOUS AGENT DETECTION BY NUCLEIC ACID (DNA OR RNA); SEVERE ACUTE RESPIRATORY SYNDROME CORONAVIRUS 2 (SARS-COV-2) (CORONAVIRUS DISEASE [COVID-19]), AMPLIFIED PROBE TECHNIQUE, MAKING USE OF HIGH THROUGHPUT TECHNOLOGIES AS DESCRIBED BY CMS-2020-01-R: HCPCS

## 2021-06-11 LAB
SARS-COV-2, XPLCVT: NOT DETECTED
SOURCE, COVRS: NORMAL

## 2021-06-14 ENCOUNTER — APPOINTMENT (OUTPATIENT)
Dept: GENERAL RADIOLOGY | Age: 76
DRG: 470 | End: 2021-06-14
Attending: ORTHOPAEDIC SURGERY
Payer: MEDICARE

## 2021-06-14 ENCOUNTER — ANESTHESIA (OUTPATIENT)
Dept: SURGERY | Age: 76
DRG: 470 | End: 2021-06-14
Payer: MEDICARE

## 2021-06-14 ENCOUNTER — ANESTHESIA EVENT (OUTPATIENT)
Dept: SURGERY | Age: 76
DRG: 470 | End: 2021-06-14
Payer: MEDICARE

## 2021-06-14 ENCOUNTER — HOSPITAL ENCOUNTER (INPATIENT)
Age: 76
LOS: 1 days | Discharge: HOME HEALTH | DRG: 470 | End: 2021-06-15
Attending: ORTHOPAEDIC SURGERY | Admitting: ORTHOPAEDIC SURGERY
Payer: MEDICARE

## 2021-06-14 DIAGNOSIS — Z96.641 STATUS POST RIGHT HIP REPLACEMENT: Primary | ICD-10-CM

## 2021-06-14 PROBLEM — Z96.649 S/P HIP REPLACEMENT: Status: ACTIVE | Noted: 2021-06-14

## 2021-06-14 PROCEDURE — 0SR906Z REPLACEMENT OF RIGHT HIP JOINT WITH OXIDIZED ZIRCONIUM ON POLYETHYLENE SYNTHETIC SUBSTITUTE, OPEN APPROACH: ICD-10-PCS | Performed by: ORTHOPAEDIC SURGERY

## 2021-06-14 PROCEDURE — 77030036722: Performed by: ORTHOPAEDIC SURGERY

## 2021-06-14 PROCEDURE — 74011250636 HC RX REV CODE- 250/636: Performed by: PHYSICIAN ASSISTANT

## 2021-06-14 PROCEDURE — 77030041074 HC DRSG AG OPTIFRM MDII -A: Performed by: ORTHOPAEDIC SURGERY

## 2021-06-14 PROCEDURE — 77030033138 HC SUT PGA STRATFX J&J -B: Performed by: ORTHOPAEDIC SURGERY

## 2021-06-14 PROCEDURE — 74011250636 HC RX REV CODE- 250/636: Performed by: ORTHOPAEDIC SURGERY

## 2021-06-14 PROCEDURE — 74011250636 HC RX REV CODE- 250/636: Performed by: ANESTHESIOLOGY

## 2021-06-14 PROCEDURE — 74011250637 HC RX REV CODE- 250/637: Performed by: ORTHOPAEDIC SURGERY

## 2021-06-14 PROCEDURE — 65270000029 HC RM PRIVATE

## 2021-06-14 PROCEDURE — 77030014647 HC SEAL FBRN TISSL BAXT -D: Performed by: ORTHOPAEDIC SURGERY

## 2021-06-14 PROCEDURE — 73501 X-RAY EXAM HIP UNI 1 VIEW: CPT

## 2021-06-14 PROCEDURE — 74011250636 HC RX REV CODE- 250/636: Performed by: NURSE ANESTHETIST, CERTIFIED REGISTERED

## 2021-06-14 PROCEDURE — 77030008684 HC TU ET CUF COVD -B: Performed by: ANESTHESIOLOGY

## 2021-06-14 PROCEDURE — 76000 FLUOROSCOPY <1 HR PHYS/QHP: CPT

## 2021-06-14 PROCEDURE — 77030022704 HC SUT VLOC COVD -B: Performed by: ORTHOPAEDIC SURGERY

## 2021-06-14 PROCEDURE — 2709999900 HC NON-CHARGEABLE SUPPLY: Performed by: ORTHOPAEDIC SURGERY

## 2021-06-14 PROCEDURE — 74011000250 HC RX REV CODE- 250: Performed by: NURSE ANESTHETIST, CERTIFIED REGISTERED

## 2021-06-14 PROCEDURE — 77030026438 HC STYL ET INTUB CARD -A: Performed by: ANESTHESIOLOGY

## 2021-06-14 PROCEDURE — 76060000034 HC ANESTHESIA 1.5 TO 2 HR: Performed by: ORTHOPAEDIC SURGERY

## 2021-06-14 PROCEDURE — 77030018723 HC ELCTRD BLD COVD -A: Performed by: ORTHOPAEDIC SURGERY

## 2021-06-14 PROCEDURE — 74011000250 HC RX REV CODE- 250: Performed by: PHYSICIAN ASSISTANT

## 2021-06-14 PROCEDURE — 76010000162 HC OR TIME 1.5 TO 2 HR INTENSV-TIER 1: Performed by: ORTHOPAEDIC SURGERY

## 2021-06-14 PROCEDURE — 77010033678 HC OXYGEN DAILY

## 2021-06-14 PROCEDURE — 77030031139 HC SUT VCRL2 J&J -A: Performed by: ORTHOPAEDIC SURGERY

## 2021-06-14 PROCEDURE — 76210000017 HC OR PH I REC 1.5 TO 2 HR: Performed by: ORTHOPAEDIC SURGERY

## 2021-06-14 PROCEDURE — 77030035643 HC BLD SAW OSC PRECIS STRY -C: Performed by: ORTHOPAEDIC SURGERY

## 2021-06-14 PROCEDURE — 94760 N-INVAS EAR/PLS OXIMETRY 1: CPT

## 2021-06-14 PROCEDURE — 77030035236 HC SUT PDS STRATFX BARB J&J -B: Performed by: ORTHOPAEDIC SURGERY

## 2021-06-14 PROCEDURE — 74011250637 HC RX REV CODE- 250/637: Performed by: PHYSICIAN ASSISTANT

## 2021-06-14 PROCEDURE — 77030003666 HC NDL SPINAL BD -A: Performed by: ORTHOPAEDIC SURGERY

## 2021-06-14 PROCEDURE — 77030034479 HC ADH SKN CLSR PRINEO J&J -B: Performed by: ORTHOPAEDIC SURGERY

## 2021-06-14 PROCEDURE — 77030040361 HC SLV COMPR DVT MDII -B: Performed by: ORTHOPAEDIC SURGERY

## 2021-06-14 PROCEDURE — 77030018673: Performed by: ORTHOPAEDIC SURGERY

## 2021-06-14 PROCEDURE — 74011000250 HC RX REV CODE- 250: Performed by: ORTHOPAEDIC SURGERY

## 2021-06-14 PROCEDURE — 77030019908 HC STETH ESOPH SIMS -A: Performed by: ANESTHESIOLOGY

## 2021-06-14 PROCEDURE — C1776 JOINT DEVICE (IMPLANTABLE): HCPCS | Performed by: ORTHOPAEDIC SURGERY

## 2021-06-14 DEVICE — IMPLANTABLE DEVICE
Type: IMPLANTABLE DEVICE | Site: HIP | Status: FUNCTIONAL
Brand: ALTEON

## 2021-06-14 DEVICE — SHELL ACET MH 54 MM HIP GRP 6 CUP ALTEON: Type: IMPLANTABLE DEVICE | Site: HIP | Status: FUNCTIONAL

## 2021-06-14 DEVICE — COMPONENT TOT HIP CAPPED H2 ADV: Type: IMPLANTABLE DEVICE | Status: FUNCTIONAL

## 2021-06-14 DEVICE — IMPLANTABLE DEVICE
Type: IMPLANTABLE DEVICE | Site: HIP | Status: FUNCTIONAL
Brand: NOVATION

## 2021-06-14 DEVICE — ALTEON CUP
Type: IMPLANTABLE DEVICE | Site: HIP | Status: FUNCTIONAL
Brand: ALTEON

## 2021-06-14 DEVICE — IMPLANTABLE DEVICE
Type: IMPLANTABLE DEVICE | Site: HIP | Status: FUNCTIONAL
Brand: EXACTECH

## 2021-06-14 RX ORDER — ONDANSETRON 2 MG/ML
4 INJECTION INTRAMUSCULAR; INTRAVENOUS AS NEEDED
Status: DISCONTINUED | OUTPATIENT
Start: 2021-06-14 | End: 2021-06-14 | Stop reason: HOSPADM

## 2021-06-14 RX ORDER — FENTANYL CITRATE 50 UG/ML
25 INJECTION, SOLUTION INTRAMUSCULAR; INTRAVENOUS
Status: DISCONTINUED | OUTPATIENT
Start: 2021-06-14 | End: 2021-06-14 | Stop reason: HOSPADM

## 2021-06-14 RX ORDER — FAMOTIDINE 20 MG/1
20 TABLET, FILM COATED ORAL 2 TIMES DAILY
Status: DISCONTINUED | OUTPATIENT
Start: 2021-06-14 | End: 2021-06-14

## 2021-06-14 RX ORDER — ACETAMINOPHEN 325 MG/1
650 TABLET ORAL EVERY 6 HOURS
Status: DISCONTINUED | OUTPATIENT
Start: 2021-06-14 | End: 2021-06-15 | Stop reason: HOSPADM

## 2021-06-14 RX ORDER — SODIUM CHLORIDE 9 MG/ML
125 INJECTION, SOLUTION INTRAVENOUS CONTINUOUS
Status: DISCONTINUED | OUTPATIENT
Start: 2021-06-14 | End: 2021-06-15 | Stop reason: HOSPADM

## 2021-06-14 RX ORDER — DEXAMETHASONE SODIUM PHOSPHATE 4 MG/ML
10 INJECTION, SOLUTION INTRA-ARTICULAR; INTRALESIONAL; INTRAMUSCULAR; INTRAVENOUS; SOFT TISSUE ONCE
Status: DISCONTINUED | OUTPATIENT
Start: 2021-06-15 | End: 2021-06-15 | Stop reason: HOSPADM

## 2021-06-14 RX ORDER — SUCCINYLCHOLINE CHLORIDE 20 MG/ML
INJECTION INTRAMUSCULAR; INTRAVENOUS AS NEEDED
Status: DISCONTINUED | OUTPATIENT
Start: 2021-06-14 | End: 2021-06-14 | Stop reason: HOSPADM

## 2021-06-14 RX ORDER — POLYETHYLENE GLYCOL 3350 17 G/17G
17 POWDER, FOR SOLUTION ORAL DAILY
Status: DISCONTINUED | OUTPATIENT
Start: 2021-06-15 | End: 2021-06-15 | Stop reason: HOSPADM

## 2021-06-14 RX ORDER — SODIUM CHLORIDE 0.9 % (FLUSH) 0.9 %
5-40 SYRINGE (ML) INJECTION EVERY 8 HOURS
Status: DISCONTINUED | OUTPATIENT
Start: 2021-06-14 | End: 2021-06-15 | Stop reason: HOSPADM

## 2021-06-14 RX ORDER — SODIUM CHLORIDE 0.9 % (FLUSH) 0.9 %
5-40 SYRINGE (ML) INJECTION AS NEEDED
Status: DISCONTINUED | OUTPATIENT
Start: 2021-06-14 | End: 2021-06-15 | Stop reason: HOSPADM

## 2021-06-14 RX ORDER — FACIAL-BODY WIPES
10 EACH TOPICAL DAILY PRN
Status: DISCONTINUED | OUTPATIENT
Start: 2021-06-16 | End: 2021-06-15 | Stop reason: HOSPADM

## 2021-06-14 RX ORDER — FENTANYL CITRATE 50 UG/ML
50 INJECTION, SOLUTION INTRAMUSCULAR; INTRAVENOUS AS NEEDED
Status: DISCONTINUED | OUTPATIENT
Start: 2021-06-14 | End: 2021-06-14 | Stop reason: HOSPADM

## 2021-06-14 RX ORDER — OXYCODONE HYDROCHLORIDE 5 MG/1
10 TABLET ORAL
Status: DISCONTINUED | OUTPATIENT
Start: 2021-06-14 | End: 2021-06-14

## 2021-06-14 RX ORDER — KETAMINE HYDROCHLORIDE 100 MG/ML
INJECTION, SOLUTION INTRAMUSCULAR; INTRAVENOUS AS NEEDED
Status: DISCONTINUED | OUTPATIENT
Start: 2021-06-14 | End: 2021-06-14 | Stop reason: HOSPADM

## 2021-06-14 RX ORDER — ONDANSETRON 4 MG/1
4 TABLET, ORALLY DISINTEGRATING ORAL
Status: DISCONTINUED | OUTPATIENT
Start: 2021-06-16 | End: 2021-06-15 | Stop reason: HOSPADM

## 2021-06-14 RX ORDER — SODIUM CHLORIDE, SODIUM LACTATE, POTASSIUM CHLORIDE, CALCIUM CHLORIDE 600; 310; 30; 20 MG/100ML; MG/100ML; MG/100ML; MG/100ML
25 INJECTION, SOLUTION INTRAVENOUS CONTINUOUS
Status: DISCONTINUED | OUTPATIENT
Start: 2021-06-14 | End: 2021-06-14 | Stop reason: HOSPADM

## 2021-06-14 RX ORDER — FENTANYL CITRATE 50 UG/ML
INJECTION, SOLUTION INTRAMUSCULAR; INTRAVENOUS AS NEEDED
Status: DISCONTINUED | OUTPATIENT
Start: 2021-06-14 | End: 2021-06-14 | Stop reason: HOSPADM

## 2021-06-14 RX ORDER — LIDOCAINE HYDROCHLORIDE 20 MG/ML
INJECTION, SOLUTION EPIDURAL; INFILTRATION; INTRACAUDAL; PERINEURAL AS NEEDED
Status: DISCONTINUED | OUTPATIENT
Start: 2021-06-14 | End: 2021-06-14 | Stop reason: HOSPADM

## 2021-06-14 RX ORDER — TRANEXAMIC ACID 100 MG/ML
INJECTION, SOLUTION INTRAVENOUS AS NEEDED
Status: DISCONTINUED | OUTPATIENT
Start: 2021-06-14 | End: 2021-06-14 | Stop reason: HOSPADM

## 2021-06-14 RX ORDER — HYDROMORPHONE HYDROCHLORIDE 1 MG/ML
.2-.5 INJECTION, SOLUTION INTRAMUSCULAR; INTRAVENOUS; SUBCUTANEOUS
Status: DISCONTINUED | OUTPATIENT
Start: 2021-06-14 | End: 2021-06-14 | Stop reason: HOSPADM

## 2021-06-14 RX ORDER — MORPHINE SULFATE 2 MG/ML
2 INJECTION, SOLUTION INTRAMUSCULAR; INTRAVENOUS
Status: DISCONTINUED | OUTPATIENT
Start: 2021-06-14 | End: 2021-06-15 | Stop reason: HOSPADM

## 2021-06-14 RX ORDER — PROPOFOL 10 MG/ML
INJECTION, EMULSION INTRAVENOUS AS NEEDED
Status: DISCONTINUED | OUTPATIENT
Start: 2021-06-14 | End: 2021-06-14 | Stop reason: HOSPADM

## 2021-06-14 RX ORDER — PRAMIPEXOLE DIHYDROCHLORIDE 0.25 MG/1
0.25 TABLET ORAL
Status: DISCONTINUED | OUTPATIENT
Start: 2021-06-14 | End: 2021-06-15 | Stop reason: HOSPADM

## 2021-06-14 RX ORDER — ONDANSETRON 2 MG/ML
4 INJECTION INTRAMUSCULAR; INTRAVENOUS
Status: DISCONTINUED | OUTPATIENT
Start: 2021-06-14 | End: 2021-06-15 | Stop reason: HOSPADM

## 2021-06-14 RX ORDER — DEXAMETHASONE SODIUM PHOSPHATE 4 MG/ML
INJECTION, SOLUTION INTRA-ARTICULAR; INTRALESIONAL; INTRAMUSCULAR; INTRAVENOUS; SOFT TISSUE AS NEEDED
Status: DISCONTINUED | OUTPATIENT
Start: 2021-06-14 | End: 2021-06-14 | Stop reason: HOSPADM

## 2021-06-14 RX ORDER — CELECOXIB 200 MG/1
400 CAPSULE ORAL ONCE
Status: COMPLETED | OUTPATIENT
Start: 2021-06-14 | End: 2021-06-14

## 2021-06-14 RX ORDER — TRAMADOL HYDROCHLORIDE 50 MG/1
50-100 TABLET ORAL
Status: DISCONTINUED | OUTPATIENT
Start: 2021-06-14 | End: 2021-06-15 | Stop reason: HOSPADM

## 2021-06-14 RX ORDER — FAMOTIDINE 20 MG/1
20 TABLET, FILM COATED ORAL EVERY EVENING
Status: DISCONTINUED | OUTPATIENT
Start: 2021-06-14 | End: 2021-06-15 | Stop reason: HOSPADM

## 2021-06-14 RX ORDER — AMOXICILLIN 250 MG
1 CAPSULE ORAL 2 TIMES DAILY
Status: DISCONTINUED | OUTPATIENT
Start: 2021-06-14 | End: 2021-06-15 | Stop reason: HOSPADM

## 2021-06-14 RX ORDER — MIDAZOLAM HYDROCHLORIDE 1 MG/ML
INJECTION, SOLUTION INTRAMUSCULAR; INTRAVENOUS AS NEEDED
Status: DISCONTINUED | OUTPATIENT
Start: 2021-06-14 | End: 2021-06-14 | Stop reason: HOSPADM

## 2021-06-14 RX ORDER — DIPHENHYDRAMINE HCL 12.5MG/5ML
12.5 LIQUID (ML) ORAL
Status: DISCONTINUED | OUTPATIENT
Start: 2021-06-14 | End: 2021-06-15 | Stop reason: HOSPADM

## 2021-06-14 RX ORDER — EPHEDRINE SULFATE/0.9% NACL/PF 50 MG/5 ML
SYRINGE (ML) INTRAVENOUS AS NEEDED
Status: DISCONTINUED | OUTPATIENT
Start: 2021-06-14 | End: 2021-06-14 | Stop reason: HOSPADM

## 2021-06-14 RX ORDER — ATORVASTATIN CALCIUM 40 MG/1
40 TABLET, FILM COATED ORAL EVERY EVENING
Status: DISCONTINUED | OUTPATIENT
Start: 2021-06-14 | End: 2021-06-15 | Stop reason: HOSPADM

## 2021-06-14 RX ORDER — HYDROMORPHONE HYDROCHLORIDE 2 MG/ML
INJECTION, SOLUTION INTRAMUSCULAR; INTRAVENOUS; SUBCUTANEOUS AS NEEDED
Status: DISCONTINUED | OUTPATIENT
Start: 2021-06-14 | End: 2021-06-14 | Stop reason: HOSPADM

## 2021-06-14 RX ORDER — NALOXONE HYDROCHLORIDE 0.4 MG/ML
0.4 INJECTION, SOLUTION INTRAMUSCULAR; INTRAVENOUS; SUBCUTANEOUS AS NEEDED
Status: DISCONTINUED | OUTPATIENT
Start: 2021-06-14 | End: 2021-06-15 | Stop reason: HOSPADM

## 2021-06-14 RX ORDER — MIDAZOLAM HYDROCHLORIDE 1 MG/ML
1 INJECTION, SOLUTION INTRAMUSCULAR; INTRAVENOUS AS NEEDED
Status: DISCONTINUED | OUTPATIENT
Start: 2021-06-14 | End: 2021-06-14 | Stop reason: HOSPADM

## 2021-06-14 RX ORDER — ACETAMINOPHEN 500 MG
1000 TABLET ORAL ONCE
Status: COMPLETED | OUTPATIENT
Start: 2021-06-14 | End: 2021-06-14

## 2021-06-14 RX ORDER — PREGABALIN 75 MG/1
75 CAPSULE ORAL ONCE
Status: COMPLETED | OUTPATIENT
Start: 2021-06-14 | End: 2021-06-14

## 2021-06-14 RX ORDER — ONDANSETRON 2 MG/ML
INJECTION INTRAMUSCULAR; INTRAVENOUS AS NEEDED
Status: DISCONTINUED | OUTPATIENT
Start: 2021-06-14 | End: 2021-06-14 | Stop reason: HOSPADM

## 2021-06-14 RX ORDER — ASPIRIN 81 MG/1
81 TABLET ORAL 2 TIMES DAILY
Status: DISCONTINUED | OUTPATIENT
Start: 2021-06-14 | End: 2021-06-15 | Stop reason: HOSPADM

## 2021-06-14 RX ORDER — LIDOCAINE HYDROCHLORIDE 10 MG/ML
0.1 INJECTION, SOLUTION EPIDURAL; INFILTRATION; INTRACAUDAL; PERINEURAL AS NEEDED
Status: DISCONTINUED | OUTPATIENT
Start: 2021-06-14 | End: 2021-06-14 | Stop reason: HOSPADM

## 2021-06-14 RX ORDER — OXYCODONE HYDROCHLORIDE 5 MG/1
5 TABLET ORAL
Status: DISCONTINUED | OUTPATIENT
Start: 2021-06-14 | End: 2021-06-14

## 2021-06-14 RX ORDER — SCOLOPAMINE TRANSDERMAL SYSTEM 1 MG/1
1 PATCH, EXTENDED RELEASE TRANSDERMAL
Status: DISCONTINUED | OUTPATIENT
Start: 2021-06-14 | End: 2021-06-15 | Stop reason: HOSPADM

## 2021-06-14 RX ORDER — ROPIVACAINE HYDROCHLORIDE 5 MG/ML
INJECTION, SOLUTION EPIDURAL; INFILTRATION; PERINEURAL AS NEEDED
Status: DISCONTINUED | OUTPATIENT
Start: 2021-06-14 | End: 2021-06-14 | Stop reason: HOSPADM

## 2021-06-14 RX ADMIN — FENTANYL CITRATE 12.5 MCG: 50 INJECTION, SOLUTION INTRAMUSCULAR; INTRAVENOUS at 12:39

## 2021-06-14 RX ADMIN — LIDOCAINE HYDROCHLORIDE 80 MG: 20 INJECTION, SOLUTION EPIDURAL; INFILTRATION; INTRACAUDAL; PERINEURAL at 10:05

## 2021-06-14 RX ADMIN — WATER 2 G: 1 INJECTION INTRAMUSCULAR; INTRAVENOUS; SUBCUTANEOUS at 10:13

## 2021-06-14 RX ADMIN — FENTANYL CITRATE 25 MCG: 50 INJECTION, SOLUTION INTRAMUSCULAR; INTRAVENOUS at 10:09

## 2021-06-14 RX ADMIN — ACETAMINOPHEN 650 MG: 325 TABLET ORAL at 21:30

## 2021-06-14 RX ADMIN — PREGABALIN 75 MG: 75 CAPSULE ORAL at 08:58

## 2021-06-14 RX ADMIN — KETAMINE HYDROCHLORIDE 20 MG: 100 INJECTION, SOLUTION, CONCENTRATE INTRAMUSCULAR; INTRAVENOUS at 10:22

## 2021-06-14 RX ADMIN — FENTANYL CITRATE 25 MCG: 50 INJECTION, SOLUTION INTRAMUSCULAR; INTRAVENOUS at 10:25

## 2021-06-14 RX ADMIN — CELECOXIB 400 MG: 200 CAPSULE ORAL at 08:58

## 2021-06-14 RX ADMIN — SODIUM CHLORIDE, POTASSIUM CHLORIDE, SODIUM LACTATE AND CALCIUM CHLORIDE 25 ML/HR: 600; 310; 30; 20 INJECTION, SOLUTION INTRAVENOUS at 08:59

## 2021-06-14 RX ADMIN — ATORVASTATIN CALCIUM 40 MG: 40 TABLET, FILM COATED ORAL at 17:10

## 2021-06-14 RX ADMIN — HYDROMORPHONE HYDROCHLORIDE 0.4 MG: 2 INJECTION, SOLUTION INTRAMUSCULAR; INTRAVENOUS; SUBCUTANEOUS at 10:32

## 2021-06-14 RX ADMIN — Medication 10 ML: at 21:30

## 2021-06-14 RX ADMIN — PROPOFOL 20 MG: 10 INJECTION, EMULSION INTRAVENOUS at 10:36

## 2021-06-14 RX ADMIN — SODIUM CHLORIDE 125 ML/HR: 9 INJECTION, SOLUTION INTRAVENOUS at 12:07

## 2021-06-14 RX ADMIN — ACETAMINOPHEN 650 MG: 325 TABLET ORAL at 13:36

## 2021-06-14 RX ADMIN — PROPOFOL 20 MG: 10 INJECTION, EMULSION INTRAVENOUS at 10:25

## 2021-06-14 RX ADMIN — Medication 1000 MG: at 08:58

## 2021-06-14 RX ADMIN — FAMOTIDINE 20 MG: 20 TABLET, FILM COATED ORAL at 17:11

## 2021-06-14 RX ADMIN — SODIUM CHLORIDE 125 ML/HR: 9 INJECTION, SOLUTION INTRAVENOUS at 19:50

## 2021-06-14 RX ADMIN — TRANEXAMIC ACID 1 G: 100 INJECTION, SOLUTION INTRAVENOUS at 10:15

## 2021-06-14 RX ADMIN — MIDAZOLAM HYDROCHLORIDE 0.5 MG: 1 INJECTION, SOLUTION INTRAMUSCULAR; INTRAVENOUS at 10:01

## 2021-06-14 RX ADMIN — SUCCINYLCHOLINE CHLORIDE 140 MG: 20 INJECTION, SOLUTION INTRAMUSCULAR; INTRAVENOUS at 10:05

## 2021-06-14 RX ADMIN — PROPOFOL 10 MG: 10 INJECTION, EMULSION INTRAVENOUS at 10:33

## 2021-06-14 RX ADMIN — FENTANYL CITRATE 25 MCG: 50 INJECTION, SOLUTION INTRAMUSCULAR; INTRAVENOUS at 10:05

## 2021-06-14 RX ADMIN — Medication 10 MG: at 10:47

## 2021-06-14 RX ADMIN — ONDANSETRON HYDROCHLORIDE 4 MG: 2 INJECTION, SOLUTION INTRAMUSCULAR; INTRAVENOUS at 11:12

## 2021-06-14 RX ADMIN — HYDROMORPHONE HYDROCHLORIDE 0.2 MG: 2 INJECTION, SOLUTION INTRAMUSCULAR; INTRAVENOUS; SUBCUTANEOUS at 11:36

## 2021-06-14 RX ADMIN — CEFAZOLIN 2 G: 1 INJECTION, POWDER, FOR SOLUTION INTRAMUSCULAR; INTRAVENOUS at 17:10

## 2021-06-14 RX ADMIN — OXYCODONE HYDROCHLORIDE 5 MG: 5 TABLET ORAL at 14:46

## 2021-06-14 RX ADMIN — Medication 10 ML: at 13:37

## 2021-06-14 RX ADMIN — ONDANSETRON HYDROCHLORIDE 4 MG: 2 INJECTION, SOLUTION INTRAMUSCULAR; INTRAVENOUS at 17:42

## 2021-06-14 RX ADMIN — FENTANYL CITRATE 25 MCG: 50 INJECTION, SOLUTION INTRAMUSCULAR; INTRAVENOUS at 10:14

## 2021-06-14 RX ADMIN — PROPOFOL 130 MG: 10 INJECTION, EMULSION INTRAVENOUS at 10:05

## 2021-06-14 RX ADMIN — SODIUM CHLORIDE, POTASSIUM CHLORIDE, SODIUM LACTATE AND CALCIUM CHLORIDE: 600; 310; 30; 20 INJECTION, SOLUTION INTRAVENOUS at 11:03

## 2021-06-14 RX ADMIN — Medication 3 AMPULE: at 08:59

## 2021-06-14 RX ADMIN — FENTANYL CITRATE 25 MCG: 50 INJECTION, SOLUTION INTRAMUSCULAR; INTRAVENOUS at 12:04

## 2021-06-14 RX ADMIN — PROPOFOL 20 MG: 10 INJECTION, EMULSION INTRAVENOUS at 10:13

## 2021-06-14 RX ADMIN — DOCUSATE SODIUM 50MG AND SENNOSIDES 8.6MG 1 TABLET: 8.6; 5 TABLET, FILM COATED ORAL at 17:10

## 2021-06-14 RX ADMIN — HYDROMORPHONE HYDROCHLORIDE 0.2 MG: 2 INJECTION, SOLUTION INTRAMUSCULAR; INTRAVENOUS; SUBCUTANEOUS at 10:57

## 2021-06-14 RX ADMIN — DEXAMETHASONE SODIUM PHOSPHATE 8 MG: 4 INJECTION, SOLUTION INTRAMUSCULAR; INTRAVENOUS at 10:13

## 2021-06-14 RX ADMIN — PRAMIPEXOLE DIHYDROCHLORIDE 0.25 MG: 0.25 TABLET ORAL at 21:30

## 2021-06-14 RX ADMIN — ASPIRIN 81 MG: 81 TABLET, COATED ORAL at 17:10

## 2021-06-14 RX ADMIN — HYDROMORPHONE HYDROCHLORIDE 0.4 MG: 2 INJECTION, SOLUTION INTRAMUSCULAR; INTRAVENOUS; SUBCUTANEOUS at 10:37

## 2021-06-14 RX ADMIN — HYDROMORPHONE HYDROCHLORIDE 0.2 MG: 2 INJECTION, SOLUTION INTRAMUSCULAR; INTRAVENOUS; SUBCUTANEOUS at 11:03

## 2021-06-14 NOTE — DISCHARGE INSTRUCTIONS
Discharge Instructions: How to [id number]  Surgery: Total Hip Replacement  Surgeon:   Duane Mylar, MD  Surgery Date:  6/14/2021     To relieve pain:   Use ice/gel packs.  Put the ice pack directly over the wound, or anywhere you are hurting or swollen.  To control pain and swelling, keep ice on regularly, especially after physical activity.  The packs should stay cold for 3-4 hours. When it is not cold anymore, rotate with the packs in the freezer.  Elevate your leg. This will also keep swelling down.  Rest for at least 20 minutes between activity or exercises.  To keep track of your pain medications, write down what you take and when you take it.  The last dose of pain medication you got in the hospital was:       Medication    Dose    Date & Time           Choose your medications based on the pain scale below:     To keep your pain under control, take Tylenol every 6 hours for 14 days - even if you feel like you dont need it.  For mild to moderate pain (4-6 on pain scale), take one pain pill every 3-4 hours as needed.  For severe pain (7-10 on pain scale), take two pain pills every 3-4 hours as needed.  To prevent nausea, take your pain medications with food. Pain Scale              As your pain lessens:     Slowly start taking less pain medication. You may do this by waiting longer between doses or by taking smaller doses.  Stop using the pain medications as soon as you no longer need it, usually in 2-3 weeks.  Aspirin   To prevent blood clots, you will need to take Aspirin 81 mg twice a day for 30 days.  To prevent stomach upset or bleeding:   Do not take non-steroidal anti-inflammatory medications (Ibuprofen, Advil, Motrin, Naproxen, etc.)    Take Pepcid 20 mg twice a day, or a similar home medication, while you are taking a blood thinner. DRESSING: Waterproof dressing (Silver dressing with clear tape/tegaderm)     Keep your clear, waterproof dressing in place. It will be removed by your surgeon during your follow-up appointment in 2 weeks.  If you are having drainage, you may need to change your dressing. You will be given an extra dressing to use at home.  You will have some swelling, warmth, and bruising around the knee and up and down the leg after surgery. This will may get worse in the first few days you are home and will slowly get better over the next few weeks. OPTIFOAM DRESSING INSTRUCTIONS                   PRINEO DRESSING INSTRUCTIONS      Prineo is a type of skin glue and mesh that is keeping your wound together.  Leave this covering alone. Do not peel it off.  Do not apply oils or lotions over it.  You may shower with this dressing but do not soak it. Gently pat your wound dry when you get out of the shower.  DO NOTs:   Do not rub your surgical wound   Do not put lotion or oils on your wound.  Do not take a tub bath or go swimming until your doctor says it is ok.  You may shower with this dressing over your wound.  After showering pat the dressing dry.  If you have staples a home health nurse will remove them in about 10 days.  To increase and promote healing:     Stop Smoking (or at least cut back on       Smoking).  Eat a well-balanced diet (high in protein       and vitamin C).  If you have a poor appetite, drink Ensure, Glucerna, or [place] Instant Breakfast for the next 30 days.  If you are diabetic, you should control your blood                                                sugars to prevent infection and help your wound                                                to heal.     Prevent Infection    1. Wash your hands.  This is the most important thing you or your caregiver can do.   [facility] your hands with soap and water (or use the hand  we gave you) before you touch any wounds. 2. Shower.  Use the antibacterial soap we gave you when you take a shower.  Shower with this soap until your wounds are healed. 3.  Use clean sheets.  Put freshly cleaned sheets on your bed after surgery.  To keep the surgery site clean, do not allow pets to sleep with you while your wound is still healing.  To prevent constipation, stay active and drink plenty of fluid.  While using pain medications, you should also take stool softeners and laxatives, such as Pericolace       and Miralax.  If you are having too many bowel       Movements, then you may need       to stop taking the laxatives.  You should have a bowel movement 3-4 days        after surgery and then at least every other day while       on pain medication.  To improve your recovery, you must be active!  Use your walker and take short walks         (in your home). about every 2 hours during the day.  Try to increase how far you walk each day.  You can put as much weight on your leg as you can tolerate while walking.  Home health physical therapy will come to your       home the day after you leave the hospital.  The       therapist will visit about 4 times over the next couple of       weeks to teach you how to get out of bed, to safely walk       in your home, and to do your exercises.  NO DRIVING until your surgeon tells you it is ok.  You can return to work when cleared by a physician.  Please call your physician immediately if you have:     Constant bleeding from your wound.  Increasing redness or swelling around your wound (Some warmth, bruising and swelling is normal).  Change in wound drainage (increase in amount, color, or bad smell).      Change in mental status (unusual behavior   Temperature over 101.5 degrees Fahrenheit      Pain or redness in the calf (back of your lower leg - see picture)     Increased swelling of the thigh, ankle, calf, or foot.  Emergency: CALL 911 if you have:     Shortness of breath     Chest pain when you cough or taking a deep breath     Please call your surgeons office at [phone] for a follow up appointment. _   You should call as soon as you get home or the next day after discharge. Ask to make an appointment in 2 weeks.  If you have questions or concerns during normal business hours, you may reach Dr. Jacob Maurer' Team at [phone].

## 2021-06-14 NOTE — PROGRESS NOTES
Problem: Falls - Risk of  Goal: *Absence of Falls  Description: Document Howell Maple Fall Risk and appropriate interventions in the flowsheet.   Outcome: Progressing Towards Goal  Note: Fall Risk Interventions:  Mobility Interventions: Assess mobility with egress test, Communicate number of staff needed for ambulation/transfer, OT consult for ADLs, PT Consult for mobility concerns, PT Consult for assist device competence, Strengthening exercises (ROM-active/passive), Patient to call before getting OOB, Utilize walker, cane, or other assistive device, Utilize gait belt for transfers/ambulation         Medication Interventions: Assess postural VS orthostatic hypotension, Evaluate medications/consider consulting pharmacy, Patient to call before getting OOB, Utilize gait belt for transfers/ambulation, Teach patient to arise slowly    Elimination Interventions: Call light in reach, Elevated toilet seat, Patient to call for help with toileting needs, Stay With Me (per policy), Toilet paper/wipes in reach, Toileting schedule/hourly rounds              Problem: Patient Education: Go to Patient Education Activity  Goal: Patient/Family Education  Outcome: Progressing Towards Goal

## 2021-06-14 NOTE — PROGRESS NOTES
Physical Therapy:  Orders received and chart reviewed. PA reports patient with high drowsiness and unable to participate in PT evaluation at this time. Patient asleep in bed, does not arouse to conversation. Will defer evaluation and follow up as appropriate.     Ana Doyle PT, DPT

## 2021-06-14 NOTE — OP NOTES
Raphael Emanuel MD - Adult Reconstruction and Total Joint Replacement    Nicole Garcia - MRN [id number] - : 1945 (68 y.o.)      Date: 2021    Pre-operative Diagnosis: Right Hip DJD     Post-operative Diagnosis: same     Procedure:  (1) Right Total Hip Arthroplasty, Direct Anterior Approach    (2) Intraoperative Fluoroscopy    (3) Imageless Computer Navigation     Implants Used:   Implant Name Type Inv.  Item Serial No.  Lot No. LRB No. Used Action   SHELL ACET MH 54 MM HIP GRP 6 CUP ALTEON - [id number]  SHELL ACET MH 54 MM HIP GRP 6 CUP ALTEON [id number] [employer] NA Right 1 Implanted   SCREW ACET CUP L45MM OD6.5MM - [id number]  SCREW ACET CUP L45MM OD6.5MM [id number] [employer] NA Right 1 Implanted   LINER ACET NEUT 36X54 MM HIP GRP 6 XLE ALTEON - [id number]  LINER ACET NEUT 36X54 MM HIP GRP 6 XLE ALTEON [id number] [employer] NA Right 1 Implanted   SCREW BONE L30MM DIA6.5MM FOR NOVATION CRWN CUP ACET SHELL - [id number]  SCREW BONE L30MM DIA6.5MM FOR NOVATION CRWN CUP ACET SHELL [id number] [employer] NA Right 1 Implanted   STEM FEM OD12MM 12/14 STD OFFSET CLLRD TAPR REDUC NK PRAMOD - [id number]  STEM FEM OD12MM 12/14 STD OFFSET CLLRD TAPR REDUC NK PRAMOD [id number] [employer] NA Right 1 Implanted   HEAD FEM +0MM [id number] ALUM OXIDE ZIRCONIA STRONTIUM OXIDE - [id number]  HEAD FEM +0MM [id number] ALUM OXIDE ZIRCONIA STRONTIUM OXIDE [id number] [employer] NA Right 1 Implanted       Anesthesia: GETA + local    Pre-operative antibiotic: Ancef    Surgeon: Raphael Emanuel     Assist: MEGAN Olivas (Performing all or most of the following assistant-at-surgery services including but not limited to: proper patient positioning, sterile/prep and draping, placement of instruments/trackers, operative exposure, minor portions of bone / soft tissue excision, final irrigation and debridement, deep and superficial closure, application of final dressings)    EBL: 250cc     Drains: none     Specimens: none Complications: none     Condition: stable to PACU     Brief History: Longstanding hip pain, unresponsive to conservative treatment. The risks, benefits, and alternatives to total hip replacement were thoroughly explained. The patient understood no guarantees could be given about the outcome of the procedure and wished to proceed. Description of Procedure: After being identified in the preoperative holding area and having their operative site marked, the patient was brought back to the operating room where they underwent anesthesia to good effect. They were  placed in the supine position with a bump under the hip. The operative extremity was then prepped and draped in the usual fashion using sterile technique. Preoperative antibiotics had been administered. An appropriate time-out was performed. We began by placing the pelvic tracker with two percutaneous Shanz pins into the ipsilateral ilium. The pelvis was then registered with the navigation system. An incision was made 2 fingerbreadths lateral and distal to the ASIS. The skin flaps were developed down to the tensor fascia. This was divided sharply. Blunt dissection was taken medially over the tensor muscle. Retractors were placed superior and inferior to the femoral neck. The anterior fat pad was debrided. Circumflex vessels were identified and cauterized. A provisional neck cut was performed. The head was removed from the acetabulum. We then excised the labrum. We sequentially reamed for the acetabular shell. This was placed in the appropriate abduction and version. Position was confirmed by navigation and fluoroscopy . The liner was then impacted into the locking mechanism. We then turned our attention to the femur. Elevators were used to gain access to the proximal femur. Soft tissue releases were performed as necessary. The lateralizer was utilized.  We then sequentially broached up to the final size trial. We placed a trial neck and head and had excellent restoration of leg length and offset with good soft tissue balance. We selected these as our final implants. Final components were inserted and the hip was reduced after thorough lavage. Restoration of appropriate leg length was confirmed by navigation and fluoroscopy. We again irrigated. The tensor fascia was closed. Periarticular injection was performed. Skin closure was performed in layers. A sterile dressing was applied. The patient was awakened, moved to the stretcher, and taken to the recovery room in stable condition. At the conclusion of the procedure, all counts were correct. There were no immediate complications. Additional Procedure:   Date: 6/14/2021    Preoperative diagnosis: RIGHT HIP OSTEOARTHRITIS    Postoperative diagnosis: RIGHT HIP OSTEOARTHRITIS    Procedure performed: Procedure(s):  RIGHT TOTAL HIP ARTHROPLASTY WITH NAVIGATION ANTERIOR APPROACH    Anesthesia: General    Surgeon(s) and Role:     Yogesh Dick MD - Primary      This describes the use of intraoperative fluoroscopy. Fluoroscopic imaging was utilized in orthogonal planes as well as using live technique for all phases of the procedure, described separately in the operative report, including hardware placement.     Bernard Cristina MD

## 2021-06-14 NOTE — PROGRESS NOTES
End of Shift Note    Bedside shift change report given to Tashia SALCIDO (oncoming nurse) by Lin Campos RN (offgoing nurse). Report included the following information SBAR, Kardex, Intake/Output, MAR and Recent Results    Shift worked:  day     Shift summary and any significant changes:     VS stable. Up to the chair for dinner. 2 instances of N/V this evening, scope patch placed behind R ear, prn zofran given as well. Concerns for physician to address:       Zone phone for oncoming shift:   [phone]       Activity:  Activity Level: Up with Assistance  Number times ambulated in hallways past shift: 0  Number of times OOB to chair past shift: 1    Cardiac:   Cardiac Monitoring: No      Cardiac Rhythm: Sinus Rhythm    Access:   Current line(s): PIV     Genitourinary:   Urinary status: voiding    Respiratory:   O2 Device: Nasal cannula  Chronic home O2 use?: NO  Incentive spirometer at bedside: YES     GI:  Last Bowel Movement Date: 06/13/21  Current diet:  ADULT DIET Regular  Passing flatus: YES  Tolerating current diet: YES       Pain Management:   Patient states pain is manageable on current regimen: YES    Skin:  Clifton Score: 21  Interventions: float heels, increase time out of bed, foam dressing, PT/OT consult and limit briefs    Patient Safety:  Fall Score:  Total Score: 3  Interventions: assistive device (walker, cane, etc), gripper socks, pt to call before getting OOB and stay with me (per policy)  High Fall Risk: Yes    Length of Stay:  Expected LOS: 1d 19h  Actual LOS: 0      Alison Feldman RN

## 2021-06-14 NOTE — PROGRESS NOTES
Ortho / Neurosurgery NP Note    POD# 0  s/p RIGHT TOTAL HIP ARTHROPLASTY WITH NAVIGATION ANTERIOR APPROACH   Pt seen with daughter at bedside. Pt resting in bed. Very drowsy, drifts to sleep during conversation. Denies post-op pain - has only received tylenol on ortho unit. Made aware of prn oxycodone, states she has tolerated well in past.   Denies nausea. Regular tray recently arrived. VSS Afebrile. 3L NC. Visit Vitals  /71   Pulse 75   Temp 97 °F (36.1 °C)   Resp 18   Ht 5' (1.524 m)   Wt 66.7 kg (147 lb 0.8 oz)   LMP 10/26/1995   SpO2 97%   BMI 28.72 kg/m²       Voiding status: due to void  Output (mL)  Last Bowel Movement Date: 06/13/21 (06/14/21 1329)      Labs    Lab Results   Component Value Date/Time    HGB 12.8 06/07/2021 08:21 AM      Lab Results   Component Value Date/Time    INR 1.0 06/07/2021 08:21 AM      Lab Results   Component Value Date/Time    Sodium 140 06/07/2021 08:21 AM    Potassium 4.0 06/07/2021 08:21 AM    Chloride 111 (H) 06/07/2021 08:21 AM    CO2 29 06/07/2021 08:21 AM    Glucose 111 (H) 06/07/2021 08:21 AM    Glucose 98 08/05/2010 08:34 AM    BUN 16 06/07/2021 08:21 AM    Creatinine 1.04 (H) 06/07/2021 08:21 AM    Calcium 8.8 06/07/2021 08:21 AM     Recent Glucose Results: No results found for: GLU, GLUPOC, GLUCPOC        Body mass index is 28.72 kg/m². : A BMI > 30 is classified as obesity and > 40 is classified as morbid obesity. Silver dressing with tegaderm c.d.i  Cryotherapy in place over incision  Calves soft and supple; No pain with passive stretch  Sensation and motor intact - PF/DF/EHL intact   SCDs for mechanical DVT proph while in bed     PLAN:  1) PT BID, OT - WBAT. Has RW.    2) Aspirin 81 mg PO BID for DVT Prophylaxis   3) GI Prophylaxis - Pepcid  4) Readniess for discharge:     [x] Vital Signs stable    [] Hgb stable    [] + Voiding    [x] Wound intact, drainage minimal    [x] Tolerating PO intake     [] Cleared by PT (OT if applicable)     [] Stair training completed (if applicable)    [] Independent / Contact Guard Assist (household distance)     [] Bed mobility     [] Car transfers     [] ADLs    [x] Adequate pain control on oral medication alone     Routine post-op care. Allow time from anesthesia. Hopefully can participate with therapy later today. Discharge pending voiding status & therapy clearance.      Tammy Langford, KAMI

## 2021-06-14 NOTE — ANESTHESIA PREPROCEDURE EVALUATION
Anesthetic History   No history of anesthetic complications            Review of Systems / Medical History  Patient summary reviewed, nursing notes reviewed and pertinent labs reviewed    Pulmonary  Within defined limits                 Neuro/Psych   Within defined limits           Cardiovascular    Hypertension        Dysrhythmias (palpitations; controlled)   Hyperlipidemia    Exercise tolerance: >4 METS  Comments: 03/19 EKG= SR, NSSTTW changes   GI/Hepatic/Renal     GERD: well controlled    Renal disease: stones and CRI       Endo/Other        Arthritis     Other Findings   Comments: RLS           Physical Exam    Airway  Mallampati: II  TM Distance: 4 - 6 cm  Neck ROM: normal range of motion   Mouth opening: Normal     Cardiovascular    Rhythm: regular  Rate: normal         Dental    Dentition: Full upper dentures and Implants  Comments: implants across lower front   Pulmonary  Breath sounds clear to auscultation               Abdominal  GI exam deferred       Other Findings            Anesthetic Plan    ASA: 2  Anesthesia type: general    Monitoring Plan: BIS      Induction: Intravenous  Anesthetic plan and risks discussed with: Patient

## 2021-06-14 NOTE — PROGRESS NOTES
Transition of Care Plan:  RUR: 6%  Disposition: home with home health- at home care   Follow up appointments: ortho  DME needed: pt owns dme needed for d/c  Transportation at Discharge: daughter  Ozzy Cuenca or means to access home: yes     IM Medicare letter: to be provided prior to d/c if applicable   Caregiver Contact: eliseo Corea [phone]  Discharge Caregiver contacted prior to discharge? yes      Reason for Admission:  Right total hip arthroplasty                 RUR Score: 6%                    Plan for utilizing home health:  Per recommendation       PCP: First and Last name: Alan Eid MD   Name of Practice: [facility] [place]    Are you a current patient: Yes/No: yes   Approximate date of last visit: 6/2/21   Can you participate in a virtual visit with your PCP: yes                    Current Advanced Directive/Advance Care Plan: Ora Moreira (ACP) Conversation      Date of Conversation: 6/14/21  Conducted with: Patient with [address] Decision Maker:     Click here to complete [address] including selection of the [address] Relationship (ie \"Primary\")  Today we documented Decision Maker(s) consistent with ACP documents on file. Content/Action Overview: Has ACP document(s) on file - reflects the patient's care preferences  Reviewed DNR/DNI and patient elects Full Code (Attempt Resuscitation)    Healthcare Decision Maker:   Click here to complete [address] including selection of the Healthcare Decision Maker Relationship (ie \"Primary\")                        Transition of Care Plan:                      CM made room visit with patient who was alert and oriented with daughter at bedside. Pt confirmed demographics, insurance, and emergency contact on file. Pt uses [facility] in [place] on [address]      Pt lives alone in a single level home with 5 kermit. Pt's dtr lives 1 mile down the road from patient. Pt has a RW. At baseline pt is independent with ADLs and driving. Pt has used HH in the past but unable to recall the name of the agency. Pt denied any hx of SNF/IPR. Pt's plan is to d/c home with home health. Pt requested to use [facility] as she received a phone call from them prior to surgery. FOC signed and placed on bedside chart. Referral sent to at home care and waiting for response. Pt's dtr to provide transportation at time of d/c and will also be staying with patient to assist during recovery. Update  [facility] has accepted patient for [facility] services. AVS updated. Care Management Interventions  PCP Verified by CM: Yes  Last Visit to PCP: 06/02/21  Mode of Transport at Discharge:  Other (see comment) (daughter)  Transition of Care Consult (CM Consult): Discharge Planning  Discharge Durable Medical Equipment: No  Physical Therapy Consult: Yes  Occupational Therapy Consult: Yes  Speech Therapy Consult: No  Current Support Network: Lives Alone, Family Lives [place], Own Home (patient lives alone in a single level home with 5 kermit)  Confirm Follow Up Transport: Family  The Plan for Transition of Care is Related to the Following Treatment Goals : hh  The Patient and/or Patient Representative was Provided with a Choice of Provider and Agrees with the Discharge Plan?: Yes  Freedom of Choice List was Provided with Basic Dialogue that Supports the Patient's Individualized Plan of Care/Goals, Treatment Preferences and Shares the Quality Data Associated with the Providers?: Yes  Discharge Location  Discharge Placement: Home with home health    Fransico Kennedy, [address]

## 2021-06-14 NOTE — PERIOP NOTES
TRANSFER - OUT REPORT:    Verbal report given to Alison SALCIDO on [address]  being transferred to [phone] (unit) for routine post - op       Report consisted of patients Situation, Background, Assessment and   Recommendations(SBAR). Information from the following report(s) SBAR, OR Summary, Procedure Summary, Intake/Output, MAR and Cardiac Rhythm NSR was reviewed with the receiving nurse. Opportunity for questions and clarification was provided.       Patient transported with:   O2 @ 3 liters  Tech

## 2021-06-14 NOTE — PROGRESS NOTES
Orders received and chart reviewed. Spoke with PA who reports patient with high drowsiness and unable to participate in OT evaluation at this time. Will follow up as appropriate and able.     Rebeca De La Cruz, OTS

## 2021-06-14 NOTE — H&P
Rosamaria Morales MD - Adult Reconstruction and Total Joint Replacement     Orthopaedic History and Physical        NAME: Sachin Rodriges       :  1945       MRN:  [id number]      Subjective:   Patient ID: Sachin Rodriges is a 76 y.o. female. Chief Complaint: Pain of the Right Hip    Ms. Kade Elizalde presents for an evaluation of her R hip pain. She received a R hip joint injection under ultrasound guidance on 2021. This offered her very good pain relief, but only for 4 weeks. Her pain is severe and has begun to limit her activity. She would like to discuss moving forward with a AR today.       Patient Active Problem List    Diagnosis Date Noted    Elevated hemoglobin A1c 2020    Synovial cyst of lumbar facet joint 2019    Traumatic complete tear of left rotator cuff 2019    Combined forms of age-related cataract of right eye 2019    Chronic midline low back pain without sciatica 2018    Advanced care planning/counseling discussion 2016    Hypertension 2016    Compression fracture 2015    Low back pain 2015    Abnormal stress test 2015    Hyperlipidemia 2015    Osteoporosis 2010    Vitamin D deficiency 2010    RLS (restless legs syndrome) 2010     Past Medical History:   Diagnosis Date    Arthritis     bilateral hands, hips, lower back    At risk for sleep apnea 2019    on phone assessment with VEENA, kathia sleep scoring tool, scored 4    Chronic kidney disease     kidney stones    Elevated hemoglobin A1c 2020    GERD (gastroesophageal reflux disease)     being treated    High cholesterol     Kidney stones     Menopause     Palpitations     as of 19 pt reports was evaluated by cardiology and cleared, now followed by cardiology, no further symptoms per pt    Pneumonia 2006 (approx)    Rheumatic fever     AGE 5    RLS (restless legs syndrome)     Shoulder fracture     [facility] -Jan 09  Right side       Past Surgical History:   Procedure Laterality Date    COLONOSCOPY  7/30/2010    Dr. Brain Reed, diverticulosis, repeat due 7/30/2015    COLONOSCOPY,JONAH CALHOUN,FORCEP/CAUTERY  9/4/2015         HX CATARACT REMOVAL Bilateral 03/2019    HX OPEN REDUCTION INTERNAL FIXATION Left 1982    ankle    HX ORTHOPAEDIC  2019    L4-L5 BACK SURGERY - synovial cyst removal    HX ROTATOR CUFF REPAIR Right 2008    HX ROTATOR CUFF REPAIR Left 2012    HX UROLOGICAL  2017    stent for kidney stone      Prior to Admission medications    Medication Sig Start Date End Date Taking? Authorizing Provider   nitrofurantoin, macrocrystal-monohydrate, (Macrobid) 100 mg capsule Take 1 Capsule by mouth two (2) times a day for 7 days. Indications: urinary tract infection due to E. coli bacteria 6/9/21 6/16/21  Phil PICKERING NP   acetaminophen-codeine (TYLENOL #3) 300-30 mg per tablet Take 1 Tablet by mouth every six (6) hours as needed for Pain for up to 5 days. Max Daily Amount: 4 Tablets. 6/8/21 6/13/21  Carmita Kemp NP   mupirocin (BACTROBAN) 2 % ointment by Both Nostrils route two (2) times a day for 5 days. 6/8/21 6/13/21  Krystal Chakraborty NP   mupirocin (BACTROBAN) 2 % ointment by Both Nostrils route two (2) times a day. 6/1/21 6/13/21  Provider, Historical   MAGNESIUM GLYCINATE PO Take  by mouth daily. Provider, Historical   pramipexole (MIRAPEX) 0.5 mg tablet TAKE 1/2 (ONE-HALF) TABLET BY MOUTH NIGHTLY FOR RESTLESS LEG SYNDROME 5/27/21   Amrik Romeo MD   omeprazole (PRILOSEC) 20 mg capsule Take 1 Cap by mouth daily. 4/13/21   Amrik Romeo MD   atorvastatin (LIPITOR) 40 mg tablet Take 1 Tab by mouth daily. Patient taking differently: Take 40 mg by mouth every evening.  10/13/20   Amrik Romeo MD     No Known Allergies   Social History     Tobacco Use    Smoking status: Never Smoker    Smokeless tobacco: Never Used   Substance Use Topics    Alcohol use: No     Alcohol/week: 0.0 standard drinks      Family History   Problem Relation Age of Onset    Cancer Father         Colon     No Known Problems Mother     Diabetes Grandchild     Heart Disease Brother     Heart Attack Brother     Anesth Problems Neg Hx         REVIEW OF SYSTEMS: A comprehensive review of systems was negative except for that written in the HPI. Objective:   Constitutional: She appears stated age. Pt is cooperative and is in no acute distress. Well nourished. Well developed. Body habitus is overweight. Body mass index is 29.49 kg/m². Gait is antalgic on the right hip with no assistive devices. Eyes: Sclera are nonicteric. Respiratory: No labored breathing. Cardiovascular: No marked edema. Well perfused extremities bilaterally. Skin: No marked skin ulcers. No lymphedema or skin abnormalities. Neurological: No marked sensory loss noted. Grossly neurovascularly intact. Both lower extremities are intact to distal sensory and motor function. Psychiatric: Alert and oriented x3. MUSCULOSKELETAL: Pain with passive range of motion of right hip. Loss of internal rotation. Imaging:   I ordered no new films in the office today. Previous films of R hip from 4/13/2021 demonstrated severe arthritic change. CAM lesion. Obliteration of joint space, bone-on-bone articulation. Assessment:     ICD-10-CM   1. Primary osteoarthritis of right hip M16.11     Plan: At this time, I explained to the patient that the only permament solution to her arthritic symptoms is a total hip replacement. We discussed the process, benefits, pertinent risks, and recovery time of this procedure today. She understands that she will need PCP approval prior to surgery. She would like to proceed with scheduling a R AR for mid June. All questions were answered to her satisfaction. Follow up after surgery. Scribed for Dr. Raiv Figueroa by [facility].         MEGAN Ramirez

## 2021-06-14 NOTE — ANESTHESIA POSTPROCEDURE EVALUATION
Procedure(s):  RIGHT TOTAL HIP ARTHROPLASTY WITH NAVIGATION ANTERIOR APPROACH. general    Anesthesia Post Evaluation        Patient location during evaluation: PACU  Note status: Adequate. Level of consciousness: responsive to verbal stimuli and sleepy but conscious  Pain management: satisfactory to patient  Airway patency: patent  Anesthetic complications: no  Cardiovascular status: acceptable  Respiratory status: acceptable  Hydration status: acceptable  Comments: +Post-Anesthesia Evaluation and Assessment    Patient: Braydon Hawkins MRN: [id number]  SSN: [id number]   YOB: 1945  Age: 68 y.o. Sex: female      Cardiovascular Function/Vital Signs    BP (!) 116/58   Pulse 80   Temp 36.5 °C (97.7 °F)   Resp 10   Ht 5' (1.524 m)   Wt 66.7 kg (147 lb 0.8 oz)   SpO2 95%   BMI 28.72 kg/m²     Patient is status post Procedure(s):  RIGHT TOTAL HIP ARTHROPLASTY WITH NAVIGATION ANTERIOR APPROACH. Nausea/Vomiting: Controlled. Postoperative hydration reviewed and adequate. Pain:  Pain Scale 1: Numeric (0 - 10) (06/14/21 1302)  Pain Intensity 1: 4 (06/14/21 1239)   Managed. Neurological Status:   Neuro (WDL): Within Defined Limits (06/14/21 0901)   At baseline. Mental Status and Level of Consciousness: Arousable. Pulmonary Status:   O2 Device: Nasal cannula (06/14/21 1300)   Adequate oxygenation and airway patent. Complications related to anesthesia: None    Post-anesthesia assessment completed. No concerns. Signed By: Brody No MD    6/14/2021  Post anesthesia nausea and vomiting:  controlled      INITIAL Post-op Vital signs:   Vitals Value Taken Time   /58 06/14/21 1300   Temp 36.5 °C (97.7 °F) 06/14/21 1239   Pulse 79 06/14/21 1313   Resp 10 06/14/21 1313   SpO2 96 % 06/14/21 1313   Vitals shown include unvalidated device data.

## 2021-06-14 NOTE — H&P
Date of Surgery Update:  Iraida Brothers was seen and examined on the day of surgery prior to the procedure. There were no significant clinical changes since the completion of the History and Physical.    Exam today prior to surgery showed no acute cardiac findings, no respiratory difficulty, and no abdominal complaints or pain. This patient is a candidate for TXA. The patient was counseled at length about the risks of flaquito Covid-19 during their perioperative period and any recovery window from their procedure. The patient was made aware that flaquito Covid-19  may worsen their prognosis for recovering from their procedure and lend to a higher morbidity and/or mortality risk. All material risks, benefits, and reasonable alternatives including postponing the procedure were discussed. The patient does wish to proceed with the procedure at this time. Documentation of Medical Necessity:    Symptoms: pain with activity and at rest, antalgia, interferes with ADLs    Conservative Treatment: activity modification, multiple medications, injection    Physical Findings: painful AROM/PROM, antalgia on ambulation, no trochanteric pain    Imaging: significant OA, sclerosis and osteophytes    Indications:   Failure of conservative treatments with daily pain and functional limitations. Appropriate imaging demonstrating significant disease. Appropriate physical findings consistent with significant degenerative joint disease. All pertinent risks, benefits, and alternatives to operative management including continued conservative care were explained at length. The patient has elected to proceed with appropriately indicated and medically necessary total joint arthroplasty. They understand no guarantees can be given about the outcome.     Signed By: MEGAN Grande     June 14, 2021 7:33 AM

## 2021-06-14 NOTE — PERIOP NOTES
Handoff Report from Operating Room to PACU    [facility] [address] [place], [place] [address]  and Sophie Zhou CRNA regarding Austin Pelayo. Surgeon(s):  Elyse Goodman MD  And Procedure(s) (LRB):  RIGHT TOTAL HIP ARTHROPLASTY WITH NAVIGATION ANTERIOR APPROACH (Right)  confirmed   with allergies and dressings discussed. Anesthesia type, drugs, patient history, complications, estimated blood loss, vital signs, intake and output, and last pain medication and reversal medications were reviewed.

## 2021-06-15 VITALS
HEART RATE: 68 BPM | TEMPERATURE: 98.1 F | RESPIRATION RATE: 18 BRPM | OXYGEN SATURATION: 95 % | SYSTOLIC BLOOD PRESSURE: 98 MMHG | WEIGHT: 147.05 LBS | DIASTOLIC BLOOD PRESSURE: 52 MMHG | BODY MASS INDEX: 28.87 KG/M2 | HEIGHT: 60 IN

## 2021-06-15 LAB
ANION GAP SERPL CALC-SCNC: 5 MMOL/L (ref 5–15)
BUN SERPL-MCNC: 17 MG/DL (ref 6–20)
BUN/CREAT SERPL: 16 (ref 12–20)
CALCIUM SERPL-MCNC: 7.5 MG/DL (ref 8.5–10.1)
CHLORIDE SERPL-SCNC: 110 MMOL/L (ref 97–108)
CO2 SERPL-SCNC: 24 MMOL/L (ref 21–32)
CREAT SERPL-MCNC: 1.09 MG/DL (ref 0.55–1.02)
GLUCOSE SERPL-MCNC: 196 MG/DL (ref 65–100)
HGB BLD-MCNC: 8.6 G/DL (ref 11.5–16)
POTASSIUM SERPL-SCNC: 4.8 MMOL/L (ref 3.5–5.1)
SODIUM SERPL-SCNC: 139 MMOL/L (ref 136–145)

## 2021-06-15 PROCEDURE — 97116 GAIT TRAINING THERAPY: CPT | Performed by: PHYSICAL THERAPIST

## 2021-06-15 PROCEDURE — 80048 BASIC METABOLIC PNL TOTAL CA: CPT

## 2021-06-15 PROCEDURE — 36415 COLL VENOUS BLD VENIPUNCTURE: CPT

## 2021-06-15 PROCEDURE — 97535 SELF CARE MNGMENT TRAINING: CPT

## 2021-06-15 PROCEDURE — 97161 PT EVAL LOW COMPLEX 20 MIN: CPT | Performed by: PHYSICAL THERAPIST

## 2021-06-15 PROCEDURE — 85018 HEMOGLOBIN: CPT

## 2021-06-15 PROCEDURE — 74011000250 HC RX REV CODE- 250: Performed by: PHYSICIAN ASSISTANT

## 2021-06-15 PROCEDURE — 97530 THERAPEUTIC ACTIVITIES: CPT

## 2021-06-15 PROCEDURE — 74011250637 HC RX REV CODE- 250/637: Performed by: PHYSICIAN ASSISTANT

## 2021-06-15 PROCEDURE — 97530 THERAPEUTIC ACTIVITIES: CPT | Performed by: PHYSICAL THERAPIST

## 2021-06-15 PROCEDURE — 74011250636 HC RX REV CODE- 250/636: Performed by: PHYSICIAN ASSISTANT

## 2021-06-15 PROCEDURE — 97165 OT EVAL LOW COMPLEX 30 MIN: CPT

## 2021-06-15 RX ORDER — ASPIRIN 81 MG/1
81 TABLET ORAL 2 TIMES DAILY
Qty: 60 TABLET | Refills: 0 | Status: SHIPPED | OUTPATIENT
Start: 2021-06-15 | End: 2021-07-15

## 2021-06-15 RX ORDER — AMOXICILLIN 250 MG
1 CAPSULE ORAL 2 TIMES DAILY
Qty: 28 TABLET | Refills: 0 | Status: SHIPPED | OUTPATIENT
Start: 2021-06-15 | End: 2021-06-29

## 2021-06-15 RX ORDER — POLYETHYLENE GLYCOL 3350 17 G/17G
17 POWDER, FOR SOLUTION ORAL DAILY
Qty: 14 PACKET | Refills: 0 | Status: SHIPPED | OUTPATIENT
Start: 2021-06-15 | End: 2021-06-29

## 2021-06-15 RX ORDER — TRAMADOL HYDROCHLORIDE 50 MG/1
50-100 TABLET ORAL
Qty: 60 TABLET | Refills: 0 | Status: SHIPPED | OUTPATIENT
Start: 2021-06-15 | End: 2021-06-29

## 2021-06-15 RX ORDER — ACETAMINOPHEN 325 MG/1
650 TABLET ORAL EVERY 6 HOURS
Qty: 112 TABLET | Refills: 0 | Status: SHIPPED
Start: 2021-06-15 | End: 2021-06-29

## 2021-06-15 RX ADMIN — TRAMADOL HYDROCHLORIDE 50 MG: 50 TABLET, FILM COATED ORAL at 06:07

## 2021-06-15 RX ADMIN — ASPIRIN 81 MG: 81 TABLET, COATED ORAL at 09:19

## 2021-06-15 RX ADMIN — CEFAZOLIN 2 G: 1 INJECTION, POWDER, FOR SOLUTION INTRAMUSCULAR; INTRAVENOUS at 02:25

## 2021-06-15 RX ADMIN — ACETAMINOPHEN 650 MG: 325 TABLET ORAL at 09:19

## 2021-06-15 RX ADMIN — DOCUSATE SODIUM 50MG AND SENNOSIDES 8.6MG 1 TABLET: 8.6; 5 TABLET, FILM COATED ORAL at 09:19

## 2021-06-15 RX ADMIN — POLYETHYLENE GLYCOL 3350 17 G: 17 POWDER, FOR SOLUTION ORAL at 09:19

## 2021-06-15 RX ADMIN — Medication 10 ML: at 06:07

## 2021-06-15 RX ADMIN — TRAMADOL HYDROCHLORIDE 50 MG: 50 TABLET, FILM COATED ORAL at 00:04

## 2021-06-15 RX ADMIN — ACETAMINOPHEN 650 MG: 325 TABLET ORAL at 02:24

## 2021-06-15 NOTE — PROGRESS NOTES
DISCHARGE NOTE FROM [facility]    Patient determined to be stable for discharge by attending provider. I have reviewed the discharge instructions with the patient. They verbalized understanding and all questions were answered to their satisfaction. No complaints or further questions were expressed. Medications sent to pharmacy. Appropriate educational materials and medication side effect teaching were provided. PIV were removed prior to discharge. Patient did not discharge with any line, hook, or drain. Personal items and valuables accounted for at discharge by patient and/or family: YES    Post-op patient: Yes- Patient given post-op discharge kit and instructed on use.        Jennifer Bhat RN

## 2021-06-15 NOTE — DISCHARGE SUMMARY
Ortho Discharge Summary    Patient ID:  Jean Carlos Landis  [id number]  female  68 y.o.  1945    Admit date: 6/14/2021    Discharge date: 6/15/2021    Admitting Physician: Lisa Osullivan MD     Consulting Physician(s):   Treatment Team: Attending Provider: Kosta Benjamin MD; Utilization Review: Quang North RN; Care Manager: Ana Maria Soto    Date of Surgery:   6/14/2021     Preoperative Diagnosis:  RIGHT HIP OSTEOARTHRITIS    Postoperative Diagnosis:   RIGHT HIP OSTEOARTHRITIS    Procedure(s):   RIGHT TOTAL HIP ARTHROPLASTY WITH NAVIGATION ANTERIOR APPROACH     Anesthesia Type:   General     Surgeon: Kosta Benjamin MD                            HPI:  Pt is a 68 y.o. female who has a history of RIGHT HIP OSTEOARTHRITIS  with pain and limitations of activities of daily living who presents at this time for a RIGHT TOTAL HIP [address] following the failure of conservative management. PMH:   Past Medical History:   Diagnosis Date    Arthritis     bilateral hands, hips, lower back    At risk for sleep apnea 03/18/2019    on phone assessment with PAT, kathia sleep scoring tool, scored 4    Chronic kidney disease 2017    kidney stones    Elevated hemoglobin A1c 1/27/2020    GERD (gastroesophageal reflux disease)     being treated    High cholesterol     Kidney stones     Menopause 1995    Palpitations     as of 6/24/19 pt reports was evaluated by cardiology and cleared, now followed by cardiology, no further symptoms per pt    Pneumonia 2006 (approx)    Rheumatic fever     AGE 5    RLS (restless legs syndrome)     Shoulder fracture 2009 [facility] -Jan 09  Right side        Body mass index is 28.72 kg/m². : A BMI > 30 is classified as obesity and > 40 is classified as morbid obesity. Medications upon admission :   Prior to Admission Medications   Prescriptions Last Dose Informant Patient Reported? Taking?    MAGNESIUM GLYCINATE PO 6/13/2021 at Unknown time Yes Yes   Sig: Take  by mouth daily. acetaminophen-codeine (TYLENOL #3) 300-30 mg per tablet 6/13/2021 at 2115  No Yes   Sig: Take 1 Tablet by mouth every six (6) hours as needed for Pain for up to 5 days. Max Daily Amount: 4 Tablets. atorvastatin (LIPITOR) 40 mg tablet 6/13/2021 at Unknown time  No Yes   Sig: Take 1 Tab by mouth daily. Patient taking differently: Take 40 mg by mouth every evening. mupirocin (BACTROBAN) 2 % ointment   No No   Sig: by Both Nostrils route two (2) times a day for 5 days. mupirocin (BACTROBAN) 2 % ointment   Yes No   Sig: by Both Nostrils route two (2) times a day. nitrofurantoin, macrocrystal-monohydrate, (Macrobid) 100 mg capsule 6/13/2021 at 1700  No Yes   Sig: Take 1 Capsule by mouth two (2) times a day for 7 days. Indications: urinary tract infection due to E. coli bacteria   omeprazole (PRILOSEC) 20 mg capsule 6/14/2021 at 0600  No Yes   Sig: Take 1 Cap by mouth daily. pramipexole (MIRAPEX) 0.5 mg tablet 6/13/2021 at Unknown time  No Yes   Sig: TAKE 1/2 (ONE-HALF) TABLET BY MOUTH NIGHTLY FOR RESTLESS LEG SYNDROME      Facility-Administered Medications: None        Allergies:  No Known Allergies     Hospital Course: The patient underwent surgery. Complications:  None; patient tolerated the procedure well. Was taken to the PACU in stable condition and then transferred to the ortho floor. Perioperative Antibiotics:  Ancef     Postoperative Pain Management:  Oxycodone & Tylenol     DVT Prophylaxis: Aspirin 81 BID    Postoperative transfusions:    Number of units banked PRBCs =   none     Post Op complications: none    Hemoglobin at discharge:    Lab Results   Component Value Date/Time    HGB 8.6 (L) 06/15/2021 02:35 AM    INR 1.0 06/07/2021 08:21 AM       Silver dressing and tegaderm remained clean, dry and intact. No significant erythema or swelling. Wound appears to be healing without any evidence of infection.  Neurovascular exam found to be within normal limits. Physical Therapy started following surgery and participated in bed mobility, transfers and ambulation. Discharged to: Home with [facility]. Condition on Discharge:   stable    Discharge instructions:  - Anticoagulate with Aspirin 81 BID  - Take pain medications as prescribed  - Resume pre hospital diet      - Discharge activity: activity as tolerated  - Ambulate with assistive device as needed. - Weight bearing status - WBAT  - Wound Care Keep wound clean and dry. See discharge instruction sheet. -DISCHARGE MEDICATION LIST     Current Discharge Medication List      START taking these medications    Details   acetaminophen (TYLENOL) 325 mg tablet Take 2 Tablets by mouth every six (6) hours for 14 days. Qty: 112 Tablet, Refills: 0  Start date: 6/15/2021, End date: 6/29/2021      aspirin delayed-release 81 mg tablet Take 1 Tablet by mouth two (2) times a day for 30 days. Qty: 60 Tablet, Refills: 0  Start date: 6/15/2021, End date: 7/15/2021      polyethylene glycol (MIRALAX) 17 gram packet Take 1 Packet by mouth daily for 14 days. Qty: 14 Packet, Refills: 0  Start date: 6/15/2021, End date: 6/29/2021      senna-docusate (PERICOLACE) 8.6-50 mg per tablet Take 1 Tablet by mouth two (2) times a day for 14 days. Qty: 28 Tablet, Refills: 0  Start date: 6/15/2021, End date: 6/29/2021      traMADoL (ULTRAM) 50 mg tablet Take 1-2 Tablets by mouth every six (6) hours as needed for Pain for up to 14 days. Max Daily Amount: 400 mg. Qty: 60 Tablet, Refills: 0  Start date: 6/15/2021, End date: 6/29/2021    Associated Diagnoses: Status post right hip replacement         CONTINUE these medications which have NOT CHANGED    Details   nitrofurantoin, macrocrystal-monohydrate, (Macrobid) 100 mg capsule Take 1 Capsule by mouth two (2) times a day for 7 days. Indications: urinary tract infection due to E. coli bacteria  Qty: 14 Capsule, Refills: 0      MAGNESIUM GLYCINATE PO Take  by mouth daily. pramipexole (MIRAPEX) 0.5 mg tablet TAKE 1/2 (ONE-HALF) TABLET BY MOUTH NIGHTLY FOR RESTLESS LEG SYNDROME  Qty: 45 Tablet, Refills: 0    Associated Diagnoses: RLS (restless legs syndrome)      omeprazole (PRILOSEC) 20 mg capsule Take 1 Cap by mouth daily. Qty: 90 Cap, Refills: 3    Associated Diagnoses: Gastroesophageal reflux disease, unspecified whether esophagitis present      atorvastatin (LIPITOR) 40 mg tablet Take 1 Tab by mouth daily.   Qty: 90 Tab, Refills: 3    Associated Diagnoses: Hyperlipidemia, unspecified hyperlipidemia type         STOP taking these medications       acetaminophen-codeine (TYLENOL #3) 300-30 mg per tablet Comments:   Reason for Stopping:         mupirocin (BACTROBAN) 2 % ointment Comments:   Reason for Stopping:         mupirocin (BACTROBAN) 2 % ointment Comments:   Reason for Stopping:            per medical continuation form      -Follow up in office in 2 weeks      Signed:  Trisha Way NP  Orthopaedic Nurse Practitioner    6/15/2021  10:13 AM

## 2021-06-15 NOTE — PROGRESS NOTES
Ortho / Neurosurgery NP Note    POD# 1  s/p RIGHT TOTAL HIP ARTHROPLASTY WITH NAVIGATION ANTERIOR APPROACH   Pt seen with no visitor present. Pt resting in bed, in NAD. Awake and alert today. Ambulated to bathroom multiple times overnight - tolerating activity well. Denies dizziness at rest or with activity. Post-op hip pain well controlled with prn Tramadol. PONV resolved - Tolerating regular diet. VSS Afebrile. Room air. Visit Vitals  BP (!) 98/52 (BP 1 Location: Left arm, BP Patient Position: At rest)   Pulse 68   Temp 98.1 °F (36.7 °C)   Resp 18   Ht 5' (1.524 m)   Wt 66.7 kg (147 lb 0.8 oz)   LMP 10/26/1995   SpO2 95%   BMI 28.72 kg/m²       Voiding status: voiding   Output (mL)  Urine Voided: 450 ml (06/15/21 0607)  Last Bowel Movement Date: 06/13/21 (06/15/21 0755)      Labs    Lab Results   Component Value Date/Time    HGB 8.6 (L) 06/15/2021 02:35 AM      Lab Results   Component Value Date/Time    INR 1.0 06/07/2021 08:21 AM      Lab Results   Component Value Date/Time    Sodium 139 06/15/2021 02:35 AM    Potassium 4.8 06/15/2021 02:35 AM    Chloride 110 (H) 06/15/2021 02:35 AM    CO2 24 06/15/2021 02:35 AM    Glucose 196 (H) 06/15/2021 02:35 AM    Glucose 98 08/05/2010 08:34 AM    BUN 17 06/15/2021 02:35 AM    Creatinine 1.09 (H) 06/15/2021 02:35 AM    Calcium 7.5 (L) 06/15/2021 02:35 AM     Recent Glucose Results:   Lab Results   Component Value Date/Time     (H) 06/15/2021 02:35 AM           Body mass index is 28.72 kg/m². : A BMI > 30 is classified as obesity and > 40 is classified as morbid obesity. Silver dressing with tegaderm c.d.i  Cryotherapy in place over incision  Calves soft and supple; No pain with passive stretch  Sensation and motor intact - PF/DF/EHL intact   SCDs for mechanical DVT proph while in bed     PLAN:  1) PT BID, OT - WBAT. Has RW.    2) Aspirin 81 mg PO BID for DVT Prophylaxis   3) GI Prophylaxis - Pepcid  4) Readniess for discharge:     [x] Vital Signs stable    [x] Hgb stable    [x] + Voiding    [x] Wound intact, drainage minimal    [x] Tolerating PO intake     [] Cleared by PT (OT if applicable)     [] Stair training completed (if applicable)    [] Independent / Contact Guard Assist (household distance)     [] Bed mobility     [] Car transfers     [] ADLs    [x] Adequate pain control on oral medication alone     Discharge home today pending therapy clearance. Plans to return home with support of family & HH.      Pierce Uribe NP

## 2021-06-15 NOTE — PROGRESS NOTES
Problem: Mobility Impaired (Adult and Pediatric)  Goal: *Acute Goals and Plan of Care (Insert Text)  Description: FUNCTIONAL STATUS PRIOR TO ADMISSION: Patient was independent and active without use of DME.    HOME SUPPORT PRIOR TO ADMISSION: The patient lived alone with daughter in the area to provide assistance. Physical Therapy Goals  Initiated 6/15/2021    1. Patient will move from supine to sit and sit to supine  in bed with modified independence within 4 days. 2. Patient will perform sit to stand with modified independence within 4 days. 3. Patient will ambulate with modified independence for 250 feet with the least restrictive device within 4 days. 4. Patient will ascend/descend 4 stairs with 1 handrail(s) with modified independence within 4 days. 5. Patient will verbalize and demonstrate understanding of AR anterior precautions per protocol within 4 days. 6. Patient will perform Hip home exercise program per protocol with modified independence within 4 days. Outcome: Progressing Towards Goal   PHYSICAL THERAPY EVALUATION  Patient: Tom Ruano (63 y.o. female)  Date: 6/15/2021  Primary Diagnosis: S/P hip replacement [Z96.649]  Procedure(s) (LRB):  RIGHT TOTAL HIP ARTHROPLASTY WITH NAVIGATION ANTERIOR APPROACH (Right) 1 Day Post-Op   Precautions: WBAT  Fall    ASSESSMENT  Based on the objective data described below, the patient presents with decreased functional mobility from baseline. Patient currently limited by pain but otherwise moving well. Needing supervision for bed mobility using gait belt and SBA for transfers. Amb approx 150 feet with RW and CGA with slow daine but no overt LOB. Able to up/down stairs with use of rail without difficulty. Educated on exercises and use of ice at home with good return demo. Patient has met all PT goals and is cleared to DC. Will continue to follow should DC be delayed.     Patient is cleared for discharge from PT standpoint:  YES [x] NO []      Other factors to consider for discharge: none     Patient will benefit from skilled therapy intervention to address the above noted impairments. PLAN :  Recommendations and Planned Interventions: bed mobility training, transfer training, gait training, therapeutic exercises, neuromuscular re-education, patient and family training/education, and therapeutic activities      Frequency/Duration: Patient will be followed by physical therapy:  twice daily to address goals. Recommendation for discharge: (in order for the patient to meet his/her long term goals)  Physical therapy at least 2 days/week in the home . IF patient discharges home will need the following DME: none         SUBJECTIVE:   Patient stated I actually feel really good. \"    OBJECTIVE DATA SUMMARY:   HISTORY:    Past Medical History:   Diagnosis Date    Arthritis     bilateral hands, hips, lower back    At risk for sleep apnea 03/18/2019    on phone assessment with PAT, kathia sleep scoring tool, scored 4    Chronic kidney disease 2017    kidney stones    Elevated hemoglobin A1c 1/27/2020    GERD (gastroesophageal reflux disease)     being treated    High cholesterol     Kidney stones     Menopause 1995    Palpitations     as of 6/24/19 pt reports was evaluated by cardiology and cleared, now followed by cardiology, no further symptoms per pt    Pneumonia 2006 (approx)    Rheumatic fever     AGE 5    RLS (restless legs syndrome)     Shoulder fracture 2009 [facility] -Jan 09  Right side      Past Surgical History:   Procedure Laterality Date    COLONOSCOPY  7/30/2010    Dr. Beka Bonner, diverticulosis, repeat due 7/30/2015    COLONOSCOPY,JONAH CALHOUN,FORCEP/CAUTERY  9/4/2015         HX CATARACT REMOVAL Bilateral 03/2019    HX OPEN REDUCTION INTERNAL FIXATION Left 1982    ankle    HX ORTHOPAEDIC  2019    L4-L5 BACK SURGERY - synovial cyst removal    HX ROTATOR CUFF REPAIR Right 2008    HX ROTATOR CUFF REPAIR Left 2012    HX UROLOGICAL  2017    stent for kidney stone       Personal factors and/or comorbidities impacting plan of care:     Home Situation  Home Environment: Private residence  # Steps to Enter: 5  Rails to Enter: Yes  Hand Rails : Right  Wheelchair Ramp: Yes  One/Two Story Residence: One story  Living Alone: Yes  Support Systems: Friends \ neighbors  Patient Expects to be Discharged to[de-identified] [employer]  Current DME Used/Available at Home: Kait Lover, rolling  Tub or Shower Type: Shower    EXAMINATION/PRESENTATION/DECISION MAKING:   Critical Behavior:  Neurologic State: Alert  Orientation Level: Oriented X4  Cognition: Appropriate decision making, Appropriate for age attention/concentration, Appropriate safety awareness, Follows commands  Safety/Judgement: Awareness of environment, Good awareness of safety precautions, Home safety  Hearing: Auditory  Auditory Impairment: None    Range Of Motion:  AROM: Generally decreased, functional           PROM: Generally decreased, functional           Strength:    Strength: Generally decreased, functional                    Tone & Sensation:   Tone: Normal              Sensation: Intact               Coordination:  Coordination: Generally decreased, functional  Vision:      Functional Mobility:  Bed Mobility:     Supine to Sit: Supervision (use of gait belt)     Scooting: Supervision  Transfers:  Sit to Stand: Stand-by assistance  Stand to Sit: Stand-by assistance        Bed to Chair: Stand-by assistance              Balance:   Sitting: Intact  Ambulation/Gait Training:  Distance (ft): 150 Feet (ft)  Assistive Device: Gait belt;Walker, rolling  Ambulation - Level of Assistance: Contact guard assistance     Gait Description (WDL): Exceptions to WDL  Gait Abnormalities: Antalgic;Decreased step clearance        Base of Support: Widened  Stance: Right decreased  Speed/Cynthia: Pace decreased (<100 feet/min); Slow  Step Length: Left shortened;Right shortened             Pain Rating:  No c/o pain    Activity Tolerance:   Good    After treatment patient left in no apparent distress:   Sitting in chair and Call bell within reach    COMMUNICATION/EDUCATION:   The patients plan of care was discussed with: Physical therapist, Occupational therapist, and Registered nurse. Fall prevention education was provided and the patient/caregiver indicated understanding., Patient/family have participated as able in goal setting and plan of care. , and Patient/family agree to work toward stated goals and plan of care.     Thank you for this referral.  Felicity Jasmine, PT, DPT   Time Calculation: 30 mins

## 2021-06-15 NOTE — PROGRESS NOTES
Problem: Falls - Risk of  Goal: *Absence of Falls  Description: Document Augusta Marianos Fall Risk and appropriate interventions in the flowsheet.   Outcome: Progressing Towards Goal  Note: Fall Risk Interventions:  Mobility Interventions: Communicate number of staff needed for ambulation/transfer, OT consult for ADLs, Patient to call before getting OOB, PT Consult for mobility concerns, PT Consult for assist device competence, Utilize walker, cane, or other assistive device         Medication Interventions: Evaluate medications/consider consulting pharmacy, Patient to call before getting OOB    Elimination Interventions: Call light in reach, Patient to call for help with toileting needs

## 2021-06-15 NOTE — PROGRESS NOTES
End of Shift Note    Bedside shift change report given to Abhinav JUNIOR RN (oncoming nurse) by Rhoda Rodriguez RN (offgoing nurse). Report included the following information SBAR, Kardex, OR Summary, Procedure Summary, Intake/Output, MAR, Recent Results and Med Rec Status    Shift worked:  7p-7a     Shift summary and any significant changes:          Concerns for physician to address:       Zone phone for oncoming shift:   [phone]       Activity:  Activity Level: Up with Assistance  Number times ambulated in hallways past shift: 0  Number of times OOB to chair past shift: 0, up to [facility]    Cardiac:   Cardiac Monitoring: No      Cardiac Rhythm: Sinus Rhythm    Access:   Current line(s): PIV     Genitourinary:   Urinary status: voiding    Respiratory:   O2 Device: Nasal cannula  Chronic home O2 use?: NO  Incentive spirometer at bedside: YES     GI:  Last Bowel Movement Date: 06/13/21  Current diet:  ADULT DIET Regular  Passing flatus: YES  Tolerating current diet: YES       Pain Management:   Patient states pain is manageable on current regimen: YES    Skin:  Clifton Score: 19  Interventions: float heels, increase time out of bed and PT/OT consult    Patient Safety:  Fall Score:  Total Score: 3  Interventions: assistive device (walker, cane, etc), gripper socks, pt to call before getting OOB and stay with me (per policy)  High Fall Risk: Yes    Length of Stay:  Expected LOS: 1d 19h  Actual LOS: 1      Tashia Prieto RN

## 2021-06-15 NOTE — PROGRESS NOTES
OCCUPATIONAL THERAPY EVALUATION/DISCHARGE  Patient: [id number] (10 y.o. female)  Date: 6/15/2021  Primary Diagnosis: S/P hip replacement [Z96.649]  Procedure(s) (LRB):  RIGHT TOTAL HIP ARTHROPLASTY WITH NAVIGATION ANTERIOR APPROACH (Right) 1 Day Post-Op   Precautions:   Fall    ASSESSMENT  Based on the objective data described below, the patient presents with decreased higher level mobility/balance/activity tolerance and decreased distal LE ADL management; however, she is demonstrating a high level of safe functional independence, with SBA for RW mobility/balance and Min A for distal LE ADL management, with pt reporting she owns reacher and will have daughter purchase recommended long-handled sponge. Pt educated to have Supervision for initial shower, with pt stating she will follow up with recommended shower chair if she believes she needs it. Pt reports daughter will stay with her upon discharge. Given pt's current high level of ADL independence, all DME/AE needs fulfilled, physical therapy following mobility/balance deficits and recommended level of ADL assist available within the home, no other acute OT needs identified, with OT answering pt's quetsions/concerns and pt thanking gabriel for is efforts. Current Level of Function (ADLs/self-care): Min A    Functional Outcome Measure: The patient scored 70/100 on the Hayley Index outcome measure. Other factors to consider for discharge: none     PLAN :  Recommendation for discharge: (in order for the patient to meet his/her long term goals)  No skilled occupational therapy/ follow up rehabilitation needs identified at this time.     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     OBJECTIVE DATA SUMMARY:   HISTORY:   Past Medical History:   Diagnosis Date    Arthritis     bilateral hands, hips, lower back    At risk for sleep apnea 03/18/2019    on phone assessment with VEENA, kathia sleep scoring tool, scored 4    Chronic kidney disease 2017    kidney stones    Elevated hemoglobin A1c 1/27/2020    GERD (gastroesophageal reflux disease)     being treated    High cholesterol     Kidney stones     Menopause 1995    Palpitations     as of 6/24/19 pt reports was evaluated by cardiology and cleared, now followed by cardiology, no further symptoms per pt    Pneumonia 2006 (approx)    Rheumatic fever     AGE 5    RLS (restless legs syndrome)     Shoulder fracture 2009 [facility] -Jan 09  Right side      Past Surgical History:   Procedure Laterality Date    COLONOSCOPY  7/30/2010    Dr. Jose Sky, diverticulosis, repeat due 7/30/2015    COLONOSCOPY,REMV UNIQUE,FORCEP/CAUTERY  9/4/2015         HX CATARACT REMOVAL Bilateral 03/2019    HX OPEN REDUCTION INTERNAL FIXATION Left 1982    ankle    HX ORTHOPAEDIC  2019    L4-L5 BACK SURGERY - synovial cyst removal    HX ROTATOR CUFF REPAIR Right 2008    HX ROTATOR CUFF REPAIR Left 2012    HX UROLOGICAL  2017    stent for kidney stone       Prior Level of Function/Environment/Context: Independent with ADLs/IADLs   Expanded or extensive additional review of patient history:     Home Situation  Home Environment: Private residence  # Steps to Enter: 5  Rails to Enter: Yes  Hand Rails : Right  Wheelchair Ramp: Yes  One/Two Story Residence: One story  Living Alone: Yes  Support Systems: Friends \ neighbors  Patient Expects to be Discharged to[de-identified] [employer]  Current DME Used/Available at Home: Chrisandra Scott, rolling  Tub or Shower Type: Shower    Hand dominance: Right    EXAMINATION OF PERFORMANCE DEFICITS:  Cognitive/Behavioral Status:  Neurologic State: Alert  Orientation Level: Oriented X4  Cognition: Appropriate decision making; Appropriate for age attention/concentration; Appropriate safety awareness; Follows commands  Perception: Appears intact  Perseveration: No perseveration noted  Safety/Judgement: Awareness of environment;Good awareness of safety precautions; Home safety    Hearing: Auditory  Auditory Impairment: None    Range of Motion:  AROM: Generally decreased, functional  PROM: Generally decreased, functional                      Strength:  Strength: Generally decreased, functional                Coordination:  Coordination: Generally decreased, functional  Fine Motor Skills-Upper: Left Intact; Right Intact    Gross Motor Skills-Upper: Left Intact; Right Intact    Tone & Sensation:  Tone: Normal  Sensation: Intact                      Balance:  Sitting: Intact    Functional Mobility and Transfers for ADLs:  Bed Mobility:  Supine to Sit: Supervision (use of gait belt)  Scooting: Supervision    Transfers:  Sit to Stand: Stand-by assistance  Stand to Sit: Stand-by assistance  Bed to Chair: Stand-by assistance  Bathroom Mobility: Stand-by assistance    ADL Assessment:  The patient recalled and demonstrated hip contraindications Right LE during ADLs and functional mobility with Min cues. Feeding: Independent    Oral Facial Hygiene/Grooming: Stand-by assistance    Bathing: Minimum assistance    Upper Body Dressing: Independent    Lower Body Dressing: Minimum assistance    Toileting: Stand by assistance                ADL Intervention and task modifications:  Patient instructed and indicated understanding the benefits of maintaining activity tolerance, functional mobility, and independence with self care tasks during acute stay  to ensure safe return home and to baseline. Encouraged patient to increase frequency and duration OOB, be out of bed for all meals, perform daily ADLs (as approved by RN/MD regarding bathing etc), and performing functional mobility to/from bathroom.     Pt educated on safe transfer techniques, with specific emphasis on proper hand placement to push up from seated surface rather than attempt to pull self up, fully positioning self in-front of desired seated location, feeling chair on back of legs and reaching back with 1-2 UE to slowly lower self to seated position. Cognitive Retraining  Safety/Judgement: Awareness of environment;Good awareness of safety precautions; Home safety    Bathing: Patient instructed when bathing to not submerge wound in water, stand to shower or sponge bathe, cover wound with plastic and tape to ensure no water reaches bandage/wound without cues. Patient indicated understanding. Dressing joint: Patient instructed and demonstrated to don/doff Right LE first/last Min verbal cues. Patient instructed and demonstrated to don all clothing while sitting prior to standing, doff all clothing to knees while standing, then sit to doff clothing off from knees to feet in order to facilitate fall prevention, pain management, and energy conservation with Minimum assistance. Home safety: Patient instructed on home modifications and safety (raise height of ADL objects, appropriate height of chair surfaces, recliner safety, change of floor surfaces, clear pathways) to increase independence and fall prevention. Patient indicated understanding. Standing: Patient instructed and demonstrated to walk up to sink/counter top/surfaces, step into walker to increase safety of joint and fall prevention with Stand-by assistance. Instructed to apply concept of hip contraindications to ADLs within the home (no extreme movements, square off while using objects, slide objects along surfaces). Patient instructed to increase amount of time standing, observe standing position during ADLs in order to increase even weight bearing through bilateral LEs in order to increase independence with ADLs. Goal to be reached 30 days post - op, per orthopedic surgeon or per PT. Patient indicated understanding.   Shower transfer: Patient instructed regarding when it is safe to begin transfer into shower (complete stairs with PT, advance exercises with PT high enough to clear tub height). Patient instructed to use the same technique as used with stairs when entering and exiting shower (\"up with the non-surgical, down with the surgical leg\"). Patient indicated understanding. Functional Measure:  Barthel Index:    Bathin  Bladder: 10  Bowels: 10  Groomin  Dressin  Feeding: 10  Mobility: 10  Stairs: 5  Toilet Use: 5  Transfer (Bed to Chair and Back): 10  Total: 70/100        The Barthel ADL Index: Guidelines  1. The index should be used as a record of what a patient does, not as a record of what a patient could do. 2. The main aim is to establish degree of independence from any help, physical or verbal, however minor and for whatever reason. 3. The need for supervision renders the patient not independent. 4. A patient's performance should be established using the best available evidence. Asking the patient, friends/relatives and nurses are the usual sources, but direct observation and common sense are also important. However direct testing is not needed. 5. Usually the patient's performance over the preceding 24-48 hours is important, but occasionally longer periods will be relevant. 6. Middle categories imply that the patient supplies over 50 per cent of the effort. 7. Use of aids to be independent is allowed. Petra Palmer., Barthel, D.W. ([id number]). Functional evaluation: the Barthel Index. [address] [facility] (14)2. Leonette Castleman [place], FE.J.NESHA, Kerry Rascon.[facility], [address] [place] [address] (). Measuring the change indisability after inpatient rehabilitation; comparison of the responsiveness of the Barthel Index and Functional [place] Measure. Journal of Neurology, Neurosurgery, and Psychiatry, 66(4), 171-127.   Julien Abraham, N.J.A, Polina Krishnamurthy,  W.J.M, & Vero Taylor MLORENZO. (2004.) Assessment of post-stroke quality of life in cost-effectiveness studies: The usefulness of the Barthel Index and the EuroQoL-5D. Quality of Life Research, 15, 041-40         Occupational Therapy Evaluation Charge Determination   History Examination Decision-Making   LOW Complexity : Brief history review  MEDIUM Complexity : 3-5 performance deficits relating to physical, cognitive , or psychosocial skils that result in activity limitations and / or participation restrictions LOW Complexity : No comorbidities that affect functional and no verbal or physical assistance needed to complete eval tasks       Based on the above components, the patient evaluation is determined to be of the following complexity level: LOW   Pain Rating:  No c/o pain    Activity Tolerance:   Good and Fair      After treatment patient left in no apparent distress:    Sitting in chair and Call bell within reach    COMMUNICATION/EDUCATION:   The patients plan of care was discussed with: Physical therapist, Registered nurse, Case management and PA.      Thank you for this referral.  Jessica Arroyo OT  Time Calculation: 32 mins

## 2021-06-16 ENCOUNTER — PATIENT OUTREACH (OUTPATIENT)
Dept: CASE MANAGEMENT | Age: 76
End: 2021-06-16

## 2021-06-16 NOTE — PROGRESS NOTES
Care Transitions Initial Call    Call within 2 business days of discharge: Yes     Patient: Smita Quijano Patient : 1945 MRN: [id number]    Last Discharge [address]       Complaint Diagnosis Description Type Department Provider    21  Status post right hip replacement Admission (Discharged) Miguel He MD          Was this an external facility discharge? No Discharge Facility: [facility]    Challenges to be reviewed by the provider   Additional needs identified to be addressed with provider: yes    Component      Latest Ref Rng & Units 6/15/2021   Calcium      8.5 - 10.1 MG/DL 7.5 (L)          Method of communication with provider : chart routing    Discussed COVID-19 related testing which was available at this time. Test results were negative. Patient informed of results, if available? yes     Advance Care Planning:   Does patient have an Advance Directive: yes, reviewed and current. Inpatient Readmission Risk score: Unplanned Readmit Risk Score: 11    Was this a readmission? no   Patient stated reason for the admission: Surgery    Patients top risk factors for readmission: medical condition-OA   Interventions to address risk factors: Scheduled appointment with PCP-([facility]-message to office for hosp.follow up), Scheduled appointment with Specialist-21, Obtained and reviewed discharge summary and/or continuity of care documents and Education of patient/family/caregiver/guardian to support self-management-[facility]    Care Transition Nurse (CTN) contacted the patient by telephone to perform post hospital discharge assessment. Verified name and  with patient as identifiers. Provided introduction to self, and explanation of the CTN role. CTN reviewed discharge instructions, medical action plan and red flags with patient who verbalized understanding. Were discharge instructions available to patient? yes.  Reviewed appropriate site of care based on symptoms and resources available to patient including: PCP, Specialist, [address] [place] [address] Ramana Shanks and When to call 911. Patient given an opportunity to ask questions and does not have any further questions or concerns at this time. The patient agrees to contact the PCP office for questions related to their healthcare. Medication reconciliation was performed with patient, who verbalizes understanding of administration of home medications. Advised obtaining a 90-day supply of all daily and as-needed medications. Referral to Pharm D needed: no   START taking these medications     Details   acetaminophen (TYLENOL) 325 mg tablet Take 2 Tablets by mouth every six (6) hours for 14 days. Qty: 112 Tablet, Refills: 0  Start date: 6/15/2021, End date: 6/29/2021       aspirin delayed-release 81 mg tablet Take 1 Tablet by mouth two (2) times a day for 30 days. Qty: 60 Tablet, Refills: 0  Start date: 6/15/2021, End date: 7/15/2021       polyethylene glycol (MIRALAX) 17 gram packet Take 1 Packet by mouth daily for 14 days. Qty: 14 Packet, Refills: 0  Start date: 6/15/2021, End date: 6/29/2021       senna-docusate (PERICOLACE) 8.6-50 mg per tablet Take 1 Tablet by mouth two (2) times a day for 14 days. Qty: 28 Tablet, Refills: 0  Start date: 6/15/2021, End date: 6/29/2021       traMADoL (ULTRAM) 50 mg tablet Take 1-2 Tablets by mouth every six (6) hours as needed for Pain for up to 14 days. Max Daily Amount: 400 mg.   Qty: 60 Tablet, Refills: 0  Start date: 6/15/2021, End date: 6/29/2021     Associated Diagnoses: Status post right hip replacement     STOP:    acetaminophen-codeine (TYLENOL #3) 300-30 mg per tablet         mupirocin (BACTROBAN) 2 % ointment         mupirocin (BACTROBAN) 2 % ointment     Home Health/Outpatient orders at discharge: home health care  [address]: [facility]  Date of initial visit: 6/16    [facility] ordered at discharge: None      Covid Risk Education    Patient decline education  COVID-19 and CDC guidelines. CTN reviewed discharge instructions, medical action plan and red flag symptoms with the patient who verbalized understanding. Discussed COVID vaccination status: yes. (not vaccinated). Education provided on COVID-19 vaccination as appropriate. Patient was given an opportunity to verbalize any questions and concerns and agrees to contact health care provider for questions related to their healthcare. Was patient discharged with a pulse oximeter? no.     Discussed follow-up appointments. If no appointment was previously scheduled, appointment scheduling offered: Nelda arredondo to office, contact pt.for hosp.follow up). Is follow up appointment scheduled within 7 days of discharge? pending. [address] follow up appointment(s): No future appointments. Non-[facility] follow up appointment(s):   (Ortho) Raphael Emanuel MD 6/29/21 @ 10 am    Plan for follow-up call in 10-14 days based on severity of symptoms and risk factors. Plan for next call: follow up appointment-pcp/specialist  CTN provided contact information for future needs. Goals      Establish PCP relationships and regularly scheduled appointments. 6/17 Patient will  follow-up with PCP and specialist, keep a list of medications to bring to f/u appointments. Pt.will follow-up with Koby Rogers NP (office to contact pt); (Ortho) Dr. Anastasiya Madrid, [address] Lauren 6/29/21 @ 10 am. CTN will follow-up pt.attendance or assistance with re-scheduling at next outreach in 7-10 days. -[facility]                       Understands red flags post discharge. 6/17 Pt.will wash hands before coming in contact with surgical site, report symptoms; increased pain, swelling, warmth, or redness; pus or fever, calf pain or swelling. Pt.will notify surgeon or PCP office immediately. CTN contact information provided f/u with pt.in 7-10 days. -[address]

## 2021-06-19 ENCOUNTER — HOSPITAL ENCOUNTER (INPATIENT)
Age: 76
LOS: 2 days | Discharge: HOME | DRG: 194 | End: 2021-06-22
Attending: EMERGENCY MEDICINE | Admitting: INTERNAL MEDICINE
Payer: MEDICARE

## 2021-06-19 DIAGNOSIS — J81.0 ACUTE PULMONARY EDEMA (HCC): ICD-10-CM

## 2021-06-19 DIAGNOSIS — E86.0 DEHYDRATION: ICD-10-CM

## 2021-06-19 DIAGNOSIS — D64.89 ANEMIA DUE TO OTHER CAUSE, NOT CLASSIFIED: Primary | ICD-10-CM

## 2021-06-19 DIAGNOSIS — I50.9 ACUTE CONGESTIVE HEART FAILURE, UNSPECIFIED HEART FAILURE TYPE (HCC): ICD-10-CM

## 2021-06-19 DIAGNOSIS — E87.20 LACTIC ACIDOSIS: ICD-10-CM

## 2021-06-19 PROCEDURE — 99285 EMERGENCY DEPT VISIT HI MDM: CPT

## 2021-06-20 ENCOUNTER — APPOINTMENT (OUTPATIENT)
Dept: CT IMAGING | Age: 76
DRG: 194 | End: 2021-06-20
Attending: EMERGENCY MEDICINE
Payer: MEDICARE

## 2021-06-20 ENCOUNTER — APPOINTMENT (OUTPATIENT)
Dept: GENERAL RADIOLOGY | Age: 76
DRG: 194 | End: 2021-06-20
Attending: EMERGENCY MEDICINE
Payer: MEDICARE

## 2021-06-20 PROBLEM — A41.9 SEPSIS (HCC): Status: ACTIVE | Noted: 2021-06-20

## 2021-06-20 LAB
ALBUMIN SERPL-MCNC: 2.1 G/DL (ref 3.5–5)
ALBUMIN/GLOB SERPL: 0.5 {RATIO} (ref 1.1–2.2)
ALP SERPL-CCNC: 175 U/L (ref 45–117)
ALT SERPL-CCNC: 51 U/L (ref 12–78)
ANION GAP SERPL CALC-SCNC: 7 MMOL/L (ref 5–15)
APPEARANCE UR: CLEAR
AST SERPL-CCNC: 36 U/L (ref 15–37)
ATRIAL RATE: 90 BPM
BACTERIA URNS QL MICRO: ABNORMAL /HPF
BASOPHILS # BLD: 0 K/UL (ref 0–0.1)
BASOPHILS NFR BLD: 0 % (ref 0–1)
BILIRUB SERPL-MCNC: 1 MG/DL (ref 0.2–1)
BILIRUB UR QL CFM: NEGATIVE
BNP SERPL-MCNC: 1482 PG/ML
BUN SERPL-MCNC: 21 MG/DL (ref 6–20)
BUN/CREAT SERPL: 20 (ref 12–20)
CALCIUM SERPL-MCNC: 8.2 MG/DL (ref 8.5–10.1)
CALCULATED P AXIS, ECG09: 44 DEGREES
CALCULATED R AXIS, ECG10: -27 DEGREES
CALCULATED T AXIS, ECG11: -15 DEGREES
CHLORIDE SERPL-SCNC: 107 MMOL/L (ref 97–108)
CO2 SERPL-SCNC: 24 MMOL/L (ref 21–32)
COLOR UR: ABNORMAL
COVID-19 RAPID TEST, COVR: NOT DETECTED
CREAT SERPL-MCNC: 1.05 MG/DL (ref 0.55–1.02)
DIAGNOSIS, 93000: NORMAL
DIFFERENTIAL METHOD BLD: ABNORMAL
EOSINOPHIL # BLD: 0.3 K/UL (ref 0–0.4)
EOSINOPHIL NFR BLD: 3 % (ref 0–7)
EPITH CASTS URNS QL MICRO: ABNORMAL /LPF
ERYTHROCYTE [DISTWIDTH] IN BLOOD BY AUTOMATED COUNT: 13.2 % (ref 11.5–14.5)
GLOBULIN SER CALC-MCNC: 3.9 G/DL (ref 2–4)
GLUCOSE BLD STRIP.AUTO-MCNC: 169 MG/DL (ref 65–117)
GLUCOSE SERPL-MCNC: 147 MG/DL (ref 65–100)
GLUCOSE UR STRIP.AUTO-MCNC: 100 MG/DL
HCT VFR BLD AUTO: 26 % (ref 35–47)
HGB BLD-MCNC: 8.6 G/DL (ref 11.5–16)
HGB UR QL STRIP: NEGATIVE
IMM GRANULOCYTES # BLD AUTO: 0.1 K/UL (ref 0–0.04)
IMM GRANULOCYTES NFR BLD AUTO: 1 % (ref 0–0.5)
KETONES UR QL STRIP.AUTO: ABNORMAL MG/DL
LACTATE BLD-SCNC: 3.21 MMOL/L (ref 0.4–2)
LACTATE BLD-SCNC: 3.48 MMOL/L (ref 0.4–2)
LACTATE SERPL-SCNC: 1.5 MMOL/L (ref 0.4–2)
LEUKOCYTE ESTERASE UR QL STRIP.AUTO: NEGATIVE
LYMPHOCYTES # BLD: 0.5 K/UL (ref 0.8–3.5)
LYMPHOCYTES NFR BLD: 5 % (ref 12–49)
MCH RBC QN AUTO: 30.2 PG (ref 26–34)
MCHC RBC AUTO-ENTMCNC: 33.1 G/DL (ref 30–36.5)
MCV RBC AUTO: 91.2 FL (ref 80–99)
MONOCYTES # BLD: 0.3 K/UL (ref 0–1)
MONOCYTES NFR BLD: 3 % (ref 5–13)
NEUTS SEG # BLD: 9.7 K/UL (ref 1.8–8)
NEUTS SEG NFR BLD: 88 % (ref 32–75)
NITRITE UR QL STRIP.AUTO: NEGATIVE
NRBC # BLD: 0 K/UL (ref 0–0.01)
NRBC BLD-RTO: 0 PER 100 WBC
P-R INTERVAL, ECG05: 146 MS
PH UR STRIP: 5.5 [PH] (ref 5–8)
PLATELET # BLD AUTO: 200 K/UL (ref 150–400)
PMV BLD AUTO: 10.9 FL (ref 8.9–12.9)
POTASSIUM SERPL-SCNC: 3.9 MMOL/L (ref 3.5–5.1)
PROCALCITONIN SERPL-MCNC: 4.3 NG/ML
PROT SERPL-MCNC: 6 G/DL (ref 6.4–8.2)
PROT UR STRIP-MCNC: 100 MG/DL
Q-T INTERVAL, ECG07: 368 MS
QRS DURATION, ECG06: 96 MS
QTC CALCULATION (BEZET), ECG08: 450 MS
RBC # BLD AUTO: 2.85 M/UL (ref 3.8–5.2)
RBC #/AREA URNS HPF: ABNORMAL /HPF (ref 0–5)
RBC MORPH BLD: ABNORMAL
SERVICE CMNT-IMP: ABNORMAL
SODIUM SERPL-SCNC: 138 MMOL/L (ref 136–145)
SOURCE, COVRS: NORMAL
SP GR UR REFRACTOMETRY: 1.01 (ref 1–1.03)
TROPONIN I SERPL-MCNC: <0.05 NG/ML
TROPONIN I SERPL-MCNC: <0.05 NG/ML
UA: UC IF INDICATED,UAUC: ABNORMAL
UROBILINOGEN UR QL STRIP.AUTO: 1 EU/DL (ref 0.2–1)
VENTRICULAR RATE, ECG03: 90 BPM
WBC # BLD AUTO: 10.9 K/UL (ref 3.6–11)
WBC URNS QL MICRO: ABNORMAL /HPF (ref 0–4)

## 2021-06-20 PROCEDURE — 83605 ASSAY OF LACTIC ACID: CPT

## 2021-06-20 PROCEDURE — 96367 TX/PROPH/DG ADDL SEQ IV INF: CPT

## 2021-06-20 PROCEDURE — 74011000636 HC RX REV CODE- 636: Performed by: EMERGENCY MEDICINE

## 2021-06-20 PROCEDURE — 80053 COMPREHEN METABOLIC PANEL: CPT

## 2021-06-20 PROCEDURE — 36415 COLL VENOUS BLD VENIPUNCTURE: CPT

## 2021-06-20 PROCEDURE — 74177 CT ABD & PELVIS W/CONTRAST: CPT

## 2021-06-20 PROCEDURE — 83880 ASSAY OF NATRIURETIC PEPTIDE: CPT

## 2021-06-20 PROCEDURE — 96365 THER/PROPH/DIAG IV INF INIT: CPT

## 2021-06-20 PROCEDURE — 87040 BLOOD CULTURE FOR BACTERIA: CPT

## 2021-06-20 PROCEDURE — 96375 TX/PRO/DX INJ NEW DRUG ADDON: CPT

## 2021-06-20 PROCEDURE — 65660000000 HC RM CCU STEPDOWN

## 2021-06-20 PROCEDURE — 74011250637 HC RX REV CODE- 250/637: Performed by: EMERGENCY MEDICINE

## 2021-06-20 PROCEDURE — 74011250636 HC RX REV CODE- 250/636: Performed by: EMERGENCY MEDICINE

## 2021-06-20 PROCEDURE — 71045 X-RAY EXAM CHEST 1 VIEW: CPT

## 2021-06-20 PROCEDURE — 74011000258 HC RX REV CODE- 258: Performed by: EMERGENCY MEDICINE

## 2021-06-20 PROCEDURE — 85025 COMPLETE CBC W/AUTO DIFF WBC: CPT

## 2021-06-20 PROCEDURE — 84145 PROCALCITONIN (PCT): CPT

## 2021-06-20 PROCEDURE — 74011250636 HC RX REV CODE- 250/636: Performed by: INTERNAL MEDICINE

## 2021-06-20 PROCEDURE — 84484 ASSAY OF TROPONIN QUANT: CPT

## 2021-06-20 PROCEDURE — 74011250637 HC RX REV CODE- 250/637: Performed by: INTERNAL MEDICINE

## 2021-06-20 PROCEDURE — 87635 SARS-COV-2 COVID-19 AMP PRB: CPT

## 2021-06-20 PROCEDURE — 82962 GLUCOSE BLOOD TEST: CPT

## 2021-06-20 PROCEDURE — 93005 ELECTROCARDIOGRAM TRACING: CPT

## 2021-06-20 PROCEDURE — 71275 CT ANGIOGRAPHY CHEST: CPT

## 2021-06-20 PROCEDURE — 81001 URINALYSIS AUTO W/SCOPE: CPT

## 2021-06-20 RX ORDER — ATORVASTATIN CALCIUM 40 MG/1
40 TABLET, FILM COATED ORAL DAILY
Status: CANCELLED | OUTPATIENT
Start: 2021-06-21

## 2021-06-20 RX ORDER — PRAMIPEXOLE DIHYDROCHLORIDE 0.25 MG/1
0.5 TABLET ORAL
Status: DISCONTINUED | OUTPATIENT
Start: 2021-06-20 | End: 2021-06-20

## 2021-06-20 RX ORDER — ENOXAPARIN SODIUM 100 MG/ML
40 INJECTION SUBCUTANEOUS DAILY
Status: DISCONTINUED | OUTPATIENT
Start: 2021-06-21 | End: 2021-06-22 | Stop reason: HOSPADM

## 2021-06-20 RX ORDER — PRAMIPEXOLE DIHYDROCHLORIDE 0.25 MG/1
0.25 TABLET ORAL
Status: DISCONTINUED | OUTPATIENT
Start: 2021-06-20 | End: 2021-06-21

## 2021-06-20 RX ORDER — POLYETHYLENE GLYCOL 3350 17 G/17G
17 POWDER, FOR SOLUTION ORAL DAILY PRN
Status: DISCONTINUED | OUTPATIENT
Start: 2021-06-20 | End: 2021-06-22 | Stop reason: HOSPADM

## 2021-06-20 RX ORDER — ENOXAPARIN SODIUM 100 MG/ML
40 INJECTION SUBCUTANEOUS DAILY
Status: DISCONTINUED | OUTPATIENT
Start: 2021-06-21 | End: 2021-06-20 | Stop reason: SDUPTHER

## 2021-06-20 RX ORDER — OXYCODONE AND ACETAMINOPHEN 5; 325 MG/1; MG/1
1 TABLET ORAL
Status: COMPLETED | OUTPATIENT
Start: 2021-06-20 | End: 2021-06-20

## 2021-06-20 RX ORDER — SODIUM CHLORIDE 0.9 % (FLUSH) 0.9 %
5-40 SYRINGE (ML) INJECTION AS NEEDED
Status: DISCONTINUED | OUTPATIENT
Start: 2021-06-20 | End: 2021-06-21 | Stop reason: SDUPTHER

## 2021-06-20 RX ORDER — ASPIRIN 81 MG/1
81 TABLET ORAL 2 TIMES DAILY
Status: CANCELLED | OUTPATIENT
Start: 2021-06-20

## 2021-06-20 RX ORDER — FENTANYL CITRATE 50 UG/ML
50 INJECTION, SOLUTION INTRAMUSCULAR; INTRAVENOUS ONCE
Status: COMPLETED | OUTPATIENT
Start: 2021-06-20 | End: 2021-06-20

## 2021-06-20 RX ORDER — SODIUM CHLORIDE 9 MG/ML
75 INJECTION, SOLUTION INTRAVENOUS CONTINUOUS
Status: DISCONTINUED | OUTPATIENT
Start: 2021-06-20 | End: 2021-06-21

## 2021-06-20 RX ORDER — ACETAMINOPHEN 650 MG/1
650 SUPPOSITORY RECTAL
Status: DISCONTINUED | OUTPATIENT
Start: 2021-06-20 | End: 2021-06-22 | Stop reason: HOSPADM

## 2021-06-20 RX ORDER — SODIUM CHLORIDE 0.9 % (FLUSH) 0.9 %
5-40 SYRINGE (ML) INJECTION AS NEEDED
Status: DISCONTINUED | OUTPATIENT
Start: 2021-06-20 | End: 2021-06-22 | Stop reason: HOSPADM

## 2021-06-20 RX ORDER — SODIUM CHLORIDE 0.9 % (FLUSH) 0.9 %
5-40 SYRINGE (ML) INJECTION EVERY 8 HOURS
Status: DISCONTINUED | OUTPATIENT
Start: 2021-06-20 | End: 2021-06-22 | Stop reason: HOSPADM

## 2021-06-20 RX ORDER — SODIUM CHLORIDE 0.9 % (FLUSH) 0.9 %
5-10 SYRINGE (ML) INJECTION AS NEEDED
Status: DISCONTINUED | OUTPATIENT
Start: 2021-06-20 | End: 2021-06-21 | Stop reason: SDUPTHER

## 2021-06-20 RX ORDER — SODIUM CHLORIDE 0.9 % (FLUSH) 0.9 %
5-40 SYRINGE (ML) INJECTION EVERY 8 HOURS
Status: DISCONTINUED | OUTPATIENT
Start: 2021-06-20 | End: 2021-06-21 | Stop reason: SDUPTHER

## 2021-06-20 RX ORDER — PANTOPRAZOLE SODIUM 40 MG/1
40 TABLET, DELAYED RELEASE ORAL
Status: CANCELLED | OUTPATIENT
Start: 2021-06-21

## 2021-06-20 RX ORDER — POLYETHYLENE GLYCOL 3350 17 G/17G
17 POWDER, FOR SOLUTION ORAL DAILY PRN
Status: DISCONTINUED | OUTPATIENT
Start: 2021-06-20 | End: 2021-06-21 | Stop reason: SDUPTHER

## 2021-06-20 RX ORDER — PRAMIPEXOLE DIHYDROCHLORIDE 0.25 MG/1
0.5 TABLET ORAL
Status: CANCELLED | OUTPATIENT
Start: 2021-06-20

## 2021-06-20 RX ORDER — ACETAMINOPHEN 325 MG/1
650 TABLET ORAL
Status: DISCONTINUED | OUTPATIENT
Start: 2021-06-20 | End: 2021-06-22 | Stop reason: HOSPADM

## 2021-06-20 RX ORDER — ENOXAPARIN SODIUM 100 MG/ML
40 INJECTION SUBCUTANEOUS DAILY
Status: DISCONTINUED | OUTPATIENT
Start: 2021-06-20 | End: 2021-06-20

## 2021-06-20 RX ORDER — OXYCODONE HYDROCHLORIDE 5 MG/1
5 TABLET ORAL
Status: COMPLETED | OUTPATIENT
Start: 2021-06-20 | End: 2021-06-20

## 2021-06-20 RX ORDER — ONDANSETRON 2 MG/ML
4 INJECTION INTRAMUSCULAR; INTRAVENOUS
Status: DISCONTINUED | OUTPATIENT
Start: 2021-06-20 | End: 2021-06-22 | Stop reason: HOSPADM

## 2021-06-20 RX ADMIN — SODIUM CHLORIDE 500 ML: 9 INJECTION, SOLUTION INTRAVENOUS at 01:43

## 2021-06-20 RX ADMIN — OXYCODONE HYDROCHLORIDE 5 MG: 5 TABLET ORAL at 21:18

## 2021-06-20 RX ADMIN — FENTANYL CITRATE 50 MCG: 50 INJECTION, SOLUTION INTRAMUSCULAR; INTRAVENOUS at 01:38

## 2021-06-20 RX ADMIN — OXYCODONE HYDROCHLORIDE 5 MG: 5 TABLET ORAL at 15:18

## 2021-06-20 RX ADMIN — Medication 10 ML: at 21:19

## 2021-06-20 RX ADMIN — SODIUM CHLORIDE, POTASSIUM CHLORIDE, SODIUM LACTATE AND CALCIUM CHLORIDE 1000 ML: 600; 310; 30; 20 INJECTION, SOLUTION INTRAVENOUS at 01:40

## 2021-06-20 RX ADMIN — Medication 10 ML: at 15:09

## 2021-06-20 RX ADMIN — OXYCODONE HYDROCHLORIDE AND ACETAMINOPHEN 1 TABLET: 5; 325 TABLET ORAL at 06:44

## 2021-06-20 RX ADMIN — PRAMIPEXOLE DIHYDROCHLORIDE 0.25 MG: 0.25 TABLET ORAL at 21:19

## 2021-06-20 RX ADMIN — IOPAMIDOL 100 ML: 755 INJECTION, SOLUTION INTRAVENOUS at 11:09

## 2021-06-20 RX ADMIN — SODIUM CHLORIDE 75 ML/HR: 9 INJECTION, SOLUTION INTRAVENOUS at 15:08

## 2021-06-20 RX ADMIN — CEFTRIAXONE SODIUM 2 G: 2 INJECTION, POWDER, FOR SOLUTION INTRAMUSCULAR; INTRAVENOUS at 09:33

## 2021-06-20 RX ADMIN — AZITHROMYCIN MONOHYDRATE 500 MG: 500 INJECTION, POWDER, LYOPHILIZED, FOR SOLUTION INTRAVENOUS at 11:58

## 2021-06-20 RX ADMIN — IOPAMIDOL 100 ML: 755 INJECTION, SOLUTION INTRAVENOUS at 05:05

## 2021-06-20 NOTE — ED NOTES
Bedside shift change report given to Henna Rogers (oncoming nurse) by Cornell Marquez (offgoing nurse). Report included the following information SBAR, ED Summary, MAR and Recent Results.

## 2021-06-20 NOTE — PROGRESS NOTES
1352 Bedside shift change report given to Chika Lara (oncoming nurse) by Liz Kumar RN(offgoing nurse). Report included the following information SBAR, Kardex, ED Summary, Intake/Output, MAR, Recent Results and Cardiac Rhythm ST. Primary Nurse Payton Landeros and Liz Kumar RN performed a dual skin assessment on this patient Impairment noted- see wound doc flow sheet  Clifton score is 20    1722 Liz Kumar RN draw and sent lactic acid at this time. 1800 Lactic acid is 1.5 at this time. 1900  Bedside shift change report given to [facility]  (oncoming nurse) by Chika Lara RN(offgoing nurse).  Report included the following information SBAR, Kardex, ED Summary, Intake/Output, MAR, Recent Results and Cardiac Rhythm ST.

## 2021-06-20 NOTE — ED NOTES
Bedside and Verbal shift change report given to [address] [place] [address] (oncoming nurse) by [address] (offgoing nurse). Report included the following information SBAR, ED Summary, Intake/Output, MAR and Recent Results.

## 2021-06-20 NOTE — ED TRIAGE NOTES
Pt arrives to the ED via EMS. EMS stated pt had a right hip replacement surgery on Monday and has been receiving physical therapy at home. Since today around 2 pm ,the pt states she has been feeling weak and dizzy. EMS stated that she has been hypotensive and tachycardia.

## 2021-06-20 NOTE — PROGRESS NOTES
1329 - Pt arrived via stretcher from ED. Primary Nurse Sharee Alamo RN and Tatiana Vazquez RN performed a dual skin assessment on this patient Impairment noted- see wound doc flow sheet  Clifton score is 16    1335 - TRANSFER - IN REPORT:    Verbal report received from Mirtha(name) on [address]  being received from ED(unit) for routine progression of care      Report consisted of patients Situation, Background, Assessment and   Recommendations(SBAR). Information from the following report(s) ED Summary was reviewed with the receiving nurse. Opportunity for questions and clarification was not provided. 1352 - Bedside Report given to Elijah Montiel called to Dr. Donna Guardado' office.

## 2021-06-20 NOTE — H&P
Hospitalist Admission Note    NAME: Sharon Styles   :  1945   MRN:  [id number]     Date/Time:  2021 10:57 AM    Patient PCP: Smita Ortega MD  ______________________________________________________________________  Given the patient's current clinical presentation, I have a high level of concern for decompensation if discharged from the emergency department. Complex decision making was performed, which includes reviewing the patient's available past medical records, laboratory results, and x-ray films. My assessment of this patient's clinical condition and my plan of care is as follows. Assessment / Plan:  Dizziness/lactic acidosis cause unknown rule out Sirs versus hypovolemia  Start patient on IV fluid  repeat lactic acid  start patient with IV antibiotic empirically  Follow-up chest CTA to rule out PE  Follow-up procalcitonin  IV antibiotic    S/p right hip replacement  Ortho consultationcontinue pain medication  PT OT evaluation    GERD  Continue PPI    Hyperlipidemia  Continue Lipitor    Code Status: Full   Surrogate Decision Maker:    DVT Prophylaxis: Lovenox   GI Prophylaxis: not indicated          Subjective:   CHIEF COMPLAINT: SOB     HISTORY OF PRESENT ILLNESS:     68years old female from home with past medical history significant for chronic kidney disease, GERD, hyperlipidemia, restless leg syndrome, as well as right hip replacement last Monday presented to the hospital for evaluation of generalized weakness and dizziness started around 2 PM yesterday, associated with fever, associated with shortness of breath, denies any cough denies any chest pain, blood work was done show no acute abnormality, chest x-ray was done show bilateral diffuse interstitial thickening may be related to pulmonary edema or atypical viral pneumonia, CT abdomen was done show no acute abdominal abnormality.     We were asked to admit for work up and evaluation of the above problems. Past Medical History:   Diagnosis Date    Arthritis     bilateral hands, hips, lower back    At risk for sleep apnea 03/18/2019    on phone assessment with VEENA, kathia sleep scoring tool, scored 4    Chronic kidney disease 2017    kidney stones    Elevated hemoglobin A1c 1/27/2020    GERD (gastroesophageal reflux disease)     being treated    High cholesterol     Kidney stones     Menopause 1995    Palpitations     as of 6/24/19 pt reports was evaluated by cardiology and cleared, now followed by cardiology, no further symptoms per pt    Pneumonia 2006 (approx)    Rheumatic fever     AGE 5    RLS (restless legs syndrome)     Shoulder fracture 2009 [facility] -Jan 09  Right side         Past Surgical History:   Procedure Laterality Date    COLONOSCOPY  7/30/2010    Dr. Joyce Devine, diverticulosis, repeat due 7/30/2015    COLONOSCOPY,REMV UNIQUE,FORCEP/CAUTERY  9/4/2015         HX CATARACT REMOVAL Bilateral 03/2019    HX OPEN REDUCTION INTERNAL FIXATION Left 1982    ankle    HX ORTHOPAEDIC  2019    L4-L5 BACK SURGERY - synovial cyst removal    HX ROTATOR CUFF REPAIR Right 2008    HX ROTATOR CUFF REPAIR Left 2012    HX UROLOGICAL  2017    stent for kidney stone       Social History     Tobacco Use    Smoking status: Never Smoker    Smokeless tobacco: Never Used   Substance Use Topics    Alcohol use: No     Alcohol/week: 0.0 standard drinks        Family History   Problem Relation Age of Onset    Cancer Father         Colon     No Known Problems Mother     Diabetes Grandchild     Heart Disease Brother     Heart Attack Brother     Anesth Problems Neg Hx      No Known Allergies     Prior to Admission medications    Medication Sig Start Date End Date Taking? Authorizing Provider   acetaminophen (TYLENOL) 325 mg tablet Take 2 Tablets by mouth every six (6) hours for 14 days.  6/15/21 6/29/21 Yes Jessica Jack NP   aspirin delayed-release 81 mg tablet Take 1 Tablet by mouth two (2) times a day for 30 days. 6/15/21 7/15/21 Yes Weston Bonilla NP   polyethylene glycol (MIRALAX) 17 gram packet Take 1 Packet by mouth daily for 14 days. 6/15/21 6/29/21 Yes Weston Bonilla NP   senna-docusate (PERICOLACE) 8.6-50 mg per tablet Take 1 Tablet by mouth two (2) times a day for 14 days. 6/15/21 6/29/21 Yes Weston Bonilla NP   traMADoL Wallene Naegeli) 50 mg tablet Take 1-2 Tablets by mouth every six (6) hours as needed for Pain for up to 14 days. Max Daily Amount: 400 mg. 6/15/21 6/29/21 Yes Weston Bonilla NP   MAGNESIUM GLYCINATE PO Take  by mouth daily. Yes Provider, Historical   pramipexole (MIRAPEX) 0.5 mg tablet TAKE 1/2 (ONE-HALF) TABLET BY MOUTH NIGHTLY FOR RESTLESS LEG SYNDROME 5/27/21  Yes Wu Flaherty MD   omeprazole (PRILOSEC) 20 mg capsule Take 1 Cap by mouth daily. 4/13/21  Yes Wu Flaherty MD   atorvastatin (LIPITOR) 40 mg tablet Take 1 Tab by mouth daily. Patient taking differently: Take 40 mg by mouth every evening. 10/13/20  Yes Wu Flaherty MD       REVIEW OF SYSTEMS:     I am not able to complete the review of systems because:    The patient is intubated and sedated    The patient has altered mental status due to his acute medical problems    The patient has baseline aphasia from prior stroke(s)    The patient has baseline dementia and is not reliable historian    The patient is in acute medical distress and unable to provide information           Total of 12 systems reviewed as follows:       POSITIVE= underlined text  Negative = text not underlined  General:  fever, chills, sweats, generalized weakness, weight loss/gain,      loss of appetite   Eyes:    blurred vision, eye pain, loss of vision, double vision  ENT:    rhinorrhea, pharyngitis   Respiratory:   cough, sputum production, SOB, WASHBURN, wheezing, pleuritic pain   Cardiology:   chest pain, palpitations, orthopnea, PND, edema, syncope   Gastrointestinal:  abdominal pain , N/V, diarrhea, dysphagia, constipation, bleeding Genitourinary:  frequency, urgency, dysuria, hematuria, incontinence   Muskuloskeletal :  arthralgia, myalgia, back pain  Hematology:  easy bruising, nose or gum bleeding, lymphadenopathy   Dermatological: rash, ulceration, pruritis, color change / jaundice  Endocrine:   hot flashes or polydipsia   Neurological:  headache, dizziness, confusion, focal weakness, paresthesia,     Speech difficulties, memory loss, gait difficulty  Psychological: Feelings of anxiety, depression, agitation    Objective:   VITALS:    Visit Vitals  BP (!) 120/59   Pulse 87   Temp 98.7 °F (37.1 °C)   Resp 20   Ht 5' (1.524 m)   Wt 65.3 kg (144 lb)   SpO2 96%   BMI 28.12 kg/m²       PHYSICAL EXAM:    General:    Alert, cooperative, no distress, appears stated age. HEENT: Atraumatic, anicteric sclerae, pink conjunctivae     No oral ulcers, mucosa moist, throat clear, dentition fair  Neck:  Supple, symmetrical,  thyroid: non tender  Lungs:   Clear to auscultation bilaterally. No Wheezing or Rhonchi. No rales. Chest wall:  No tenderness  No Accessory muscle use. Heart:   Regular  rhythm,  No  murmur   No edema  Abdomen:   Soft, non-tender. Not distended. Bowel sounds normal  Extremities: No cyanosis. No clubbing,      Skin turgor normal, Capillary refill normal, Radial dial pulse 2+  Skin:     Not pale. Not Jaundiced  No rashes   Psych:  Good insight. Not depressed. Not anxious or agitated. Neurologic: EOMs intact. No facial asymmetry. No aphasia or slurred speech. Symmetrical strength, Sensation grossly intact.  Alert and oriented X 4.     _______________________________________________________________________  Care Plan discussed with:    Comments   Patient y    Family      RN y    Care Manager                    Consultant:      _______________________________________________________________________  Expected  Disposition:   Home with Family y   HH/PT/OT/RN    SNF/LTC    KARLA ________________________________________________________________________  TOTAL TIME:  54  Minutes    Critical Care Provided     Minutes non procedure based      Comments    y Reviewed previous records   >50% of visit spent in counseling and coordination of care y Discussion with patient and/or family and questions answered       ________________________________________________________________________  Signed: Aminata Gavin MD    Procedures: see electronic medical records for all procedures/Xrays and details which were not copied into this note but were reviewed prior to creation of Plan. LAB DATA REVIEWED:    Recent Results (from the past 24 hour(s))   CULTURE, BLOOD    Collection Time: 06/20/21  1:05 AM    Specimen: Blood   Result Value Ref Range    Special Requests: NO SPECIAL REQUESTS      Culture result: NO GROWTH AFTER 5 HOURS     CULTURE, BLOOD    Collection Time: 06/20/21  1:23 AM    Specimen: Blood   Result Value Ref Range    Special Requests: NO SPECIAL REQUESTS      Culture result: NO GROWTH AFTER 5 HOURS     METABOLIC PANEL, COMPREHENSIVE    Collection Time: 06/20/21  1:23 AM   Result Value Ref Range    Sodium 138 136 - 145 mmol/L    Potassium 3.9 3.5 - 5.1 mmol/L    Chloride 107 97 - 108 mmol/L    CO2 24 21 - 32 mmol/L    Anion gap 7 5 - 15 mmol/L    Glucose 147 (H) 65 - 100 mg/dL    BUN 21 (H) 6 - 20 MG/DL    Creatinine 1.05 (H) 0.55 - 1.02 MG/DL    BUN/Creatinine ratio 20 12 - 20      GFR est AA >60 >60 ml/min/1.73m2    GFR est non-AA 51 (L) >60 ml/min/1.73m2    Calcium 8.2 (L) 8.5 - 10.1 MG/DL    Bilirubin, total 1.0 0.2 - 1.0 MG/DL    ALT (SGPT) 51 12 - 78 U/L    AST (SGOT) 36 15 - 37 U/L    Alk.  phosphatase 175 (H) 45 - 117 U/L    Protein, total 6.0 (L) 6.4 - 8.2 g/dL    Albumin 2.1 (L) 3.5 - 5.0 g/dL    Globulin 3.9 2.0 - 4.0 g/dL    A-G Ratio 0.5 (L) 1.1 - 2.2     CBC WITH AUTOMATED DIFF    Collection Time: 06/20/21  1:23 AM   Result Value Ref Range    WBC 10.9 3.6 - 11.0 K/uL    RBC 2.85 (L) 3.80 - 5.20 M/uL    HGB 8.6 (L) 11.5 - 16.0 g/dL    HCT 26.0 (L) 35.0 - 47.0 %    MCV 91.2 80.0 - 99.0 FL    MCH 30.2 26.0 - 34.0 PG    MCHC 33.1 30.0 - 36.5 g/dL    RDW 13.2 11.5 - 14.5 %    PLATELET 112 069 - 247 K/uL    MPV 10.9 8.9 - 12.9 FL    NRBC 0.0 0  WBC    ABSOLUTE NRBC 0.00 0.00 - 0.01 K/uL    NEUTROPHILS 88 (H) 32 - 75 %    LYMPHOCYTES 5 (L) 12 - 49 %    MONOCYTES 3 (L) 5 - 13 %    EOSINOPHILS 3 0 - 7 %    BASOPHILS 0 0 - 1 %    IMMATURE GRANULOCYTES 1 (H) 0.0 - 0.5 %    ABS. NEUTROPHILS 9.7 (H) 1.8 - 8.0 K/UL    ABS. LYMPHOCYTES 0.5 (L) 0.8 - 3.5 K/UL    ABS. MONOCYTES 0.3 0.0 - 1.0 K/UL    ABS. EOSINOPHILS 0.3 0.0 - 0.4 K/UL    ABS. BASOPHILS 0.0 0.0 - 0.1 K/UL    ABS. IMM.  GRANS. 0.1 (H) 0.00 - 0.04 K/UL    DF SMEAR SCANNED      RBC COMMENTS NORMOCYTIC, NORMOCHROMIC     TROPONIN I    Collection Time: 06/20/21  1:23 AM   Result Value Ref Range    Troponin-I, Qt. <0.05 <0.05 ng/mL   NT-PRO BNP    Collection Time: 06/20/21  1:23 AM   Result Value Ref Range    NT pro-BNP 1,482 (H) <450 PG/ML   POC LACTIC ACID    Collection Time: 06/20/21  1:38 AM   Result Value Ref Range    Lactic Acid (POC) 3.48 (HH) 0.40 - 2.00 mmol/L   EKG, 12 LEAD, INITIAL    Collection Time: 06/20/21  1:53 AM   Result Value Ref Range    Ventricular Rate 90 BPM    Atrial Rate 90 BPM    P-R Interval 146 ms    QRS Duration 96 ms    Q-T Interval 368 ms    QTC Calculation (Bezet) 450 ms    Calculated P Axis 44 degrees    Calculated R Axis -27 degrees    Calculated T Axis -15 degrees    Diagnosis       Normal sinus rhythm  Moderate voltage criteria for LVH, may be normal variant  When compared with ECG of 07-JUN-2021 08:37,  T wave inversion less evident in Inferior leads  T wave inversion no longer evident in Anterior leads     POC LACTIC ACID    Collection Time: 06/20/21  4:20 AM   Result Value Ref Range    Lactic Acid (POC) 3.21 (HH) 0.40 - 2.00 mmol/L   URINALYSIS W/ REFLEX CULTURE    Collection Time: 06/20/21  4:26 AM Specimen: Urine    Urine specimen   Result Value Ref Range    Color YELLOW/STRAW      Appearance CLEAR CLEAR      Specific gravity 1.015 1.003 - 1.030      pH (UA) 5.5 5.0 - 8.0      Protein 100 (A) NEG mg/dL    Glucose 100 (A) NEG mg/dL    Ketone TRACE (A) NEG mg/dL    Blood Negative NEG      Urobilinogen 1.0 0.2 - 1.0 EU/dL    Nitrites Negative NEG      Leukocyte Esterase Negative NEG      WBC 0-4 0 - 4 /hpf    RBC 0-5 0 - 5 /hpf    Epithelial cells FEW FEW /lpf    Bacteria 1+ (A) NEG /hpf    UA:UC IF INDICATED CULTURE NOT INDICATED BY UA RESULT CNI     BILIRUBIN, CONFIRM    Collection Time: 06/20/21  4:26 AM   Result Value Ref Range    Bilirubin UA, confirm Negative

## 2021-06-20 NOTE — PROGRESS NOTES
Problem: Falls - Risk of  Goal: *Absence of Falls  Description: Document Giselle Spare Fall Risk and appropriate interventions in the flowsheet.   Outcome: Progressing Towards Goal  Note: Fall Risk Interventions:  Mobility Interventions: Bed/chair exit alarm, Communicate number of staff needed for ambulation/transfer, Patient to call before getting OOB, Strengthening exercises (ROM-active/passive)         Medication Interventions: Assess postural VS orthostatic hypotension, Bed/chair exit alarm, Patient to call before getting OOB, Teach patient to arise slowly    Elimination Interventions: Bed/chair exit alarm, Call light in reach, Toileting schedule/hourly rounds, Toilet paper/wipes in reach, Stay With Me (per policy), Patient to call for help with toileting needs    History of Falls Interventions: Bed/chair exit alarm, Vital signs minimum Q4HRs X 24 hrs (comment for end date), Room close to nurse's station         Problem: Patient Education: Go to Patient Education Activity  Goal: Patient/Family Education  Outcome: Progressing Towards Goal

## 2021-06-20 NOTE — ED PROVIDER NOTES
EMERGENCY DEPARTMENT HISTORY AND PHYSICAL EXAM    Please note that this dictation was completed with [employer], the computer voice recognition software. Quite often unanticipated grammatical, syntax, homophones, and other interpretive errors are inadvertently transcribed by the computer software. Please disregard these errors. Please excuse any errors that have escaped final proofreading. Date: 6/19/2021  Patient Name: Consuelo Vance  Patient Age and Sex: 68 y.o. female    History of Presenting Illness     Chief Complaint   Patient presents with    Dizziness     Pt arrived via EMS, EMS stated pt had right hip surgery on Monday and has been complaining of dizziness, low grade fever, and hypotension since this afternoon.  Hypotension       History Provided By: Patient    HPI: Consuelo Vance, is a 68 y.o. female who presents to the ED via ambulance with malaise, fatigue, orthostatic lightheadedness, low appetite. Symptoms started this morning. She had a hip replacement surgery on Monday, had been doing well postoperatively until today. Denies having any abd pain, cough, sob, dysuria. No nausea or vomiting. She has postoperative pain in her hip, not worse than normal. Is able to stand up and transfer at this time but is still in recovery from her surgery. Per ems, patient reported having a low-grade temperature at home and a low BP reading. This was not the case for EMS at her home nor here in triage. Normal mental status. Pt denies any other alleviating or exacerbating factors. No other associated signs or symptoms. There are no other complaints, changes or physical findings at this time.      PCP: Digna Jimenez MD    Past History   All documented elements of the [address] reviewed and verified by me. -Gale Kurtz MD    Past Medical History:  Past Medical History:   Diagnosis Date    Arthritis     bilateral hands, hips, lower back    At risk for sleep apnea 03/18/2019    on phone assessment with PAT, kathia sleep scoring tool, scored 4    Chronic kidney disease 2017    kidney stones    Elevated hemoglobin A1c 1/27/2020    GERD (gastroesophageal reflux disease)     being treated    High cholesterol     Kidney stones     Menopause 1995    Palpitations     as of 6/24/19 pt reports was evaluated by cardiology and cleared, now followed by cardiology, no further symptoms per pt    Pneumonia 2006 (approx)    Rheumatic fever     AGE 5    RLS (restless legs syndrome)     Shoulder fracture 2009 [facility] -Jan 09  Right side        Past Surgical History:  Past Surgical History:   Procedure Laterality Date    COLONOSCOPY  7/30/2010    Dr. Maryellen Nair, diverticulosis, repeat due 7/30/2015    COLONOSCOPY,REMV LESN,FORCEP/CAUTERY  9/4/2015         HX CATARACT REMOVAL Bilateral 03/2019    HX OPEN REDUCTION INTERNAL FIXATION Left 1982    ankle    HX ORTHOPAEDIC  2019    L4-L5 BACK SURGERY - synovial cyst removal    HX ROTATOR CUFF REPAIR Right 2008    HX ROTATOR CUFF REPAIR Left 2012    HX UROLOGICAL  2017    stent for kidney stone       Family History:  Family History   Problem Relation Age of Onset    Cancer Father         Colon     No Known Problems Mother     Diabetes Grandchild     Heart Disease Brother     Heart Attack Brother     Anesth Problems Neg Hx        Social History:  Social History     Tobacco Use    Smoking status: Never Smoker    Smokeless tobacco: Never Used   Vaping Use    Vaping Use: Never used   Substance Use Topics    Alcohol use: No     Alcohol/week: 0.0 standard drinks    Drug use: Not Currently       Allergies:  No Known Allergies    Review of Systems   All other systems reviewed and negative    Review of Systems   Constitutional: Positive for appetite change and fatigue. Negative for fever. HENT: Negative. Negative for congestion, sore throat and trouble swallowing. Eyes: Negative. Negative for photophobia and visual disturbance.    Respiratory: Negative for cough and shortness of breath. Cardiovascular: Negative for chest pain, palpitations and leg swelling. Gastrointestinal: Negative for abdominal pain, diarrhea, nausea and vomiting. Endocrine: Negative. Negative for polydipsia and polyuria. Genitourinary: Negative for dysuria, flank pain and hematuria. Musculoskeletal: Negative for back pain. Skin: Negative. Negative for color change and rash. Neurological: Positive for light-headedness. Negative for tremors, syncope, facial asymmetry, speech difficulty, weakness, numbness and headaches. Hematological: Negative. Negative for adenopathy. Does not bruise/bleed easily. Psychiatric/Behavioral: Negative for confusion. All other systems reviewed and are negative. Physical Exam   Reviewed patients vital signs and nursing note    Physical Exam  Vitals and nursing note reviewed. Constitutional:       General: She is awake. Appearance: She is not diaphoretic. Comments: Appears fatigued   HENT:      Head: Atraumatic. Nose: Nose normal.      Mouth/Throat:      Mouth: Mucous membranes are moist.   Eyes:      General: No scleral icterus. Extraocular Movements: Extraocular movements intact. Conjunctiva/sclera: Conjunctivae normal.      Pupils: Pupils are equal, round, and reactive to light. Cardiovascular:      Rate and Rhythm: Normal rate and regular rhythm. Pulses: Normal pulses. Heart sounds: Normal heart sounds. Pulmonary:      Effort: Pulmonary effort is normal.      Breath sounds: Normal breath sounds. Abdominal:      General: Bowel sounds are normal. There is no distension. Palpations: Abdomen is soft. Tenderness: There is no abdominal tenderness. There is no right CVA tenderness or left CVA tenderness. Musculoskeletal:         General: No swelling or tenderness. Normal range of motion. Cervical back: Normal range of motion and neck supple. No rigidity or tenderness. Right lower leg: No edema. Left lower leg: No edema. Comments: Limited rom in right hip as expected post surgery. Lymphadenopathy:      Cervical: No cervical adenopathy. Skin:     General: Skin is warm and dry. Capillary Refill: Capillary refill takes less than 2 seconds. Findings: No erythema. Comments: Surgical incision over right hip is covered with clean gauze. Incision and surrounding skin is not erythematous, no swelling, no drainage. No tenderness to palpation   Neurological:      General: No focal deficit present. Mental Status: She is oriented to person, place, and time. Psychiatric:         Mood and Affect: Mood normal.         Behavior: Behavior normal.         Diagnostic Study Results     Labs - I have personally reviewed and interpreted all laboratory results. Danielle Garcia MD, MSc  Recent Results (from the past 24 hour(s))   CULTURE, BLOOD    Collection Time: 06/20/21  1:05 AM    Specimen: Blood   Result Value Ref Range    Special Requests: NO SPECIAL REQUESTS      Culture result: NO GROWTH AFTER 5 HOURS     CULTURE, BLOOD    Collection Time: 06/20/21  1:23 AM    Specimen: Blood   Result Value Ref Range    Special Requests: NO SPECIAL REQUESTS      Culture result: NO GROWTH AFTER 5 HOURS     METABOLIC PANEL, COMPREHENSIVE    Collection Time: 06/20/21  1:23 AM   Result Value Ref Range    Sodium 138 136 - 145 mmol/L    Potassium 3.9 3.5 - 5.1 mmol/L    Chloride 107 97 - 108 mmol/L    CO2 24 21 - 32 mmol/L    Anion gap 7 5 - 15 mmol/L    Glucose 147 (H) 65 - 100 mg/dL    BUN 21 (H) 6 - 20 MG/DL    Creatinine 1.05 (H) 0.55 - 1.02 MG/DL    BUN/Creatinine ratio 20 12 - 20      GFR est AA >60 >60 ml/min/1.73m2    GFR est non-AA 51 (L) >60 ml/min/1.73m2    Calcium 8.2 (L) 8.5 - 10.1 MG/DL    Bilirubin, total 1.0 0.2 - 1.0 MG/DL    ALT (SGPT) 51 12 - 78 U/L    AST (SGOT) 36 15 - 37 U/L    Alk.  phosphatase 175 (H) 45 - 117 U/L    Protein, total 6.0 (L) 6.4 - 8.2 g/dL    Albumin 2.1 (L) 3.5 - 5.0 g/dL Globulin 3.9 2.0 - 4.0 g/dL    A-G Ratio 0.5 (L) 1.1 - 2.2     CBC WITH AUTOMATED DIFF    Collection Time: 06/20/21  1:23 AM   Result Value Ref Range    WBC 10.9 3.6 - 11.0 K/uL    RBC 2.85 (L) 3.80 - 5.20 M/uL    HGB 8.6 (L) 11.5 - 16.0 g/dL    HCT 26.0 (L) 35.0 - 47.0 %    MCV 91.2 80.0 - 99.0 FL    MCH 30.2 26.0 - 34.0 PG    MCHC 33.1 30.0 - 36.5 g/dL    RDW 13.2 11.5 - 14.5 %    PLATELET 689 531 - 616 K/uL    MPV 10.9 8.9 - 12.9 FL    NRBC 0.0 0  WBC    ABSOLUTE NRBC 0.00 0.00 - 0.01 K/uL    NEUTROPHILS 88 (H) 32 - 75 %    LYMPHOCYTES 5 (L) 12 - 49 %    MONOCYTES 3 (L) 5 - 13 %    EOSINOPHILS 3 0 - 7 %    BASOPHILS 0 0 - 1 %    IMMATURE GRANULOCYTES 1 (H) 0.0 - 0.5 %    ABS. NEUTROPHILS 9.7 (H) 1.8 - 8.0 K/UL    ABS. LYMPHOCYTES 0.5 (L) 0.8 - 3.5 K/UL    ABS. MONOCYTES 0.3 0.0 - 1.0 K/UL    ABS. EOSINOPHILS 0.3 0.0 - 0.4 K/UL    ABS. BASOPHILS 0.0 0.0 - 0.1 K/UL    ABS. IMM.  GRANS. 0.1 (H) 0.00 - 0.04 K/UL    DF SMEAR SCANNED      RBC COMMENTS NORMOCYTIC, NORMOCHROMIC     TROPONIN I    Collection Time: 06/20/21  1:23 AM   Result Value Ref Range    Troponin-I, Qt. <0.05 <0.05 ng/mL   NT-PRO BNP    Collection Time: 06/20/21  1:23 AM   Result Value Ref Range    NT pro-BNP 1,482 (H) <450 PG/ML   POC LACTIC ACID    Collection Time: 06/20/21  1:38 AM   Result Value Ref Range    Lactic Acid (POC) 3.48 (HH) 0.40 - 2.00 mmol/L   EKG, 12 LEAD, INITIAL    Collection Time: 06/20/21  1:53 AM   Result Value Ref Range    Ventricular Rate 90 BPM    Atrial Rate 90 BPM    P-R Interval 146 ms    QRS Duration 96 ms    Q-T Interval 368 ms    QTC Calculation (Bezet) 450 ms    Calculated P Axis 44 degrees    Calculated R Axis -27 degrees    Calculated T Axis -15 degrees    Diagnosis       Normal sinus rhythm  Moderate voltage criteria for LVH, may be normal variant  When compared with ECG of 07-JUN-2021 08:37,  T wave inversion less evident in Inferior leads  T wave inversion no longer evident in Anterior leads     POC LACTIC ACID    Collection Time: 06/20/21  4:20 AM   Result Value Ref Range    Lactic Acid (POC) 3.21 (HH) 0.40 - 2.00 mmol/L   URINALYSIS W/ REFLEX CULTURE    Collection Time: 06/20/21  4:26 AM    Specimen: Urine    Urine specimen   Result Value Ref Range    Color YELLOW/STRAW      Appearance CLEAR CLEAR      Specific gravity 1.015 1.003 - 1.030      pH (UA) 5.5 5.0 - 8.0      Protein 100 (A) NEG mg/dL    Glucose 100 (A) NEG mg/dL    Ketone TRACE (A) NEG mg/dL    Blood Negative NEG      Urobilinogen 1.0 0.2 - 1.0 EU/dL    Nitrites Negative NEG      Leukocyte Esterase Negative NEG      WBC 0-4 0 - 4 /hpf    RBC 0-5 0 - 5 /hpf    Epithelial cells FEW FEW /lpf    Bacteria 1+ (A) NEG /hpf    UA:UC IF INDICATED CULTURE NOT INDICATED BY UA RESULT CNI     BILIRUBIN, CONFIRM    Collection Time: 06/20/21  4:26 AM   Result Value Ref Range    Bilirubin UA, confirm Negative         Radiologic Studies - I have personally reviewed and interpreted all imaging studies and agree with radiology interpretation and report. Cesar Simpson MD, MSc  CT ABD PELV W CONT   Final Result   Colonic diverticulosis. Small bilateral pleural effusions with bilateral lower   lobe atelectasis. No acute intra-abdominal abnormality. XR CHEST PORT   Final Result   Bilateral diffuse interstitial thickening may be related to   pulmonary edema or atypical viral pneumonia. Please correlate clinically. CTA CHEST W OR W WO CONT    (Results Pending)         Medical Decision Making   I am the first provider for this patient. Records Reviewed: I reviewed our electronic medical record system for any past medical records that were available that may contribute to the patient's current condition, including their PMH, surgical history, social and family history. Reviewed the nursing notes and vital signs from today's visit. Nursing notes will be reviewed as they become available in realtime while the pt has been in the ED.  In addition, I read most recent discharge summaries, if available and reviewed prior ECGs or imaging studies for comparison purposes. Yolanda De Luna MD Msc    Vital Signs-Reviewed the patient's vital signs. Patient Vitals for the past 24 hrs:   Temp Pulse Resp BP SpO2   06/20/21 0800  87 20 (!) 120/59 96 %   06/20/21 0750  (!) 107  (!) 96/59    06/20/21 0749  95  123/65    06/20/21 0746  90  (!) 122/59    06/20/21 0745  92 20 (!) 121/56 95 %   06/20/21 0730  90 19 111/64 95 %   06/20/21 0715  89 21 (!) 121/59 96 %   06/20/21 0700  84 24 (!) 131/58 95 %   06/20/21 0630 98.7 °F (37.1 °C) 88 25 (!) 132/59 97 %   06/20/21 0300  84 24 (!) 112/50 96 %   06/20/21 0230  88 24 (!) 113/53 95 %   06/20/21 0200  88 18 (!) 108/59 96 %   06/20/21 0130  93 25 (!) 109/59 94 %   06/20/21 0045  90 25 (!) 109/58 95 %   06/20/21 0015  96 25 (!) 106/52 93 %   06/19/21 2357     92 %   06/19/21 2349 98.9 °F (37.2 °C) 98 25 (!) 104/54 92 %       ECG interpretation: Normal sinus rhythm with rate 90, normal intervals, no ST elevations, depressions or other changes concerning for acute ischemia. This ECG has been viewed and interpreted by me personally. Yolanda De Luna MD, Msc  - no significant change when compared to June 7 2021 -Katerine Holden MD      Provider Notes (Medical Decision Making):   Patient is about 1 week postop right hip replacement surgery and presents with fatigue, malaise, low appetite and orthostatic lightheadedness. No syncope, cp, sob, cough. No fever. No abd pain, n/v. No dysuria. Surgical incision does not appear to be infected. Ddx: dehydration, underlying infection, metabolic abnormalities, anemia, acs. Plan: will send full set of labs to assess for the above. Considering imaging of hip although exam does not suggest infection. Start IV fluids as clinically she appears dry. ED Course:   Initial assessment performed.  The patients presenting problems have been discussed, and they are in agreement with the care plan formulated and outlined with them. I have encouraged them to ask questions as they arise throughout their visit. Reassessment:  Patient has remained hemodynamically stable. Problem list and intervention:  1. Acute anemia - no indication for transfusion at this time. Anemia is postoperative and likely secondary to blood loss during surgery. She has no other sources of bleeding. Certainly may explain her fatigue although her hgb is not significantly changed from a few days ago and her symptoms began today. 2. Dehydration - likely explains her orthostatic hypotension and to some extent her fatigue. Renal function normal. Clinically appears dry, however. Dehydration likely due to poor po intake. Received 30cc/kg bolus of LR.  3. Lactic acidosis- unclear etiology. There is currently no evidence of severe sepsis or shock. This includes no leukocytosis, ua normal, cxr more consistent with edema based on my own interpretation and patient's clinical picture. She has no cough or other symptoms of a viral pna. She did likely receive fluids during surgery and this may contribute or cause her pulm edema. i've obtained a bnp, which is elevated and supports dx of pulm edema rather than infection. 4. chf - would be new for her. She has normal o2 sat, no orthopnea. Again, may be related to fluid shifts after surgery but would consider echo. Angelic repeated lactic acid after giving her ample fluids and it has not significantly changed, although trending down. I have given her abx while cultures are pending but my clinical suspicion for severe sepsis or shock is low. Due to her age and risk factors, however, will admit for further observation until cultures are back and for gentle hydration and repeat lactic acid. Consult Note:  Carly Day MD spoke with  hospitalist,   Discussed pt's hx, physical exam and available diagnostic and imaging results. Reviewed care plans. Agree with management and plan thus far. DISPOSITION: ADMIT  Patient is being admitted to the hospital.  Their test results and reasons for admission have been discussed. The patient and/or available family express agreement with and understanding of the need to be admitted and their admission diagnosis. Thank you for resuming the care of this patient. Please don't hesitate to contact me in the emergency department if you  have any additional questions. Nilam Zuñiga MD, MSc    CRITICAL CARE NOTE :    IMPENDING DETERIORATION -Cardiovascular and Metabolic  ASSOCIATED RISK FACTORS - Shock, Bleeding, Metabolic changes, Dehydration and Vascular Compromise  MANAGEMENT- Bedside Assessment  INTERPRETATION -  Xrays, CT Scan, Blood Gases, ECG, Blood Pressure and Cardiac Output Measures   INTERVENTIONS - hemodynamic mngmt and Metobolic interventions  CASE REVIEW - Hospitalist/Intensivist, Nursing and Family  TREATMENT RESPONSE -Stable  PERFORMED BY - Self    NOTES   :    I have spent 80 minutes of critical care time involved in lab review, consultations with specialist, family decision- making, bedside attention and documentation. During this entire length of time I was immediately available to the patient . Critical Care: The reason for providing this level of medical care for this critically ill patient was due to a critical illness that impaired one or more vital organ systems, such that there was a high probability of imminent or life threatening deterioration in the patient's condition. This care involved high complexity decision making to assess, manipulate, and support vital system functions, to treat this degree of vital organ system failure, and to prevent further life threatening deterioration of the patients condition. Nilam Zuñiga MD          I, Michael Capellan MD, am the attending of record for this patient encounter. Diagnosis     Clinical Impression:   1. Anemia due to other cause, not classified    2. Lactic acidosis    3. Dehydration    4. Acute congestive heart failure, unspecified heart failure type ([address]    5. Acute pulmonary edema (HCC)        Attestation:  I personally performed the services described in this documentation on this date 6/19/2021 for patient Jess Mars.   David Mckinley MD

## 2021-06-20 NOTE — ED NOTES
Pt IVs in right lower forearm and left basilic both blew; charge RN notified; charge RN placed 2 new IVs; IVs charted under LDA in pt chart

## 2021-06-21 ENCOUNTER — APPOINTMENT (OUTPATIENT)
Dept: NON INVASIVE DIAGNOSTICS | Age: 76
DRG: 194 | End: 2021-06-21
Attending: INTERNAL MEDICINE
Payer: MEDICARE

## 2021-06-21 LAB
ALBUMIN SERPL-MCNC: 1.7 G/DL (ref 3.5–5)
ALBUMIN/GLOB SERPL: 0.5 {RATIO} (ref 1.1–2.2)
ALP SERPL-CCNC: 137 U/L (ref 45–117)
ALT SERPL-CCNC: 37 U/L (ref 12–78)
ANION GAP SERPL CALC-SCNC: 5 MMOL/L (ref 5–15)
AST SERPL-CCNC: 27 U/L (ref 15–37)
BASOPHILS # BLD: 0 K/UL (ref 0–0.1)
BASOPHILS NFR BLD: 0 % (ref 0–1)
BILIRUB SERPL-MCNC: 0.5 MG/DL (ref 0.2–1)
BUN SERPL-MCNC: 16 MG/DL (ref 6–20)
BUN/CREAT SERPL: 19 (ref 12–20)
CALCIUM SERPL-MCNC: 7.5 MG/DL (ref 8.5–10.1)
CHLORIDE SERPL-SCNC: 112 MMOL/L (ref 97–108)
CO2 SERPL-SCNC: 24 MMOL/L (ref 21–32)
CREAT SERPL-MCNC: 0.83 MG/DL (ref 0.55–1.02)
DIFFERENTIAL METHOD BLD: ABNORMAL
EOSINOPHIL # BLD: 1.2 K/UL (ref 0–0.4)
EOSINOPHIL NFR BLD: 12 % (ref 0–7)
ERYTHROCYTE [DISTWIDTH] IN BLOOD BY AUTOMATED COUNT: 13.7 % (ref 11.5–14.5)
FERRITIN SERPL-MCNC: 419 NG/ML (ref 8–252)
FOLATE SERPL-MCNC: 11.2 NG/ML (ref 5–21)
GLOBULIN SER CALC-MCNC: 3.3 G/DL (ref 2–4)
GLUCOSE BLD STRIP.AUTO-MCNC: 127 MG/DL (ref 65–117)
GLUCOSE BLD STRIP.AUTO-MCNC: 184 MG/DL (ref 65–117)
GLUCOSE SERPL-MCNC: 109 MG/DL (ref 65–100)
HCT VFR BLD AUTO: 22.3 % (ref 35–47)
HEMOCCULT STL QL: NEGATIVE
HGB BLD-MCNC: 7.1 G/DL (ref 11.5–16)
IMM GRANULOCYTES # BLD AUTO: 0 K/UL (ref 0–0.04)
IMM GRANULOCYTES NFR BLD AUTO: 0 % (ref 0–0.5)
IRON SATN MFR SERPL: 41 % (ref 20–50)
IRON SERPL-MCNC: 86 UG/DL (ref 35–150)
LYMPHOCYTES # BLD: 3 K/UL (ref 0.8–3.5)
LYMPHOCYTES NFR BLD: 29 % (ref 12–49)
MCH RBC QN AUTO: 30.6 PG (ref 26–34)
MCHC RBC AUTO-ENTMCNC: 31.8 G/DL (ref 30–36.5)
MCV RBC AUTO: 96.1 FL (ref 80–99)
METAMYELOCYTES NFR BLD MANUAL: 1 %
MONOCYTES # BLD: 0.4 K/UL (ref 0–1)
MONOCYTES NFR BLD: 4 % (ref 5–13)
MYELOCYTES NFR BLD MANUAL: 1 %
NEUTS BAND NFR BLD MANUAL: 1 %
NEUTS SEG # BLD: 5.4 K/UL (ref 1.8–8)
NEUTS SEG NFR BLD: 52 % (ref 32–75)
NRBC # BLD: 0.02 K/UL (ref 0–0.01)
NRBC BLD-RTO: 0.2 PER 100 WBC
PLATELET # BLD AUTO: 206 K/UL (ref 150–400)
PMV BLD AUTO: 10.6 FL (ref 8.9–12.9)
POTASSIUM SERPL-SCNC: 4.4 MMOL/L (ref 3.5–5.1)
PROT SERPL-MCNC: 5 G/DL (ref 6.4–8.2)
RBC # BLD AUTO: 2.32 M/UL (ref 3.8–5.2)
RBC MORPH BLD: ABNORMAL
SERVICE CMNT-IMP: ABNORMAL
SERVICE CMNT-IMP: ABNORMAL
SODIUM SERPL-SCNC: 141 MMOL/L (ref 136–145)
TIBC SERPL-MCNC: 208 UG/DL (ref 250–450)
TROPONIN I SERPL-MCNC: <0.05 NG/ML
VIT B12 SERPL-MCNC: 1315 PG/ML (ref 193–986)
WBC # BLD AUTO: 10.2 K/UL (ref 3.6–11)

## 2021-06-21 PROCEDURE — 82607 VITAMIN B-12: CPT

## 2021-06-21 PROCEDURE — 84484 ASSAY OF TROPONIN QUANT: CPT

## 2021-06-21 PROCEDURE — 36415 COLL VENOUS BLD VENIPUNCTURE: CPT

## 2021-06-21 PROCEDURE — 97165 OT EVAL LOW COMPLEX 30 MIN: CPT

## 2021-06-21 PROCEDURE — 82272 OCCULT BLD FECES 1-3 TESTS: CPT

## 2021-06-21 PROCEDURE — 74011250637 HC RX REV CODE- 250/637: Performed by: INTERNAL MEDICINE

## 2021-06-21 PROCEDURE — 80053 COMPREHEN METABOLIC PANEL: CPT

## 2021-06-21 PROCEDURE — 85025 COMPLETE CBC W/AUTO DIFF WBC: CPT

## 2021-06-21 PROCEDURE — 82962 GLUCOSE BLOOD TEST: CPT

## 2021-06-21 PROCEDURE — 97535 SELF CARE MNGMENT TRAINING: CPT

## 2021-06-21 PROCEDURE — 74011000258 HC RX REV CODE- 258: Performed by: EMERGENCY MEDICINE

## 2021-06-21 PROCEDURE — 74011250636 HC RX REV CODE- 250/636: Performed by: EMERGENCY MEDICINE

## 2021-06-21 PROCEDURE — 74011250636 HC RX REV CODE- 250/636: Performed by: INTERNAL MEDICINE

## 2021-06-21 PROCEDURE — 82728 ASSAY OF FERRITIN: CPT

## 2021-06-21 PROCEDURE — 83540 ASSAY OF IRON: CPT

## 2021-06-21 PROCEDURE — 82746 ASSAY OF FOLIC ACID SERUM: CPT

## 2021-06-21 PROCEDURE — 97161 PT EVAL LOW COMPLEX 20 MIN: CPT

## 2021-06-21 PROCEDURE — 97116 GAIT TRAINING THERAPY: CPT

## 2021-06-21 PROCEDURE — 65270000029 HC RM PRIVATE

## 2021-06-21 RX ORDER — PANTOPRAZOLE SODIUM 40 MG/1
40 TABLET, DELAYED RELEASE ORAL
Status: DISCONTINUED | OUTPATIENT
Start: 2021-06-21 | End: 2021-06-22 | Stop reason: HOSPADM

## 2021-06-21 RX ORDER — OXYCODONE HYDROCHLORIDE 5 MG/1
5 TABLET ORAL
Status: DISCONTINUED | OUTPATIENT
Start: 2021-06-21 | End: 2021-06-22 | Stop reason: HOSPADM

## 2021-06-21 RX ORDER — NYSTATIN 100000 [USP'U]/ML
500000 SUSPENSION ORAL 4 TIMES DAILY
Status: DISCONTINUED | OUTPATIENT
Start: 2021-06-21 | End: 2021-06-21

## 2021-06-21 RX ADMIN — Medication 10 ML: at 07:15

## 2021-06-21 RX ADMIN — Medication 10 ML: at 14:30

## 2021-06-21 RX ADMIN — SODIUM CHLORIDE 75 ML/HR: 9 INJECTION, SOLUTION INTRAVENOUS at 03:43

## 2021-06-21 RX ADMIN — OXYCODONE 5 MG: 5 TABLET ORAL at 19:00

## 2021-06-21 RX ADMIN — AZITHROMYCIN MONOHYDRATE 500 MG: 500 INJECTION, POWDER, LYOPHILIZED, FOR SOLUTION INTRAVENOUS at 10:12

## 2021-06-21 RX ADMIN — CEFTRIAXONE SODIUM 2 G: 2 INJECTION, POWDER, FOR SOLUTION INTRAMUSCULAR; INTRAVENOUS at 08:29

## 2021-06-21 RX ADMIN — ACETAMINOPHEN 650 MG: 325 TABLET ORAL at 08:29

## 2021-06-21 RX ADMIN — Medication 10 ML: at 22:38

## 2021-06-21 RX ADMIN — ENOXAPARIN SODIUM 40 MG: 40 INJECTION SUBCUTANEOUS at 08:29

## 2021-06-21 RX ADMIN — OXYCODONE 5 MG: 5 TABLET ORAL at 12:12

## 2021-06-21 RX ADMIN — PANTOPRAZOLE SODIUM 40 MG: 40 TABLET, DELAYED RELEASE ORAL at 12:12

## 2021-06-21 NOTE — PROGRESS NOTES
Hospitalist Progress Note    NAME: Blanche Honeycutt   :  1945   MRN:  [id number]       Assessment / Plan:    Dizziness/lactic acidosis cause   Likely community-acquired pneumonia  -Likely due to postoperative anemia, patient's hemoglobin prior to surgery was 12.8  -She presented with hemoglobin of 8.5 and with IV fluids she has hemoglobin dropped to 7.1  Lactic acidosis has resolved with IV fluids   repeat lactic acid  Continue IV fluid  -Patient is not orthostatic by definition but she drops her pressure on standing by 13 points  -CT of the chest showed bilateral lower lobe atelectasis  -Patient was started on empiric antibiotics for possible pneumonia due to elevated procalcitonin  -Continue ceftriaxone and azithromycin for now  -Patient reports having episodes of subjective fevers and chills prior to hospitalization  -Blood cultures obtained in negative today, continue to follow blood cultures  -UA is negative  -PT OT    Acute anemia:  -Likely her anemia is postoperative  -Preoperative hemoglobin is 12.8, postop it was 8.5  -Dropped to 7.1 after IV fluids  -We will check anemia panel  -No clinical signs of bleeding  -Transfuse as needed for hemoglobin below 7     S/p right hip replacement  Ortho consultationcontinue pain medication  PT OT evaluation     GERD  Continue PPI     Hyperlipidemia  Continue Lipitor     Code Status: Full   Surrogate Decision Maker:     DVT Prophylaxis: Lovenox   GI Prophylaxis: not indicated    Recommended Disposition: Home w/Family     Anticipated discharge date:     Barriers to discharge: Awaiting clinical improvement and infectious disease work-up     Subjective:     Chief Complaint / Reason for Physician Visit  \" Continues to report intermittent shivering and chills. Denies any fever. Continues to report some dizziness on changing position\". Discussed with RN events overnight.      Review of Systems:  Symptom Y/N Comments  Symptom Y/N Comments Fever/Chills n/y   Chest Pain n    Poor Appetite n   Edema n    Cough n   Abdominal Pain n    Sputum n   Joint Pain     SOB/WASHBURN n   Pruritis/Rash     Nausea/vomit n   Tolerating PT/OT     Diarrhea n   Tolerating Diet     Constipation n   Other       Could NOT obtain due to:      Objective:     VITALS:   Last 24hrs VS reviewed since prior progress note. Most recent are:  Patient Vitals for the past 24 hrs:   Temp Pulse Resp BP SpO2   06/21/21 1200  94  138/70    06/21/21 1039    130/65    06/21/21 1036    125/61    06/21/21 1032 98.2 °F (36.8 °C) (!) 106 20 132/60 98 %   06/21/21 0800  97      06/21/21 0717 98.1 °F (36.7 °C) 86 17 136/63 98 %   06/21/21 0335 98.2 °F (36.8 °C)       06/21/21 0332 98.2 °F (36.8 °C) 88 20 137/63 97 %   06/20/21 2324 98.3 °F (36.8 °C) 89 23 139/61 95 %   06/20/21 2043  98 25 118/66 97 %   06/20/21 1930 98.7 °F (37.1 °C) (!) 111 20 (!) 115/55 97 %   06/20/21 1600  99      06/20/21 1448 98.4 °F (36.9 °C) (!) 101 17 (!) 116/94 94 %       Intake/Output Summary (Last 24 hours) at 6/21/2021 1429  Last data filed at 6/21/2021 1012  Gross per 24 hour   Intake 580 ml   Output 800 ml   Net -220 ml        PHYSICAL EXAM:  General: WD, WN. Alert, cooperative, no acute distress    EENT:  EOMI. Anicteric sclerae. MMM  Resp:  CTA bilaterally, no wheezing or rales. No accessory muscle use  CV:  Regular  rhythm,  No edema  GI:  Soft, Non distended, Non tender.  +Bowel sounds  Neurologic:  Alert and oriented X 3, normal speech,   Psych:   Good insight. Not anxious nor agitated  Skin:  No rashes.   No jaundice    Reviewed most current lab test results and cultures  YES  Reviewed most current radiology test results   YES  Review and summation of old records today    NO  Reviewed patient's current orders and MAR    YES  PMH/SH reviewed - no change compared to H&P  ________________________________________________________________________  Care Plan discussed with:    Comments   Patient x    Family      RN x    Care Manager     Consultant                        Multidiciplinary team rounds were held today with , nursing, pharmacist and clinical coordinator. Patient's plan of care was discussed; medications were reviewed and discharge planning was addressed. ________________________________________________________________________  Total NON critical care TIME:  30   Minutes    Total CRITICAL CARE TIME Spent:   Minutes non procedure based      Comments   >50% of visit spent in counseling and coordination of care     ________________________________________________________________________  Ofe Rodriguez MD     Procedures: see electronic medical records for all procedures/Xrays and details which were not copied into this note but were reviewed prior to creation of Plan. LABS:  I reviewed today's most current labs and imaging studies.   Pertinent labs include:  Recent Labs     06/21/21  0344 06/20/21  0123   WBC 10.2 10.9   HGB 7.1* 8.6*   HCT 22.3* 26.0*    200     Recent Labs     06/21/21  0344 06/20/21  0123    138   K 4.4 3.9   * 107   CO2 24 24   * 147*   BUN 16 21*   CREA 0.83 1.05*   CA 7.5* 8.2*   ALB 1.7* 2.1*   TBILI 0.5 1.0   ALT 37 51       Signed: Ofe Rodriguez MD

## 2021-06-21 NOTE — PROGRESS NOTES
Problem: Mobility Impaired (Adult and Pediatric)  Goal: *Acute Goals and Plan of Care (Insert Text)  Description: FUNCTIONAL STATUS PRIOR TO ADMISSION: Pt s/p R AR 6/14/21 and discharged home with HHPT and assist of daughter. Has been ambulating with RW support since surgery and receiving HHPT. Denies history of falls. Independent prior to hip replacement. HOME SUPPORT PRIOR TO ADMISSION: The patient lived alone however states daughter has been staying with her and assisting as needed. Physical Therapy Goals  Initiated 6/21/2021  1. Patient will move from supine to sit and sit to supine , scoot up and down, and roll side to side in bed with modified independence within 7 day(s). 2.  Patient will transfer from bed to chair and chair to bed with modified independence using the least restrictive device within 7 day(s). 3.  Patient will perform sit to stand with modified independence within 7 day(s). 4.  Patient will ambulate with modified independence for 300 feet with the least restrictive device within 7 day(s). 5.  Patient will ascend/descend 5 stairs with 1 handrail(s) with modified independence within 7 day(s). Outcome: Progressing Towards Goal   PHYSICAL THERAPY EVALUATION  Patient: Joi Tellez (42 y.o. female)  Date: 6/21/2021  Primary Diagnosis: Sepsis ([facility] [address] [A41.9]        Precautions:   WBAT    ASSESSMENT  Based on the objective data described below, the patient presents with increased pain levels, decreased endurance/activity tolerance, altered gait pattern, impaired higher level balance, and overall mild impairments in functional mobility. Pt with reports of 9/10 pain, only agreeable to brief ambulation trial before fatigue/declining further participation. Gait speed decreased with an overall shuffled, antalgic gait pattern. No overt LOB however fair gait stability overall. All VSS on RA.  Anticipate once pain is better controlled, pt will progress well and quickly return to modified independent level. Pt is safe to return home w/ assist of daughter and resumption of HHPT. Current Level of Function Impacting Discharge (mobility/balance): SB/CGAx1 with use of RW    Functional Outcome Measure: The patient scored 55/100 on the Barthel Index outcome measure which is indicative of moderate impairment in ADLs and functional mobility. Other factors to consider for discharge: pain levels, fatigue     Patient will benefit from skilled therapy intervention to address the above noted impairments. PLAN :  Recommendations and Planned Interventions: bed mobility training, transfer training, gait training, therapeutic exercises, patient and family training/education, and therapeutic activities      Frequency/Duration: Patient will be followed by physical therapy:  3 times a week to address goals. Recommendation for discharge: (in order for the patient to meet his/her long term goals)  Physical therapy at least 2 days/week in the home     This discharge recommendation:  Has been made in collaboration with the attending provider and/or case management    IF patient discharges home will need the following DME: patient owns DME required for discharge         SUBJECTIVE:   Patient stated I am so stiff, ugh this hip.     OBJECTIVE DATA SUMMARY:   HISTORY:    Past Medical History:   Diagnosis Date    Arthritis     bilateral hands, hips, lower back    At risk for sleep apnea 03/18/2019    on phone assessment with PAT, kathia sleep scoring tool, scored 4    Chronic kidney disease 2017    kidney stones    Elevated hemoglobin A1c 1/27/2020    GERD (gastroesophageal reflux disease)     being treated    High cholesterol     Kidney stones     Menopause 1995    Palpitations     as of 6/24/19 pt reports was evaluated by cardiology and cleared, now followed by cardiology, no further symptoms per pt    Pneumonia 2006 (approx)    Rheumatic fever     AGE 5    RLS (restless legs syndrome)     Shoulder fracture 2009 [facility] -Jan 09  Right side      Past Surgical History:   Procedure Laterality Date    COLONOSCOPY  7/30/2010    Dr. Cheryl Morley, diverticulosis, repeat due 7/30/2015    COLONOSCOPY,JONAH CALHOUN,FORCEP/CAUTERY  9/4/2015         HX CATARACT REMOVAL Bilateral 03/2019    HX OPEN REDUCTION INTERNAL FIXATION Left 1982    ankle    HX ORTHOPAEDIC  2019    L4-L5 BACK SURGERY - synovial cyst removal    HX ROTATOR CUFF REPAIR Right 2008    HX ROTATOR CUFF REPAIR Left 2012    HX UROLOGICAL  2017    stent for kidney stone       Personal factors and/or comorbidities impacting plan of care:     Home Situation  Home Environment: Private residence  # Steps to Enter: 5  Rails to Enter: Yes  Hand Rails : Right  Wheelchair Ramp: Yes  One/Two Story Residence: One story  Living Alone: No  Support Systems: Child(neena) (daughter )  Patient Expects to be Discharged to[de-identified] House  Current DME Used/Available at Home: shashank Borrego, [address] chair, Adaptive bathing aides, Adaptive dressing aides  Tub or Shower Type: Shower    EXAMINATION/PRESENTATION/DECISION MAKING:   Critical Behavior:  Neurologic State: Alert  Orientation Level: Oriented X4  Cognition: Appropriate decision making, Appropriate safety awareness, Appropriate for age attention/concentration  Safety/Judgement: Awareness of environment, Insight into deficits, Home safety  Hearing:   Auditory  Auditory Impairment: None  Skin:  intact  Edema: none noted  Range Of Motion:  AROM: Generally decreased, functional           PROM: Within functional limits           Strength:    Strength: Generally decreased, functional                    Tone & Sensation:   Tone: Normal              Sensation: Intact               Coordination:  Coordination: Generally decreased, functional  Vision:      Functional Mobility:  Bed Mobility:              Transfers:  Sit to Stand: Stand-by assistance  Stand to Sit: Stand-by assistance        Bed to Chair: Stand-by assistance;Contact guard assistance Balance:   Sitting: Intact  Standing: Impaired; With support (RW)  Standing - Static: Good  Standing - Dynamic : Good;Fair  Ambulation/Gait Training:  Distance (ft): 30 Feet (ft)  Assistive Device: Walker, rolling;Gait belt  Ambulation - Level of Assistance: Stand-by assistance;Contact guard assistance        Gait Abnormalities: Antalgic        Base of Support: Narrowed     Speed/Cynthia: Pace decreased (<100 feet/min)  Step Length: Left shortened;Right shortened                Functional Measure:  Barthel Index:    Bathin  Bladder: 10  Bowels: 10  Groomin  Dressin  Feeding: 10  Mobility: 0  Stairs: 0  Toilet Use: 5  Transfer (Bed to Chair and Back): 10  Total: 55/100       The Barthel ADL Index: Guidelines  1. The index should be used as a record of what a patient does, not as a record of what a patient could do. 2. The main aim is to establish degree of independence from any help, physical or verbal, however minor and for whatever reason. 3. The need for supervision renders the patient not independent. 4. A patient's performance should be established using the best available evidence. Asking the patient, friends/relatives and nurses are the usual sources, but direct observation and common sense are also important. However direct testing is not needed. 5. Usually the patient's performance over the preceding 24-48 hours is important, but occasionally longer periods will be relevant. 6. Middle categories imply that the patient supplies over 50 per cent of the effort. 7. Use of aids to be independent is allowed. Myrna Aldridge., Barthel, D.W. ([id number]). Functional evaluation: the Barthel Index. [address] [facility] (14)2. YADIRA Lee, Rebecca Lopez., Remigio Boas., [place], [address] [place] [address] (). Measuring the change indisability after inpatient rehabilitation; comparison of the responsiveness of the Barthel Index and Functional [place] Measure.  Journal of Neurology, Neurosurgery, and Psychiatry, 66(3), 548-591. SHAN Dunbar, ARMOND Carrillo, & Naya Santana M.A. (2004.) Assessment of post-stroke quality of life in cost-effectiveness studies: The usefulness of the Barthel Index and the EuroQoL-5D. Quality of Life Research, 15, 603-17           Physical Therapy Evaluation Charge Determination   History Examination Presentation Decision-Making   MEDIUM  Complexity : 1-2 comorbidities / personal factors will impact the outcome/ POC  MEDIUM Complexity : 3 Standardized tests and measures addressing body structure, function, activity limitation and / or participation in recreation  MEDIUM Complexity : Evolving with changing characteristics  MEDIUM Complexity : FOTO score of 26-74      Based on the above components, the patient evaluation is determined to be of the following complexity level: MEDIUM    Pain Ratin/10 pain in R hip    Activity Tolerance:   VSS on RA however pt fatigues quickly    After treatment patient left in no apparent distress:   Sitting in chair and Call bell within reach    COMMUNICATION/EDUCATION:   The patients plan of care was discussed with: Registered nurse. Fall prevention education was provided and the patient/caregiver indicated understanding., Patient/family have participated as able in goal setting and plan of care. , and Patient/family agree to work toward stated goals and plan of care.     Thank you for this referral.  Lyubov Carranza, PT, DPT   Time Calculation: 19 mins

## 2021-06-21 NOTE — PROGRESS NOTES
Transition of Care Plan:    RUR:14%  Disposition:Home with home health At [address]  Follow up appointments:PCP and specialist  DME needed: TBD  Transportation at Discharge:Dtr  Keys or means to access home:  Dtr  IM Medicare letter:2nd IMM Letter  Caregiver Contact:Mitzi Luz-Dtr-[phone]  Discharge Caregiver contacted prior to discharge? Reason for Readmission:     SOB         RUR Score/Risk Level:     14%    PCP: First and Last name: Imani Sosa Name of Practice:    Are you a current patient: Yes/No:  Yes   Approximate date of last visit:  As an appointment schedule for June 24, 2021   Can you participate in a virtual visit with your PCP:  Yes    Is a Care Conference indicated:  No care conference indicated      Did you attend your follow up appointment (s): If not, why not: No has a follow up appointment schedule for June 24, 2021         Resources/supports as identified by patient/family:   Dtr is supportive       Top Challenges facing patient (as identified by patient/family and CM): Finances/Medication cost?     No concerns regarding finances/medication cost  Transportation     Dtr transport or pt drives herself   Support system or lack thereof? Dtr   Living arrangements? Pt lives alone in an one story home  Self-care/ADLs/Cognition? Pt is alert, verbal and oriented.  Independent with her ADLs and IADLs       Current Advanced Directive/Advance Care Plan:           Advance Care Planning     General Advance Care Planning (ACP) Conversation      Date of Conversation: 6/21/2021  Conducted with: Patient with Decision Making Capacity    Healthcare Decision Maker:     Primary Decision Maker: Mitzi Luz - Daughter - [phone]    Secondary Decision Maker: Jose Martin Vicente - [phone]  Click here to complete [address] including selection of the Healthcare Decision Maker Relationship (ie \"Primary\")  Today we documented Decision Maker(s) consistent with ACP documents on file. Content/Action Overview: Has ACP document(s) on file - reflects the patient's care preferences  Reviewed DNR/DNI and patient elects Full Code (Attempt Resuscitation)         Length of Voluntary ACP Conversation in minutes:  <16 minutes (Non-Billable)    [address] for utilizing home health: [facility]             Transition of Care Plan:    Based on readmission, the patient's previous Plan of Care   has been evaluated and/or modified. The current Transition of Care Plan is:           Pt is a 67 yo female who was admitted to [facility] with a dx of SOB. PMHx includes arthritis, chronic kidney disease, GERD and etc. CM confirmed demographics with pt. Pt lives alone in an one story home with 5 steps to enter. Pt has a rolling walker. She is receiving home health services from At [facility]. No hx of inpatient rehab or SNF. Pt uses [facility] [address] in [place]. Pt is independent in her ADLs and IADLs. Pt drives herself to her medical appointments. At the time of d/c pt's dtr will transport home. CM will continue to follow and assist with d/c planning. Care Management Interventions  PCP Verified by CM: Yes (Imani Pott)  Mode of Transport at Discharge:  Other (see comment) (Dtr to transport at the time of d/c )  Transition of Care Consult (CM Consult): Discharge Planning, [address] (Home alone and with [facility])  [facility] - INPATIENT: No  Reason Outside [place]: Patient already serviced by other home care/hospice agency  Discharge Durable Medical Equipment: No (Rolling Walker)  Physical Therapy Consult: Yes  Occupational Therapy Consult: Yes  Speech Therapy Consult: No  Current Support Network: Family Lives [place], Own Home, Lives Alone (Pt's dtr is supportive and involved)  Confirm Follow Up Transport: Self (Pt drives herself to her medical appointments)  Discharge Location  Discharge Placement: Home with home health    Readmission Assessment  Number of days since last admission?: 1-7 days  Previous disposition: Home with Home Health (Home alone with [facility])  Who is being interviewed?: Patient  What was the patient's/caregiver's perception as to why they think they needed to return back to the hospital?: Other (Comment) (Pt was having chills, having dizzy spells and was feeling hot)  Did you visit your Primary Care Physician after you left the hospital, before you returned this time?: No  Why weren't you able to visit your PCP?: Other (Comment) (Pt had an appointment schedule for June 24, 2021)  Did you see a specialist, such as Cardiac, Pulmonary, Orthopedic Physician, etc. after you left the hospital?: No (Pt has a follow up appointment mariann for June 28, 2021)  Who advised the patient to return to the hospital?: Other (Comment) (Pt's dtr called 911)  Does the patient report anything that got in the way of taking their medications?: No  In our efforts to provide the best possible care to you and others like you, can you think of anything that we could have done to help you after you left the hospital the first time, so that you might not have needed to return so soon?: Other (Comment) (Pt stated \"no I don't think so\")    Meeta Adame.   Care Manager [facility]  [phone]

## 2021-06-21 NOTE — PROGRESS NOTES
OCCUPATIONAL THERAPY EVALUATION/DISCHARGE  Patient: Lang Stephenson (49 y.o. female)  Date: 6/21/2021  Primary Diagnosis: Sepsis ([facility] [address] [A41.9]       Precautions:  WBAT    ASSESSMENT  Based on the objective data described below, the patient presents with decreased higher level mobility/balance, decreased higher level activity tolerance and c/o R hip pain with movement; however, she is demonstrating a high level of safe functional independence, completing static standing ADLs at RW with SBA and completing dynamic ADLs at RW with CGA. Pt reports all DME/AE needs fulfilled within home, with good understanding of long-handled AE to distal LE's and good understanding of modified dressing techniques. Given pt's current high level of safe functional independence, all DME/AE needs fulfilled within home, good in-home ADL/IADL assist from daughter (who is staying with patient post discharge) and physical therapy following mobility/balance deficits, no other acute OT needs identified, with OT answering pt's questions/concerns and pt thanking therapist for his efforts. Current Level of Function (ADLs/self-care): CGA/SBA    Functional Outcome Measure: The patient scored 55/100 on the Barthel Index outcome measure. Other factors to consider for discharge: none     PLAN :  Recommend with staff: OOB meals, Active ADL engagement, Assist x1 to/from bathroom    Recommendation for discharge: (in order for the patient to meet his/her long term goals)  No skilled occupational therapy/ follow up rehabilitation needs identified at this time. This discharge recommendation:  Has been made in collaboration with the attending provider and/or case management    IF patient discharges home will need the following DME: patient owns DME required for discharge       SUBJECTIVE:   Patient stated Siepershelton Robles was doing good at home and then on Friday night I started going downhill.     OBJECTIVE DATA SUMMARY:   HISTORY:   Past Medical History: Diagnosis Date    Arthritis     bilateral hands, hips, lower back    At risk for sleep apnea 03/18/2019    on phone assessment with VEENA, kathia sleep scoring tool, scored 4    Chronic kidney disease 2017    kidney stones    Elevated hemoglobin A1c 1/27/2020    GERD (gastroesophageal reflux disease)     being treated    High cholesterol     Kidney stones     Menopause 1995    Palpitations     as of 6/24/19 pt reports was evaluated by cardiology and cleared, now followed by cardiology, no further symptoms per pt    Pneumonia 2006 (approx)    Rheumatic fever     AGE 5    RLS (restless legs syndrome)     Shoulder fracture 2009 [facility] -Jan 09  Right side      Past Surgical History:   Procedure Laterality Date    COLONOSCOPY  7/30/2010    Dr. Fermin Glover, diverticulosis, repeat due 7/30/2015    COLONOSCOPY,REMV LESN,FORCEP/CAUTERY  9/4/2015         HX CATARACT REMOVAL Bilateral 03/2019    HX OPEN REDUCTION INTERNAL FIXATION Left 1982    ankle    HX ORTHOPAEDIC  2019    L4-L5 BACK SURGERY - synovial cyst removal    HX ROTATOR CUFF REPAIR Right 2008    HX ROTATOR CUFF REPAIR Left 2012    HX UROLOGICAL  2017    stent for kidney stone       Prior Level of Function/Environment/Context: Independent  Expanded or extensive additional review of patient history:   Home Situation  Home Environment: Private residence  # Steps to Enter: 5  Rails to Enter: Yes  Hand Rails : Right  Wheelchair Ramp: Yes  One/Two Story Residence: One story  Living Alone: No  Support Systems: Child(neena) (daughter )  Patient Expects to be Discharged to[de-identified] House  Current DME Used/Available at Home: Alease Zarate, rolling, Shower chair, Adaptive bathing aides, Adaptive dressing aides  Tub or Shower Type: Shower    Hand dominance: Right    EXAMINATION OF PERFORMANCE DEFICITS:  Cognitive/Behavioral Status:  Neurologic State: Alert  Orientation Level: Oriented X4  Cognition: Appropriate decision making; Appropriate safety awareness; Appropriate for age attention/concentration  Perception: Appears intact  Perseveration: No perseveration noted  Safety/Judgement: Awareness of environment; Insight into deficits;Home safety    Hearing: Auditory  Auditory Impairment: None    Range of Motion:  AROM: Generally decreased, functional  PROM: Within functional limits    Strength:  Strength: Generally decreased, functional    Coordination:  Coordination: Generally decreased, functional  Fine Motor Skills-Upper: Left Intact; Right Intact    Gross Motor Skills-Upper: Left Intact; Right Intact    Tone & Sensation:  Tone: Normal  Sensation: Intact    Balance:  Sitting: Intact  Standing: Impaired; With support  Standing - Static: Good  Standing - Dynamic : Good;Fair    Functional Mobility and Transfers for ADLs:  Transfers:  Sit to Stand: Stand-by assistance  Stand to Sit: Stand-by assistance  Bathroom Mobility: Contact guard assistance    ADL Assessment:  Feeding: Independent    Oral Facial Hygiene/Grooming: Stand-by assistance    Bathing: Supervision    Upper Body Dressing: Independent    Lower Body Dressing: Stand-by assistance (with use of AE)    Toileting: Contact guard assistance    ADL Intervention and task modifications:  Patient instructed and indicated understanding the benefits of maintaining activity tolerance, functional mobility, and independence with self care tasks during acute stay  to ensure safe return home and to baseline. Encouraged patient to increase frequency and duration OOB, be out of bed for all meals, perform daily ADLs (as approved by RN/MD regarding bathing etc), and performing functional mobility to/from bathroom. Pt educated on safe transfer techniques, with specific emphasis on proper hand placement to push up from seated surface rather than attempt to pull self up, fully positioning self in-front of desired seated location, feeling chair on back of legs and reaching back with 1-2 UE to slowly lower self to seated position.     Cognitive Retraining  Safety/Judgement: Awareness of environment; Insight into deficits;Home safety    Functional Measure:  Barthel Index:    Bathin  Bladder: 10  Bowels: 10  Groomin  Dressin  Feeding: 10  Mobility: 0  Stairs: 0  Toilet Use: 5  Transfer (Bed to Chair and Back): 10  Total: 55/100        The Barthel ADL Index: Guidelines  1. The index should be used as a record of what a patient does, not as a record of what a patient could do. 2. The main aim is to establish degree of independence from any help, physical or verbal, however minor and for whatever reason. 3. The need for supervision renders the patient not independent. 4. A patient's performance should be established using the best available evidence. Asking the patient, friends/relatives and nurses are the usual sources, but direct observation and common sense are also important. However direct testing is not needed. 5. Usually the patient's performance over the preceding 24-48 hours is important, but occasionally longer periods will be relevant. 6. Middle categories imply that the patient supplies over 50 per cent of the effort. 7. Use of aids to be independent is allowed. Cl Peters., Barthel, DNelsonW. ([id number]). Functional evaluation: the Barthel Index. [address] [facility] (14)2. Daisy Pearl pancho YADIRA Cobian, Sarkis Washington., Susanne Cummins., [place], [address] [place] [address] (). Measuring the change indisability after inpatient rehabilitation; comparison of the responsiveness of the Barthel Index and Functional [place] Measure. Journal of Neurology, Neurosurgery, and Psychiatry, 66(4), 231-507. Mason Rolon, N.FE.A, ARMOND Carrillo, & Maura Strange MNelsonA. (2004.) Assessment of post-stroke quality of life in cost-effectiveness studies: The usefulness of the Barthel Index and the EuroQoL-5D.  Quality of Life Research, 15, 733-15     Occupational Therapy Evaluation Charge Determination   History Examination Decision-Making   LOW Complexity : Brief history review LOW Complexity : 1-3 performance deficits relating to physical, cognitive , or psychosocial skils that result in activity limitations and / or participation restrictions  LOW Complexity : No comorbidities that affect functional and no verbal or physical assistance needed to complete eval tasks       Based on the above components, the patient evaluation is determined to be of the following complexity level: LOW   Pain Rating:  No c/o pain during OT evaluation, pt reported high pain with PT earlier this date; however, pain medication administered between evaluations    Activity Tolerance:   Good, Fair and requires rest breaks    After treatment patient left in no apparent distress:    Sitting in chair and Call bell within reach    COMMUNICATION/EDUCATION:   The patients plan of care was discussed with: Physical therapist, Registered nurse and Case management.      Thank you for this referral.  Khushi Downing, OT  Time Calculation: 19 mins

## 2021-06-21 NOTE — CONSULTS
Ortho:    Pt is s/p right hip anterior AR by Dr King Lobo 6/14    Wasn't feeling well, weak and light-headed- presented to the ED via EMS  No complaints of right hip pain   States she feels much better over all since admission. Right hip CDI- No erythema, No swelling or ecchy  No focal RLE weakness  No sign of  LE VTE    WBAT with PT/OT as necessary.  Walker as necessary     Hb 7.1 partially dilutional --- was 8.6 at admission    Will re-ck tomorrow  Regular out-pt FU next week with DUSTIN BautistaC

## 2021-06-21 NOTE — PROGRESS NOTES
Problem: Falls - Risk of  Goal: *Absence of Falls  Description: Document Marilu Matos Fall Risk and appropriate interventions in the flowsheet.   Outcome: Progressing Towards Goal  Note: Fall Risk Interventions:  Mobility Interventions: Patient to call before getting OOB, Bed/chair exit alarm         Medication Interventions: Bed/chair exit alarm    Elimination Interventions: Bed/chair exit alarm, Call light in reach    History of Falls Interventions: Bed/chair exit alarm, Consult care management for discharge planning         Problem: Patient Education: Go to Patient Education Activity  Goal: Patient/Family Education  Outcome: Progressing Towards Goal

## 2021-06-21 NOTE — PROGRESS NOTES
0700  Bedside shift change report given to Elpidio Hannah (oncoming nurse) by Carlee Laureano RN(offgoing nurse). Report included the following information SBAR, Kardex, ED Summary, Intake/Output, MAR, Recent Results and Cardiac Rhythm ST.     0900 Dr. Greyson Dela Cruz at the bedside. Excepted discharge tomorrow.     1330 PT/OT at the bedside. 1900 Bedside shift change report given to Brayden Rodirguez RN (oncoming nurse) by Elpidio Hannah RN(offgoing nurse).  Report included the following information SBAR, Kardex, ED Summary, Intake/Output, MAR, Recent Results and Cardiac Rhythm ST.

## 2021-06-21 NOTE — INTERDISCIPLINARY ROUNDS
Interdisciplinary team rounds were held 6/21/2021 with the following team members:Care Management, Nursing, Pharmacy Physician and Charge RN Plan of care discussed. See clinical pathway and/or care plan for interventions and desired outcomes. Transfer to [facility]  to find SNF for patient.

## 2021-06-21 NOTE — PROGRESS NOTES
Occupational Therapy    Orders received, chart reviewed and patient evaluated by occupational therapy. Pending progression with skilled acute occupational therapy, recommend:  No skilled occupational therapy/ follow up rehabilitation needs identified at this time. Recommend with nursing ADLs with supervision/setup, OOB to chair 3x/day and toileting via functional mobility to and from bathroom contact guard assist and walker. Thank you for completing as able in order to maintain patient strength, endurance and independence. Full evaluation to follow.      Thank you,  Brown Nissen, OT

## 2021-06-22 ENCOUNTER — APPOINTMENT (OUTPATIENT)
Dept: NON INVASIVE DIAGNOSTICS | Age: 76
DRG: 194 | End: 2021-06-22
Attending: INTERNAL MEDICINE
Payer: MEDICARE

## 2021-06-22 VITALS
HEIGHT: 60 IN | DIASTOLIC BLOOD PRESSURE: 70 MMHG | SYSTOLIC BLOOD PRESSURE: 141 MMHG | RESPIRATION RATE: 13 BRPM | BODY MASS INDEX: 30.04 KG/M2 | OXYGEN SATURATION: 96 % | WEIGHT: 153 LBS | HEART RATE: 78 BPM | TEMPERATURE: 97.8 F

## 2021-06-22 LAB
ECHO AO ROOT DIAM: 2.71 CM
ECHO AV AREA PEAK VELOCITY: 1.39 CM2
ECHO AV AREA VTI: 1.38 CM2
ECHO AV AREA/BSA PEAK VELOCITY: 0.8 CM2/M2
ECHO AV AREA/BSA VTI: 0.8 CM2/M2
ECHO AV MEAN GRADIENT: 6.97 MMHG
ECHO AV PEAK GRADIENT: 14.06 MMHG
ECHO AV PEAK VELOCITY: 187.48 CM/S
ECHO AV VTI: 33.6 CM
ECHO LA MAJOR AXIS: 3.24 CM
ECHO LA MINOR AXIS: 1.94 CM
ECHO LV E' LATERAL VELOCITY: 10.42 CM/S
ECHO LV E' SEPTAL VELOCITY: 8.73 CM/S
ECHO LV INTERNAL DIMENSION DIASTOLIC: 4.25 CM (ref 3.9–5.3)
ECHO LV INTERNAL DIMENSION SYSTOLIC: 3.55 CM
ECHO LV IVSD: 1.11 CM (ref 0.6–0.9)
ECHO LV MASS 2D: 161 G (ref 67–162)
ECHO LV MASS INDEX 2D: 96.4 G/M2 (ref 43–95)
ECHO LV POSTERIOR WALL DIASTOLIC: 1.1 CM (ref 0.6–0.9)
ECHO LVOT CARDIAC OUTPUT: 4.32 LITER/MINUTE
ECHO LVOT DIAM: 1.77 CM
ECHO LVOT PEAK GRADIENT: 4.53 MMHG
ECHO LVOT PEAK VELOCITY: 106.37 CM/S
ECHO LVOT SV: 46.3 ML
ECHO LVOT VTI: 18.85 CM
ECHO MV A VELOCITY: 78.96 CM/S
ECHO MV AREA PHT: 6.04 CM2
ECHO MV AREA VTI: 2.58 CM2
ECHO MV E DECELERATION TIME (DT): 174.29 MS
ECHO MV E VELOCITY: 59.48 CM/S
ECHO MV E/A RATIO: 0.75
ECHO MV E/E' LATERAL: 5.71
ECHO MV E/E' RATIO (AVERAGED): 6.26
ECHO MV E/E' SEPTAL: 6.81
ECHO MV MAX VELOCITY: 116.33 CM/S
ECHO MV MEAN GRADIENT: 2.66 MMHG
ECHO MV PEAK GRADIENT: 5.41 MMHG
ECHO MV PRESSURE HALF TIME (PHT): 36.41 MS
ECHO MV VTI: 17.98 CM
ECHO PV MAX VELOCITY: 122.19 CM/S
ECHO PV PEAK INSTANTANEOUS GRADIENT SYSTOLIC: 5.97 MMHG
GLUCOSE BLD STRIP.AUTO-MCNC: 100 MG/DL (ref 65–117)
HGB BLD-MCNC: 9 G/DL (ref 11.5–16)
LVOT MG: 2.18 MMHG
SERVICE CMNT-IMP: NORMAL

## 2021-06-22 PROCEDURE — 74011250637 HC RX REV CODE- 250/637: Performed by: INTERNAL MEDICINE

## 2021-06-22 PROCEDURE — 85018 HEMOGLOBIN: CPT

## 2021-06-22 PROCEDURE — 74011250636 HC RX REV CODE- 250/636: Performed by: EMERGENCY MEDICINE

## 2021-06-22 PROCEDURE — 74011250636 HC RX REV CODE- 250/636: Performed by: INTERNAL MEDICINE

## 2021-06-22 PROCEDURE — 93306 TTE W/DOPPLER COMPLETE: CPT

## 2021-06-22 PROCEDURE — 93306 TTE W/DOPPLER COMPLETE: CPT | Performed by: INTERNAL MEDICINE

## 2021-06-22 PROCEDURE — 36415 COLL VENOUS BLD VENIPUNCTURE: CPT

## 2021-06-22 PROCEDURE — 74011000258 HC RX REV CODE- 258: Performed by: EMERGENCY MEDICINE

## 2021-06-22 PROCEDURE — 82962 GLUCOSE BLOOD TEST: CPT

## 2021-06-22 RX ORDER — AZITHROMYCIN 250 MG/1
250 TABLET, FILM COATED ORAL DAILY
Qty: 4 TABLET | Refills: 4 | Status: SHIPPED | OUTPATIENT
Start: 2021-06-22 | End: 2022-02-04

## 2021-06-22 RX ADMIN — CEFTRIAXONE SODIUM 2 G: 2 INJECTION, POWDER, FOR SOLUTION INTRAMUSCULAR; INTRAVENOUS at 08:41

## 2021-06-22 RX ADMIN — AZITHROMYCIN MONOHYDRATE 500 MG: 500 INJECTION, POWDER, LYOPHILIZED, FOR SOLUTION INTRAVENOUS at 10:47

## 2021-06-22 RX ADMIN — OXYCODONE 5 MG: 5 TABLET ORAL at 00:03

## 2021-06-22 RX ADMIN — Medication 10 ML: at 06:27

## 2021-06-22 RX ADMIN — PANTOPRAZOLE SODIUM 40 MG: 40 TABLET, DELAYED RELEASE ORAL at 08:41

## 2021-06-22 RX ADMIN — ENOXAPARIN SODIUM 40 MG: 40 INJECTION SUBCUTANEOUS at 08:41

## 2021-06-22 RX ADMIN — ACETAMINOPHEN 650 MG: 325 TABLET ORAL at 08:41

## 2021-06-22 NOTE — PROGRESS NOTES
Physical Therapy Note:  Attempted to see patient for intervention. She is currently CHALINO for an echo. Will defer and follow up as able once she returns.   Jeannie Drake, PT, DPT

## 2021-06-22 NOTE — PROGRESS NOTES
0700  Bedside shift change report given to [address]) by Aydee Pennington RN(offgoing nurse). Report included the following information SBAR, Kardex, ED Summary, Intake/Output, MAR, Recent Results and Cardiac Rhythm ST.     1330 Printed and review AVS. Patient signed AVS. Removed IV AVS and tele leads. Patient has all personal belonging. All question and concerns has been answered. Patient has no questions or no concerns at this time. Patient is being wheeled via wheelchair with tech to the front enterence. Tech has returned to the floor. Patient was transported safely into family's car.

## 2021-06-22 NOTE — PROGRESS NOTES
Problem: Falls - Risk of  Goal: *Absence of Falls  Description: Document Bethany Mejia Fall Risk and appropriate interventions in the flowsheet.   Outcome: Progressing Towards Goal  Note: Fall Risk Interventions:  Mobility Interventions: Assess mobility with egress test, Bed/chair exit alarm, Communicate number of staff needed for ambulation/transfer, Patient to call before getting OOB, PT Consult for mobility concerns, Strengthening exercises (ROM-active/passive), Utilize walker, cane, or other assistive device         Medication Interventions: Assess postural VS orthostatic hypotension, Bed/chair exit alarm, Evaluate medications/consider consulting pharmacy, Patient to call before getting OOB, Teach patient to arise slowly    Elimination Interventions: Bed/chair exit alarm, Call light in reach, Patient to call for help with toileting needs, Stay With Me (per policy), Toileting schedule/hourly rounds    History of Falls Interventions: Bed/chair exit alarm, Door open when patient unattended, Evaluate medications/consider consulting pharmacy, Room close to nurse's station         Problem: Patient Education: Go to Patient Education Activity  Goal: Patient/Family Education  Outcome: Progressing Towards Goal

## 2021-06-22 NOTE — PROGRESS NOTES
Physician Progress Note      Oralia Ricketts  [facility] #:                  [id number]  :                       1945  ADMIT DATE:       2021 11:38 PM  [address] DATE:  RESPONDING  PROVIDER #:        Yvon Lewis MD        QUERY TEXT:    Stage of Chronic Kidney Disease: Please provide further specificity, if known. Clinical indicators include: chronic kidney disease, bun, creatinine, bun/creatinine, gfr, bnp, pro-bnp  Options provided:  -- Chronic kidney disease stage 1  -- Chronic kidney disease stage 2  -- Chronic kidney disease stage 3  -- Chronic kidney disease stage 3a  -- Chronic kidney disease stage 3b  -- Chronic kidney disease stage 4  -- Chronic kidney disease stage 5  -- Chronic kidney disease stage 5, requiring dialysis  -- End stage renal disease  -- Other - I will add my own diagnosis  -- Disagree - Not applicable / Not valid  -- Disagree - Clinically Unable to determine / Unknown        PROVIDER RESPONSE TEXT:    The patient has chronic kidney disease stage 3.       Electronically signed by:  Yvon Lewis MD 2021 10:04 AM

## 2021-06-22 NOTE — PROGRESS NOTES
Problem: Falls - Risk of  Goal: *Absence of Falls  Description: Document Ozzy Izaguirre Fall Risk and appropriate interventions in the flowsheet.   Outcome: Progressing Towards Goal  Note: Fall Risk Interventions:  Mobility Interventions: Assess mobility with egress test, Bed/chair exit alarm, Communicate number of staff needed for ambulation/transfer, Patient to call before getting OOB, PT Consult for mobility concerns, Strengthening exercises (ROM-active/passive), Utilize walker, cane, or other assistive device         Medication Interventions: Assess postural VS orthostatic hypotension, Bed/chair exit alarm, Evaluate medications/consider consulting pharmacy, Patient to call before getting OOB, Teach patient to arise slowly    Elimination Interventions: Bed/chair exit alarm, Call light in reach, Patient to call for help with toileting needs, Stay With Me (per policy), Toileting schedule/hourly rounds    History of Falls Interventions: Bed/chair exit alarm, Door open when patient unattended, Evaluate medications/consider consulting pharmacy, Room close to nurse's station         Problem: Patient Education: Go to Patient Education Activity  Goal: Patient/Family Education  Outcome: Progressing Towards Goal     Problem: Patient Education: Go to Patient Education Activity  Goal: Patient/Family Education  Outcome: Progressing Towards Goal

## 2021-06-22 NOTE — PROGRESS NOTES
Pharmacist Discharge Medication Reconciliation    Encounter Diagnoses   Name Primary? Anemia due to other cause, not classified Yes    Lactic acidosis     Dehydration     Acute congestive heart failure, unspecified heart failure type ([facility] [place] [address]     Acute pulmonary edema (HCC)        Allergies: Patient has no known allergies. Discharge Medications:   Current Discharge Medication List        START taking these medications    Details   azithromycin (ZITHROMAX) 250 mg tablet Take 1 Tablet by mouth daily. Qty: 4 Tablet, Refills: 4  Start date: 6/22/2021           CONTINUE these medications which have NOT CHANGED    Details   acetaminophen (TYLENOL) 325 mg tablet Take 2 Tablets by mouth every six (6) hours for 14 days. Qty: 112 Tablet, Refills: 0      aspirin delayed-release 81 mg tablet Take 1 Tablet by mouth two (2) times a day for 30 days. Qty: 60 Tablet, Refills: 0      polyethylene glycol (MIRALAX) 17 gram packet Take 1 Packet by mouth daily for 14 days. Qty: 14 Packet, Refills: 0      senna-docusate (PERICOLACE) 8.6-50 mg per tablet Take 1 Tablet by mouth two (2) times a day for 14 days. Qty: 28 Tablet, Refills: 0      traMADoL (ULTRAM) 50 mg tablet Take 1-2 Tablets by mouth every six (6) hours as needed for Pain for up to 14 days. Max Daily Amount: 400 mg. Qty: 60 Tablet, Refills: 0    Associated Diagnoses: Status post right hip replacement      MAGNESIUM GLYCINATE PO Take  by mouth daily. pramipexole (MIRAPEX) 0.5 mg tablet TAKE 1/2 (ONE-HALF) TABLET BY MOUTH NIGHTLY FOR RESTLESS LEG SYNDROME  Qty: 45 Tablet, Refills: 0    Associated Diagnoses: RLS (restless legs syndrome)      omeprazole (PRILOSEC) 20 mg capsule Take 1 Cap by mouth daily. Qty: 90 Cap, Refills: 3    Associated Diagnoses: Gastroesophageal reflux disease, unspecified whether esophagitis present      atorvastatin (LIPITOR) 40 mg tablet Take 1 Tab by mouth daily.   Qty: 90 Tab, Refills: 3    Associated Diagnoses: Hyperlipidemia, unspecified hyperlipidemia type             The patient's chart, MAR and AVS were reviewed by [facility].     Discharging Provider: Evie Alvarado MD     Thank you,     [facility]

## 2021-06-22 NOTE — PROGRESS NOTES
Transition of Care Plan:     RUR:14%  Disposition:Home with home health At [address]  Follow up appointments:PCP and specialist  DME needed: TBD  Transportation at Discharge:Dtr  Keys or means to access home:  Dtr  IM Medicare letter:2nd IMM Letter  Caregiver Contact:Mitzi Luz-Dtr-[phone]  Discharge Caregiver contacted prior to discharge? CM spoke to pt    Pt is ready for d/c from a  standpoint. RN informed. Medicare pt has received, reviewed, and signed 2nd IM letter informing them of their right to appeal the discharge. Signed copy has been placed on pt bedside chart. CM added information to pt's AVS.       Care Management Interventions  PCP Verified by CM: Yes (Weston Alcantar)  Mode of Transport at Discharge: Other (see comment) (Dtr to transport at the time of d/c )  Hospital Transport Time of Discharge: [address] [facility] (CM Consult): Discharge Planning, [address] (Home alone and with At [address])  [address]: No  Reason Outside [facility]: Patient already serviced by other home care/hospice agency  Discharge Durable Medical Equipment: No ([facility])  Physical Therapy Consult: Yes  Occupational Therapy Consult: Yes  Speech Therapy Consult: No  Current Support Network: Family Lives [place], Own Home, Lives Alone (Pt's dtr is supportive and involved)  Confirm Follow Up Transport: Self (Pt drives herself to her medical appointments)  Discharge Location  Discharge Placement: Home with home health    [place], [place].   Care Manager ADDISON [facility]  [phone]

## 2021-06-23 ENCOUNTER — PATIENT OUTREACH (OUTPATIENT)
Dept: CASE MANAGEMENT | Age: 76
End: 2021-06-23

## 2021-06-23 NOTE — PROGRESS NOTES
Care Transitions Initial Call    Call within 2 business days of discharge: Yes     Patient: Anna Bowers Patient : 1945 MRN: [id number]    Last Discharge [facility]       Complaint Diagnosis Description Type Department Provider    21 Dizziness; Hypotension Anemia due to other cause, not classified . .. ED to Hosp-Admission (Discharged) (ADMIT) [id number] Rosanne Cerna MD; Cindi Santos. .. Was this an external facility discharge? No [facility]    Challenges to be reviewed by the provider   Additional needs identified to be addressed with provider: yes    Component      Latest Ref Rng & Units 2021   NT pro-BNP      <450 PG/ML 1,482 (H)       Component      Latest Ref Rng & Units 2021   Calcium      8.5 - 10.1 MG/DL 7.5 (L)     Component      Latest Ref Rng & Units 2021   Protein, total      6.4 - 8.2 g/dL 5.0 (L)   Albumin      3.5 - 5.0 g/dL 1.7 (L)          Method of communication with provider : chart routing    Discussed COVID-19 related testing which was available at this time. Test results were negative. Patient informed of results, if available? yes     Advance Care Planning:   Does patient have an Advance Directive: yes, reviewed and current. Inpatient Readmission Risk score: Unplanned Readmit Risk Score: 15    Was this a readmission? yes   Patient stated reason for the admission: dizziness, low b/p, fever , fatigue    Patients top risk factors for readmission: medical condition-Anemia   Interventions to address risk factors: Scheduled appointment with PCP-21, Scheduled appointment with Specialist-21, Obtained and reviewed discharge summary and/or continuity of care documents and Education of patient/family/caregiver/guardian to support self-management-Anemia    Care Transition Nurse (CTN) contacted the patient by telephone to perform post hospital discharge assessment. Verified name and  with patient as identifiers.  Provided introduction to self, and explanation of the CTN role. CTN reviewed discharge instructions, medical action plan and red flags with patient who verbalized understanding. Were discharge instructions available to patient? yes. Reviewed appropriate site of care based on symptoms and resources available to patient including: PCP, Specialist, [address] [place] [address] Ramana Shanks and When to call 911. Patient given an opportunity to ask questions and does not have any further questions or concerns at this time. The patient agrees to contact the PCP office for questions related to their healthcare. Medication reconciliation was performed with patient, who verbalizes understanding of administration of home medications. Advised obtaining a 90-day supply of all daily and as-needed medications. Referral to Pharm D needed: no   START taking:  azithromycin (ZITHROMAX) 250 mg - Take 1 Tablet by mouth daily. Home Health/Outpatient orders at discharge: home health care, PT and [facility]: At [facility]  Date of initial visit: 6/23/21    Durable Medical Equipment ordered at discharge: None    Covid Risk Education    Patient decline education  COVID-19and CDC guidelines. CTN reviewed discharge instructions, medical action plan and red flag symptoms with the patient who verbalized understanding. Discussed COVID vaccination status: yes, not vaccinated. Patient was given an opportunity to verbalize any questions and concerns and agrees to contact  health care provider for questions related to their healthcare. Was patient discharged with a pulse oximeter? no.     Discussed follow-up appointments: yes. Is follow up appointment scheduled within 7 days of discharge? yes.    [facility] follow up appointment(s):   Future Appointments   Date Time Provider Rohini Hiltoni   6/24/2021  3:45 PM Zohreh Fragoso MD [facility]     [facility] follow up appointment(s):   (Ortho) Aimee Abebe MD 6/29/21 @ 10 am    Plan for follow-up call in 7-10 days based on severity of symptoms and risk factors. Plan for next call: follow up appointment-pcp/specialist  CTN provided contact information for future needs. Goals      Establish PCP relationships and regularly scheduled appointments. 6/17 Patient will  follow-up with PCP and specialist, keep a list of medications to bring to f/u appointments. Pt.will follow-up with Gladis Ulloa NP (office to contact pt); (Ortho) Dr. Judson Christina [place], [place] 6/29/21 @ 10 am. CTN will follow-up pt.attendance or assistance with re-scheduling at next outreach in 7-10 days. -[address]    6/23 Pt.follow-up with Gladis Ulloa NP/Trisha Dove MD 6/24/21 @ 3:45 pm; (Ortho) Dr. Judson Valadez Carri [place], [place] 6/29/21 @ 10 am. Pt.reports will attend appt. as scheduled, CTN follow up in 7-10 days. -                       Understands red flags post discharge. 6/17 Pt.will wash hands before coming in contact with surgical site, report symptoms; increased pain, swelling, warmth, or redness; pus or fever, calf pain or swelling. Pt.will notify surgeon or PCP office immediately. CTN contact information provided f/u with pt.in 7-10 days. -[address]    6/23 Pt.without s/s listed above, will contact PCP or Ortho for complication r/t surgical site, CTN contact information provided, call with questions or concerns, follow up in 7-10 days. -[address]

## 2021-06-24 ENCOUNTER — OFFICE VISIT (OUTPATIENT)
Dept: INTERNAL MEDICINE CLINIC | Age: 76
End: 2021-06-24
Payer: MEDICARE

## 2021-06-24 VITALS
WEIGHT: 151 LBS | HEART RATE: 83 BPM | DIASTOLIC BLOOD PRESSURE: 70 MMHG | TEMPERATURE: 97.8 F | HEIGHT: 60 IN | BODY MASS INDEX: 29.64 KG/M2 | SYSTOLIC BLOOD PRESSURE: 138 MMHG | RESPIRATION RATE: 16 BRPM | OXYGEN SATURATION: 97 %

## 2021-06-24 DIAGNOSIS — Z09 HOSPITAL DISCHARGE FOLLOW-UP: ICD-10-CM

## 2021-06-24 DIAGNOSIS — Z96.641 STATUS POST RIGHT HIP REPLACEMENT: ICD-10-CM

## 2021-06-24 DIAGNOSIS — D50.9 IRON DEFICIENCY ANEMIA, UNSPECIFIED IRON DEFICIENCY ANEMIA TYPE: Primary | ICD-10-CM

## 2021-06-24 DIAGNOSIS — E87.20 LACTIC ACIDOSIS: ICD-10-CM

## 2021-06-24 DIAGNOSIS — I10 ESSENTIAL HYPERTENSION: ICD-10-CM

## 2021-06-24 PROCEDURE — G8427 DOCREV CUR MEDS BY ELIG CLIN: HCPCS | Performed by: INTERNAL MEDICINE

## 2021-06-24 PROCEDURE — 99495 TRANSJ CARE MGMT MOD F2F 14D: CPT | Performed by: INTERNAL MEDICINE

## 2021-06-24 PROCEDURE — 1111F DSCHRG MED/CURRENT MED MERGE: CPT | Performed by: INTERNAL MEDICINE

## 2021-06-24 RX ORDER — FERROUS GLUCONATE 324(37.5)
324 TABLET ORAL
Qty: 90 TABLET | Refills: 0 | Status: SHIPPED | OUTPATIENT
Start: 2021-06-24

## 2021-06-24 NOTE — PROGRESS NOTES
[address] [address] [place] [address]  Identified pt with two pt identifiers(name and ). Chief Complaint   Patient presents with   [place], hip sx        Reviewed record In preparation for visit and have obtained necessary documentation. 1. Have you been to the ER, urgent care clinic or hospitalized since your last visit? No     2. Have you seen or consulted any other health care providers outside of the [address] [place] [address] since your last visit? Include any pap smears or colon screening. No    Patient has an advance directive. Vitals reviewed with provider.     Health Maintenance reviewed:     Health Maintenance Due   Topic    COVID-19 Vaccine (1)    Shingrix Vaccine Age 49> (1 of 2)    Pneumococcal 65+ years (2 of 2 - PPSV23)    Medicare Yearly Exam     Lipid Screen           Wt Readings from Last 3 Encounters:   21 151 lb (68.5 kg)   21 153 lb (69.4 kg)   21 147 lb 0.8 oz (66.7 kg)        Temp Readings from Last 3 Encounters:   21 97.8 °F (36.6 °C) (Oral)   21 97.8 °F (36.6 °C)   06/15/21 98.1 °F (36.7 °C)        BP Readings from Last 3 Encounters:   21 (!) 149/75   21 (!) 141/70   06/15/21 (!) 98/52        Pulse Readings from Last 3 Encounters:   21 83   21 78   06/15/21 68        Vitals:    21 1543   BP: (!) 149/75   Pulse: 83   Resp: 16   Temp: 97.8 °F (36.6 °C)   TempSrc: Oral   SpO2: 97%   Weight: 151 lb (68.5 kg)   Height: 5' (1.524 m)   PainSc:   6   PainLoc: Leg   LMP: 10/26/1995          Learning Assessment:   :       Learning Assessment 10/7/2020 2019 2014   PRIMARY LEARNER Patient Patient Patient   HIGHEST LEVEL OF EDUCATION - PRIMARY LEARNER  - - DID NOT GRADUATE HIGH SCHOOL   BARRIERS PRIMARY LEARNER - - NONE   CO-LEARNER CAREGIVER - - No   PRIMARY LANGUAGE ENGLISH ENGLISH ENGLISH   LEARNER PREFERENCE PRIMARY DEMONSTRATION OTHER (COMMENT) DEMONSTRATION   ANSWERED BY patient patient patient RELATIONSHIP SELF SELF SELF        Depression Screening:   :       3 most recent PHQ Screens 6/8/2021   Little interest or pleasure in doing things Not at all   Feeling down, depressed, irritable, or hopeless Not at all   Total Score PHQ 2 0        Fall Risk Assessment:   :       Fall Risk Assessment, last 12 mths 3/11/2021   Able to walk? Yes   Fall in past 12 months? 0   Do you feel unsteady? 0   Are you worried about falling 0        Abuse Screening:   :       Abuse Screening Questionnaire 6/2/2020 2/19/2019 1/8/2018 12/29/2016 9/14/2015   Do you ever feel afraid of your partner? N N N N N   Are you in a relationship with someone who physically or mentally threatens you? N N N N N   Is it safe for you to go home?  Y Y Y Y Y        ADL Screening:   :       ADL Assessment 1/8/2018   Feeding yourself No Help Needed   Getting from bed to chair No Help Needed   Getting dressed No Help Needed   Bathing or showering No Help Needed   Walk across the room (includes cane/walker) No Help Needed   Using the telphone No Help Needed   Taking your medications No Help Needed   Preparing meals No Help Needed   Managing money (expenses/bills) No Help Needed   Moderately strenuous housework (laundry) No Help Needed   Shopping for personal items (toiletries/medicines) No Help Needed   Shopping for groceries No Help Needed   Driving No Help Needed   Climbing a flight of stairs No Help Needed   Getting to places beyond walking distances No Help Needed

## 2021-06-24 NOTE — PROGRESS NOTES
HPI  Ms. Jayne Madrid is a 68y.o. year old female, she is seen today for transitional care management visit. Patient was admitted from 6/19/2021 until 6/22/2021 to [facility] for dizziness and generalized weakness. First contact made by nurse navigator within 2 business days of discharge. Hospital records reviewed. Patient admitted with dizziness and lactic acidosis of unclear cause. She had CTA of the chest with the impression as noted below. She also had CT abdomen pelvis with out acute intra-abdominal abnormality. In addition she had likely postop anemia, presenting with a hemoglobin of 8.5. Her hemoglobin is 9.0 on discharge. She had had a right hip replacement 5 days prior to admission and was seen by Ortho and surgical site was without evidence of infection. She was treated presumptively for CAP with CTX + azithromycin IV then po azithromycin on dc    CTA chest:   IMPRESSION  1. No evidence of pulmonary embolism. 2. Small bilateral pleural effusions with bilateral lower lobe subsegmental  atelectasis. Additional areas of subsegmental atelectasis in the right middle  lobe and lingula. No convincing evidence of pneumonia.     CT abd/pelvis:     IMPRESSION  Colonic diverticulosis. Small bilateral pleural effusions with bilateral lower  lobe atelectasis. No acute intra-abdominal abnormality. Cultures - ngtd x 3 bottles 6/20/21  covid neg  UA not indicative of UTI but did have 1+ bacteria    hgb 9.0 on discharge    Pain on right lateral hip - controlled  No f/c  No n/v/abd  No cough, no sob  No dizziness or lightheadedness    Was having chills at home, then sweats, had rigors as well, felt better morning of the 19th  Later that evening felt dizzy, couldn't see, had sweats again - BP was 80/56 and went to ED     Lactic acid was elevated - normal by discharge    /70 at home    Echo 6/22/21:  LV: Estimated LVEF is 55 - 60%.  Normal cavity size, wall thickness and systolic function (ejection fraction normal). Age-appropriate left ventricular diastolic function. Chief Complaint   Patient presents with   [place], hip sx         Prior to Admission medications    Medication Sig Start Date End Date Taking? Authorizing Provider   ferrous gluconate 324 mg (37.5 mg iron) tablet Take 1 Tablet by mouth Daily (before breakfast). 6/24/21  Yes Amena Wang MD   azithromycin (ZITHROMAX) 250 mg tablet Take 1 Tablet by mouth daily. 6/22/21  Yes Marsha Dinero MD   aspirin delayed-release 81 mg tablet Take 1 Tablet by mouth two (2) times a day for 30 days. 6/15/21 7/15/21 Yes Marti Pollock NP   pramipexole (MIRAPEX) 0.5 mg tablet TAKE 1/2 (ONE-HALF) TABLET BY MOUTH NIGHTLY FOR RESTLESS LEG SYNDROME 5/27/21  Yes Amena Wang MD   omeprazole (PRILOSEC) 20 mg capsule Take 1 Cap by mouth daily. 4/13/21  Yes Amena Wang MD   atorvastatin (LIPITOR) 40 mg tablet Take 1 Tab by mouth daily. Patient taking differently: Take 40 mg by mouth every evening. 10/13/20  Yes Amena Wang MD   MAGNESIUM GLYCINATE PO Take  by mouth daily. Provider, Historical         No Known Allergies      REVIEW OF SYSTEMS:  Per HPI    PHYSICAL EXAM:  Visit Vitals  /70   Pulse 83   Temp 97.8 °F (36.6 °C) (Oral)   Resp 16   Ht 5' (1.524 m)   Wt 151 lb (68.5 kg)   LMP 10/26/1995   SpO2 97%   BMI 29.49 kg/m²     Constitutional: Appears well-developed and well-nourished. No distress. HENT:   Head: Normocephalic and atraumatic. Eyes: No scleral icterus. Neck: no lad, no tm, supple   Cardiovascular: Normal S1/S2, regular rhythm. No murmurs, rubs, or gallops. Pulmonary/Chest: Effort normal and breath sounds normal. No respiratory distress. No wheezes, rhonchi, or rales. Abdomen: Soft, NT/ND, +BS, no rebound or guarding, no masses. Ext: 1+ b/l le edema, no calf tenderness.   Skin: right hip incision c/d/i without surrounding erythema, warmth, tenderness    Neurological: Alert. Psychiatric: Normal mood and affect. Behavior is normal.     Lab Results   Component Value Date/Time    Sodium 141 06/21/2021 03:44 AM    Potassium 4.4 06/21/2021 03:44 AM    Chloride 112 (H) 06/21/2021 03:44 AM    CO2 24 06/21/2021 03:44 AM    Anion gap 5 06/21/2021 03:44 AM    Glucose 109 (H) 06/21/2021 03:44 AM    Glucose 98 08/05/2010 08:34 AM    BUN 16 06/21/2021 03:44 AM    Creatinine 0.83 06/21/2021 03:44 AM    BUN/Creatinine ratio 19 06/21/2021 03:44 AM    GFR est AA >60 06/21/2021 03:44 AM    GFR est non-AA >60 06/21/2021 03:44 AM    Calcium 7.5 (L) 06/21/2021 03:44 AM    Bilirubin, total 0.5 06/21/2021 03:44 AM    Alk. phosphatase 137 (H) 06/21/2021 03:44 AM    Protein, total 5.0 (L) 06/21/2021 03:44 AM    Albumin 1.7 (L) 06/21/2021 03:44 AM    Globulin 3.3 06/21/2021 03:44 AM    A-G Ratio 0.5 (L) 06/21/2021 03:44 AM    ALT (SGPT) 37 06/21/2021 03:44 AM     Lab Results   Component Value Date/Time    Hemoglobin A1c 6.3 (H) 06/07/2021 08:21 AM    Hemoglobin A1c 6.4 (H) 06/29/2020 10:22 AM    Hemoglobin A1c 6.6 (H) 12/12/2019 09:26 AM      Lab Results   Component Value Date/Time    Cholesterol, total 166 06/29/2020 10:22 AM    HDL Cholesterol 29 (L) 06/29/2020 10:22 AM    LDL, calculated 83 06/29/2020 10:22 AM    VLDL, calculated 54 (H) 06/29/2020 10:22 AM    Triglyceride 271 (H) 06/29/2020 10:22 AM          ASSESSMENT/PLAN  Diagnoses and all orders for this visit:    1. Iron deficiency anemia, unspecified iron deficiency anemia type  -     ferrous gluconate 324 mg (37.5 mg iron) tablet; Take 1 Tablet by mouth Daily (before breakfast). 2. Essential hypertension    3. Lactic acidosis    4. Status post right hip replacement    5. Hospital discharge follow-up  -     TX DISCHARGE MEDS RECONCILED W/ CURRENT OUTPATIENT MED LIST      Patient was initially hypotensive on admission, now normotensive. She was treated with antibiotics but no clear source of infection was found. She is clinically improved. Her lactic acidosis has resolved with hydration. Her pain is well controlled. Her iron deficiency anemia likely related to postop anemia we will add iron as noted above. Health Maintenance Due   Topic Date Due    COVID-19 Vaccine (1) Never done    Shingrix Vaccine Age 50> (1 of 2) Never done    Pneumococcal 65+ years (2 of 2 - PPSV23) 01/31/2018    Medicare Yearly Exam  06/03/2021    Lipid Screen  06/29/2021        Follow-up and Dispositions    · Return in about 3 months (around 9/24/2021) for bp, anemia. Reviewed plan of care. Patient has provided input and agrees with goals. The nurse provided the patient and/or family with advanced directive information if needed and encouraged the patient to provide a copy to the office when available.

## 2021-06-25 LAB
BACTERIA SPEC CULT: NORMAL
SERVICE CMNT-IMP: NORMAL

## 2021-06-26 LAB
BACTERIA SPEC CULT: NORMAL
BACTERIA SPEC CULT: NORMAL
SERVICE CMNT-IMP: NORMAL
SERVICE CMNT-IMP: NORMAL

## 2021-06-30 NOTE — DISCHARGE SUMMARY
Hospitalist Discharge Summary     Patient ID:  Ladan Olea  [id number]  68 y.o.  1945 6/19/2021    PCP on record: Sharon Cleveland MD    Admit date: 6/19/2021  Discharge date and time: 6/22/2021    DISCHARGE DIAGNOSIS:  · Dizziness/lactic acidosis cause   · Community-acquired pneumonia  · Acute anemia/Post-op   · S/p recent right hip replacement  · GERD  · Hyperlipidemia      CONSULTATIONS:  IP CONSULT TO ORTHOPEDIC SURGERY    Excerpted HPI from H&P of Anna Zurita MD:  68years old female from home with past medical history significant for chronic kidney disease, GERD, hyperlipidemia, restless leg syndrome, as well as right hip replacement last Monday presented to the hospital for evaluation of generalized weakness and dizziness started around 2 PM yesterday, associated with fever, associated with shortness of breath, denies any cough denies any chest pain, blood work was done show no acute abnormality, chest x-ray was done show bilateral diffuse interstitial thickening may be related to pulmonary edema or atypical viral pneumonia, CT abdomen was done show no acute abdominal abnormality.       ______________________________________________________________________  DISCHARGE SUMMARY/HOSPITAL COURSE:  for full details see H&P, daily progress notes, labs, consult notes. Dizziness/lactic acidosis cause   Likely community-acquired pneumonia  -Pt presented with generalized weakness, dizziness, subjective fever, chills, and shortness of breath.  -In ED, she was afebrile and had no leukocytosis but her LA was elevated at 3.4. Her pocalcitonin was also markedly elevated at 4.3.  -CT of the chest showed bilateral lower lobe atelectasis  -In light of clinical presentation and elevated procalcitonin, pt was started on empiric ceftriaxone and azithromycin for possible pneumonia. She was given IV fluid following which her lactic acidosis resolved.  Patient reported improvement in her symptoms. She will continue PO azithromycin to complete course OP.        Acute anemia:  -Likely postoperative anemia. Patient had right hip replacement 5 days PTA. Her Hb prior to surgery was 12.8. She presented with hemoglobin of 8.5   -Iron panel, folate, and vitamin B12 level check and not suggestive of deficiency.   -Her Hb on day of discharge was at 9. She is to follow up with PCP outpatient.      S/p right hip replacement on 6/14  --she was seen by Ortho and PT/OT. Surgical site looks good and no evidence of surgical site infection. continue pain medication. Regular out-pt FU in one week with Dr Lianne BECERRA  Continue PPI     Hyperlipidemia  Continue Lipitor     _______________________________________________________________________  Patient seen and examined by me on discharge day. Pertinent Findings:  Gen:    Not in distress  Chest: Clear lungs  CVS:   Regular rhythm. No edema  Abd:  Soft, not distended, not tender  Neuro:  Alert, oriented x 3  _______________________________________________________________________  DISCHARGE MEDICATIONS:   Discharge Medication List as of 6/22/2021 12:28 PM      START taking these medications    Details   azithromycin (ZITHROMAX) 250 mg tablet Take 1 Tablet by mouth daily. , Normal, Disp-4 Tablet, R-4         CONTINUE these medications which have NOT CHANGED    Details   acetaminophen (TYLENOL) 325 mg tablet Take 2 Tablets by mouth every six (6) hours for 14 days. , No Print, Disp-112 Tablet, R-0      aspirin delayed-release 81 mg tablet Take 1 Tablet by mouth two (2) times a day for 30 days. , Normal, Disp-60 Tablet, R-0      polyethylene glycol (MIRALAX) 17 gram packet Take 1 Packet by mouth daily for 14 days. , Normal, Disp-14 Packet, R-0      senna-docusate (PERICOLACE) 8.6-50 mg per tablet Take 1 Tablet by mouth two (2) times a day for 14 days. , Normal, Disp-28 Tablet, R-0      traMADoL (ULTRAM) 50 mg tablet Take 1-2 Tablets by mouth every six (6) hours as needed for Pain for up to 14 days. Max Daily Amount: 400 mg., Normal, Disp-60 Tablet, R-0      MAGNESIUM GLYCINATE PO Take  by mouth daily. , Historical Med      pramipexole (MIRAPEX) 0.5 mg tablet TAKE 1/2 (ONE-HALF) TABLET BY MOUTH NIGHTLY FOR RESTLESS LEG SYNDROME, Normal, Disp-45 Tablet, R-0      omeprazole (PRILOSEC) 20 mg capsule Take 1 Cap by mouth daily. , Normal, Disp-90 Cap, R-3      atorvastatin (LIPITOR) 40 mg tablet Take 1 Tab by mouth daily. , Normal, Disp-90 Tab, R-3               Patient Follow Up Instructions: Activity: Activity as tolerated/WBAT  Diet: Regular Diet  Wound Care: As directed      Follow-up Information     Follow up With Specialties Details Why Carlos Enrique Meyers MD Internal Medicine On 6/24/2021 A hosptial follow up appointment at 3:45 pm [address] [place] [address]  [phone]       [address]  A nurse will call to schedule an appointment. Will resume Skilled nursing and physical therapy. If you have any questions regarding your home health services please call [facility].  [address] [place] [address]  [phone]        ________________________________________________________________    Risk of deterioration: Low    Condition at Discharge:  Stable  __________________________________________________________________    Disposition  Home with family and home health services    ____________________________________________________________________    Code Status: Full Code  ___________________________________________________________________      Total time in minutes spent coordinating this discharge (includes going over instructions, follow-up, prescriptions, and preparing report for sign off to her PCP) :  35 minutes    Signed:  Yojana Jolly MD Lab Facility: [address] (DO)  Orthopaedic Surgery Orthopaedics Surgery  [address] [facility], [place] [address]  Phone: [phone]  Fax: [phone]  Follow Up Time: 1-3 Days

## 2021-07-07 ENCOUNTER — PATIENT OUTREACH (OUTPATIENT)
Dept: CASE MANAGEMENT | Age: 76
End: 2021-07-07

## 2021-07-07 NOTE — PROGRESS NOTES
Goals      Establish PCP relationships and regularly scheduled appointments. 6/17 Patient will  follow-up with PCP and specialist, keep a list of medications to bring to f/u appointments. Pt.will follow-up with Anne Saucedo NP (office to contact pt); (Ortho) Dr. Chris Stallworth [place] 6/29/21 @ 10 am. CTN will follow-up pt.attendance or assistance with re-scheduling at next outreach in 7-10 days. -[address]    6/23 Pt.follow-up with Anne Saucedo NP/Trisha Garcia Ly MD [place] 6/24/21 @ 3:45 pm; (Ortho) Dr. Chris Stallworth [place] 6/29/21 @ 10 am. Pt.reports will attend appt. as scheduled, CTN follow up in 7-10 days. -    7/7 Pt.attend follow-up with Isabell Lr MD 6/24/21, appt.scheduled with (Ortho) Dr. Chris Stallworth [place] 6/29/2; CTN attempt contact pt., unable to reach, LVM. -                   Understands red flags post discharge. 6/17 Pt.will wash hands before coming in contact with surgical site, report symptoms; increased pain, swelling, warmth, or redness; pus or fever, calf pain or swelling. Pt.will notify surgeon or PCP office immediately. CTN contact information provided f/u with pt.in 7-10 days. -[address]    6/23 Pt.without s/s listed above, will contact PCP or Ortho for complication r/t surgical site, CTN contact information provided, call with questions or concerns, follow up in 7-10 days. -     7/7 CTN attempt contact pt., unable to reach, LVM.; follow up in 7-14 days. -[address]

## 2021-07-27 ENCOUNTER — PATIENT OUTREACH (OUTPATIENT)
Dept: CASE MANAGEMENT | Age: 76
End: 2021-07-27

## 2021-07-27 NOTE — PROGRESS NOTES
Patient has graduated from the Transitions of Care Coordination  program on 7/27/21. Patient/family has the ability to self-manage at this time Care management goals have been completed. Patient was not referred to the [facility] team for further management. Goals Addressed                 This Visit's Progress     Establish PCP relationships and regularly scheduled appointments. 6/17 Patient will  follow-up with PCP and specialist, keep a list of medications to bring to f/u appointments. Pt.will follow-up with Marcelino Barahona NP (office to contact pt); (Ortho) Dr. Forest Kasal Wallie [place], [place] 6/29/21 @ 10 am. CTN will follow-up pt.attendance or assistance with re-scheduling at next outreach in 7-10 days. -[facility]    6/23 Pt.follow-up with Marcelino Barahona NP/Trisha Day MD 6/24/21 @ 3:45 pm; (Ortho) Dr. Forest Kasal [place], [place] 6/29/21 @ 10 am. Pt.reports will attend appt. as scheduled, CTN follow up in 7-10 days. -    7/7 Pt.attend follow-up with Adiel Hicks MD 6/24/21, appt.scheduled with (Ortho) Dr. Forest Kasal Wallie [place], [place] 6/29/2; CTN attempt contact pt., unable to reach, LVM. -[facility]    7/27  Pt.reports attend all appts as scheduled, continue with PT outpt. -               Understands red flags post discharge. 6/17 Pt.will wash hands before coming in contact with surgical site, report symptoms; increased pain, swelling, warmth, or redness; pus or fever, calf pain or swelling. Pt.will notify surgeon or PCP office immediately. CTN contact information provided f/u with pt.in 7-10 days. -[facility]    6/23 Pt.without s/s listed above, will contact PCP or Ortho for complication r/t surgical site, CTN contact information provided, call with questions or concerns, follow up in 7-10 days. -     7/7 CTN attempt contact pt., unable to reach, LVM.; follow up in 7-14 days. -[facility]    7/27 Pt.reports surgical site healing well, no reports s/s listed above. -[facility]          Patient has Care Transition Nurse's contact information for any further questions, concerns, or needs. Patients upcoming visits:  No future appointments.

## 2021-08-23 DIAGNOSIS — G25.81 RLS (RESTLESS LEGS SYNDROME): ICD-10-CM

## 2021-08-23 RX ORDER — PRAMIPEXOLE DIHYDROCHLORIDE 0.5 MG/1
TABLET ORAL
Qty: 45 TABLET | Refills: 0 | Status: SHIPPED | OUTPATIENT
Start: 2021-08-23 | End: 2021-11-08 | Stop reason: SDUPTHER

## 2021-09-10 ENCOUNTER — TRANSCRIBE ORDER (OUTPATIENT)
Dept: SCHEDULING | Age: 76
End: 2021-09-10

## 2021-09-10 DIAGNOSIS — Z12.31 VISIT FOR SCREENING MAMMOGRAM: Primary | ICD-10-CM

## 2021-10-01 ENCOUNTER — HOSPITAL ENCOUNTER (OUTPATIENT)
Dept: MAMMOGRAPHY | Age: 76
Discharge: HOME | End: 2021-10-01
Attending: INTERNAL MEDICINE
Payer: MEDICARE

## 2021-10-01 DIAGNOSIS — Z12.31 VISIT FOR SCREENING MAMMOGRAM: ICD-10-CM

## 2021-10-01 PROCEDURE — 77063 BREAST TOMOSYNTHESIS BI: CPT

## 2021-11-08 DIAGNOSIS — G25.81 RLS (RESTLESS LEGS SYNDROME): ICD-10-CM

## 2021-11-08 DIAGNOSIS — E78.5 HYPERLIPIDEMIA, UNSPECIFIED HYPERLIPIDEMIA TYPE: ICD-10-CM

## 2021-11-08 RX ORDER — PRAMIPEXOLE DIHYDROCHLORIDE 0.5 MG/1
0.5 TABLET ORAL
Qty: 90 TABLET | Refills: 1 | Status: SHIPPED | OUTPATIENT
Start: 2021-11-08 | End: 2022-05-04

## 2021-11-08 RX ORDER — ATORVASTATIN CALCIUM 40 MG/1
40 TABLET, FILM COATED ORAL EVERY EVENING
Qty: 90 TABLET | Refills: 3 | Status: SHIPPED | OUTPATIENT
Start: 2021-11-08 | End: 2022-09-07

## 2021-11-08 NOTE — TELEPHONE ENCOUNTER
I called the patient and verified them by name and date of birth. She is currently taking 0.25mg of pramipexole every night but within about 3 or 4 hours it has wore off. She wanted to know what she could do about the dosage.

## 2021-11-08 NOTE — TELEPHONE ENCOUNTER
PCP: Davis Strong MD     Last appt: 6/24/2021   No future appointments. Requested Prescriptions     Pending Prescriptions Disp Refills    atorvastatin (LIPITOR) 40 mg tablet 90 Tablet 3     Sig: Take 1 Tablet by mouth every evening.

## 2021-11-08 NOTE — TELEPHONE ENCOUNTER
----- Message from [address] sent at 11/8/2021 10:28 AM EST -----  Subject: Medication Problem    QUESTIONS  Name of Medication? pramipexole (MIRAPEX) 0.5 mg tablet  Patient-reported dosage and instructions? .5 mg once nightly  What question or problem do you have with the medication? Pt questions   about dosage of med. Also has refill request for LIPITOR sent. Preferred [facility]? [facility] phone number (if available)? [phone]  Additional Information for Provider?   ---------------------------------------------------------------------------  --------------  CALL BACK INFO  What is the best way for the office to contact you? OK to leave message on   voicemail  Preferred Call Back Phone Number? [phone]  ---------------------------------------------------------------------------  --------------  SCRIPT ANSWERS  Relationship to Patient?  Self

## 2022-01-24 ENCOUNTER — TELEPHONE (OUTPATIENT)
Dept: INTERNAL MEDICINE CLINIC | Age: 77
End: 2022-01-24

## 2022-01-24 NOTE — TELEPHONE ENCOUNTER
I called the patient and verified them by name and date of birth. I informed her that we will have to schedule her for an appointment before we can order the xrays. She stated understanding.

## 2022-01-24 NOTE — TELEPHONE ENCOUNTER
----- Message from Anneldra Kevin sent at 1/24/2022  9:42 AM EST -----  Subject: Referral Request    QUESTIONS   Reason for referral request? patient needs x-ray from covid on 1/6/2022   and pneumonia on 1/9/2022 and needs to make sure everything is clear   Has the physician seen you for this condition before? No   Preferred Specialist (if applicable)? Do you already have an appointment scheduled? No  Additional Information for Provider?   ---------------------------------------------------------------------------  --------------  CALL BACK INFO  What is the best way for the office to contact you? OK to leave message on   voicemail  Preferred Call Back Phone Number?  [phone]

## 2022-02-04 ENCOUNTER — VIRTUAL VISIT (OUTPATIENT)
Dept: INTERNAL MEDICINE CLINIC | Age: 77
End: 2022-02-04
Payer: MEDICARE

## 2022-02-04 DIAGNOSIS — D64.89 ANEMIA DUE TO OTHER CAUSE, NOT CLASSIFIED: ICD-10-CM

## 2022-02-04 DIAGNOSIS — E78.2 MIXED HYPERLIPIDEMIA: ICD-10-CM

## 2022-02-04 DIAGNOSIS — U07.1 PNEUMONIA DUE TO COVID-19 VIRUS: Primary | ICD-10-CM

## 2022-02-04 DIAGNOSIS — R73.01 IFG (IMPAIRED FASTING GLUCOSE): ICD-10-CM

## 2022-02-04 DIAGNOSIS — I10 PRIMARY HYPERTENSION: ICD-10-CM

## 2022-02-04 DIAGNOSIS — J12.82 PNEUMONIA DUE TO COVID-19 VIRUS: Primary | ICD-10-CM

## 2022-02-04 PROCEDURE — 99442 PR PHYS/QHP TELEPHONE EVALUATION 11-20 MIN: CPT | Performed by: INTERNAL MEDICINE

## 2022-02-04 RX ORDER — ACETAMINOPHEN 325 MG/1
TABLET ORAL
COMMUNITY

## 2022-02-04 RX ORDER — GABAPENTIN 100 MG/1
CAPSULE ORAL 3 TIMES DAILY
COMMUNITY
End: 2022-10-13

## 2022-02-04 NOTE — PROGRESS NOTES
[address] [address] [place] [address]  Identified pt with two pt identifiers(name and ). Chief Complaint   Patient presents with    Follow-up     Pt would like to know if she needs a Xray from post covid        Reviewed record In preparation for visit and have obtained necessary documentation. 1. Have you been to the ER, urgent care clinic or hospitalized since your last visit? No     2. Have you seen or consulted any other health care providers outside of the [address] [place] [address] since your last visit? Include any pap smears or colon screening. No    Patient does not have an advance directive. Vitals reviewed with provider. Health Maintenance reviewed:     Health Maintenance Due   Topic    COVID-19 Vaccine (1)    Shingrix Vaccine Age 49> (1 of 2)    Pneumococcal 65+ years (2 of 2 - PPSV23)    Medicare Yearly Exam     Lipid Screen     Flu Vaccine (1)          Wt Readings from Last 3 Encounters:   21 151 lb (68.5 kg)   21 153 lb (69.4 kg)   21 147 lb 0.8 oz (66.7 kg)        Temp Readings from Last 3 Encounters:   21 97.8 °F (36.6 °C) (Oral)   21 97.8 °F (36.6 °C)   06/15/21 98.1 °F (36.7 °C)        BP Readings from Last 3 Encounters:   21 138/70   21 (!) 141/70   06/15/21 (!) 98/52        Pulse Readings from Last 3 Encounters:   21 83   21 78   06/15/21 68      There were no vitals filed for this visit.        Learning Assessment:   :       Learning Assessment 10/7/2020 2019 2014   PRIMARY LEARNER Patient Patient Patient   HIGHEST LEVEL OF EDUCATION - PRIMARY LEARNER  - - DID NOT GRADUATE HIGH SCHOOL   BARRIERS PRIMARY LEARNER - - NONE   CO-LEARNER CAREGIVER - - No   PRIMARY LANGUAGE ENGLISH ENGLISH ENGLISH   LEARNER PREFERENCE PRIMARY DEMONSTRATION OTHER (COMMENT) DEMONSTRATION   ANSWERED BY patient patient patient   RELATIONSHIP SELF SELF SELF        Depression Screening:   :       3 most recent PHQ Screens 2021   Little interest or pleasure in doing things Not at all   Feeling down, depressed, irritable, or hopeless Not at all   Total Score PHQ 2 0        Fall Risk Assessment:   :       Fall Risk Assessment, last 12 mths 3/11/2021   Able to walk? Yes   Fall in past 12 months? 0   Do you feel unsteady? 0   Are you worried about falling 0        Abuse Screening:   :       Abuse Screening Questionnaire 6/2/2020 2/19/2019 1/8/2018 12/29/2016 9/14/2015   Do you ever feel afraid of your partner? N N N N N   Are you in a relationship with someone who physically or mentally threatens you? N N N N N   Is it safe for you to go home?  Y Y Y Y Y        ADL Screening:   :       ADL Assessment 1/8/2018   Feeding yourself No Help Needed   Getting from bed to chair No Help Needed   Getting dressed No Help Needed   Bathing or showering No Help Needed   Walk across the room (includes cane/walker) No Help Needed   Using the telphone No Help Needed   Taking your medications No Help Needed   Preparing meals No Help Needed   Managing money (expenses/bills) No Help Needed   Moderately strenuous housework (laundry) No Help Needed   Shopping for personal items (toiletries/medicines) No Help Needed   Shopping for groceries No Help Needed   Driving No Help Needed   Climbing a flight of stairs No Help Needed   Getting to places beyond walking distances No Help Needed

## 2022-02-04 NOTE — PROGRESS NOTES
Lissette Ang is a 68 y.o. female, evaluated via audio-only technology on 2/4/2022 for Follow-up (Pt would like to know if she needs a Xray from post covid )    Assessment & Plan:   Diagnoses and all orders for this visit:    1. Pneumonia due to COVID-19 virus  -     XR CHEST PA LAT; Future    2. Mixed hyperlipidemia  -     METABOLIC PANEL, COMPREHENSIVE; Future  -     LIPID PANEL; Future    3. Primary hypertension    4. Anemia due to other cause, not classified  -     CBC WITH AUTOMATED DIFF; Future    5. IFG (impaired fasting glucose)  -     HEMOGLOBIN A1C WITH EAG; Future      Follow-up and Dispositions    · Return for AWV, bp, chol, labs prior. needs repeat CXR to document resolution of pneumonia - clinically improved  Get labs prior to follow up for routine issues  Need to get records from [facility]    Subjective:     Was diagnosed on January 6 - went to [facility] ER - was treated in ER (?monoclonal ab) and sent home to take mucinex, tylenol, cough medicine. Tells me had double pneumonia. Was sob on presentation with cough, now resolved. No f/c, sweats now. Appetite is back to normal, lost sense of taste now resolved. Had nausea for a few days, then resolved. Energy improved. Prior to Admission medications    Medication Sig Start Date End Date Taking? Authorizing Provider   gabapentin (NEURONTIN) 100 mg capsule Take  by mouth three (3) times daily. Yes Provider, Historical   acetaminophen (TylenoL) 325 mg tablet Take  by mouth every four (4) hours as needed for Pain. Yes Provider, Historical   atorvastatin (LIPITOR) 40 mg tablet Take 1 Tablet by mouth every evening. 11/8/21  Yes Amena Navarrete MD   pramipexole (MIRAPEX) 0.5 mg tablet Take 1 Tablet by mouth nightly. 11/8/21  Yes Amena Navarrete MD   ferrous gluconate 324 mg (37.5 mg iron) tablet Take 1 Tablet by mouth Daily (before breakfast). 6/24/21  Yes Amena Navarrete MD   MAGNESIUM GLYCINATE PO Take  by mouth daily. Yes Provider, Historical   omeprazole (PRILOSEC) 20 mg capsule Take 1 Cap by mouth daily. 4/13/21  Yes Patti Acosta MD   azithromycin (ZITHROMAX) 250 mg tablet Take 1 Tablet by mouth daily. Patient not taking: Reported on 2/4/2022 6/22/21 2/4/22  Jacqui Barajas MD         ROS  Per HPI  No data recorded     Samantha Santioz, who was evaluated through a patient-initiated, synchronous (real-time) audio only encounter, and/or her healthcare decision maker, is aware that it is a billable service, which includes applicable co-pays, with coverage as determined by her insurance carrier. She provided verbal consent to proceed. She has not had a related appointment within my department in the past 7 days or scheduled within the next 24 hours. The patient was located at home in a state where the provider was licensed to provide care.         Wilton Silva MD

## 2022-02-07 ENCOUNTER — HOSPITAL ENCOUNTER (OUTPATIENT)
Dept: GENERAL RADIOLOGY | Age: 77
Discharge: HOME | End: 2022-02-07
Payer: MEDICARE

## 2022-02-07 DIAGNOSIS — J12.82 PNEUMONIA DUE TO COVID-19 VIRUS: ICD-10-CM

## 2022-02-07 DIAGNOSIS — U07.1 PNEUMONIA DUE TO COVID-19 VIRUS: ICD-10-CM

## 2022-02-07 PROCEDURE — 71046 X-RAY EXAM CHEST 2 VIEWS: CPT

## 2022-02-07 NOTE — PROGRESS NOTES
Called pt, HIPAA verified by two patient identifiers. Advised that CXR showed no evidence of pneumonia. Pt verified understanding.

## 2022-02-08 LAB
ALBUMIN SERPL-MCNC: 4.1 G/DL (ref 3.7–4.7)
ALBUMIN/GLOB SERPL: 1.5 {RATIO} (ref 1.2–2.2)
ALP SERPL-CCNC: 108 IU/L (ref 44–121)
ALT SERPL-CCNC: 15 IU/L (ref 0–32)
AST SERPL-CCNC: 20 IU/L (ref 0–40)
BASOPHILS # BLD AUTO: 0.1 X10E3/UL (ref 0–0.2)
BASOPHILS NFR BLD AUTO: 1 %
BILIRUB SERPL-MCNC: 0.3 MG/DL (ref 0–1.2)
BUN SERPL-MCNC: 18 MG/DL (ref 8–27)
BUN/CREAT SERPL: 18 (ref 12–28)
CALCIUM SERPL-MCNC: 9.7 MG/DL (ref 8.7–10.3)
CHLORIDE SERPL-SCNC: 106 MMOL/L (ref 96–106)
CHOLEST SERPL-MCNC: 203 MG/DL (ref 100–199)
CO2 SERPL-SCNC: 21 MMOL/L (ref 20–29)
CREAT SERPL-MCNC: 1.02 MG/DL (ref 0.57–1)
EOSINOPHIL # BLD AUTO: 0.2 X10E3/UL (ref 0–0.4)
EOSINOPHIL NFR BLD AUTO: 3 %
ERYTHROCYTE [DISTWIDTH] IN BLOOD BY AUTOMATED COUNT: 13.9 % (ref 11.7–15.4)
EST. AVERAGE GLUCOSE BLD GHB EST-MCNC: 151 MG/DL
GLOBULIN SER CALC-MCNC: 2.8 G/DL (ref 1.5–4.5)
GLUCOSE SERPL-MCNC: 105 MG/DL (ref 65–99)
HBA1C MFR BLD: 6.9 % (ref 4.8–5.6)
HCT VFR BLD AUTO: 37.4 % (ref 34–46.6)
HDLC SERPL-MCNC: 33 MG/DL
HGB BLD-MCNC: 12.4 G/DL (ref 11.1–15.9)
IMM GRANULOCYTES # BLD AUTO: 0 X10E3/UL (ref 0–0.1)
IMM GRANULOCYTES NFR BLD AUTO: 0 %
LDLC SERPL CALC-MCNC: 124 MG/DL (ref 0–99)
LYMPHOCYTES # BLD AUTO: 2.3 X10E3/UL (ref 0.7–3.1)
LYMPHOCYTES NFR BLD AUTO: 42 %
MCH RBC QN AUTO: 29.8 PG (ref 26.6–33)
MCHC RBC AUTO-ENTMCNC: 33.2 G/DL (ref 31.5–35.7)
MCV RBC AUTO: 90 FL (ref 79–97)
MONOCYTES # BLD AUTO: 0.5 X10E3/UL (ref 0.1–0.9)
MONOCYTES NFR BLD AUTO: 9 %
NEUTROPHILS # BLD AUTO: 2.5 X10E3/UL (ref 1.4–7)
NEUTROPHILS NFR BLD AUTO: 45 %
PLATELET # BLD AUTO: 302 X10E3/UL (ref 150–450)
POTASSIUM SERPL-SCNC: 4.8 MMOL/L (ref 3.5–5.2)
PROT SERPL-MCNC: 6.9 G/DL (ref 6–8.5)
RBC # BLD AUTO: 4.16 X10E6/UL (ref 3.77–5.28)
SODIUM SERPL-SCNC: 139 MMOL/L (ref 134–144)
TRIGL SERPL-MCNC: 257 MG/DL (ref 0–149)
VLDLC SERPL CALC-MCNC: 46 MG/DL (ref 5–40)
WBC # BLD AUTO: 5.6 X10E3/UL (ref 3.4–10.8)

## 2022-03-06 NOTE — TELEPHONE ENCOUNTER
A1c higher but still diet controlled diabetes. However, getting higher. Recommend starting metformin 500mg bid and refer to diabetes education. Blood counts normal. Kidney labs stable. Cholesterol higher - is she taking atorvastatin?  Would like her to follow up in about 3 mos for diabetes

## 2022-03-18 PROBLEM — Z96.649 S/P HIP REPLACEMENT: Status: ACTIVE | Noted: 2021-06-14

## 2022-03-18 PROBLEM — R73.09 ELEVATED HEMOGLOBIN A1C: Status: ACTIVE | Noted: 2020-01-27

## 2022-03-18 PROBLEM — S46.012A TRAUMATIC COMPLETE TEAR OF LEFT ROTATOR CUFF: Status: ACTIVE | Noted: 2019-06-27

## 2022-03-19 PROBLEM — G89.29 CHRONIC MIDLINE LOW BACK PAIN WITHOUT SCIATICA: Status: ACTIVE | Noted: 2018-01-08

## 2022-03-19 PROBLEM — M54.50 CHRONIC MIDLINE LOW BACK PAIN WITHOUT SCIATICA: Status: ACTIVE | Noted: 2018-01-08

## 2022-03-19 PROBLEM — A41.9 SEPSIS (HCC): Status: ACTIVE | Noted: 2021-06-20

## 2022-03-19 PROBLEM — H25.811 COMBINED FORMS OF AGE-RELATED CATARACT OF RIGHT EYE: Status: ACTIVE | Noted: 2019-03-07

## 2022-03-20 PROBLEM — M71.38 SYNOVIAL CYST OF LUMBAR FACET JOINT: Status: ACTIVE | Noted: 2019-12-17

## 2022-05-04 DIAGNOSIS — G25.81 RLS (RESTLESS LEGS SYNDROME): ICD-10-CM

## 2022-05-04 RX ORDER — PRAMIPEXOLE DIHYDROCHLORIDE 0.5 MG/1
0.5 TABLET ORAL
Qty: 90 TABLET | Refills: 0 | Status: SHIPPED | OUTPATIENT
Start: 2022-05-04 | End: 2022-07-18

## 2022-06-07 ENCOUNTER — OFFICE VISIT (OUTPATIENT)
Dept: INTERNAL MEDICINE CLINIC | Age: 77
End: 2022-06-07
Payer: MEDICARE

## 2022-06-07 VITALS
HEIGHT: 60 IN | BODY MASS INDEX: 29.84 KG/M2 | OXYGEN SATURATION: 96 % | DIASTOLIC BLOOD PRESSURE: 80 MMHG | RESPIRATION RATE: 14 BRPM | TEMPERATURE: 97.9 F | HEART RATE: 71 BPM | WEIGHT: 152 LBS | SYSTOLIC BLOOD PRESSURE: 160 MMHG

## 2022-06-07 DIAGNOSIS — I10 PRIMARY HYPERTENSION: ICD-10-CM

## 2022-06-07 DIAGNOSIS — E11.9 TYPE 2 DIABETES MELLITUS WITHOUT COMPLICATION, WITHOUT LONG-TERM CURRENT USE OF INSULIN (HCC): Primary | ICD-10-CM

## 2022-06-07 DIAGNOSIS — E78.2 MIXED HYPERLIPIDEMIA: ICD-10-CM

## 2022-06-07 DIAGNOSIS — R73.09 ELEVATED HEMOGLOBIN A1C: ICD-10-CM

## 2022-06-07 LAB — HBA1C MFR BLD HPLC: 6.3 %

## 2022-06-07 PROCEDURE — G8510 SCR DEP NEG, NO PLAN REQD: HCPCS | Performed by: INTERNAL MEDICINE

## 2022-06-07 PROCEDURE — 3044F HG A1C LEVEL LT 7.0%: CPT | Performed by: INTERNAL MEDICINE

## 2022-06-07 PROCEDURE — 83036 HEMOGLOBIN GLYCOSYLATED A1C: CPT | Performed by: INTERNAL MEDICINE

## 2022-06-07 PROCEDURE — G8753 SYS BP > OR = 140: HCPCS | Performed by: INTERNAL MEDICINE

## 2022-06-07 PROCEDURE — G8427 DOCREV CUR MEDS BY ELIG CLIN: HCPCS | Performed by: INTERNAL MEDICINE

## 2022-06-07 PROCEDURE — G8536 NO DOC ELDER MAL SCRN: HCPCS | Performed by: INTERNAL MEDICINE

## 2022-06-07 PROCEDURE — G8417 CALC BMI ABV UP PARAM F/U: HCPCS | Performed by: INTERNAL MEDICINE

## 2022-06-07 PROCEDURE — G8754 DIAS BP LESS 90: HCPCS | Performed by: INTERNAL MEDICINE

## 2022-06-07 PROCEDURE — 99214 OFFICE O/P EST MOD 30 MIN: CPT | Performed by: INTERNAL MEDICINE

## 2022-06-07 PROCEDURE — 1123F ACP DISCUSS/DSCN MKR DOCD: CPT | Performed by: INTERNAL MEDICINE

## 2022-06-07 PROCEDURE — 1090F PRES/ABSN URINE INCON ASSESS: CPT | Performed by: INTERNAL MEDICINE

## 2022-06-07 PROCEDURE — 1101F PT FALLS ASSESS-DOCD LE1/YR: CPT | Performed by: INTERNAL MEDICINE

## 2022-06-07 NOTE — PROGRESS NOTES
HPI  Ms. Heri Terry is a 68y.o. year old female, she is seen today for follow up DM. A1C 4 mos ago was 6.9. says glucose has never been high - 92, 110 ie. Today is 6.3. Takes lipitor 40mg daily. No missed doses. No chest pain, sob, dizziness, weakness, lightheadedness. Chief Complaint   Patient presents with    Diabetes     Room 2A //         Prior to Admission medications    Medication Sig Start Date End Date Taking? Authorizing Provider   pramipexole (MIRAPEX) 0.5 mg tablet Take 1 tablet by mouth nightly 5/4/22  Yes Sara Armas MD   gabapentin (NEURONTIN) 100 mg capsule Take  by mouth three (3) times daily. Yes Provider, Historical   acetaminophen (TylenoL) 325 mg tablet Take  by mouth every four (4) hours as needed for Pain. Yes Provider, Historical   atorvastatin (LIPITOR) 40 mg tablet Take 1 Tablet by mouth every evening. 11/8/21  Yes Sara Armas MD   ferrous gluconate 324 mg (37.5 mg iron) tablet Take 1 Tablet by mouth Daily (before breakfast). 6/24/21  Yes Sara Armas MD   MAGNESIUM GLYCINATE PO Take  by mouth daily. Yes Provider, Historical   omeprazole (PRILOSEC) 20 mg capsule Take 1 Cap by mouth daily. 4/13/21  Yes Sara Armas MD         No Known Allergies      REVIEW OF SYSTEMS:  Per HPI    PHYSICAL EXAM:  Visit Vitals  BP (!) 160/80   Pulse 71   Temp 97.9 °F (36.6 °C) (Oral)   Resp 14   Ht 5' (1.524 m)   Wt 152 lb (68.9 kg)   LMP 10/26/1995   SpO2 96%   BMI 29.69 kg/m²     Constitutional: Appears well-developed and well-nourished. No distress. HENT:   Head: Normocephalic and atraumatic. Eyes: No scleral icterus. Neck: no lad, no tm, supple   Cardiovascular: Normal S1/S2, regular rhythm. No murmurs, rubs, or gallops. Pulmonary/Chest: Effort normal and breath sounds normal. No respiratory distress. No wheezes, rhonchi, or rales. Ext: No edema. Neurological: Alert. Psychiatric: Normal mood and affect.  Behavior is normal.     Lab Results   Component Value Date/Time    Sodium 139 02/07/2022 12:52 PM    Potassium 4.8 02/07/2022 12:52 PM    Chloride 106 02/07/2022 12:52 PM    CO2 21 02/07/2022 12:52 PM    Anion gap 5 06/21/2021 03:44 AM    Glucose 105 (H) 02/07/2022 12:52 PM    Glucose 98 08/05/2010 08:34 AM    BUN 18 02/07/2022 12:52 PM    Creatinine 1.02 (H) 02/07/2022 12:52 PM    BUN/Creatinine ratio 18 02/07/2022 12:52 PM    GFR est AA 62 02/07/2022 12:52 PM    GFR est non-AA 54 (L) 02/07/2022 12:52 PM    Calcium 9.7 02/07/2022 12:52 PM    Bilirubin, total 0.3 02/07/2022 12:52 PM    Alk. phosphatase 108 02/07/2022 12:52 PM    Protein, total 6.9 02/07/2022 12:52 PM    Albumin 4.1 02/07/2022 12:52 PM    Globulin 3.3 06/21/2021 03:44 AM    A-G Ratio 1.5 02/07/2022 12:52 PM    ALT (SGPT) 15 02/07/2022 12:52 PM     Lab Results   Component Value Date/Time    Hemoglobin A1c 6.9 (H) 02/07/2022 12:52 PM    Hemoglobin A1c 6.3 (H) 06/07/2021 08:21 AM    Hemoglobin A1c 6.4 (H) 06/29/2020 10:22 AM      Lab Results   Component Value Date/Time    Cholesterol, total 203 (H) 02/07/2022 12:52 PM    HDL Cholesterol 33 (L) 02/07/2022 12:52 PM    LDL, calculated 124 (H) 02/07/2022 12:52 PM    LDL, calculated 83 06/29/2020 10:22 AM    VLDL, calculated 46 (H) 02/07/2022 12:52 PM    VLDL, calculated 54 (H) 06/29/2020 10:22 AM    Triglyceride 257 (H) 02/07/2022 12:52 PM          ASSESSMENT/PLAN  Diagnoses and all orders for this visit:    1. Type 2 diabetes mellitus without complication, without long-term current use of insulin (HCC)  -     HEMOGLOBIN A1C WITH EAG; Future    2. Elevated hemoglobin A1c  -     AMB POC HEMOGLOBIN A1C    3. Primary hypertension    4. Mixed hyperlipidemia  -     METABOLIC PANEL, COMPREHENSIVE; Future  -     LIPID PANEL;  Future      A1C back to prediabetes range - educated on following diabetic diet  BP not to goal - reports being normal in recent past - has been on metoprolol but off for about a year  Monitor at home, if high call back - would restart toprol xl 25mg daily - may be high today due to pain  Lipids not to goal - for now continue lipitor 40mg daily - recheck prior to follow up and if still high consider increase to 80mg vs try crestor    Health Maintenance Due   Topic Date Due    Medicare Yearly Exam  06/03/2021        Follow-up and Dispositions    · Return in about 3 months (around 9/7/2022) for bp, chol, dm, AWV. Reviewed plan of care. Patient has provided input and agrees with goals. The nurse provided the patient and/or family with advanced directive information if needed and encouraged the patient to provide a copy to the office when available.

## 2022-06-07 NOTE — PROGRESS NOTES
[id number] [address] [place] [address]  Identified pt with two pt identifiers(name and ). Chief Complaint   Patient presents with    Diabetes     Room 2A //        Reviewed record In preparation for visit and have obtained necessary documentation. 1. Have you been to the ER, urgent care clinic or hospitalized since your last visit? No     2. Have you seen or consulted any other health care providers outside of the [address] [place] [address] since your last visit? Include any pap smears or colon screening. [address]     Patient has an advance directive. Vitals reviewed with provider.     Health Maintenance reviewed:     Health Maintenance Due   Topic    COVID-19 Vaccine (1)    Shingrix Vaccine Age 49> (1 of 2)    Pneumococcal 65+ years (2 - PPSV23 or PCV20)    Medicare Yearly Exam     Depression Screen         Wt Readings from Last 3 Encounters:   22 152 lb (68.9 kg)   21 151 lb (68.5 kg)   21 153 lb (69.4 kg)      Temp Readings from Last 3 Encounters:   21 97.8 °F (36.6 °C) (Oral)   21 97.8 °F (36.6 °C)   06/15/21 98.1 °F (36.7 °C)      BP Readings from Last 3 Encounters:   21 138/70   21 (!) 141/70   06/15/21 (!) 98/52      Pulse Readings from Last 3 Encounters:   21 83   21 78   06/15/21 68      Vitals:    22 1009   Resp: 14   Weight: 152 lb (68.9 kg)   Height: 5' (1.524 m)   PainSc:   7   PainLoc: Hand   LMP: 10/26/1995          Learning Assessment:   :     Learning Assessment 10/7/2020 2019 2014   PRIMARY LEARNER Patient Patient Patient   HIGHEST LEVEL OF EDUCATION - PRIMARY LEARNER  - - DID NOT GRADUATE HIGH SCHOOL   BARRIERS PRIMARY LEARNER - - NONE   CO-LEARNER CAREGIVER - - No   PRIMARY LANGUAGE ENGLISH ENGLISH ENGLISH   LEARNER PREFERENCE PRIMARY DEMONSTRATION OTHER (COMMENT) DEMONSTRATION   ANSWERED BY patient patient patient   RELATIONSHIP SELF SELF SELF        Depression Screening:   :     3 most recent PHQ Screens 2021 Little interest or pleasure in doing things Not at all   Feeling down, depressed, irritable, or hopeless Not at all   Total Score PHQ 2 0        Fall Risk Assessment:   :     Fall Risk Assessment, last 12 mths 3/11/2021   Able to walk? Yes   Fall in past 12 months? 0   Do you feel unsteady? 0   Are you worried about falling 0        Abuse Screening:   :     Abuse Screening Questionnaire 6/2/2020 2/19/2019 1/8/2018 12/29/2016 9/14/2015   Do you ever feel afraid of your partner? N N N N N   Are you in a relationship with someone who physically or mentally threatens you? N N N N N   Is it safe for you to go home?  Y Y Y Y Y        ADL Screening:   :     ADL Assessment 1/8/2018   Feeding yourself No Help Needed   Getting from bed to chair No Help Needed   Getting dressed No Help Needed   Bathing or showering No Help Needed   Walk across the room (includes cane/walker) No Help Needed   Using the telphone No Help Needed   Taking your medications No Help Needed   Preparing meals No Help Needed   Managing money (expenses/bills) No Help Needed   Moderately strenuous housework (laundry) No Help Needed   Shopping for personal items (toiletries/medicines) No Help Needed   Shopping for groceries No Help Needed   Driving No Help Needed   Climbing a flight of stairs No Help Needed   Getting to places beyond walking distances No Help Needed

## 2022-06-07 NOTE — PATIENT INSTRUCTIONS
Learning About Meal Planning for Diabetes  Why plan your meals? Meal planning can be a key part of managing diabetes. Planning meals and snacks with the right balance of carbohydrate, protein, and fat can help you keep your blood sugar at the target level you set with your doctor. You don't have to eat special foods. You can eat what your family eats, including sweets once in a while. But you do have to pay attention to how often you eat and how much you eat of certain foods. You may want to work with a dietitian or a diabetes educator. They can give you tips and meal ideas and can answer your questions about meal planning. This health professional can also help you reach a healthy weight if that is one of your goals. What plan is right for you? Your dietitian or diabetes educator may suggest that you start with the plate format or carbohydrate counting. The plate format  The plate format is a simple way to help you manage how you eat. You plan meals by learning how much space each food should take on a plate. Using the plate format helps you manage the amount of carbohydrate you eat. It can make it easier to keep your blood sugar level within your target range. It also helps you see if you're eating healthy portion sizes. To use the plate format, you put non-starchy vegetables on half your plate. Add lean protein foods, such as fish, lean meats and poultry, or soy products, on one-quarter of the plate. Put a grain or starchy vegetable (such as brown rice or a potato) on the final quarter of the plate. You can add a small piece of fruit and some low-fat or fat-free milk or yogurt, depending on your carbohydrate goal for each meal.  Here are some tips for using the plate format:  · Make sure that you are not using an oversized plate. A 9-inch plate is best. Many restaurants use larger plates. · Get used to using the plate format at home. Then you can use it when you eat out.   · Write down your questions about using the plate format. Talk to your doctor, a dietitian, or a diabetes educator about your concerns. Carbohydrate counting  With carbohydrate counting, you plan meals based on the amount of carbohydrate in each food. Carbohydrate raises blood sugar higher and more quickly than any other nutrient. It is found in desserts, breads and cereals, and fruit. It's also found in starchy vegetables such as potatoes and corn, grains such as rice and pasta, and milk and yogurt. You can help keep your blood sugar levels within your target range by planning how much carbohydrate to have at meals and snacks. The amount you need depends on several things. These include your weight, how active you are, which diabetes medicines you take, and what your goals are for your blood sugar levels. A registered dietitian or diabetes educator can help you plan how much carbohydrate to include in each meal and snack. An example of a carbohydrate counting plan is:  · 45 to 60 grams at each meal. That's about the same as 3 to 4 carbohydrate servings. · 15 to 20 grams at each snack. That's about the same as 1 carbohydrate serving. The Nutrition Facts label on packaged foods tells you how much carbohydrate is in a serving of the food. First, look at the serving size on the food label. Is that the amount you eat in a serving? All of the nutrition information on a food label is based on that serving size. So if you eat more or less than that, you'll need to adjust the other numbers. Total carbohydrate is the next thing you need to look for on the label. If you count carbohydrate servings, one serving of carbohydrate is 15 grams. For foods that don't come with labels, such as fresh fruits and vegetables, you'll need a guide that lists carbohydrate in these foods. Ask your doctor, dietitian, or diabetes educator about books or other nutrition guides you can use.   If you take insulin, you need to know how many grams of carbohydrate are in a meal. This lets you know how much rapid-acting insulin to take before you eat. If you use an insulin pump, you get a constant rate of insulin during the day. So the pump must be programmed at meals to give you extra insulin to cover the rise in blood sugar after meals. When you know how much carbohydrate you will eat, you can take the right amount of insulin. Or, if you always use the same amount of insulin, you need to make sure that you eat the same amount of carbohydrate at meals. If you need more help to understand carbohydrate counting and food labels, ask your doctor, dietitian, or diabetes educator. How can you plan healthy meals? Here are some tips to get started:  · Plan your meals a week at a time. Don't forget to include snacks too. · Use cookbooks or online recipes to plan several main meals. Plan some quick meals for busy nights. You also can double some recipes that freeze well. Then you can save half for other busy nights when you don't have time to cook. · Make sure you have the ingredients you need for your recipes. If you're running low on basic items, put these items on your shopping list too. · List foods that you use to make breakfasts, lunches, and snacks. List plenty of fruits and vegetables. · Post this list on the refrigerator. Add to it as you think of more things you need. · Take the list to the store to do your weekly shopping. Follow-up care is a key part of your treatment and safety. Be sure to make and go to all appointments, and call your doctor if you are having problems. It's also a good idea to know your test results and keep a list of the medicines you take. Where can you learn more? Go to http://www.gray.com/  Enter F793 in the search box to learn more about \"Learning About Meal Planning for Diabetes. \"  Current as of: September 8, 2021               Content Version: 13.2  © 5292-9880 [employer]wise, [facility].    Care instructions adapted under license by [employer] (which disclaims liability or warranty for this information). If you have questions about a medical condition or this instruction, always ask your healthcare professional. [address] any warranty or liability for your use of this information.

## 2022-07-27 DIAGNOSIS — G25.81 RLS (RESTLESS LEGS SYNDROME): ICD-10-CM

## 2022-07-27 RX ORDER — PRAMIPEXOLE DIHYDROCHLORIDE 0.5 MG/1
0.5 TABLET ORAL
Qty: 90 TABLET | Refills: 3 | Status: CANCELLED | OUTPATIENT
Start: 2022-07-27

## 2022-07-27 NOTE — TELEPHONE ENCOUNTER
Requested Prescriptions     Pending Prescriptions Disp Refills    pramipexole (MIRAPEX) 0.5 mg tablet 90 Tablet 3     Sig: Take 1 Tablet by mouth nightly.

## 2022-08-25 ENCOUNTER — TELEPHONE (OUTPATIENT)
Dept: INTERNAL MEDICINE CLINIC | Age: 77
End: 2022-08-25

## 2022-08-25 NOTE — TELEPHONE ENCOUNTER
I left voice message. Rescheduled patient appointment from 09/13 to 10/13/2022. Patient advised to call if this date does not work.

## 2022-09-07 DIAGNOSIS — E78.5 HYPERLIPIDEMIA, UNSPECIFIED HYPERLIPIDEMIA TYPE: ICD-10-CM

## 2022-09-07 RX ORDER — ATORVASTATIN CALCIUM 40 MG/1
TABLET, FILM COATED ORAL
Qty: 90 TABLET | Refills: 0 | Status: SHIPPED | OUTPATIENT
Start: 2022-09-07

## 2022-09-20 ENCOUNTER — TRANSCRIBE ORDER (OUTPATIENT)
Dept: SCHEDULING | Age: 77
End: 2022-09-20

## 2022-09-20 DIAGNOSIS — Z12.31 SCREENING MAMMOGRAM FOR HIGH-RISK PATIENT: Primary | ICD-10-CM

## 2022-10-13 ENCOUNTER — OFFICE VISIT (OUTPATIENT)
Dept: INTERNAL MEDICINE CLINIC | Age: 77
End: 2022-10-13
Payer: MEDICARE

## 2022-10-13 VITALS
HEIGHT: 60 IN | OXYGEN SATURATION: 96 % | BODY MASS INDEX: 30.23 KG/M2 | DIASTOLIC BLOOD PRESSURE: 60 MMHG | RESPIRATION RATE: 16 BRPM | SYSTOLIC BLOOD PRESSURE: 120 MMHG | HEART RATE: 85 BPM | TEMPERATURE: 97.9 F | WEIGHT: 154 LBS

## 2022-10-13 DIAGNOSIS — I10 PRIMARY HYPERTENSION: ICD-10-CM

## 2022-10-13 DIAGNOSIS — E11.9 TYPE 2 DIABETES MELLITUS WITHOUT COMPLICATION, WITHOUT LONG-TERM CURRENT USE OF INSULIN (HCC): ICD-10-CM

## 2022-10-13 DIAGNOSIS — E78.2 MIXED HYPERLIPIDEMIA: ICD-10-CM

## 2022-10-13 DIAGNOSIS — Z23 NEEDS FLU SHOT: ICD-10-CM

## 2022-10-13 DIAGNOSIS — Z00.00 MEDICARE ANNUAL WELLNESS VISIT, SUBSEQUENT: Primary | ICD-10-CM

## 2022-10-13 LAB — HBA1C MFR BLD HPLC: 6.6 %

## 2022-10-13 PROCEDURE — G8427 DOCREV CUR MEDS BY ELIG CLIN: HCPCS | Performed by: INTERNAL MEDICINE

## 2022-10-13 PROCEDURE — G8510 SCR DEP NEG, NO PLAN REQD: HCPCS | Performed by: INTERNAL MEDICINE

## 2022-10-13 PROCEDURE — G8417 CALC BMI ABV UP PARAM F/U: HCPCS | Performed by: INTERNAL MEDICINE

## 2022-10-13 PROCEDURE — G8536 NO DOC ELDER MAL SCRN: HCPCS | Performed by: INTERNAL MEDICINE

## 2022-10-13 PROCEDURE — 1123F ACP DISCUSS/DSCN MKR DOCD: CPT | Performed by: INTERNAL MEDICINE

## 2022-10-13 PROCEDURE — G0439 PPPS, SUBSEQ VISIT: HCPCS | Performed by: INTERNAL MEDICINE

## 2022-10-13 PROCEDURE — 90694 VACC AIIV4 NO PRSRV 0.5ML IM: CPT | Performed by: INTERNAL MEDICINE

## 2022-10-13 PROCEDURE — 3044F HG A1C LEVEL LT 7.0%: CPT | Performed by: INTERNAL MEDICINE

## 2022-10-13 PROCEDURE — G8754 DIAS BP LESS 90: HCPCS | Performed by: INTERNAL MEDICINE

## 2022-10-13 PROCEDURE — 83036 HEMOGLOBIN GLYCOSYLATED A1C: CPT | Performed by: INTERNAL MEDICINE

## 2022-10-13 PROCEDURE — G0008 ADMIN INFLUENZA VIRUS VAC: HCPCS | Performed by: INTERNAL MEDICINE

## 2022-10-13 PROCEDURE — 1101F PT FALLS ASSESS-DOCD LE1/YR: CPT | Performed by: INTERNAL MEDICINE

## 2022-10-13 PROCEDURE — G8752 SYS BP LESS 140: HCPCS | Performed by: INTERNAL MEDICINE

## 2022-10-13 RX ORDER — DULOXETIN HYDROCHLORIDE 30 MG/1
CAPSULE, DELAYED RELEASE ORAL
COMMUNITY
Start: 2022-10-04

## 2022-10-13 NOTE — PATIENT INSTRUCTIONS
Vaccine Information Statement    Influenza (Flu) Vaccine (Inactivated or Recombinant): What You Need to Know    Many vaccine information statements are available in Syriac and other languages. See www.immunize.org/vis. Hojas de información sobre vacunas están disponibles en español y en muchos otros idiomas. Visite www.immunize.org/vis. 1. Why get vaccinated? Influenza vaccine can prevent influenza (flu). Flu is a contagious disease that spreads around the [facility] every year, usually between October and May. Anyone can get the flu, but it is more dangerous for some people. Infants and young children, people 72 years and older, pregnant people, and people with certain health conditions or a weakened immune system are at greatest risk of flu complications. Pneumonia, bronchitis, sinus infections, and ear infections are examples of flu-related complications. If you have a medical condition, such as heart disease, cancer, or diabetes, flu can make it worse. Flu can cause fever and chills, sore throat, muscle aches, fatigue, cough, headache, and runny or stuffy nose. Some people may have vomiting and diarrhea, though this is more common in children than adults. In an average year, thousands of people in the [facility] die from flu, and many more are hospitalized. Flu vaccine prevents millions of illnesses and flu-related visits to the doctor each year. 2. Influenza vaccines     CDC recommends everyone 6 months and older get vaccinated every flu season. Children 6 months through 6years of age may need 2 doses during a single flu season. Everyone else needs only 1 dose each flu season. It takes about 2 weeks for protection to develop after vaccination. There are many flu viruses, and they are always changing. Each year a new flu vaccine is made to protect against the influenza viruses believed to be likely to cause disease in the upcoming flu season.  Even when the vaccine doesnt exactly match these viruses, it may still provide some protection. Influenza vaccine does not cause flu. Influenza vaccine may be given at the same time as other vaccines. 3. Talk with your health care provider    Tell your vaccination provider if the person getting the vaccine:  Has had an allergic reaction after a previous dose of influenza vaccine, or has any severe, life-threatening allergies   Has ever had Guillain-Barré Syndrome (also called GBS)    In some cases, your health care provider may decide to postpone influenza vaccination until a future visit. Influenza vaccine can be administered at any time during pregnancy. People who are or will be pregnant during influenza season should receive inactivated influenza vaccine. People with minor illnesses, such as a cold, may be vaccinated. People who are moderately or severely ill should usually wait until they recover before getting influenza vaccine. Your health care provider can give you more information. 4. Risks of a vaccine reaction    Soreness, redness, and swelling where the shot is given, fever, muscle aches, and headache can happen after influenza vaccination. There may be a very small increased risk of Guillain-Barré Syndrome (GBS) after inactivated influenza vaccine (the flu shot). Alex Mendoza children who get the flu shot along with pneumococcal vaccine (PCV13) and/or DTaP vaccine at the same time might be slightly more likely to have a seizure caused by fever. Tell your health care provider if a child who is getting flu vaccine has ever had a seizure. People sometimes faint after medical procedures, including vaccination. Tell your provider if you feel dizzy or have vision changes or ringing in the ears. As with any medicine, there is a very remote chance of a vaccine causing a severe allergic reaction, other serious injury, or death. 5. What if there is a serious problem?     An allergic reaction could occur after the vaccinated person leaves the clinic. If you see signs of a severe allergic reaction (hives, swelling of the face and throat, difficulty breathing, a fast heartbeat, dizziness, or weakness), call 9-1-1 and get the person to the nearest hospital.    For other signs that concern you, call your health care provider. Adverse reactions should be reported to the Vaccine Adverse Event Reporting System (VAERS). Your health care provider will usually file this report, or you can do it yourself. Visit the VAERS website at www.vaers. [facility].gov or call 0-506.761.6406. VAERS is only for reporting reactions, and VAERS staff members do not give medical advice. 6. The National Vaccine Injury Compensation Program    The [facility] Vaccine Injury Compensation Program (VICP) is a federal program that was created to compensate people who may have been injured by certain vaccines. Claims regarding alleged injury or death due to vaccination have a time limit for filing, which may be as short as two years. Visit the VICP website at www.[facility].gov/vaccinecompensation or call 0-496.306.2452 to learn about the program and about filing a claim. 7. How can I learn more? Ask your health care provider. Call your local or state health department. Visit the website of the Food and Drug Administration (FDA) for vaccine package inserts and additional information at www.fda.gov/vaccines-blood-biologics/vaccines. Contact the Centers for Disease Control and Prevention (CDC): Call 0-989.952.1814 (8-107-ZAX-INFO) or  Visit CDCs influenza website at www.cdc.gov/flu. Vaccine Information Statement   Inactivated Influenza Vaccine   8/6/2021  42 PRESTON Vasquez [id number]   Department of Health and Human Services  Centers for Disease Control and Prevention    Office Use Only      Medicare Wellness Visit, Female     The best way to live healthy is to have a lifestyle where you eat a well-balanced diet, exercise regularly, limit alcohol use, and quit all forms of tobacco/nicotine, if applicable. Regular preventive services are another way to keep healthy. Preventive services (vaccines, screening tests, monitoring & exams) can help personalize your care plan, which helps you manage your own care. Screening tests can find health problems at the earliest stages, when they are easiest to treat. [facility] follows the current, evidence-based guidelines published by the [facility] ([facility]) when recommending preventive services for our patients. Because we follow these guidelines, sometimes recommendations change over time as research supports it. (For example, mammograms used to be recommended annually. Even though Medicare will still pay for an annual mammogram, the newer guidelines recommend a mammogram every two years for women of average risk). Of course, you and your doctor may decide to screen more often for some diseases, based on your risk and your co-morbidities (chronic disease you are already diagnosed with). Preventive services for you include:  - Medicare offers their members a free annual wellness visit, which is time for you and your primary care provider to discuss and plan for your preventive service needs. Take advantage of this benefit every year!  -All adults over the age of 72 should receive the recommended pneumonia vaccines. Current USPSTF guidelines recommend a series of two vaccines for the best pneumonia protection.   -All adults should have a flu vaccine yearly and a tetanus vaccine every 10 years.   -All adults age 48 and older should receive the shingles vaccines (series of two vaccines).       -All adults age 38-68 who are overweight should have a diabetes screening test once every three years.   -All adults born between [birth year] and 1965 should be screened once for Hepatitis C.  -Other screening tests and preventive services for persons with diabetes include: an eye exam to screen for diabetic retinopathy, a kidney function test, a foot exam, and stricter control over your cholesterol.   -Cardiovascular screening for adults with routine risk involves an electrocardiogram (ECG) at intervals determined by your doctor.   -Colorectal cancer screenings should be done for adults age 54-65 with no increased risk factors for colorectal cancer. There are a number of acceptable methods of screening for this type of cancer. Each test has its own benefits and drawbacks. Discuss with your doctor what is most appropriate for you during your annual wellness visit. The different tests include: colonoscopy (considered the best screening method), a fecal occult blood test, a fecal DNA test, and sigmoidoscopy.    -A bone mass density test is recommended when a woman turns 65 to screen for osteoporosis. This test is only recommended one time, as a screening. Some providers will use this same test as a disease monitoring tool if you already have osteoporosis. -Breast cancer screenings are recommended every other year for women of normal risk, age 54-69.  -Cervical cancer screenings for women over age 72 are only recommended with certain risk factors.      Here is a list of your current Health Maintenance items (your personalized list of preventive services) with a due date:  Health Maintenance Due   Topic Date Due    Diabetic Foot Care  Never done    Albumin Urine Test  Never done    Eye Exam  Never done    Yearly Flu Vaccine (1) 08/01/2022

## 2022-10-13 NOTE — PROGRESS NOTES
HPI  Ms. Jan Wild is a 68y.o. year old female, she is seen today for follow up HTN, cholesterol, DM, AWV. Denies chest pain, shortness of breath, dizziness/lightheadedness, headaches, visual changes, edema other than left ankle with bone spur in heel. No polyuria or polydipsia. Tries to walk for exercise. [facility] - podiatry  Grandson overdoses last week 33 y/o  Morena Rounds was Sunday - she is grieving appropriately    A1C - 6.6 today, was 6.3 - 4 mos ago  Has been eating more bread and sugar lately and will cut back   Chief Complaint   Patient presents with    Blood Pressure Check     Room 2B //     Cholesterol Problem    Diabetes    Annual Wellness Visit    Immunization/Injection        Prior to Admission medications    Medication Sig Start Date End Date Taking? Authorizing Provider   DULoxetine (CYMBALTA) 30 mg capsule  10/4/22  Yes Provider, Historical   atorvastatin (LIPITOR) 40 mg tablet TAKE 1 TABLET BY MOUTH ONCE DAILY IN THE EVENING 9/7/22  Yes Johnathon Belcher MD   pramipexole (MIRAPEX) 0.5 mg tablet Take 1 tablet by mouth nightly 7/18/22  Yes Johnathon Belcher MD   omeprazole (PRILOSEC) 20 mg capsule Take 1 capsule by mouth once daily 7/12/22  Yes Johnathon Belcher MD   acetaminophen (TYLENOL) 325 mg tablet Take  by mouth every four (4) hours as needed for Pain. Yes Provider, Historical   ferrous gluconate 324 mg (37.5 mg iron) tablet Take 1 Tablet by mouth Daily (before breakfast). 6/24/21  Yes Johnathon Belcher MD   MAGNESIUM GLYCINATE PO Take  by mouth daily. Yes Provider, Historical   gabapentin (NEURONTIN) 100 mg capsule Take  by mouth three (3) times daily.   Patient not taking: Reported on 10/13/2022  10/13/22  Provider, Historical         No Known Allergies      REVIEW OF SYSTEMS:  Per HPI    PHYSICAL EXAM:  Visit Vitals  /60   Pulse 85   Temp 97.9 °F (36.6 °C) (Oral)   Resp 16   Ht 5' (1.524 m)   Wt 154 lb (69.9 kg)   LMP 10/26/1995   SpO2 96%   BMI 30.08 kg/m²     Constitutional: Appears well-developed and well-nourished. No distress. HENT:   Head: Normocephalic and atraumatic. Eyes: No scleral icterus. Cardiovascular: Normal S1/S2, regular rhythm. No murmurs, rubs, or gallops. Pulmonary/Chest: Effort normal and breath sounds normal. No respiratory distress. No wheezes, rhonchi, or rales. Ext: No edema. Neurological: Alert. Psychiatric: Normal mood and affect. Behavior is normal.     Lab Results   Component Value Date/Time    Sodium 139 02/07/2022 12:52 PM    Potassium 4.8 02/07/2022 12:52 PM    Chloride 106 02/07/2022 12:52 PM    CO2 21 02/07/2022 12:52 PM    Anion gap 5 06/21/2021 03:44 AM    Glucose 105 (H) 02/07/2022 12:52 PM    Glucose 98 08/05/2010 08:34 AM    BUN 18 02/07/2022 12:52 PM    Creatinine 1.02 (H) 02/07/2022 12:52 PM    BUN/Creatinine ratio 18 02/07/2022 12:52 PM    GFR est AA 62 02/07/2022 12:52 PM    GFR est non-AA 54 (L) 02/07/2022 12:52 PM    Calcium 9.7 02/07/2022 12:52 PM    Bilirubin, total 0.3 02/07/2022 12:52 PM    Alk. phosphatase 108 02/07/2022 12:52 PM    Protein, total 6.9 02/07/2022 12:52 PM    Albumin 4.1 02/07/2022 12:52 PM    Globulin 3.3 06/21/2021 03:44 AM    A-G Ratio 1.5 02/07/2022 12:52 PM    ALT (SGPT) 15 02/07/2022 12:52 PM     Lab Results   Component Value Date/Time    Hemoglobin A1c 6.9 (H) 02/07/2022 12:52 PM    Hemoglobin A1c 6.3 (H) 06/07/2021 08:21 AM    Hemoglobin A1c 6.4 (H) 06/29/2020 10:22 AM      Lab Results   Component Value Date/Time    Cholesterol, total 203 (H) 02/07/2022 12:52 PM    HDL Cholesterol 33 (L) 02/07/2022 12:52 PM    LDL, calculated 124 (H) 02/07/2022 12:52 PM    LDL, calculated 83 06/29/2020 10:22 AM    VLDL, calculated 46 (H) 02/07/2022 12:52 PM    VLDL, calculated 54 (H) 06/29/2020 10:22 AM    Triglyceride 257 (H) 02/07/2022 12:52 PM          ASSESSMENT/PLAN  Diagnoses and all orders for this visit:    1. Medicare annual wellness visit, subsequent    2.  Type 2 diabetes mellitus without complication, without long-term current use of insulin (HCC)  -     AMB POC HEMOGLOBIN A1C  -     MICROALBUMIN, UR, RAND W/ MICROALB/CREAT RATIO; Future  -     HEMOGLOBIN A1C WITH EAG; Future  -     REFERRAL TO DIABETIC EDUCATION    3. Needs flu shot  -     INFLUENZA, FLUAD, (AGE 65 Y+), IM, PF, 0.5 ML    4. Mixed hyperlipidemia  -     LIPID PANEL; Future    5. Primary hypertension  -     METABOLIC PANEL, COMPREHENSIVE; Future    BP at goal - continue current medications   Lipids have not been at goal - check before next visit, on statin  Refer to DM education - new DM and A1C rising    Health Maintenance Due   Topic Date Due    Foot Exam Q1  Never done    MICROALBUMIN Q1  Never done    Eye Exam Retinal or Dilated  Never done    Flu Vaccine (1) 08/01/2022        Follow-up and Dispositions    Return in about 4 months (around 2/13/2023) for bp, chol, dm, labs prior. Reviewed plan of care. Patient has provided input and agrees with goals. The nurse provided the patient and/or family with advanced directive information if needed and encouraged the patient to provide a copy to the office when available. This is the Subsequent Medicare Annual Wellness Exam, performed 12 months or more after the Initial AWV or the last Subsequent AWV    I have reviewed the patient's medical history in detail and updated the computerized patient record. Assessment/Plan   Education and counseling provided:  Are appropriate based on today's review and evaluation    1. Medicare annual wellness visit, subsequent  2. Type 2 diabetes mellitus without complication, without long-term current use of insulin (HCC)  -     AMB POC HEMOGLOBIN A1C  -     MICROALBUMIN, UR, RAND W/ MICROALB/CREAT RATIO; Future  -     HEMOGLOBIN A1C WITH EAG; Future  -     REFERRAL TO DIABETIC EDUCATION  3. Needs flu shot  -     INFLUENZA, FLUAD, (AGE 65 Y+), IM, PF, 0.5 ML  4.  Mixed hyperlipidemia  -     LIPID PANEL; Future  5. Primary hypertension  -     METABOLIC PANEL, COMPREHENSIVE; Future       Depression Risk Factor Screening     3 most recent PHQ Screens 10/13/2022   Little interest or pleasure in doing things Not at all   Feeling down, depressed, irritable, or hopeless Not at all   Total Score PHQ 2 0       Alcohol & Drug Abuse Risk Screen    Do you average more than 1 drink per night or more than 7 drinks a week:  No    On any one occasion in the past three months have you have had more than 3 drinks containing alcohol:  No          Functional Ability and Level of Safety    Hearing: Hearing is good. Activities of Daily Living: The home contains: grab bars  Patient does total self care      Ambulation: with no difficulty     Fall Risk:  Fall Risk Assessment, last 12 mths 10/13/2022   Able to walk? Yes   Fall in past 12 months? 0   Do you feel unsteady?  0   Are you worried about falling 0      Abuse Screen:  Patient is not abused       Cognitive Screening    Has your family/caregiver stated any concerns about your memory: no     Cognitive Screening: n/a    Health Maintenance Due     Health Maintenance Due   Topic Date Due    Foot Exam Q1  Never done    MICROALBUMIN Q1  Never done    Eye Exam Retinal or Dilated  Never done    Flu Vaccine (1) 08/01/2022       Patient Care Team   Patient Care Team:  Juliann Davis MD as PCP - Felipe Escobedo MD as PCP - REHABILITATION [facility] Empaneled Provider  Isabela Muhammad MD as Physician (Cardiovascular Disease Physician)  Gelacio Del Castillo MD (Physical Medicine and Rehabilitation Physician)  Sky Berrios MD (Surgery General)    History     Patient Active Problem List   Diagnosis Code    Osteoporosis M81.0    Vitamin D deficiency E55.9    RLS (restless legs syndrome) G25.81    Hyperlipidemia E78.5    Abnormal stress test R94.39    Compression fracture [id number]    Low back pain M54.50    Hypertension I10    Advanced care planning/counseling discussion Z71.89 Chronic midline low back pain without sciatica M54.50, G89.29    Combined forms of age-related cataract of right eye H25.811    Traumatic complete tear of left rotator cuff S46.012A    Synovial cyst of lumbar facet joint M71.38    Elevated hemoglobin A1c R73.09    S/P hip replacement Z96.649    Sepsis (HCC) A41.9    Type 2 diabetes mellitus, without long-term current use of insulin (HCC) E11.9     Past Medical History:   Diagnosis Date    Arthritis     bilateral hands, hips, lower back    At risk for sleep apnea 03/18/2019    on phone assessment with PAT, kathia sleep scoring tool, scored 4    Chronic kidney disease 2017    kidney stones    Elevated hemoglobin A1c 1/27/2020    GERD (gastroesophageal reflux disease)     being treated    High cholesterol     Kidney stones     Menopause 1995    Palpitations     as of 6/24/19 pt reports was evaluated by cardiology and cleared, now followed by cardiology, no further symptoms per pt    Pneumonia 2006 (approx)    Rheumatic fever     AGE 5    RLS (restless legs syndrome)     Shoulder fracture 2009 [facility] -Jan 09  Right side       Past Surgical History:   Procedure Laterality Date    COLONOSCOPY  7/30/2010    Dr. Jim Cuadra, diverticulosis, repeat due 7/30/2015    COLONOSCOPY,REMV LESN,FORCEP/CAUTERY  9/4/2015         HX CATARACT REMOVAL Bilateral 03/2019    HX OPEN REDUCTION INTERNAL FIXATION Left 1982    ankle    HX ORTHOPAEDIC  2019    L4-L5 BACK SURGERY - synovial cyst removal    HX ROTATOR CUFF REPAIR Right 2008    HX ROTATOR CUFF REPAIR Left 2012    HX UROLOGICAL  2017    stent for kidney stone     Current Outpatient Medications   Medication Sig Dispense Refill    DULoxetine (CYMBALTA) 30 mg capsule       atorvastatin (LIPITOR) 40 mg tablet TAKE 1 TABLET BY MOUTH ONCE DAILY IN THE EVENING 90 Tablet 0    pramipexole (MIRAPEX) 0.5 mg tablet Take 1 tablet by mouth nightly 90 Tablet 3    omeprazole (PRILOSEC) 20 mg capsule Take 1 capsule by mouth once daily 90 Capsule 4 acetaminophen (TYLENOL) 325 mg tablet Take  by mouth every four (4) hours as needed for Pain. ferrous gluconate 324 mg (37.5 mg iron) tablet Take 1 Tablet by mouth Daily (before breakfast). 90 Tablet 0    MAGNESIUM GLYCINATE PO Take  by mouth daily.        No Known Allergies    Family History   Problem Relation Age of Onset    Cancer Father         Colon     No Known Problems Mother     Diabetes Grandchild     Heart Disease Brother     Heart Attack Brother     Anesth Problems Neg Hx      Social History     Tobacco Use    Smoking status: Never    Smokeless tobacco: Never   Substance Use Topics    Alcohol use: No     Alcohol/week: 0.0 standard drinks         Kimmie Longo MD

## 2022-10-13 NOTE — PROGRESS NOTES
[id number] [address] [place] [address]  Identified pt with two pt identifiers(name and ). Chief Complaint   Patient presents with    Blood Pressure Check     Room 2B //     Cholesterol Problem    Diabetes    Annual Wellness Visit       Reviewed record In preparation for visit and have obtained necessary documentation. 1. Have you been to the ER, urgent care clinic or hospitalized since your last visit? No     2. Have you seen or consulted any other health care providers outside of the [address] [place] [address] since your last visit? Include any pap smears or colon screening. No    Patient has an advance directive. Vitals reviewed with provider. Health Maintenance reviewed: After verbal order read back of Dr. Merlin Murillo, patient received High Dose Flu Shot (Adjuvanted Fluad) in left deltoid. [facility]: [id number] Lot: [id number] Exp: 2023. Patient tolerated procedure without complaints and received VIS.     Health Maintenance Due   Topic    COVID-19 Vaccine (1)    Foot Exam Q1     MICROALBUMIN Q1     Eye Exam Retinal or Dilated     Pneumococcal 65+ years (2 - PPSV23 or PCV20)    Medicare Yearly Exam     Flu Vaccine (1)          Wt Readings from Last 3 Encounters:   10/13/22 154 lb (69.9 kg)   22 152 lb (68.9 kg)   21 151 lb (68.5 kg)        Temp Readings from Last 3 Encounters:   22 97.9 °F (36.6 °C) (Oral)   21 97.8 °F (36.6 °C) (Oral)   21 97.8 °F (36.6 °C)        BP Readings from Last 3 Encounters:   22 (!) 160/80   21 138/70   21 (!) 141/70        Pulse Readings from Last 3 Encounters:   22 71   21 83   21 78        Vitals:    10/13/22 0850   Resp: 16   Weight: 154 lb (69.9 kg)   Height: 5' (1.524 m)   PainSc:   0 - No pain   LMP: 10/26/1995          Learning Assessment:   :       Learning Assessment 10/7/2020 2019 2014   PRIMARY LEARNER Patient Patient Patient   HIGHEST LEVEL OF EDUCATION - PRIMARY LEARNER  - - DID NOT GRADUATE HIGH SCHOOL   BARRIERS PRIMARY LEARNER - - NONE   CO-LEARNER CAREGIVER - - No   PRIMARY LANGUAGE ENGLISH ENGLISH ENGLISH   LEARNER PREFERENCE PRIMARY DEMONSTRATION OTHER (COMMENT) DEMONSTRATION   ANSWERED BY patient patient patient   RELATIONSHIP SELF SELF SELF        Depression Screening:   :       3 most recent PHQ Screens 6/7/2022   Little interest or pleasure in doing things Not at all   Feeling down, depressed, irritable, or hopeless Not at all   Total Score PHQ 2 0        Fall Risk Assessment:   :       Fall Risk Assessment, last 12 mths 6/7/2022   Able to walk? Yes   Fall in past 12 months? 0   Do you feel unsteady? 0   Are you worried about falling 0        Abuse Screening:   :       Abuse Screening Questionnaire 6/7/2022 6/2/2020 2/19/2019 1/8/2018 12/29/2016 9/14/2015   Do you ever feel afraid of your partner? N N N N N N   Are you in a relationship with someone who physically or mentally threatens you? N N N N N N   Is it safe for you to go home?  Y Y Y Y Y Y        ADL Screening:   :       ADL Assessment 6/7/2022   Feeding yourself No Help Needed   Getting from bed to chair No Help Needed   Getting dressed No Help Needed   Bathing or showering No Help Needed   Walk across the room (includes cane/walker) No Help Needed   Using the telphone No Help Needed   Taking your medications No Help Needed   Preparing meals No Help Needed   Managing money (expenses/bills) No Help Needed   Moderately strenuous housework (laundry) No Help Needed   Shopping for personal items (toiletries/medicines) No Help Needed   Shopping for groceries No Help Needed   Driving No Help Needed   Climbing a flight of stairs No Help Needed   Getting to places beyond walking distances No Help Needed

## 2022-10-14 LAB
ALBUMIN/CREAT UR: 6 MG/G CREAT (ref 0–29)
CREAT UR-MCNC: 178 MG/DL
MICROALBUMIN UR-MCNC: 10 UG/ML
SPECIMEN STATUS REPORT, ROLRST: NORMAL

## 2022-10-17 ENCOUNTER — HOSPITAL ENCOUNTER (OUTPATIENT)
Dept: MAMMOGRAPHY | Age: 77
Discharge: HOME | End: 2022-10-17
Attending: INTERNAL MEDICINE
Payer: MEDICARE

## 2022-10-17 DIAGNOSIS — Z12.31 SCREENING MAMMOGRAM FOR HIGH-RISK PATIENT: ICD-10-CM

## 2022-10-17 PROCEDURE — 77063 BREAST TOMOSYNTHESIS BI: CPT

## 2022-10-28 ENCOUNTER — TELEPHONE (OUTPATIENT)
Dept: INTERNAL MEDICINE CLINIC | Age: 77
End: 2022-10-28

## 2022-10-28 ENCOUNTER — HOSPITAL ENCOUNTER (OUTPATIENT)
Dept: ULTRASOUND IMAGING | Age: 77
Discharge: HOME | End: 2022-10-28
Attending: INTERNAL MEDICINE
Payer: MEDICARE

## 2022-10-28 ENCOUNTER — HOSPITAL ENCOUNTER (OUTPATIENT)
Dept: MAMMOGRAPHY | Age: 77
Discharge: HOME | End: 2022-10-28
Attending: INTERNAL MEDICINE
Payer: MEDICARE

## 2022-10-28 DIAGNOSIS — R92.8 ABNORMAL MAMMOGRAM OF LEFT BREAST: ICD-10-CM

## 2022-10-28 DIAGNOSIS — N63.20 MASS OF LEFT BREAST, UNSPECIFIED QUADRANT: Primary | ICD-10-CM

## 2022-10-28 PROCEDURE — 77061 BREAST TOMOSYNTHESIS UNI: CPT

## 2022-10-28 PROCEDURE — 76642 ULTRASOUND BREAST LIMITED: CPT

## 2022-10-28 NOTE — TELEPHONE ENCOUNTER
[facility] called and need order up loaded in [facility] for a Ultra Sound Left Breast Biopsy.

## 2022-11-01 ENCOUNTER — TELEPHONE (OUTPATIENT)
Dept: INTERNAL MEDICINE CLINIC | Age: 77
End: 2022-11-01

## 2022-11-01 DIAGNOSIS — N63.42 SUBAREOLAR MASS OF LEFT BREAST: Primary | ICD-10-CM

## 2022-11-01 NOTE — TELEPHONE ENCOUNTER
They need different Ultrasound box order want the nurse to call them . Ultrasound box for [employer].

## 2022-11-02 NOTE — TELEPHONE ENCOUNTER
3rd time Tahira Fara has called to try and get the order changed. She needs it to say    \"Ultrasound guided left breast biopsy with clip insertion\"  Diagnosis code: N63.42 mass sub areolar    Comments of order: preferred surgeon if the patients results come back positive. Please callback if you have questions: [phone]    Need to hear back today or they cannot do the biopsy tomorrow.

## 2022-11-03 ENCOUNTER — HOSPITAL ENCOUNTER (OUTPATIENT)
Dept: MAMMOGRAPHY | Age: 77
Discharge: HOME | End: 2022-11-03
Attending: INTERNAL MEDICINE
Payer: MEDICARE

## 2022-11-03 ENCOUNTER — HOSPITAL ENCOUNTER (OUTPATIENT)
Dept: ULTRASOUND IMAGING | Age: 77
Discharge: HOME | End: 2022-11-03
Attending: INTERNAL MEDICINE
Payer: MEDICARE

## 2022-11-03 VITALS
OXYGEN SATURATION: 100 % | DIASTOLIC BLOOD PRESSURE: 64 MMHG | HEART RATE: 78 BPM | TEMPERATURE: 98.6 F | RESPIRATION RATE: 20 BRPM | SYSTOLIC BLOOD PRESSURE: 134 MMHG

## 2022-11-03 DIAGNOSIS — R92.8 ABNORMAL MAMMOGRAM OF LEFT BREAST: ICD-10-CM

## 2022-11-03 DIAGNOSIS — N63.42 SUBAREOLAR MASS OF LEFT BREAST: ICD-10-CM

## 2022-11-03 PROCEDURE — 77065 DX MAMMO INCL CAD UNI: CPT

## 2022-11-03 PROCEDURE — 88305 TISSUE EXAM BY PATHOLOGIST: CPT

## 2022-11-03 PROCEDURE — 88377 M/PHMTRC ALYS ISHQUANT/SEMIQ: CPT

## 2022-11-03 PROCEDURE — 88360 TUMOR IMMUNOHISTOCHEM/MANUAL: CPT

## 2022-11-03 PROCEDURE — 19083 BX BREAST 1ST LESION US IMAG: CPT

## 2022-11-03 NOTE — PROGRESS NOTES
Procedure reviewed with patient by Dr. Ricardo Scott. Opportunity to verbalize questions and concerns. Consent obtained.

## 2022-11-03 NOTE — DISCHARGE INSTRUCTIONS
[facility]  [address] [facility], [address]  [phone]      Breast Biopsy Discharge Instructions        1. After the biopsy, we will place a clean covered ice pack over the biopsy site, within the bra - you should leave the ice pack on 30 minutes and then remove the ice pack for 1-2 hours until bedtime. If needed you can continue applying ice the following day. It is a good idea to wear your bra for support, both day and night unless this causes you discomfort. 2. You may take Extra-Strength Tylenol (two tablets) every 4 to 6 hours as needed for pain. Do not take aspirin or aspirin products (e.g. ibuprofen, Advil, Motrin) as these may cause more bleeding. 3. You may expect some bruising and skin discoloration in the biopsy area. This is normal and generally should resolve in 5 to 7 days. 4. Most women do not find it necessary to restrict their activities after the procedure. You should rest as needed on the day of your biopsy. The next day, if you are feeling okay, you may resume your regular work/activity schedule. Avoid strenuous activity and heavy lifting, jogging, aerobics, or vacuuming for 48 hours after the procedure. 5. 48 hours after your biopsy, remove the large outer dressing and leave the steri-strips (tiny pieces of tape) in place. The steri-strips will usually fall off in a few days. You may shower 48 hours after your biopsy and you may get the steri-strips wet. If still present after 4 days, you may gently peel the strips off. Keep the area clean and dry and shower daily. 6. If you have bleeding from the incision area, hold firm pressure on the area for 20 minutes. This should control any slight oozing that might occur.   If you develop persistent bleeding or pain which does not respond to the above measures or if you develop a fever, excessive swelling, redness, heat or drainage, please call the Stereotactic Breast Health Navigator at [phone] (if you had a stereotactic breast biopsy) or the Ultrasound Breast Health Navigator at [phone] (if you had an Ultrasound breast biopsy) during normal business hours (7 a.m. - 4 p.m.). After business hours, call [phone] and ask for the on-call Radiology Nurse to be paged, or your referring physician. 7. You will receive your biopsy results (pathology report) in 3-5 business days - the radiologist will review your results and you will receive a call from the radiology department and/or from your doctor.           Physician :Dr. Echo Charles    Nurse: Ruth Jean: Jaime Boo: ____________________

## 2022-11-14 ENCOUNTER — NURSE NAVIGATOR (OUTPATIENT)
Dept: CASE MANAGEMENT | Age: 77
End: 2022-11-14

## 2022-11-14 NOTE — NURSE NAVIGATOR
[address]  Breast Navigator Encounter    Name:    Xenia Galloway  Age:    68 y.o. Diagnosis: IDC grade 1    Interdisciplinary Team:  Med-Onc:      Surg-Onc: Gabi Cathy:      Plastics:      :      Nurse Navigator:  Barbara Carranza BSN-RN      Encounter type:  []Patient Initiated  []Navigator Follow-up []Pre-op  []Post-op  []Check-in Prior to [employer] Treatment []Treatment Modality Change  [x]Initial Navigator Encounter []Other:       Narrative:   Called patient for initial navigator contact. I explained what happens at the first consultation - an exam by the physician, a possible ultrasound, then complete discussion of surgical options and other treatment that may be considered. I encouraged the patient to bring  someone with her to this appointment since there is a lot of information that will be covered. Her daughter is in [facility] currently taking care of her Chava Esteves is in hospice it sounds like and they will be out there until she passes. She does not have any churches she attends or friends in the area to go with her. She said all of her neighbors work and there is one she can ask but last time she asked her, the neighbor canceled on her and then she had to cancel her surgery. We discussed her pathology a little more, we discussed size and type of tumor. I explained different kinds of surgeries and treatments and let her know that Dr. Frederick Guevara will go over these as well and tell her the best option for her. I told her I was proud of her for getting yearly mammograms because this allowed us to catch it early. I told her surgery is usually scheduled for 4-6 weeks out but we can accommodate her needs in order to make sure she has someone with her. I told her to save my phone number and call/text anytime. I also let her know Flora Mota is the DONNIE that works with Dr. Frederick Guevara frequently and she may talk to either of us regarding her care.  She was appreciative of the call.    Referrals/Handouts:            Sherren Diego BSN, RN.   Breast [address], [place]  22.  Office: [phone]  Cell: [phone]  F: [phone]  [email or web address]  Good Help to Those in [facility]

## 2022-11-16 ENCOUNTER — OFFICE VISIT (OUTPATIENT)
Dept: SURGERY | Age: 77
End: 2022-11-16
Payer: COMMERCIAL

## 2022-11-16 VITALS — WEIGHT: 151 LBS | BODY MASS INDEX: 29.64 KG/M2 | HEIGHT: 60 IN

## 2022-11-16 DIAGNOSIS — C50.912 INFILTRATING DUCTAL CARCINOMA OF LEFT BREAST (HCC): Primary | ICD-10-CM

## 2022-11-16 PROCEDURE — 76642 ULTRASOUND BREAST LIMITED: CPT | Performed by: SURGERY

## 2022-11-16 PROCEDURE — 99215 OFFICE O/P EST HI 40 MIN: CPT | Performed by: SURGERY

## 2022-11-16 PROCEDURE — 1123F ACP DISCUSS/DSCN MKR DOCD: CPT | Performed by: SURGERY

## 2022-11-16 NOTE — PROGRESS NOTES
HISTORY OF PRESENT ILLNESS  Clyde Canseco is a 68 y.o. female. HPI  ESTABLISHED Patient here for LEFT breast invasive ductal carcinoma. Found on imaging. Denies pain or changes to the breast area. Family history-  No breast or ovarian cancer. Father had colon cancer. Breast imaging-   STUDY:  Left Digital Diagnostic Mammogram including 3-D Tomosynthesis     INDICATION:  Screening call back for left breast focal asymmetry. COMPARISON:  [id number]. BREAST COMPOSITION: There are scattered fibroglandular densities (approximately  25 - 50% glandular). FINDINGS: Left digital diagnostic mammography and tomography was performed, and  is interpreted in conjunction with a computer assisted detection (CAD) system. Focal asymmetry in the anterior third of the left breast along the nipple line  persists on CC spot compression, but is not as conspicuous on MLO spot  compression or mediolateral view. Ultrasound of the left breast was performed by the radiologist and ultrasound  technologist. In the subareolar left breast, an irregularly marginated,  hypoechoic, taller than wide mass with subtle posterior shadowing and no  internal vascularity measures 4 x 5 x 6 mm. IMPRESSION  1. Subcentimeter, irregularly marginated, hypoechoic, subareolar mass. 2. BI-RADS Assessment Category 4: Suspicious abnormality. 3. Recommendation: Ultrasound-guided left breast biopsy. The findings of this exam and the recommendations were directly discussed with  the patient at the time of exam by myself. 11/03/2022: LEFT breast, 12:00, biopsy: IDC, spanning 6mm in greatest dimension, grade 1, ER+(%), PA+(81-90%), HER2-, Ki-67(15%).       Past Medical History:   Diagnosis Date    Arthritis     bilateral hands, hips, lower back    At risk for sleep apnea 03/18/2019    on phone assessment with VEENA, kathia sleep scoring tool, scored 4    Chronic kidney disease 2017    kidney stones    Elevated hemoglobin A1c 1/27/2020    GERD (gastroesophageal reflux disease)     being treated    High cholesterol     Kidney stones     Menopause 1995    Palpitations     as of 6/24/19 pt reports was evaluated by cardiology and cleared, now followed by cardiology, no further symptoms per pt    Pneumonia 2006 (approx)    Rheumatic fever     AGE 5    RLS (restless legs syndrome)     Shoulder fracture 2009 [facility] -Jan 09  Right side        Past Surgical History:   Procedure Laterality Date    COLONOSCOPY  7/30/2010    Dr. Mushtaq Marcano, diverticulosis, repeat due 7/30/2015    COLONOSCOPY,JONAH CALHOUN,FORCEP/CAUTERY  9/4/2015         HX CATARACT REMOVAL Bilateral 03/2019    HX OPEN REDUCTION INTERNAL FIXATION Left 1982    ankle    HX ORTHOPAEDIC  2019    L4-L5 BACK SURGERY - synovial cyst removal    HX ROTATOR CUFF REPAIR Right 2008    HX ROTATOR CUFF REPAIR Left 2012    HX UROLOGICAL  2017    stent for kidney stone       Social History     Socioeconomic History    Marital status:      Spouse name: Not on file    Number of children: Not on file    Years of education: Not on file    Highest education level: Not on file   Occupational History    Not on file   Tobacco Use    Smoking status: Never    Smokeless tobacco: Never   Vaping Use    Vaping Use: Never used   Substance and Sexual Activity    Alcohol use: No     Alcohol/week: 0.0 standard drinks    Drug use: Not Currently    Sexual activity: Not on file   Other Topics Concern    Not on file   Social History Narrative    Not on file     Social Determinants of Health     Financial Resource Strain: Not on file   Food Insecurity: Not on file   Transportation Needs: Not on file   Physical Activity: Not on file   Stress: Not on file   Social Connections: Not on file   Intimate Partner Violence: Not on file   Housing Stability: Not on file       Current Outpatient Medications on File Prior to Visit   Medication Sig Dispense Refill    DULoxetine (CYMBALTA) 30 mg capsule       atorvastatin (LIPITOR) 40 mg tablet TAKE 1 TABLET BY MOUTH ONCE DAILY IN THE EVENING 90 Tablet 0    pramipexole (MIRAPEX) 0.5 mg tablet Take 1 tablet by mouth nightly 90 Tablet 3    omeprazole (PRILOSEC) 20 mg capsule Take 1 capsule by mouth once daily 90 Capsule 4    acetaminophen (TYLENOL) 325 mg tablet Take  by mouth every four (4) hours as needed for Pain. ferrous gluconate 324 mg (37.5 mg iron) tablet Take 1 Tablet by mouth Daily (before breakfast). 90 Tablet 0    MAGNESIUM GLYCINATE PO Take  by mouth daily. No current facility-administered medications on file prior to visit. No Known Allergies    OB History          7    Para   7    Term   7            AB        Living             SAB        IAB        Ectopic        Molar        Multiple        Live Births   7          Obstetric Comments   Menarche:  15. LMP: 49.  # of Children:  7. Age at Delivery of First Child:  16.   Hysterectomy/oophorectomy:  NO/NO. Breast Bx:  no.  Hx of Breast Feeding:  no. BCP:  no. Hormone therapy:  no.                    ROS      Physical Exam  Exam conducted with a chaperone present. Constitutional:       Appearance: She is well-developed. She is not diaphoretic. HENT:      Head: Normocephalic and atraumatic. Right Ear: External ear normal.      Left Ear: External ear normal.   Eyes:      General: No scleral icterus. Right eye: No discharge. Left eye: No discharge. Pupils: Pupils are equal, round, and reactive to light. Neck:      Thyroid: No thyromegaly. Vascular: No JVD. Trachea: No tracheal deviation. Cardiovascular:      Rate and Rhythm: Normal rate and regular rhythm. Heart sounds: Normal heart sounds. Pulmonary:      Effort: Pulmonary effort is normal. No tachypnea, accessory muscle usage or respiratory distress. Breath sounds: Normal breath sounds. No stridor.    Chest:   Breasts:     Breasts are symmetrical.      Right: No inverted nipple, mass, nipple discharge, skin change or tenderness. Left: No inverted nipple, mass, nipple discharge, skin change or tenderness. Abdominal:      General: There is no distension. Palpations: Abdomen is soft. There is no mass. Tenderness: There is no abdominal tenderness. Musculoskeletal:         General: Normal range of motion. Cervical back: Normal range of motion and neck supple. Lymphadenopathy:      Cervical: No cervical adenopathy. Skin:     General: Skin is warm and dry. Neurological:      Mental Status: She is alert and oriented to person, place, and time. Psychiatric:         Speech: Speech normal.         Behavior: Behavior normal.         Thought Content: Thought content normal.         Judgment: Judgment normal.         BREAST ULTRASOUND, Pre-op Planning  Indication: Pre-op planning, LEFT breast, retroareolar. Technique: The area was scanned using a high-frequency linear-array near-field transducer  Findings: 6mm irregular hypoechoic mass with biopsy clip. Normal lymph node at LEFT axilla. Impression: Breast cancer  Disposition: Surgery      ASSESSMENT and PLAN    ICD-10-CM ICD-9-CM    1. Infiltrating ductal carcinoma of left breast (HCC)  C50.912 174.9         New pt presents for consultation for treatment of LEFT breast IDC, ER+(%)/NV+(81-90%)/HER2-, Ki-67(15%). Upon physical examination noted nothing palpable. Ultrasound visualizes a 6mm irregular hypoechoic mass with biopsy clip, retroareolar and normal LEFT axillary lymph node. We had a long discussion of options for treatment. The patient was accompanied by herself during this encounter, and over half the time was spent on counseling and coordination of care. We discussed in depth the pathology results, and surgical planning. The goals of treatment are to treat the breast, and to reduce risk of local or distant recurrence.     Discussed treatment options with risks, complications, benefits, and limitations, including lumpectomy with possible XRT, and mastectomy with optional reconstruction, both having the same cure rate. Explained the importance of negative margins, and the need for re-excision in the case of positive margins. Will plan to mobilize breast tissue during lumpectomy to minimize cosmetic defect. Patient is 68years old with clinically negative lymph nods, SLNbx was not advised. We also covered risk reduction strategies including hormonal therapy with estrogen blocker. Will refer to medical oncology for further consultation. Chemotherapy and radiation therapy was not recommended. We also discussed the pts questions and concerns such as what caused the breast cancer     Pt has elected to proceed with lumpectomy. Pt should feel free to call Sonido Mccabe or Augustine Max, nurse navigators, with any further questions. This plan was reviewed with the patient and patient agrees. All questions were answered. Total time spent was 60 minutes.       Written by Mecca Moon, as dictated by Dr. Prema Aguirre MD.

## 2022-11-16 NOTE — PROGRESS NOTES
HISTORY OF PRESENT ILLNESS  Estela Acharya is a 68 y.o. female. HPI ESTABLISHED Patient here for LEFT breast invasive ductal carcinoma. Found on imaging. Denies pain or changes to the breast area. Invasive ductal carcinoma- ER , [place] 81-90, Her2 equivocal, Ki 67 15%, FISH negative. Family history-  No breast or ovarian cancer. Father had colon cancer. Breast imaging-   STUDY:  Left Digital Diagnostic Mammogram including 3-D Tomosynthesis     INDICATION:  Screening call back for left breast focal asymmetry. COMPARISON:  [id number]. BREAST COMPOSITION: There are scattered fibroglandular densities (approximately  25 - 50% glandular). FINDINGS: Left digital diagnostic mammography and tomography was performed, and  is interpreted in conjunction with a computer assisted detection (CAD) system. Focal asymmetry in the anterior third of the left breast along the nipple line  persists on CC spot compression, but is not as conspicuous on MLO spot  compression or mediolateral view. Ultrasound of the left breast was performed by the radiologist and ultrasound  technologist. In the subareolar left breast, an irregularly marginated,  hypoechoic, taller than wide mass with subtle posterior shadowing and no  internal vascularity measures 4 x 5 x 6 mm. IMPRESSION  1. Subcentimeter, irregularly marginated, hypoechoic, subareolar mass. 2. BI-RADS Assessment Category 4: Suspicious abnormality. 3. Recommendation: Ultrasound-guided left breast biopsy. The findings of this exam and the recommendations were directly discussed with  the patient at the time of exam by myself.       ROS    Physical Exam    ASSESSMENT and PLAN  {ASSESSMENT/PLAN:13064}

## 2022-11-17 ENCOUNTER — NURSE NAVIGATOR (OUTPATIENT)
Dept: CASE MANAGEMENT | Age: 77
End: 2022-11-17

## 2022-11-17 NOTE — NURSE NAVIGATOR
[employer]  Breast Navigator Encounter    Name:    Mony Ocampo  Age:    68 y.o. Diagnosis: L IDC ERPR+ HER2-    Interdisciplinary Team:  Med-Onc:      Surg-Onc:  Alma Cables:      Plastics:      :      Nurse Navigator:  Hilaria Kussmaul BSN-RN      Encounter type:  []Patient Initiated  [x]Navigator Follow-up []Pre-op  []Post-op  []Check-in Prior to [employer] Treatment []Treatment Modality Change  []Initial Navigator Encounter []Other:       Narrative:    Spoke with Angela regarding appointment with Dr. Juan Bal. Asked if she had a good experience, did she have questions or concerns. She said she loved Dr. Juan Bal and that he explained everything well. She said she asked every question under the sun and he was able to answer them all for her. She currently does not have any questions or concerns and is going to proceed with lumpectomy. She originally was worried she would not have someone to go with her to the appointment or to go to surgery/ be at home with her after. 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 She was relieved by this and happy to have the help. She states the only thing she is waiting on now is to be scheduled for surgery. I told her to reach out if questions or concerns come up and if she does not get scheduled by mid next week to let me know and I can do some investigating as well. Referrals/Handouts:            Hilaria Kussmaul BSN, RN.   [address] [address]  [address]  Office: [phone]  Cell: [phone]  F: [phone]  [email or web address]  Good Help to Those in [facility]

## 2022-11-18 DIAGNOSIS — C50.912 MALIGNANT NEOPLASM OF LEFT FEMALE BREAST, UNSPECIFIED ESTROGEN RECEPTOR STATUS, UNSPECIFIED SITE OF BREAST (HCC): Primary | ICD-10-CM

## 2022-11-22 ENCOUNTER — NURSE TRIAGE (OUTPATIENT)
Dept: OTHER | Facility: CLINIC | Age: 77
End: 2022-11-22

## 2022-11-22 NOTE — TELEPHONE ENCOUNTER
Location of patient: [address] cesar from [place] at [facility] with [employer]. Subjective: Caller states \"woke up with left shoulder pain , neck and shoulder blade, pain when turning neck\"     Current Symptoms: soulder pain    Onset: several hours ago; sudden    Associated Symptoms: NA    Pain Severity: 10/10; sharp; constant    Temperature: denies    What has been tried: hydrocodone    LMP: NA Pregnant: NA    Recommended disposition: Go to [facility]    Care advice provided, patient verbalizes understanding; denies any other questions or concerns; instructed to call back for any new or worsening symptoms. Patient/Caller agrees with recommended disposition; writer provided warm transfer to [place] at [facility] for appointment scheduling    Attention Provider: Thank you for allowing me to participate in the care of your patient. The patient was connected to triage in response to information provided to the [facility]. Please do not respond through this encounter as the response is not directed to a shared pool.         Reason for Disposition   SEVERE pain (e.g., excruciating, unable to do any normal activities)    Protocols used: Shoulder Pain-ADULT-[place]

## 2022-12-07 ENCOUNTER — NURSE NAVIGATOR (OUTPATIENT)
Dept: CASE MANAGEMENT | Age: 77
End: 2022-12-07

## 2022-12-07 NOTE — NURSE NAVIGATOR
[address]  Breast Navigator Encounter    Name:    Lissette Ang  Age:    68 y.o. Narrative: Spoke with patient today regarding PAT appointment. Let her know that her appointment is this Friday at 9:30, gave her directions on where to go and told her to get there about 15 minutes early and this can take about 90 minutes from start to finish. She was ok with this and verbalized understanding of appointment and time. Reminded her to reach back out to me if she has any needs or questions. Hoda NICOLEN, RN.   Breast [address], [facility]  [facility].  Office: [phone]  Cell: [phone]  F: [phone]  [email or web address]  Good Help to Those in [facility]

## 2022-12-09 ENCOUNTER — HOSPITAL ENCOUNTER (OUTPATIENT)
Dept: PREADMISSION TESTING | Age: 77
End: 2022-12-09
Payer: MEDICARE

## 2022-12-09 VITALS
SYSTOLIC BLOOD PRESSURE: 167 MMHG | HEIGHT: 60 IN | HEART RATE: 78 BPM | OXYGEN SATURATION: 98 % | WEIGHT: 156.53 LBS | BODY MASS INDEX: 30.73 KG/M2 | DIASTOLIC BLOOD PRESSURE: 74 MMHG | TEMPERATURE: 97.7 F

## 2022-12-09 LAB
ANION GAP SERPL CALC-SCNC: 3 MMOL/L (ref 5–15)
ATRIAL RATE: 75 BPM
BASOPHILS # BLD: 0.1 K/UL (ref 0–0.1)
BASOPHILS NFR BLD: 1 % (ref 0–1)
BUN SERPL-MCNC: 14 MG/DL (ref 6–20)
BUN/CREAT SERPL: 14 (ref 12–20)
CALCIUM SERPL-MCNC: 8.4 MG/DL (ref 8.5–10.1)
CALCULATED P AXIS, ECG09: 42 DEGREES
CALCULATED R AXIS, ECG10: -32 DEGREES
CALCULATED T AXIS, ECG11: -45 DEGREES
CHLORIDE SERPL-SCNC: 111 MMOL/L (ref 97–108)
CO2 SERPL-SCNC: 26 MMOL/L (ref 21–32)
CREAT SERPL-MCNC: 0.99 MG/DL (ref 0.55–1.02)
DIAGNOSIS, 93000: NORMAL
DIFFERENTIAL METHOD BLD: ABNORMAL
EOSINOPHIL # BLD: 0.3 K/UL (ref 0–0.4)
EOSINOPHIL NFR BLD: 3 % (ref 0–7)
ERYTHROCYTE [DISTWIDTH] IN BLOOD BY AUTOMATED COUNT: 13.6 % (ref 11.5–14.5)
GLUCOSE SERPL-MCNC: 104 MG/DL (ref 65–100)
HCT VFR BLD AUTO: 39.5 % (ref 35–47)
HGB BLD-MCNC: 12.5 G/DL (ref 11.5–16)
IMM GRANULOCYTES # BLD AUTO: 0.2 K/UL (ref 0–0.04)
IMM GRANULOCYTES NFR BLD AUTO: 2 % (ref 0–0.5)
LYMPHOCYTES # BLD: 3 K/UL (ref 0.8–3.5)
LYMPHOCYTES NFR BLD: 29 % (ref 12–49)
MCH RBC QN AUTO: 29.3 PG (ref 26–34)
MCHC RBC AUTO-ENTMCNC: 31.6 G/DL (ref 30–36.5)
MCV RBC AUTO: 92.7 FL (ref 80–99)
MONOCYTES # BLD: 0.9 K/UL (ref 0–1)
MONOCYTES NFR BLD: 9 % (ref 5–13)
NEUTS SEG # BLD: 5.9 K/UL (ref 1.8–8)
NEUTS SEG NFR BLD: 56 % (ref 32–75)
NRBC # BLD: 0 K/UL (ref 0–0.01)
NRBC BLD-RTO: 0 PER 100 WBC
P-R INTERVAL, ECG05: 136 MS
PLATELET # BLD AUTO: 339 K/UL (ref 150–400)
PMV BLD AUTO: 9.8 FL (ref 8.9–12.9)
POTASSIUM SERPL-SCNC: 4 MMOL/L (ref 3.5–5.1)
Q-T INTERVAL, ECG07: 400 MS
QRS DURATION, ECG06: 112 MS
QTC CALCULATION (BEZET), ECG08: 446 MS
RBC # BLD AUTO: 4.26 M/UL (ref 3.8–5.2)
SODIUM SERPL-SCNC: 140 MMOL/L (ref 136–145)
VENTRICULAR RATE, ECG03: 75 BPM
WBC # BLD AUTO: 10.3 K/UL (ref 3.6–11)

## 2022-12-09 PROCEDURE — 36415 COLL VENOUS BLD VENIPUNCTURE: CPT

## 2022-12-09 PROCEDURE — 80048 BASIC METABOLIC PNL TOTAL CA: CPT

## 2022-12-09 PROCEDURE — 85025 COMPLETE CBC W/AUTO DIFF WBC: CPT

## 2022-12-09 PROCEDURE — 93005 ELECTROCARDIOGRAM TRACING: CPT

## 2022-12-09 RX ORDER — ASCORBIC ACID 500 MG
500 TABLET ORAL DAILY
COMMUNITY

## 2022-12-09 RX ORDER — MELATONIN
1000 DAILY
COMMUNITY

## 2022-12-09 RX ORDER — DULOXETIN HYDROCHLORIDE 20 MG/1
40 CAPSULE, DELAYED RELEASE ORAL
COMMUNITY

## 2022-12-09 RX ORDER — METHOCARBAMOL 500 MG/1
500 TABLET, FILM COATED ORAL AS NEEDED
COMMUNITY

## 2022-12-09 NOTE — PERIOP NOTES
PAT instructions reviewed with patient and given the opportunity to ask questions. Patient given two bottles CHG soap and instruction sheet, instructions for use reviewed with patient. Patient instructed re: check-in procedure for day of surgery.

## 2022-12-09 NOTE — PERIOP NOTES
[address] INSTRUCTIONS    Surgery Date:   12-15-22    Your surgeon's office or [facility] staff will call you between 4 PM- 8 PM the day before surgery with your arrival time. If your surgery is on a Monday, you will receive a call the preceding Friday. Please report to [facility] Patient Access/Admitting on the 1st floor. Bring your insurance card, photo identification, and any copayment ( if applicable). If you are going home the same day of your surgery, you must have a responsible adult to drive you home. You need to have a responsible adult to stay with you the first 24 hours after surgery and you should not drive a car for 24 hours following your surgery. Do NOT eat any solid foods after midnight the night before surgery including candy, mint or gum. You may drink clear liquids from midnight until 1 hour prior to your arrival. You may drink up to 12 ounces at one time every 4 hours. Please note special instructions, if applicable, below for medications. Do NOT drink alcohol or smoke 24 hours before surgery. STOP smoking for 14 days prior as it helps with breathing and healing after surgery. If you are being admitted to the hospital, please leave personal belongings/luggage in your car until you have an assigned hospital room number. Please wear comfortable clothes. Wear your glasses instead of contacts. We ask that all money, jewelry and valuables be left at home. Wear no make up, particularly mascara, the day of surgery. All body piercings, rings, and jewelry need to be removed and left at home. Please remove any nail polish or artifical nails from your fingernails. Please wear your hair loose or down. Please no pony-tails, buns, or any metal hair accessories. If you shower the morning of surgery, please do not apply any lotions or powders afterwards. You may wear deodorant, unless having breast surgery. Do not shave any body area within 24 hours of your surgery.   Please follow all instructions to avoid any potential surgical cancellation. Should your physical condition change, (i.e. fever, cold, flu, etc.) please notify your surgeon as soon as possible. It is important to be on time. If a situation occurs where you may be delayed, please call:  [phone] / [phone] on the day of surgery. The Preadmission Testing staff can be reached at [phone]. Special instructions: NONE      Current Outpatient Medications   Medication Sig    DULoxetine (CYMBALTA) 20 mg capsule Take 40 mg by mouth nightly. ascorbic acid, vitamin C, (Vitamin C) 500 mg tablet Take 500 mg by mouth daily. methocarbamoL (ROBAXIN) 500 mg tablet Take 500 mg by mouth as needed for Muscle Spasm(s). cholecalciferol (Vitamin D3) (1000 Units /25 mcg) tablet Take 1,000 Units by mouth daily. atorvastatin (LIPITOR) 40 mg tablet TAKE 1 TABLET BY MOUTH ONCE DAILY IN THE EVENING (Patient taking differently: Take 40 mg by mouth nightly.)    pramipexole (MIRAPEX) 0.5 mg tablet Take 1 tablet by mouth nightly    omeprazole (PRILOSEC) 20 mg capsule Take 1 capsule by mouth once daily    acetaminophen (TYLENOL) 325 mg tablet Take 650 mg by mouth every four (4) hours as needed for Pain. No current facility-administered medications for this encounter. YOU MUST ONLY TAKE THESE MEDICATIONS THE MORNING OF SURGERY WITH A SIP OF WATER: OMEPRAZOLE  MEDICATIONS TO TAKE THE MORNING OF SURGERY ONLY IF NEEDED: TYLENOL  HOLD these prescription medications BEFORE Surgery: N/A  STOP VITAMIN C, VITAMIN D ON 12/9/22  Ask your surgeon/prescribing physician about when/if to STOP taking these medications: N/A  Stop all vitamins, herbal medicines and Aspirin containing products 7 days prior to surgery. Stop any non-steroidal anti-inflammatory drugs (i.e. Ibuprofen, Naproxen, Advil, Aleve) 3 days before surgery. You may take Tylenol.     If you are currently taking Plavix, Coumadin,or any other blood-thinning/anticoagulant medication contact your prescribing physician for instructions. Eating and Drinking Before Surgery    You may eat a regular dinner at the usual time on the day before your surgery. Do NOT eat any solid foods after midnight unless your arrival time at the hospital is 3pm or later. You may drink clear liquids only from 12 midnight until 1 hours prior to your arrival time at the hospital on the day of your surgery. Do NOT drink alcohol. Clear liquids include:  Water  Fruit juices without pulp( i.e. apple juice)  Carbonated beverages  Black coffee (no cream/milk)  Tea (no cream/milk)  Gatorade  You may drink up to 12-16 ounces at one time every 4 hours between the hours of midnight and 1 hour before your arrival time at the hospital. Example- if your arrival time at the hospital is 6am, you may drink 12-16 ounces of clear liquids no later than 5am.  If your arrival time at the hospital is 3pm or later, you may eat a light breakfast before 8am.  A light breakfast includes: Toast or bagel (no butter)  Black coffee (no cream/milk)  Tea (no cream/milk)  Fruit juices without pulp ( i.e. apple juice)  Do NOT eat meat, eggs, vegetables or fruit  If you have any questions, please contact your surgeon's office. Preventing Infections Before and After - Your Surgery    IMPORTANT INSTRUCTIONS    You play an important role in your health and preparation for surgery. To reduce the germs on your skin you will need to shower with CHG soap (Chorhexidine gluconate 4%) two times before surgery. CHG soap (Hibiclens, Hex-A-Clens or store brand)  CHG soap will be provided at your Preadmission Testing (PAT) appointment. If you do not have a PAT appointment before surgery, you may arrange to  CHG soap from our office or purchase CHG soap at a pharmacy, grocery or department store. You need to purchase TWO 4 ounce bottles to use for your 2 showers.     Steps to follow:  Wash your hair with your normal shampoo and your body with regular soap and rinse well to remove shampoo and soap from your skin. Wet a clean washcloth and turn off the shower. Put CHG soap on washcloth and apply to your entire body from the neck down. Do not use on your head, face or private parts(genitals). Do not use CHG soap on open sores, wounds or areas of skin irritation. Wash you body gently for 5 minutes. Do not wash your skin too hard. This soap does not create lather. Pay special attention to your underarms and from your belly button to your feet. Turn the shower back on and rinse well to get CHG soap off your body. Pat your skin dry with a clean, dry towel. Do not apply lotions or moisturizer. Put on clean clothes and sleep on fresh bed sheets and do not allow pets to sleep with you. Shower with CHG soap 2 times before your surgery  The evening before your surgery  The morning of your surgery      Tips to help prevent infections after your surgery:  Protect your surgical wound from germs:  Hand washing is the most important thing you and your caregivers can do to prevent infections. Keep your bandage clean and dry! Do not touch your surgical wound. Use clean, freshly washed towels and washcloths every time you shower; do not share bath linens with others. Until your surgical wound is healed, wear clothing and sleep on bed linens each day that are clean and freshly washed. Do not allow pets to sleep in your bed with you or touch your surgical wound. Do not smoke - smoking delays wound healing. This may be a good time to stop smoking. If you have diabetes, it is important for you to manage your blood sugar levels properly before your surgery as well as after your surgery. Poorly managed blood sugar levels slow down wound healing and prevent you from healing completely.               Patient Information Regarding COVID Restrictions      Day of Procedure    Please park in the parking deck or any designated visitor parking lot. Enter the facility through the Main Entrance of the hospital.  On the day of surgery, please provide the cell phone number of the person who will be waiting for you to the Patient Access representative at the time of registration. Masks are highly recommended in the hospital, but not required. Once your procedure and the immediate recovery period is completed, a nurse in the recovery area will contact your designated visitor to inform them of your room number or to otherwise review other pertinent information regarding your care. Social distancing practices are strongly encouraged in waiting areas and the cafeteria. The patient was contacted  in person. She verbalized understanding of all instructions does not  need reinforcement.

## 2022-12-12 LAB
ATRIAL RATE: 75 BPM
CALCULATED P AXIS, ECG09: 42 DEGREES
CALCULATED R AXIS, ECG10: -32 DEGREES
CALCULATED T AXIS, ECG11: -45 DEGREES
DIAGNOSIS, 93000: NORMAL
P-R INTERVAL, ECG05: 136 MS
Q-T INTERVAL, ECG07: 400 MS
QRS DURATION, ECG06: 112 MS
QTC CALCULATION (BEZET), ECG08: 446 MS
VENTRICULAR RATE, ECG03: 75 BPM

## 2022-12-12 NOTE — PERIOP NOTES
Mp Jeronimo NP CONSULTED ON EKG FROM 12/9/2022. DOC CONSULTED TO HAVE ANESTHESIA REVIEW AND CONSULT.    DR. Elizabeth Ng REVIEWED AND CONSULTED EKG [place] FOR SURGERY.

## 2022-12-14 ENCOUNTER — ANESTHESIA EVENT (OUTPATIENT)
Dept: MEDSURG UNIT | Age: 77
End: 2022-12-14
Payer: MEDICARE

## 2022-12-15 ENCOUNTER — ANESTHESIA (OUTPATIENT)
Dept: MEDSURG UNIT | Age: 77
End: 2022-12-15
Payer: MEDICARE

## 2022-12-15 ENCOUNTER — HOSPITAL ENCOUNTER (OUTPATIENT)
Age: 77
Setting detail: OUTPATIENT SURGERY
Discharge: HOME | End: 2022-12-15
Attending: SURGERY | Admitting: SURGERY
Payer: MEDICARE

## 2022-12-15 VITALS
SYSTOLIC BLOOD PRESSURE: 108 MMHG | DIASTOLIC BLOOD PRESSURE: 70 MMHG | HEART RATE: 56 BPM | BODY MASS INDEX: 30.73 KG/M2 | RESPIRATION RATE: 19 BRPM | OXYGEN SATURATION: 98 % | HEIGHT: 60 IN | WEIGHT: 156.53 LBS | TEMPERATURE: 97.8 F

## 2022-12-15 DIAGNOSIS — C50.912 INFILTRATING DUCTAL CARCINOMA OF LEFT BREAST (HCC): Primary | ICD-10-CM

## 2022-12-15 DIAGNOSIS — C50.912 MALIGNANT NEOPLASM OF LEFT FEMALE BREAST, UNSPECIFIED ESTROGEN RECEPTOR STATUS, UNSPECIFIED SITE OF BREAST (HCC): ICD-10-CM

## 2022-12-15 PROCEDURE — 88307 TISSUE EXAM BY PATHOLOGIST: CPT

## 2022-12-15 PROCEDURE — 77030002933 HC SUT MCRYL J&J -A: Performed by: SURGERY

## 2022-12-15 PROCEDURE — 74011000250 HC RX REV CODE- 250: Performed by: NURSE ANESTHETIST, CERTIFIED REGISTERED

## 2022-12-15 PROCEDURE — 74011250636 HC RX REV CODE- 250/636: Performed by: ANESTHESIOLOGY

## 2022-12-15 PROCEDURE — 77030010507 HC ADH SKN DERMBND J&J -B: Performed by: SURGERY

## 2022-12-15 PROCEDURE — 74011000250 HC RX REV CODE- 250: Performed by: SURGERY

## 2022-12-15 PROCEDURE — 77030002996 HC SUT SLK J&J -A: Performed by: SURGERY

## 2022-12-15 PROCEDURE — 76060000061 HC AMB SURG ANES 0.5 TO 1 HR: Performed by: SURGERY

## 2022-12-15 PROCEDURE — 74011250636 HC RX REV CODE- 250/636: Performed by: NURSE ANESTHETIST, CERTIFIED REGISTERED

## 2022-12-15 PROCEDURE — 2709999900 HC NON-CHARGEABLE SUPPLY: Performed by: SURGERY

## 2022-12-15 PROCEDURE — 19301 PARTIAL MASTECTOMY: CPT | Performed by: SURGERY

## 2022-12-15 PROCEDURE — 77030031139 HC SUT VCRL2 J&J -A: Performed by: SURGERY

## 2022-12-15 PROCEDURE — 77030040361 HC SLV COMPR DVT MDII -B: Performed by: SURGERY

## 2022-12-15 PROCEDURE — 74011250636 HC RX REV CODE- 250/636: Performed by: SURGERY

## 2022-12-15 PROCEDURE — 76998 US GUIDE INTRAOP: CPT | Performed by: SURGERY

## 2022-12-15 PROCEDURE — 77030040922 HC BLNKT HYPOTHRM STRY -A

## 2022-12-15 PROCEDURE — 76030000000 HC AMB SURG OR TIME 0.5 TO 1: Performed by: SURGERY

## 2022-12-15 PROCEDURE — 77030010509 HC AIRWY LMA MSK TELE -A: Performed by: NURSE ANESTHETIST, CERTIFIED REGISTERED

## 2022-12-15 PROCEDURE — 76210000034 HC AMBSU PH I REC 0.5 TO 1 HR: Performed by: SURGERY

## 2022-12-15 PROCEDURE — 74011250637 HC RX REV CODE- 250/637: Performed by: ANESTHESIOLOGY

## 2022-12-15 RX ORDER — MORPHINE SULFATE 2 MG/ML
2 INJECTION, SOLUTION INTRAMUSCULAR; INTRAVENOUS
Status: DISCONTINUED | OUTPATIENT
Start: 2022-12-15 | End: 2022-12-15 | Stop reason: HOSPADM

## 2022-12-15 RX ORDER — ROPIVACAINE HYDROCHLORIDE 5 MG/ML
30 INJECTION, SOLUTION EPIDURAL; INFILTRATION; PERINEURAL AS NEEDED
Status: DISCONTINUED | OUTPATIENT
Start: 2022-12-15 | End: 2022-12-15 | Stop reason: HOSPADM

## 2022-12-15 RX ORDER — ONDANSETRON 2 MG/ML
4 INJECTION INTRAMUSCULAR; INTRAVENOUS AS NEEDED
Status: DISCONTINUED | OUTPATIENT
Start: 2022-12-15 | End: 2022-12-15 | Stop reason: HOSPADM

## 2022-12-15 RX ORDER — OXYCODONE AND ACETAMINOPHEN 5; 325 MG/1; MG/1
1 TABLET ORAL
Qty: 12 TABLET | Refills: 0 | Status: SHIPPED | OUTPATIENT
Start: 2022-12-15 | End: 2022-12-18

## 2022-12-15 RX ORDER — SODIUM CHLORIDE, SODIUM LACTATE, POTASSIUM CHLORIDE, CALCIUM CHLORIDE 600; 310; 30; 20 MG/100ML; MG/100ML; MG/100ML; MG/100ML
125 INJECTION, SOLUTION INTRAVENOUS CONTINUOUS
Status: DISCONTINUED | OUTPATIENT
Start: 2022-12-15 | End: 2022-12-15 | Stop reason: HOSPADM

## 2022-12-15 RX ORDER — ONDANSETRON 2 MG/ML
INJECTION INTRAMUSCULAR; INTRAVENOUS AS NEEDED
Status: DISCONTINUED | OUTPATIENT
Start: 2022-12-15 | End: 2022-12-15 | Stop reason: HOSPADM

## 2022-12-15 RX ORDER — OXYCODONE HYDROCHLORIDE 5 MG/1
5 TABLET ORAL AS NEEDED
Status: DISCONTINUED | OUTPATIENT
Start: 2022-12-15 | End: 2022-12-15 | Stop reason: HOSPADM

## 2022-12-15 RX ORDER — DEXAMETHASONE SODIUM PHOSPHATE 4 MG/ML
INJECTION, SOLUTION INTRA-ARTICULAR; INTRALESIONAL; INTRAMUSCULAR; INTRAVENOUS; SOFT TISSUE AS NEEDED
Status: DISCONTINUED | OUTPATIENT
Start: 2022-12-15 | End: 2022-12-15 | Stop reason: HOSPADM

## 2022-12-15 RX ORDER — DIPHENHYDRAMINE HYDROCHLORIDE 50 MG/ML
12.5 INJECTION, SOLUTION INTRAMUSCULAR; INTRAVENOUS AS NEEDED
Status: DISCONTINUED | OUTPATIENT
Start: 2022-12-15 | End: 2022-12-15 | Stop reason: HOSPADM

## 2022-12-15 RX ORDER — MIDAZOLAM HYDROCHLORIDE 1 MG/ML
1 INJECTION, SOLUTION INTRAMUSCULAR; INTRAVENOUS AS NEEDED
Status: DISCONTINUED | OUTPATIENT
Start: 2022-12-15 | End: 2022-12-15 | Stop reason: HOSPADM

## 2022-12-15 RX ORDER — FENTANYL CITRATE 50 UG/ML
25 INJECTION, SOLUTION INTRAMUSCULAR; INTRAVENOUS
Status: DISCONTINUED | OUTPATIENT
Start: 2022-12-15 | End: 2022-12-15 | Stop reason: HOSPADM

## 2022-12-15 RX ORDER — SODIUM CHLORIDE 0.9 % (FLUSH) 0.9 %
5-40 SYRINGE (ML) INJECTION AS NEEDED
Status: DISCONTINUED | OUTPATIENT
Start: 2022-12-15 | End: 2022-12-15 | Stop reason: HOSPADM

## 2022-12-15 RX ORDER — ACETAMINOPHEN 325 MG/1
650 TABLET ORAL ONCE
Status: COMPLETED | OUTPATIENT
Start: 2022-12-15 | End: 2022-12-15

## 2022-12-15 RX ORDER — FENTANYL CITRATE 50 UG/ML
INJECTION, SOLUTION INTRAMUSCULAR; INTRAVENOUS AS NEEDED
Status: DISCONTINUED | OUTPATIENT
Start: 2022-12-15 | End: 2022-12-15 | Stop reason: HOSPADM

## 2022-12-15 RX ORDER — SODIUM CHLORIDE 0.9 % (FLUSH) 0.9 %
5-40 SYRINGE (ML) INJECTION EVERY 8 HOURS
Status: DISCONTINUED | OUTPATIENT
Start: 2022-12-15 | End: 2022-12-15 | Stop reason: HOSPADM

## 2022-12-15 RX ORDER — PROPOFOL 10 MG/ML
INJECTION, EMULSION INTRAVENOUS AS NEEDED
Status: DISCONTINUED | OUTPATIENT
Start: 2022-12-15 | End: 2022-12-15 | Stop reason: HOSPADM

## 2022-12-15 RX ORDER — BUPIVACAINE HYDROCHLORIDE 5 MG/ML
INJECTION, SOLUTION EPIDURAL; INTRACAUDAL AS NEEDED
Status: DISCONTINUED | OUTPATIENT
Start: 2022-12-15 | End: 2022-12-15 | Stop reason: HOSPADM

## 2022-12-15 RX ORDER — MIDAZOLAM HYDROCHLORIDE 1 MG/ML
1 INJECTION, SOLUTION INTRAMUSCULAR; INTRAVENOUS
Status: DISCONTINUED | OUTPATIENT
Start: 2022-12-15 | End: 2022-12-15 | Stop reason: HOSPADM

## 2022-12-15 RX ORDER — LIDOCAINE HYDROCHLORIDE 10 MG/ML
0.5 INJECTION, SOLUTION EPIDURAL; INFILTRATION; INTRACAUDAL; PERINEURAL AS NEEDED
Status: DISCONTINUED | OUTPATIENT
Start: 2022-12-15 | End: 2022-12-15 | Stop reason: HOSPADM

## 2022-12-15 RX ORDER — DEXTROSE, SODIUM CHLORIDE, SODIUM LACTATE, POTASSIUM CHLORIDE, AND CALCIUM CHLORIDE 5; .6; .31; .03; .02 G/100ML; G/100ML; G/100ML; G/100ML; G/100ML
125 INJECTION, SOLUTION INTRAVENOUS CONTINUOUS
Status: DISCONTINUED | OUTPATIENT
Start: 2022-12-15 | End: 2022-12-15 | Stop reason: HOSPADM

## 2022-12-15 RX ORDER — FENTANYL CITRATE 50 UG/ML
50 INJECTION, SOLUTION INTRAMUSCULAR; INTRAVENOUS AS NEEDED
Status: DISCONTINUED | OUTPATIENT
Start: 2022-12-15 | End: 2022-12-15 | Stop reason: HOSPADM

## 2022-12-15 RX ORDER — LIDOCAINE HYDROCHLORIDE AND EPINEPHRINE 10; 10 MG/ML; UG/ML
30 INJECTION, SOLUTION INFILTRATION; PERINEURAL ONCE
Status: DISCONTINUED | OUTPATIENT
Start: 2022-12-15 | End: 2022-12-15 | Stop reason: HOSPADM

## 2022-12-15 RX ORDER — BUPIVACAINE HYDROCHLORIDE AND EPINEPHRINE 5; 5 MG/ML; UG/ML
30 INJECTION, SOLUTION EPIDURAL; INTRACAUDAL; PERINEURAL ONCE
Status: DISCONTINUED | OUTPATIENT
Start: 2022-12-15 | End: 2022-12-15 | Stop reason: HOSPADM

## 2022-12-15 RX ORDER — LIDOCAINE HYDROCHLORIDE 20 MG/ML
INJECTION, SOLUTION EPIDURAL; INFILTRATION; INTRACAUDAL; PERINEURAL AS NEEDED
Status: DISCONTINUED | OUTPATIENT
Start: 2022-12-15 | End: 2022-12-15 | Stop reason: HOSPADM

## 2022-12-15 RX ORDER — MIDAZOLAM HYDROCHLORIDE 1 MG/ML
INJECTION, SOLUTION INTRAMUSCULAR; INTRAVENOUS AS NEEDED
Status: DISCONTINUED | OUTPATIENT
Start: 2022-12-15 | End: 2022-12-15 | Stop reason: HOSPADM

## 2022-12-15 RX ORDER — PHENYLEPHRINE HCL IN 0.9% NACL 0.4MG/10ML
SYRINGE (ML) INTRAVENOUS AS NEEDED
Status: DISCONTINUED | OUTPATIENT
Start: 2022-12-15 | End: 2022-12-15 | Stop reason: HOSPADM

## 2022-12-15 RX ADMIN — PROPOFOL 50 MG: 10 INJECTION, EMULSION INTRAVENOUS at 14:35

## 2022-12-15 RX ADMIN — PROPOFOL 50 MG: 10 INJECTION, EMULSION INTRAVENOUS at 14:38

## 2022-12-15 RX ADMIN — MIDAZOLAM 2 MG: 1 INJECTION INTRAMUSCULAR; INTRAVENOUS at 14:17

## 2022-12-15 RX ADMIN — SODIUM CHLORIDE, POTASSIUM CHLORIDE, SODIUM LACTATE AND CALCIUM CHLORIDE: 600; 310; 30; 20 INJECTION, SOLUTION INTRAVENOUS at 13:59

## 2022-12-15 RX ADMIN — FENTANYL CITRATE 50 MCG: 50 INJECTION, SOLUTION INTRAMUSCULAR; INTRAVENOUS at 14:37

## 2022-12-15 RX ADMIN — Medication 40 MCG: at 14:36

## 2022-12-15 RX ADMIN — ONDANSETRON HYDROCHLORIDE 4 MG: 2 INJECTION, SOLUTION INTRAMUSCULAR; INTRAVENOUS at 14:23

## 2022-12-15 RX ADMIN — WATER 2 G: 1 INJECTION INTRAMUSCULAR; INTRAVENOUS; SUBCUTANEOUS at 14:34

## 2022-12-15 RX ADMIN — ACETAMINOPHEN 650 MG: 325 TABLET ORAL at 12:48

## 2022-12-15 RX ADMIN — Medication 80 MCG: at 14:58

## 2022-12-15 RX ADMIN — Medication 80 MCG: at 14:28

## 2022-12-15 RX ADMIN — Medication 80 MCG: at 14:33

## 2022-12-15 RX ADMIN — DEXAMETHASONE SODIUM PHOSPHATE 4 MG: 4 INJECTION, SOLUTION INTRAMUSCULAR; INTRAVENOUS at 14:23

## 2022-12-15 RX ADMIN — LIDOCAINE HYDROCHLORIDE 40 MG: 20 INJECTION, SOLUTION EPIDURAL; INFILTRATION; INTRACAUDAL; PERINEURAL at 14:23

## 2022-12-15 RX ADMIN — FENTANYL CITRATE 50 MCG: 50 INJECTION, SOLUTION INTRAMUSCULAR; INTRAVENOUS at 14:23

## 2022-12-15 RX ADMIN — PROPOFOL 100 MG: 10 INJECTION, EMULSION INTRAVENOUS at 14:23

## 2022-12-15 RX ADMIN — Medication 120 MCG: at 14:49

## 2022-12-15 NOTE — H&P
HISTORY OF PRESENT ILLNESS  Brody Oliveira is a 68 y.o. female. HPI  ESTABLISHED Patient here for LEFT breast invasive ductal carcinoma. Found on imaging. Denies pain or changes to the breast area. Family history-  No breast or ovarian cancer. Father had colon cancer. Breast imaging-   STUDY:  Left Digital Diagnostic Mammogram including 3-D Tomosynthesis     INDICATION:  Screening call back for left breast focal asymmetry. COMPARISON:  [id number]. BREAST COMPOSITION: There are scattered fibroglandular densities (approximately  25 - 50% glandular). FINDINGS: Left digital diagnostic mammography and tomography was performed, and  is interpreted in conjunction with a computer assisted detection (CAD) system. Focal asymmetry in the anterior third of the left breast along the nipple line  persists on CC spot compression, but is not as conspicuous on MLO spot  compression or mediolateral view. Ultrasound of the left breast was performed by the radiologist and ultrasound  technologist. In the subareolar left breast, an irregularly marginated,  hypoechoic, taller than wide mass with subtle posterior shadowing and no  internal vascularity measures 4 x 5 x 6 mm. IMPRESSION  1. Subcentimeter, irregularly marginated, hypoechoic, subareolar mass. 2. BI-RADS Assessment Category 4: Suspicious abnormality. 3. Recommendation: Ultrasound-guided left breast biopsy. The findings of this exam and the recommendations were directly discussed with  the patient at the time of exam by myself. 11/03/2022: LEFT breast, 12:00, biopsy: IDC, spanning 6mm in greatest dimension, grade 1, ER+(%), AR+(81-90%), HER2-, Ki-67(15%).              Past Medical History:   Diagnosis Date    Arthritis       bilateral hands, hips, lower back    At risk for sleep apnea 03/18/2019     on phone assessment with VEENA, kathia sleep scoring tool, scored 4    Chronic kidney disease 2017     kidney stones    Elevated hemoglobin A1c 1/27/2020    GERD (gastroesophageal reflux disease)       being treated    High cholesterol      Kidney stones      Menopause 1995    Palpitations       as of 6/24/19 pt reports was evaluated by cardiology and cleared, now followed by cardiology, no further symptoms per pt    Pneumonia 2006 (approx)    Rheumatic fever       AGE 5    RLS (restless legs syndrome)      Shoulder fracture 2009 [facility] -Jan 09  Right side                Past Surgical History:   Procedure Laterality Date    COLONOSCOPY   7/30/2010     Dr. George Ding, diverticulosis, repeat due 7/30/2015    COLONOSCOPY,JONAH CALHOUN,FORCEP/CAUTERY   9/4/2015          HX CATARACT REMOVAL Bilateral 03/2019    HX OPEN REDUCTION INTERNAL FIXATION Left 1982     ankle    HX ORTHOPAEDIC   2019     L4-L5 BACK SURGERY - synovial cyst removal    HX ROTATOR CUFF REPAIR Right 2008    HX ROTATOR CUFF REPAIR Left 2012    HX UROLOGICAL   2017     stent for kidney stone         Social History            Socioeconomic History    Marital status:        Spouse name: Not on file    Number of children: Not on file    Years of education: Not on file    Highest education level: Not on file   Occupational History    Not on file   Tobacco Use    Smoking status: Never    Smokeless tobacco: Never   Vaping Use    Vaping Use: Never used   Substance and Sexual Activity    Alcohol use: No       Alcohol/week: 0.0 standard drinks    Drug use: Not Currently    Sexual activity: Not on file   Other Topics Concern    Not on file   Social History Narrative    Not on file      Social Determinants of Health      Financial Resource Strain: Not on file   Food Insecurity: Not on file   Transportation Needs: Not on file   Physical Activity: Not on file   Stress: Not on file   Social Connections: Not on file   Intimate Partner Violence: Not on file   Housing Stability: Not on file                Current Outpatient Medications on File Prior to Visit   Medication Sig Dispense Refill    DULoxetine (CYMBALTA) 30 mg capsule          atorvastatin (LIPITOR) 40 mg tablet TAKE 1 TABLET BY MOUTH ONCE DAILY IN THE EVENING 90 Tablet 0    pramipexole (MIRAPEX) 0.5 mg tablet Take 1 tablet by mouth nightly 90 Tablet 3    omeprazole (PRILOSEC) 20 mg capsule Take 1 capsule by mouth once daily 90 Capsule 4    acetaminophen (TYLENOL) 325 mg tablet Take  by mouth every four (4) hours as needed for Pain. ferrous gluconate 324 mg (37.5 mg iron) tablet Take 1 Tablet by mouth Daily (before breakfast). 90 Tablet 0    MAGNESIUM GLYCINATE PO Take  by mouth daily. No current facility-administered medications on file prior to visit. No Known Allergies     OB History            7    Para   7    Term   7            AB        Living               SAB        IAB        Ectopic        Molar        Multiple        Live Births   7           Obstetric Comments   Menarche:  15. LMP: 49.  # of Children:  7. Age at Delivery of First Child:  16.   Hysterectomy/oophorectomy:  NO/NO. Breast Bx:  no.  Hx of Breast Feeding:  no. BCP:  no. Hormone therapy:  no.                          ROS        Physical Exam  Exam conducted with a chaperone present. Constitutional:       Appearance: She is well-developed. She is not diaphoretic. HENT:      Head: Normocephalic and atraumatic. Right Ear: External ear normal.      Left Ear: External ear normal.   Eyes:      General: No scleral icterus. Right eye: No discharge. Left eye: No discharge. Pupils: Pupils are equal, round, and reactive to light. Neck:      Thyroid: No thyromegaly. Vascular: No JVD. Trachea: No tracheal deviation. Cardiovascular:      Rate and Rhythm: Normal rate and regular rhythm. Heart sounds: Normal heart sounds. Pulmonary:      Effort: Pulmonary effort is normal. No tachypnea, accessory muscle usage or respiratory distress. Breath sounds: Normal breath sounds.  No stridor. Chest:   Breasts:     Breasts are symmetrical.      Right: No inverted nipple, mass, nipple discharge, skin change or tenderness. Left: No inverted nipple, mass, nipple discharge, skin change or tenderness. Abdominal:      General: There is no distension. Palpations: Abdomen is soft. There is no mass. Tenderness: There is no abdominal tenderness. Musculoskeletal:         General: Normal range of motion. Cervical back: Normal range of motion and neck supple. Lymphadenopathy:      Cervical: No cervical adenopathy. Skin:     General: Skin is warm and dry. Neurological:      Mental Status: She is alert and oriented to person, place, and time. Psychiatric:         Speech: Speech normal.         Behavior: Behavior normal.         Thought Content: Thought content normal.         Judgment: Judgment normal.            BREAST ULTRASOUND, Pre-op Planning  Indication: Pre-op planning, LEFT breast, retroareolar. Technique: The area was scanned using a high-frequency linear-array near-field transducer  Findings: 6mm irregular hypoechoic mass with biopsy clip. Normal lymph node at LEFT axilla. Impression: Breast cancer  Disposition: Surgery        ASSESSMENT and PLAN      ICD-10-CM ICD-9-CM     1. Infiltrating ductal carcinoma of left breast (HCC)  C50.912 174.9            New pt presents for consultation for treatment of LEFT breast IDC, ER+(%)/SC+(81-90%)/HER2-, Ki-67(15%). Upon physical examination noted nothing palpable. Ultrasound visualizes a 6mm irregular hypoechoic mass with biopsy clip, retroareolar and normal LEFT axillary lymph node. We had a long discussion of options for treatment. The patient was accompanied by herself during this encounter, and over half the time was spent on counseling and coordination of care. We discussed in depth the pathology results, and surgical planning.  The goals of treatment are to treat the breast, and to reduce risk of local or distant recurrence. Discussed treatment options with risks, complications, benefits, and limitations, including lumpectomy with possible XRT, and mastectomy with optional reconstruction, both having the same cure rate. Explained the importance of negative margins, and the need for re-excision in the case of positive margins. Will plan to mobilize breast tissue during lumpectomy to minimize cosmetic defect. Patient is 68years old with clinically negative lymph nods, SLNbx was not advised. We also covered risk reduction strategies including hormonal therapy with estrogen blocker. Will refer to medical oncology for further consultation. Chemotherapy and radiation therapy was not recommended. We also discussed the pts questions and concerns such as what caused the breast cancer      Pt has elected to proceed with lumpectomy. Pt should feel free to call Kristin Ferreira or Evgeny Mercedes, nurse navigators, with any further questions. This plan was reviewed with the patient and patient agrees. All questions were answered.

## 2022-12-15 NOTE — OP NOTES
[address]  OPERATIVE REPORT    Name:  David Casanova  MR#:  [id number]  :  1945  ACCOUNT #:  [de-identified]  DATE OF SERVICE:  12/15/2022    PREOPERATIVE DIAGNOSIS:  Carcinoma of the left breast.    POSTOPERATIVE DIAGNOSIS:  Carcinoma of the left breast.    PROCEDURE PERFORMED:  Left lumpectomy with intraoperative ultrasound. SURGEON:  Samira Sewell MD    ASSISTANT:  Suhail Han. ANESTHESIA:  General.    COMPLICATIONS:  None. SPECIMENS REMOVED:  Left breast mass. IMPLANTS:  None. ESTIMATED BLOOD LOSS:  Minimal.    INDICATIONS:  The patient is a 66-year-old female with a left breast cancer which was small, but located immediately below the left nipple. She is admitted for lumpectomy. PROCEDURE:  After the satisfactory induction of general LMA anesthesia, the patient was prepped and draped in sterile fashion. Intraoperative ultrasound was performed and the breast was marked. A circumareolar incision was made in the lower portion of the nipple-areolar complex and deepened through subcutaneous tissue with Bovie cautery. A skin flap was raised superiorly and the nipple-areolar complex was dissected carefully off the tumor. It was very close to the tumor and a small area of skin was burned with the Bovie cautery. This was debrided and closed primarily with a running 4-0 Monocryl suture. The tumor was removed. A specimen ultrasound was performed which revealed the presence of a mass and clip within the specimen. This was oriented and sent to Pathology. All dissection planes were hemostatic. Some fatty tissue was dissected and brought underneath the nipple-areolar complex to buttress this and was secured with  interrupted 3-0 Vicryl. The subcutaneous tissue was secured with interrupted 3-0 Vicryl and skin closed with running subcuticular 4-0 Monocryl after infiltration with 0.5% Marcaine. The patient tolerated the procedure well.   No immediate complications. She was taken to the recovery room in stable condition.       Sumit Ruiz MD      [id number]/  D:  12/15/2022 15:48  T:  12/15/2022 17:33  JOB #:  [id number]  CC:  MD Maryellen Vasquez MD

## 2022-12-15 NOTE — DISCHARGE INSTRUCTIONS
Discharge Instructions from Dr. Grey Men    I will call you with the pathology results, typically within 1 week from today. You may shower, but no hot tubs, swimming pools, or baths until your incision is healed. No heavy lifting with the affected extremity (nothing greater than 5 pounds), and limit its use for the next 4-5 days. You may use an ice pack for comfort for the next couple of days, but do not place ice directly on the skin. Rather, use a towel or clothing to serve as a barrier between skin and ice to prevent injury. If I placed a drain, follow the drain instructions provided, especially as you keep a record of the drain output. Follow medication instructions carefully. Watch for signs of infection as listed below. Redness  Swelling  Drainage from the incision or from your nipple that appears infected  Fever over 101 degrees for consecutive readings, or over 99.5 if you are currently undergoing chemotherapy. Call our office (number is below) for a follow-up appointment. If you have any problems, our phone number is [phone].

## 2022-12-15 NOTE — ANESTHESIA POSTPROCEDURE EVALUATION
Procedure(s):  LEFT BREAST LUMPECTOMY WITH ULTRASOUND. general    Anesthesia Post Evaluation        Patient location during evaluation: PACU  Patient participation: complete - patient participated  Level of consciousness: awake and alert  Pain management: adequate  Airway patency: patent  Anesthetic complications: no  Cardiovascular status: acceptable  Respiratory status: acceptable  Hydration status: acceptable  Comments: I have seen and evaluated the patient and is ready for discharge. Yeimi Nash MD    Post anesthesia nausea and vomiting:  none      INITIAL Post-op Vital signs:   Vitals Value Taken Time   /68 12/15/22 1516   Temp 36.6 °C (97.8 °F) 12/15/22 1516   Pulse 53 12/15/22 1524   Resp 15 12/15/22 1524   SpO2 98 % 12/15/22 1530   Vitals shown include unvalidated device data.

## 2022-12-15 NOTE — BRIEF OP NOTE
Brief Postoperative Note    Patient: Cherie Romeo  YOB: 1945  MRN: [id number]    Date of Procedure: 12/15/2022     Pre-Op Diagnosis: LEFT BREAST CANCER    Post-Op Diagnosis: Same as preoperative diagnosis.       Procedure(s):  LEFT BREAST LUMPECTOMY WITH ULTRASOUND    Surgeon(s):  Jovita Aldrich MD    Surgical Assistant: Surg Asst-1: Annemarie Bah    Anesthesia: General     Estimated Blood Loss (mL): Minimal    Complications: None    Specimens:   ID Type Source Tests Collected by Time Destination   1 : LEFT BREAST LUMPECTOMY Fresh Breast  Jovita Aldrich MD 12/15/2022 1442 Pathology   2 : LEFT BREAST ANTERIOR SKIN MARGIN Fresh Breast  Jovita Aldrich MD 12/15/2022 1445 Pathology        Implants: * No implants in log *    Drains: * No LDAs found *    Findings: spec sono pos clip    Electronically Signed by María To MD on 73/09/1919 at 3:07 PM

## 2022-12-16 ENCOUNTER — TELEPHONE (OUTPATIENT)
Dept: SURGERY | Age: 77
End: 2022-12-16

## 2022-12-16 NOTE — TELEPHONE ENCOUNTER
Patient is doing fine. No active bleeding. Advised to call if she has further concerns. She was appreciative.

## 2022-12-16 NOTE — TELEPHONE ENCOUNTER
Patient left a message on the nurse voice mail asking for someone to call her back. I called and she let me know she is bleeding from her incision and it got all over her sheets and pajamas. It has stopped oozing now. I notified Dr. Ghassan Acosta and called patient back to ask if she has any swelling of the breast.  She does not have any swelling of the breast.      I advised her to put a dry dressing of the breast and I will call her at 12 noon to re assess her. Also advised her to call if she should start bleeding again. She was appreciative.

## 2022-12-20 ENCOUNTER — TELEPHONE (OUTPATIENT)
Dept: SURGERY | Age: 77
End: 2022-12-20

## 2022-12-20 ENCOUNTER — DOCUMENTATION ONLY (OUTPATIENT)
Dept: SURGERY | Age: 77
End: 2022-12-20

## 2022-12-20 DIAGNOSIS — C50.912 INFILTRATING DUCTAL CARCINOMA OF LEFT BREAST (HCC): Primary | ICD-10-CM

## 2022-12-20 NOTE — PROGRESS NOTES
I called and left a message with Dr [employer] office for a consult appointment for this patient . Dr Yazmin Wilburn said it was NO R[place]. I will follow up tomorrow.

## 2022-12-22 ENCOUNTER — DOCUMENTATION ONLY (OUTPATIENT)
Dept: SURGERY | Age: 77
End: 2022-12-22

## 2022-12-22 NOTE — PROGRESS NOTES
Dr Shalini Caruso appointment is 01/10/2023 @ 1:00 PM.  She is located in the [facility] at [facility], second floor [address]. The telephone number to the office is [phone].

## 2022-12-30 ENCOUNTER — OFFICE VISIT (OUTPATIENT)
Dept: SURGERY | Age: 77
End: 2022-12-30
Payer: MEDICARE

## 2022-12-30 VITALS — WEIGHT: 156 LBS | HEIGHT: 60 IN | BODY MASS INDEX: 30.63 KG/M2

## 2022-12-30 DIAGNOSIS — C50.912 INFILTRATING DUCTAL CARCINOMA OF LEFT BREAST (HCC): ICD-10-CM

## 2022-12-30 PROCEDURE — 99024 POSTOP FOLLOW-UP VISIT: CPT | Performed by: SURGERY

## 2022-12-30 NOTE — PROGRESS NOTES
HISTORY OF PRESENT ILLNESS  Noe Xiao is a 68 y.o. female. HPI  ESTABLISHED Patient here for post op follow up LEFT breast. Denies pain or other changes to the breast area. Seeing Dr. Mino Bowman 1/10/23           Breast imaging-   Narrative & Impression   STUDY:  Left Digital Diagnostic Mammogram including 3-D Tomosynthesis     INDICATION:  Screening call back for left breast focal asymmetry. COMPARISON:  [id number]. BREAST COMPOSITION: There are scattered fibroglandular densities (approximately  25 - 50% glandular). FINDINGS: Left digital diagnostic mammography and tomography was performed, and  is interpreted in conjunction with a computer assisted detection (CAD) system. Focal asymmetry in the anterior third of the left breast along the nipple line  persists on CC spot compression, but is not as conspicuous on MLO spot  compression or mediolateral view. Ultrasound of the left breast was performed by the radiologist and ultrasound  technologist. In the subareolar left breast, an irregularly marginated,  hypoechoic, taller than wide mass with subtle posterior shadowing and no  internal vascularity measures 4 x 5 x 6 mm. IMPRESSION  1. Subcentimeter, irregularly marginated, hypoechoic, subareolar mass. 2. BI-RADS Assessment Category 4: Suspicious abnormality. 3. Recommendation: Ultrasound-guided left breast biopsy. The findings of this exam and the recommendations were directly discussed with  the patient at the time of exam by myself. 11/03/2022: LEFT breast, 12:00, biopsy: IDC, spanning 6mm in greatest dimension, grade 1, ER+(%), NV+(81-90%), HER2-, Ki-67(15%). 12/15/2022: LEFT breast, excision. PATH: IDC, 9mm, grade 1, negative margins. Additional Findings: Intraductal papilloma and florid usual ductal hyperplasia Pathologic stage: pT1b, pNX .   - LEFT breast, skin, excision: Seborrheic keratosis. No malignancy identified.        Past Medical History: Diagnosis Date    Arthritis     bilateral hands, hips, lower back    At risk for sleep apnea 03/18/2019    on phone assessment with PAT, kathia sleep scoring tool, scored 4    Cancer ([address] 12/2022    LEFT BREAST    Chronic kidney disease 2017    kidney stones    COVID-19 vaccination not done 12/2022    COVID-19 virus infection 01/2022    H/O COVID 19 PNEUMONIA    Elevated hemoglobin A1c 01/27/2020    GERD (gastroesophageal reflux disease)     being treated    Heart murmur     High cholesterol     Kidney stones     Menopause 1995    Palpitations     as of 6/24/19 pt reports was evaluated by cardiology and cleared, now followed by cardiology, no further symptoms per pt    Pneumonia 2006 (approx)    Rheumatic fever     AGE 5    RLS (restless legs syndrome)     Shoulder fracture 2009 [facility] -Jan 09  Right side        Past Surgical History:   Procedure Laterality Date    COLONOSCOPY  07/30/2010    Dr. Pretty Gandhi, diverticulosis, repeat due 7/30/2015    COLONOSCOPY,JONAH CALHOUN,FORCEP/CAUTERY  09/04/2015         HX BREAST LUMPECTOMY Left 12/15/2022    LEFT BREAST LUMPECTOMY WITH ULTRASOUND performed by Paramjit Riggs MD at [address] HX CATARACT REMOVAL Bilateral 03/2019    HX OPEN REDUCTION INTERNAL FIXATION Left 1982    ankle    HX ORTHOPAEDIC  2019    L4-L5 BACK SURGERY - synovial cyst removal    HX ORTHOPAEDIC Left     ORIF ANKLE    HX ORTHOPAEDIC Left     HAND SURGERY    HX ROTATOR CUFF REPAIR Right 2008    HX ROTATOR CUFF REPAIR Left 2012    HX UROLOGICAL  2017    stent for kidney stone    PA TOTAL HIP ARTHROPLASTY Right 2021       Social History     Socioeconomic History    Marital status:      Spouse name: Not on file    Number of children: Not on file    Years of education: Not on file    Highest education level: Not on file   Occupational History    Not on file   Tobacco Use    Smoking status: Never    Smokeless tobacco: Never   Vaping Use    Vaping Use: Never used   Substance and Sexual Activity    Alcohol use: No     Alcohol/week: 0.0 standard drinks    Drug use: Not Currently    Sexual activity: Not on file   Other Topics Concern    Not on file   Social History Narrative    Not on file     Social Determinants of Health     Financial Resource Strain: Not on file   Food Insecurity: Not on file   Transportation Needs: Not on file   Physical Activity: Not on file   Stress: Not on file   Social Connections: Not on file   Intimate Partner Violence: Not on file   Housing Stability: Not on file       Current Outpatient Medications on File Prior to Visit   Medication Sig Dispense Refill    atorvastatin (LIPITOR) 40 mg tablet TAKE 1 TABLET BY MOUTH ONCE DAILY IN THE EVENING 90 Tablet 4    DULoxetine (CYMBALTA) 20 mg capsule Take 40 mg by mouth nightly. (Patient not taking: Reported on 12/15/2022)      ascorbic acid, vitamin C, (VITAMIN C) 500 mg tablet Take 500 mg by mouth daily.  methocarbamoL (ROBAXIN) 500 mg tablet Take 500 mg by mouth as needed for Muscle Spasm(s).  cholecalciferol (VITAMIN D3) (1000 Units /25 mcg) tablet Take 1,000 Units by mouth daily.  pramipexole (MIRAPEX) 0.5 mg tablet Take 1 tablet by mouth nightly 90 Tablet 3    omeprazole (PRILOSEC) 20 mg capsule Take 1 capsule by mouth once daily (Patient not taking: Reported on 12/15/2022) 90 Capsule 4    acetaminophen (TYLENOL) 325 mg tablet Take 650 mg by mouth every four (4) hours as needed for Pain. No current facility-administered medications on file prior to visit. No Known Allergies    OB History          7    Para   7    Term   7            AB        Living             SAB        IAB        Ectopic        Molar        Multiple        Live Births   7          Obstetric Comments   Menarche:  15. LMP: 49.  # of Children:  7. Age at Delivery of First Child:  16.   Hysterectomy/oophorectomy:  NO/NO. Breast Bx:  no.  Hx of Breast Feeding:  no. BCP:  no. Hormone therapy:  no.                    ROS      Physical Exam  Exam conducted with a chaperone present. Constitutional:       Appearance: She is well-developed. She is not diaphoretic. HENT:      Head: Normocephalic and atraumatic. Right Ear: External ear normal.      Left Ear: External ear normal.   Eyes:      General: No scleral icterus. Right eye: No discharge. Left eye: No discharge. Pupils: Pupils are equal, round, and reactive to light. Neck:      Thyroid: No thyromegaly. Vascular: No JVD. Trachea: No tracheal deviation. Cardiovascular:      Rate and Rhythm: Normal rate and regular rhythm. Heart sounds: Normal heart sounds. Pulmonary:      Effort: Pulmonary effort is normal. No tachypnea, accessory muscle usage or respiratory distress. Breath sounds: Normal breath sounds. No stridor. Chest:   Breasts:     Breasts are symmetrical.      Right: No inverted nipple, mass, nipple discharge, skin change or tenderness. Left: No inverted nipple, mass, nipple discharge, skin change or tenderness. Abdominal:      General: There is no distension. Palpations: Abdomen is soft. There is no mass. Tenderness: There is no abdominal tenderness. Musculoskeletal:         General: Normal range of motion. Cervical back: Normal range of motion and neck supple. Lymphadenopathy:      Cervical: No cervical adenopathy. Skin:     General: Skin is warm and dry. Neurological:      Mental Status: She is alert and oriented to person, place, and time. Psychiatric:         Speech: Speech normal.         Behavior: Behavior normal.         Thought Content: Thought content normal.         Judgment: Judgment normal.           ASSESSMENT and PLAN    ICD-10-CM ICD-9-CM    1. Infiltrating ductal carcinoma of left breast (HCC)  C50.912 174.9         Patient presents for f/u s/p LEFT breast excision on 12/15/2022, and is doing well overall.  Upon physical examination noted scar at upper border LEFT areola from minor skin necrosis, otherwise healing well. Discussed with patient of the pathology results and possibly go on hormonal therapy for further treatment. Patient has appointment to meet with medical oncologist next month. Follow up in 6 months with Roberto Montilla. This plan was reviewed with the patient and patient agrees. All questions were answered.       Written by Holly Molina, as dictated by Dr. Ashutosh Chen MD.

## 2022-12-30 NOTE — PROGRESS NOTES
HISTORY OF PRESENT ILLNESS  Mikie Mcintyre is a 68 y.o. female. HPI ESTABLISHED Patient here for post op follow up LEFT breast. Denies pain or other changes to the breast area. Seeing Dr. Krissy Fritz 1/10/23    11/03/2022: LEFT breast, 12:00, biopsy: IDC, spanning 6mm in greatest dimension, grade 1, ER+(%), PA+(81-90%), HER2-, Ki-67(15%). 12/15/22- LEFT breast lumpectomy  Breast imaging-   Narrative & Impression   STUDY:  Left Digital Diagnostic Mammogram including 3-D Tomosynthesis     INDICATION:  Screening call back for left breast focal asymmetry. COMPARISON:  [id number]. BREAST COMPOSITION: There are scattered fibroglandular densities (approximately  25 - 50% glandular). FINDINGS: Left digital diagnostic mammography and tomography was performed, and  is interpreted in conjunction with a computer assisted detection (CAD) system. Focal asymmetry in the anterior third of the left breast along the nipple line  persists on CC spot compression, but is not as conspicuous on MLO spot  compression or mediolateral view. Ultrasound of the left breast was performed by the radiologist and ultrasound  technologist. In the subareolar left breast, an irregularly marginated,  hypoechoic, taller than wide mass with subtle posterior shadowing and no  internal vascularity measures 4 x 5 x 6 mm. IMPRESSION  1. Subcentimeter, irregularly marginated, hypoechoic, subareolar mass. 2. BI-RADS Assessment Category 4: Suspicious abnormality. 3. Recommendation: Ultrasound-guided left breast biopsy. The findings of this exam and the recommendations were directly discussed with  the patient at the time of exam by myself.        ROS    Physical Exam    ASSESSMENT and PLAN  {ASSESSMENT/PLAN:[id number]}

## 2023-01-09 NOTE — PROGRESS NOTES
[facility] [place] [facility] [place] [address] [address]  W: [phone]  F: [phone]    Reason for Visit:   Denzel Alonso is a 68 y.o. female who is seen in consultation at the request of Dr. Roxanne Litten for evaluation of breast cancer. Treatment History:   11/03/2022: LEFT breast, 12:00, biopsy: IDC, spanning 6mm in greatest dimension, grade 1, ER+(%), NJ+(81-90%), HER2-, Ki-67(15%). 12/15/2022: LEFT breast, excision. PATH: IDC, 9mm, grade 1, negative margins. Additional Findings: Intraductal papilloma and florid usual ductal hyperplasia Pathologic stage: pT1b, pNX .              - LEFT breast, skin, excision: Seborrheic keratosis. No malignancy identified. STAGE: 1 T1bNX ER+ HER2   PATHOLOGIC STAGE CLASSIFICATION (pTNM, AJCC 8th Edition)       Primary Tumor (pT): pT1b       Regional Lymph Nodes (pN): pNX      ADDITIONAL FINDINGS       Additional Findings: Intraductal papilloma and florid usual ductal   hyperplasia      SPECIAL STUDIES       Breast Biomarker Testing Performed on Previous Biopsy:           Estrogen Receptor (ER) Status: Positive, %           Progesterone Receptor (PgR) Status: Positive, 81-90%           HER2 (by immunohistochemistry): Equivocal (Score 2+)           HER2 (by in situ hybridization): Negative (not amplified)           Ki-67 Percentage of Positive Nuclei: 15%           Testing Performed on Case Number: [id number]     History of Present Illness:     Pt seen today for office consult for new LEFT breast cancer ER+ HER2 negative post lumpectomy 12/15/22 here for discussion of adjuvant systemic therapy. Initially had abnormal screening mammo abnormal on LEFT. Biopsy 11/22 IDC. Then lumpectomy 12/22 9mm IDC with no LN eval.   Not seeing Rad/onc. No chemo. Here to discuss adjuvant hormonal.   Has some drainage from surgery site.    Feels fine today  No fevers/ chills/ chest pain/ SOB/ nausea/ vomiting/diarrhea/ pain/fatigue    FamHx no breast /ovarian cancer      Past Medical History:   Diagnosis Date    Arthritis     bilateral hands, hips, lower back    At risk for sleep apnea 03/18/2019    on phone assessment with VEENA, kathia sleep scoring tool, scored 4    Cancer ([address] 12/2022    LEFT BREAST    Chronic kidney disease 2017    kidney stones    COVID-19 vaccination not done 12/2022    COVID-19 virus infection 01/2022    H/O COVID 19 PNEUMONIA    Elevated hemoglobin A1c 01/27/2020    GERD (gastroesophageal reflux disease)     being treated    Heart murmur     High cholesterol     Kidney stones     Menopause 1995    Palpitations     as of 6/24/19 pt reports was evaluated by cardiology and cleared, now followed by cardiology, no further symptoms per pt    Pneumonia 2006 (approx)    Rheumatic fever     AGE 5    RLS (restless legs syndrome)     Shoulder fracture 2009 [facility] -Jan 09  Right side       Past Surgical History:   Procedure Laterality Date    COLONOSCOPY  07/30/2010    Dr. Perez Pastures, diverticulosis, repeat due 7/30/2015    COLONOSCOPY,REMV UNIQUE,FORCEP/CAUTERY  09/04/2015         HX BREAST LUMPECTOMY Left 12/15/2022    LEFT BREAST LUMPECTOMY WITH ULTRASOUND performed by Anat Byrne MD at [place] [address]    HX CATARACT REMOVAL Bilateral 03/2019    HX OPEN REDUCTION INTERNAL FIXATION Left 1982    ankle    HX ORTHOPAEDIC  2019    L4-L5 BACK SURGERY - synovial cyst removal    HX ORTHOPAEDIC Left     ORIF ANKLE    HX ORTHOPAEDIC Left     HAND SURGERY    HX ROTATOR CUFF REPAIR Right 2008    HX ROTATOR CUFF REPAIR Left 2012    HX UROLOGICAL  2017    stent for kidney stone    OH ARTHRP ACETBLR/PROX FEM PROSTC AGRFT/ALGRFT Right 2021      Social History     Tobacco Use    Smoking status: Never    Smokeless tobacco: Never   Substance Use Topics    Alcohol use: No     Alcohol/week: 0.0 standard drinks      Family History   Problem Relation Age of Onset    No Known Problems Mother     Cancer Father Colon     No Known Problems Sister     Heart Disease Brother     Heart Attack Brother     Diabetes Grandchild     Anesth Problems Neg Hx      Current Outpatient Medications   Medication Sig    letrozole (FEMARA) 2.5 mg tablet Take 1 Tablet by mouth daily. Indications: hormone receptor positive postmenopausal early breast cancer    atorvastatin (LIPITOR) 40 mg tablet TAKE 1 TABLET BY MOUTH ONCE DAILY IN THE EVENING    DULoxetine (CYMBALTA) 20 mg capsule Take 40 mg by mouth nightly. ascorbic acid, vitamin C, (VITAMIN C) 500 mg tablet Take 500 mg by mouth daily. methocarbamoL (ROBAXIN) 500 mg tablet Take 500 mg by mouth as needed for Muscle Spasm(s). cholecalciferol (VITAMIN D3) (1000 Units /25 mcg) tablet Take 1,000 Units by mouth daily. pramipexole (MIRAPEX) 0.5 mg tablet Take 1 tablet by mouth nightly    omeprazole (PRILOSEC) 20 mg capsule Take 1 capsule by mouth once daily    acetaminophen (TYLENOL) 325 mg tablet Take 650 mg by mouth every four (4) hours as needed for Pain. No current facility-administered medications for this visit. No Known Allergies     A complete review of systems was obtained, negative except as described above and as reported on ROS sheet scanned into system. Physical Exam:   Visit Vitals  BP (!) 154/83 (BP 1 Location: Left upper arm)   Pulse (!) 106   Temp 98.6 °F (37 °C) (Temporal)   Resp 18   Ht 5' (1.524 m)   Wt 158 lb 1.6 oz (71.7 kg)   LMP 10/26/1995   SpO2 96%   BMI 30.88 kg/m²     ECOG PS: 0  General: No distress  Eyes: PERRLA, anicteric sclerae  HENT: Atraumatic, wearing mask  Neck: Supple  Lymphatic: No cervical, supraclavicular  Respiratory: CTAB, normal respiratory effort  CV: Normal rate, regular rhythm  GI: Soft, nontender, nondistended, no masses  MS: Normal gait and station. Digits without clubbing or cyanosis. Skin: No rashes, ecchymoses, or petechiae. Normal temperature, turgor, and texture.   Psych: Alert, oriented, appropriate affect, normal judgment/insight  Neuro non focal  Breast LEFT breast lumpectomy scar with small open area at top of scar with mild drainage, no other masses/ LAD appreciated    Results:     Lab Results   Component Value Date/Time    WBC 10.3 12/09/2022 10:13 AM    HGB 12.5 12/09/2022 10:13 AM    HCT 39.5 12/09/2022 10:13 AM    PLATELET 215 73/21/2533 10:13 AM    MCV 92.7 12/09/2022 10:13 AM    ABS. NEUTROPHILS 5.9 12/09/2022 10:13 AM     Lab Results   Component Value Date/Time    Sodium 140 12/09/2022 10:13 AM    Potassium 4.0 12/09/2022 10:13 AM    Chloride 111 (H) 12/09/2022 10:13 AM    CO2 26 12/09/2022 10:13 AM    Glucose 104 (H) 12/09/2022 10:13 AM    Glucose 98 08/05/2010 08:34 AM    BUN 14 12/09/2022 10:13 AM    Creatinine 0.99 12/09/2022 10:13 AM    GFR est AA 62 02/07/2022 12:52 PM    GFR est non-AA 54 (L) 02/07/2022 12:52 PM    Calcium 8.4 (L) 12/09/2022 10:13 AM    Glucose (POC) 100 06/22/2021 07:33 AM     Lab Results   Component Value Date/Time    Bilirubin, total 0.3 02/07/2022 12:52 PM    ALT (SGPT) 15 02/07/2022 12:52 PM    Alk. phosphatase 108 02/07/2022 12:52 PM    Protein, total 6.9 02/07/2022 12:52 PM    Albumin 4.1 02/07/2022 12:52 PM    Globulin 3.3 06/21/2021 03:44 AM     LEFT mammo:  FINDINGS: Left digital diagnostic mammography and tomography was performed, and  is interpreted in conjunction with a computer assisted detection (CAD) system. Focal asymmetry in the anterior third of the left breast along the nipple line  persists on CC spot compression, but is not as conspicuous on MLO spot  compression or mediolateral view. Ultrasound of the left breast was performed by the radiologist and ultrasound  technologist. In the subareolar left breast, an irregularly marginated,  hypoechoic, taller than wide mass with subtle posterior shadowing and no  internal vascularity measures 4 x 5 x 6 mm. IMPRESSION  1. Subcentimeter, irregularly marginated, hypoechoic, subareolar mass.   2. BI-RADS Assessment Category 4: Suspicious abnormality. 3. Recommendation: Ultrasound-guided left breast biopsy      Records reviewed and summarized above. Pathology report(s) reviewed above. Radiology report(s) reviewed above. Assessment:/PLAN     1) LEFT breast cancer post lumpectomy 12/22. PATHOLOGIC STAGE CLASSIFICATION (pTNM, AJCC 8th Edition)       Primary Tumor (pT): pT1b       Regional Lymph Nodes (pN): pNX    SPECIAL STUDIES       Breast Biomarker Testing Performed on Previous Biopsy:           Estrogen Receptor (ER) Status: Positive, %           Progesterone Receptor (PgR) Status: Positive, 81-90%           HER2 (by immunohistochemistry): Equivocal (Score 2+)           HER2 (by in situ hybridization): Negative (not amplified)           Ki-67 Percentage of Positive Nuclei: 15%           Testing Performed on Case Number: [id number]     Records and history reviewed with pt today. Reviewed path and data specifically with pt. Discussed dx, stage and treatment of breast cancer. Discussed adjuvant chemo and hormonal therapy. Pt not interested in chemo. Not seeing Rad/onc per surgery. Open to doing adjuvant hormonal therapy. The risks and benefits of aromatase inhibitors (anastrozole, letrozole, and exemestane) were discussed in detail and the patient was informed of the following: Risks include the development of painful muscles and joints (arthralgia/myalgia) and bone loss. Muscle and joint pain can be severe but rarely result in any tissue damage; symptoms usually resolve in several weeks when the medication is stopped. Bone loss is common and a bone density test is recommended as a baseline and then yearly to every several years depending on initial results. The risk of fractures is increased by a few percent in patients taking these drugs, but careful monitoring of bone density and using bone protecting agents when indicated can minimize these risks.  Unlike tamoxifen there is no increased risk of blood clots or endometrial cancer. AIs can cause or worsen vaginal dryness but women using these drugs should not use vaginal estrogen preparations for these symptoms. AIs can also cause or increase hot flashes. Any other symptoms should be reported. Pt is agreeable to trying letrozole. Ordered today. Clinically doing well overall. Labs and routine HM per PCP. Fu here in 3 months    2) arthritis hand/ RLS/ GERD. Per PCP. 3) Psychosocial.  Mood good. Coping well. Has good support. 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   years ago l  Navigator support as needed. Fu here in 3 months  Call if questions    I appreciate the opportunity to participate in Ms. Gloria Jolly lori.     Signed By: Natasha Smith, DO

## 2023-01-10 ENCOUNTER — OFFICE VISIT (OUTPATIENT)
Dept: ONCOLOGY | Age: 78
End: 2023-01-10
Payer: MEDICARE

## 2023-01-10 VITALS
BODY MASS INDEX: 31.04 KG/M2 | WEIGHT: 158.1 LBS | HEART RATE: 106 BPM | SYSTOLIC BLOOD PRESSURE: 154 MMHG | RESPIRATION RATE: 18 BRPM | OXYGEN SATURATION: 96 % | TEMPERATURE: 98.6 F | DIASTOLIC BLOOD PRESSURE: 83 MMHG | HEIGHT: 60 IN

## 2023-01-10 DIAGNOSIS — C50.912 INFILTRATING DUCTAL CARCINOMA OF LEFT BREAST (HCC): Primary | ICD-10-CM

## 2023-01-10 PROCEDURE — G0463 HOSPITAL OUTPT CLINIC VISIT: HCPCS | Performed by: INTERNAL MEDICINE

## 2023-01-10 RX ORDER — LETROZOLE 2.5 MG/1
2.5 TABLET, FILM COATED ORAL DAILY
Qty: 90 TABLET | Refills: 3 | Status: SHIPPED | OUTPATIENT
Start: 2023-01-10

## 2023-01-10 NOTE — PATIENT INSTRUCTIONS
Letrozole [facility])   This information is about a hormonal therapy used to treat breast cancer called letrozole, which is also called Femara®. Throughout this information we refer to it by its more commonly used name, Faith Martin. On this page     Femara   Aromatase inhibitors   How it is taken   When it may be given   Length of treatment   Possible side effects   Things to remember about Femara tablets   References  This information should ideally be read with our general information about breast cancer or secondary breast cancer. Femara Back to top  Femara is a type of hormonal therapy used to treat breast cancer in women who have been the through menopause (change of life). You will see your doctor regularly while you have this treatment so they can monitor its effects. Hormonal therapies interfere with the production or action of particular hormones in the body. Hormones are substances produced naturally in the body. They act as chemical messengers and help control the activity of cells and organs. Aromatase inhibitors Back to top  Many breast cancers rely on the hormone oestrogen to grow. These cancers are known as hormone-sensitive breast cancers. In women who have had their menopause, the main source of oestrogen is through the conversion of androgens (sex hormones produced by the adrenal glands) into oestrogens. This is carried out by an enzyme called aromatase. The conversion process is known as aromatisation and happens mainly in the fatty tissues of the body. Femara is a drug that blocks the process of aromatisation and so reduces the amount of oestrogen in the body. As less oestrogen reaches the cancer cells, they grow more slowly or stop growing altogether. Drugs that work in this way are known as aromatase inhibitors. Other aromatase inhibitors include anastrozole (Arimidex®) and exemestane (Aromasin®).      How it is taken Back to top  Femara is a tablet that is taken once a day, ideally at about the same time each day. It doesn't matter whether this is in the morning or the evening. When it may be given Back to top  Femara is used to treat postmenopausal women with hormone-sensitive breast cancer. Your doctor will take into account a number of different factors when planning your treatment. Early breast cancer   Femara may be given to women with early breast cancer (cancer that hasn't spread) after they have had surgery to remove the cancer. Giving treatment after surgery to reduce the risk of the cancer coming back is known as adjuvant therapy. Hormonal therapy is usually given for five years, but in some situations it may be given for longer. Femara may sometimes be given to women after they have had five years treatment with another hormonal drug called tamoxifen. Sometimes Femara is given before surgery to women with localised early breast cancer, to allow them to have a lumpectomy (removal of the lump) rather than a mastectomy (removal of the breast). Giving treatment before surgery is known as cynthia-adjuvant therapy. Advanced breast cancer   Femara may be used to treat women whose breast cancer has spread to other parts of the body (advanced or metastatic breast cancer). It can also be used to treat women whose cancer has come back after treatment with other hormonal therapies. You might find it helpful to see our information about the staging and grading of breast cancer. Length of treatment Back to top  Your doctors will discuss the length of treatment they feel is appropriate for your situation. Femara is often given over a period of years or for as long as it is effective in controlling your cancer, depending on your individual situation. Possible side effects Back to top  Each person's reaction to any medicine is different. Some people have very few side effects while others may experience more.  The side effects described here will not affect everyone and may be different if you are having more than one drug. We have outlined the most common side effects but haven't included those that are rare and unlikely to affect you. If you notice any effects that are not listed here, discuss them with your doctor or nurse. You may have some of the following side effects, to varying degrees:   Hot flushes   These are usually mild and may wear off after a period of time. Some people find it helpful to cut down on tea, coffee, nicotine and alcohol. Research suggests that hormones called progestogens or some types of antidepressants may be helpful in controlling this side effect. Your nurse or doctor can discuss this with you. Some people find complementary therapies, such as acupuncture, helpful. Your GP may be able to give you details about having these on the NHS. You can read more about treatments for menopausal symptoms like hot flushes in our information about breast cancer treatment and menopausal symptoms. Feeling sick (nausea) and being sick (vomiting)   This can usually be treated effectively, so let your doctor know if it occurs. Feelings of sickness can often be relieved by taking your tablet with food or milk, or at night. Tiredness and headaches   It's important to get enough rest. Let your doctor know if you are getting headaches, as medicines can be prescribed to help. You may find our information on fatigue helpful. Muscular aches and joint pain   Some women have pain and stiffness in their joints while taking Femara. Let your doctor know if these effects are a problem. You may find it helpful to take mild painkillers. Hair thinning   Some women notice that their hair thins while taking Femara, but this is usually mild. Vaginal dryness   This may occur while using Femara. Gels that can help to overcome the dryness are available. These can be bought from a  or be prescribed by your doctor.    Risk of osteoporosis   Women who have, or are at risk of, osteoporosis (weakened bones), should have their bones assessed before and during treatment with Femara. Some women may need to take bone-strengthening drugs to help prevent osteoporosis from developing. Always let your doctor or nurse know about any side effects you have. There are usually ways in which they can be controlled or improved. Things to remember about Femara tablets Back to top  Femara may interact with other medicines. Let your doctor know about any medication you are taking, including non-prescribed drugs such as complementary therapies, vitamins and herbal drugs. Keep the tablets in a safe place, out of the reach of children. Keep the tablets in the original packaging and store them at room temperature, away from direct heat and sunlight. Don't worry if you forget to take your tablet. Do not take a double dose. The levels of the drug in your blood will not change very much, but try not to miss more than one or two tablets in a row. If your doctor decides to stop the treatment, return any remaining tablets to the pharmacist. Zacarias Blakely not flush them down the toilet or throw them away. Remember to get a new prescription a few weeks before you run out of tablets and make sure that you have plenty for holidays. References Back to top  This information is based on our letrozole fact sheet and has been compiled using information from a number of reliable sources, including:   Tracie Graham: The Complete Drug Reference. 36th edition. 2009. [employer]. Digital [employer]. 59th edition. 2010. [employer] and [address] [facility]). www.medicines. org.uk (accessed September 2010). Early and locally-advanced breast cancer: diagnosis and treatment. 2009. [employer] (NICE).

## 2023-01-10 NOTE — LETTER
1/10/2023    Patient: Juan Antonio Murillo   YOB: 1945   Date of Visit: 1/10/2023     Pablo Manzo MD  [place] [id number]  [address] [facility] [place]  [address] [facility] [address]  Via In Basket    Dear MD Augusta Lewis MD,      Thank you for referring Ms. Floridalma Cox to [address] [place] [address] for evaluation. My notes for this consultation are attached. If you have questions, please do not hesitate to call me. I look forward to following your patient along with you.       Sincerely,    Morales Arreola, DO

## 2023-01-10 NOTE — PROGRESS NOTES
Rm    No chief complaint on file. Visit Vitals  [facility] 10/26/1995      Blood pressure (!) 154/83, pulse (!) 106, temperature 98.6 °F (37 °C), temperature source Temporal, resp. rate 18, height 5' (1.524 m), weight 158 lb 1.6 oz (71.7 kg), last menstrual period 10/26/1995, SpO2 96 %. 1. Have you been to the ER, urgent care clinic since your last visit? Hospitalized since your last visit? No    2. Have you seen or consulted any other health care providers outside of the [address] [place] [address] since your last visit? Include any pap smears or colon screening. No     Health Maintenance Due   Topic Date Due    COVID-19 Vaccine (1) Never done    Foot Exam Q1  Never done    Eye Exam Retinal or Dilated  Never done    Pneumococcal 65+ years (2 - PPSV23 if available, else PCV20) 01/31/2018        3 most recent PHQ Screens 1/10/2023   Little interest or pleasure in doing things Not at all   Feeling down, depressed, irritable, or hopeless Not at all   Total Score PHQ 2 0        Fall Risk Assessment, last 12 mths 1/10/2023   Able to walk? Yes   Fall in past 12 months? 0   Do you feel unsteady?  0   Are you worried about falling 0       Learning Assessment 10/7/2020   PRIMARY LEARNER Patient   HIGHEST LEVEL OF EDUCATION - PRIMARY LEARNER  -   BARRIERS PRIMARY LEARNER -   CO-LEARNER CAREGIVER -   PRIMARY LANGUAGE ENGLISH   LEARNER PREFERENCE PRIMARY DEMONSTRATION   ANSWERED BY patient   RELATIONSHIP SELF

## 2023-02-02 ENCOUNTER — VIRTUAL VISIT (OUTPATIENT)
Dept: INTERNAL MEDICINE CLINIC | Age: 78
End: 2023-02-02

## 2023-02-02 DIAGNOSIS — E11.9 TYPE 2 DIABETES MELLITUS WITHOUT COMPLICATION, WITHOUT LONG-TERM CURRENT USE OF INSULIN (HCC): ICD-10-CM

## 2023-02-02 RX ORDER — DICLOFENAC SODIUM 10 MG/G
GEL TOPICAL
COMMUNITY
Start: 2023-01-28

## 2023-02-02 NOTE — PROGRESS NOTES
[id number] [address] [place] [address]  Identified pt with two pt identifiers(name and ). Chief Complaint   Patient presents with    Back Pain       Reviewed record In preparation for visit and have obtained necessary documentation. 1. Have you been to the ER, urgent care clinic or hospitalized since your last visit? No     2. Have you seen or consulted any other health care providers outside of the [address] [place] [address] since your last visit? Include any pap smears or colon screening. No    Vitals reviewed with provider. Health Maintenance reviewed:     Health Maintenance Due   Topic    COVID-19 Vaccine (1)    Foot Exam Q1     Eye Exam Retinal or Dilated     Pneumococcal 65+ years (2 - PPSV23 if available, else PCV20)    Lipid Screen           Wt Readings from Last 3 Encounters:   01/10/23 158 lb 1.6 oz (71.7 kg)   22 156 lb (70.8 kg)   12/15/22 156 lb 8.4 oz (71 kg)        Temp Readings from Last 3 Encounters:   01/10/23 98.6 °F (37 °C) (Temporal)   12/15/22 97.8 °F (36.6 °C)   22 97.7 °F (36.5 °C)        BP Readings from Last 3 Encounters:   01/10/23 (!) 154/83   12/15/22 108/70   22 (!) 167/74        Pulse Readings from Last 3 Encounters:   01/10/23 (!) 106   12/15/22 (!) 56   22 78      There were no vitals filed for this visit.        Learning Assessment:   :       Learning Assessment 10/7/2020 2019 2014   PRIMARY LEARNER Patient Patient Patient   HIGHEST LEVEL OF EDUCATION - PRIMARY LEARNER  - - DID NOT GRADUATE HIGH SCHOOL   BARRIERS PRIMARY LEARNER - - NONE   CO-LEARNER CAREGIVER - - No   PRIMARY LANGUAGE ENGLISH ENGLISH ENGLISH   LEARNER PREFERENCE PRIMARY DEMONSTRATION OTHER (COMMENT) DEMONSTRATION   ANSWERED BY patient patient patient   RELATIONSHIP SELF SELF SELF        Depression Screening:   :       3 most recent PHQ Screens 2023   Little interest or pleasure in doing things Not at all   Feeling down, depressed, irritable, or hopeless Not at all   Total Score PHQ 2 0        Fall Risk Assessment:   :       Fall Risk Assessment, last 12 mths 2/2/2023   Able to walk? Yes   Fall in past 12 months? 0   Do you feel unsteady? 1   Are you worried about falling 0        Abuse Screening:   :       Abuse Screening Questionnaire 10/13/2022 6/7/2022 6/2/2020 2/19/2019 1/8/2018 12/29/2016 9/14/2015   Do you ever feel afraid of your partner? N N N N N N N   Are you in a relationship with someone who physically or mentally threatens you? N N N N N N N   Is it safe for you to go home?  Y Y Y Y Y Y Y        ADL Screening:   :       ADL Assessment 2/2/2023   Feeding yourself No Help Needed   Getting from bed to chair No Help Needed   Getting dressed No Help Needed   Bathing or showering No Help Needed   Walk across the room (includes cane/walker) No Help Needed   Using the telphone No Help Needed   Taking your medications No Help Needed   Preparing meals No Help Needed   Managing money (expenses/bills) No Help Needed   Moderately strenuous housework (laundry) No Help Needed   Shopping for personal items (toiletries/medicines) No Help Needed   Shopping for groceries No Help Needed   Driving No Help Needed   Climbing a flight of stairs No Help Needed   Getting to places beyond walking distances No Help Needed

## 2023-02-14 ENCOUNTER — OFFICE VISIT (OUTPATIENT)
Dept: INTERNAL MEDICINE CLINIC | Age: 78
End: 2023-02-14
Payer: MEDICARE

## 2023-02-14 VITALS
HEART RATE: 83 BPM | RESPIRATION RATE: 12 BRPM | SYSTOLIC BLOOD PRESSURE: 140 MMHG | TEMPERATURE: 98 F | HEIGHT: 60 IN | OXYGEN SATURATION: 98 % | BODY MASS INDEX: 30.82 KG/M2 | WEIGHT: 157 LBS | DIASTOLIC BLOOD PRESSURE: 80 MMHG

## 2023-02-14 DIAGNOSIS — I10 PRIMARY HYPERTENSION: ICD-10-CM

## 2023-02-14 DIAGNOSIS — C50.912 INFILTRATING DUCTAL CARCINOMA OF LEFT BREAST (HCC): ICD-10-CM

## 2023-02-14 DIAGNOSIS — E11.9 TYPE 2 DIABETES MELLITUS WITHOUT COMPLICATION, WITHOUT LONG-TERM CURRENT USE OF INSULIN (HCC): ICD-10-CM

## 2023-02-14 DIAGNOSIS — E78.2 MIXED HYPERLIPIDEMIA: ICD-10-CM

## 2023-02-14 DIAGNOSIS — E11.65 TYPE 2 DIABETES MELLITUS WITH HYPERGLYCEMIA, WITHOUT LONG-TERM CURRENT USE OF INSULIN (HCC): Primary | ICD-10-CM

## 2023-02-14 PROBLEM — A41.9 SEPSIS (HCC): Status: RESOLVED | Noted: 2021-06-20 | Resolved: 2023-02-14

## 2023-02-14 LAB — HBA1C MFR BLD HPLC: 7.4 %

## 2023-02-14 PROCEDURE — 99214 OFFICE O/P EST MOD 30 MIN: CPT | Performed by: INTERNAL MEDICINE

## 2023-02-14 PROCEDURE — G8417 CALC BMI ABV UP PARAM F/U: HCPCS | Performed by: INTERNAL MEDICINE

## 2023-02-14 PROCEDURE — 3077F SYST BP >= 140 MM HG: CPT | Performed by: INTERNAL MEDICINE

## 2023-02-14 PROCEDURE — G8432 DEP SCR NOT DOC, RNG: HCPCS | Performed by: INTERNAL MEDICINE

## 2023-02-14 PROCEDURE — 3079F DIAST BP 80-89 MM HG: CPT | Performed by: INTERNAL MEDICINE

## 2023-02-14 PROCEDURE — 1101F PT FALLS ASSESS-DOCD LE1/YR: CPT | Performed by: INTERNAL MEDICINE

## 2023-02-14 PROCEDURE — 3051F HG A1C>EQUAL 7.0%<8.0%: CPT | Performed by: INTERNAL MEDICINE

## 2023-02-14 PROCEDURE — 83036 HEMOGLOBIN GLYCOSYLATED A1C: CPT | Performed by: INTERNAL MEDICINE

## 2023-02-14 PROCEDURE — G8536 NO DOC ELDER MAL SCRN: HCPCS | Performed by: INTERNAL MEDICINE

## 2023-02-14 PROCEDURE — 1090F PRES/ABSN URINE INCON ASSESS: CPT | Performed by: INTERNAL MEDICINE

## 2023-02-14 PROCEDURE — 1123F ACP DISCUSS/DSCN MKR DOCD: CPT | Performed by: INTERNAL MEDICINE

## 2023-02-14 PROCEDURE — G8427 DOCREV CUR MEDS BY ELIG CLIN: HCPCS | Performed by: INTERNAL MEDICINE

## 2023-02-14 RX ORDER — METFORMIN HYDROCHLORIDE 500 MG/1
500 TABLET, EXTENDED RELEASE ORAL 2 TIMES DAILY WITH MEALS
Qty: 60 TABLET | Refills: 5 | Status: SHIPPED | OUTPATIENT
Start: 2023-02-14

## 2023-02-14 RX ORDER — LISINOPRIL 5 MG/1
5 TABLET ORAL DAILY
Qty: 30 TABLET | Refills: 3 | Status: SHIPPED | OUTPATIENT
Start: 2023-02-14

## 2023-02-14 NOTE — PROGRESS NOTES
HPI  Ms. Eduardo Maolne is a 68y.o. year old female, she is seen today for DM, HTN, cholesterol. A1C is 7.4 today - was 6.6 4 mos ago. Plan last visit was to cut back on bread and sugar. Has been eating more bread and sugar. Referred to DM education - didn't got to DM education due to new breast cancer diagnosis  Didn't get labs done - lipids, CMP    Since last visit has had LEFT breast cancer post lumpectomy 12/22. followed by Dr. Tony Montejo - on letrozole. Doing well from that standpoint. No chest pain, sob, dizziness, weakness. No numbness or tingling in feet. Has been seeing orthopedics for left hand arthritis - has had surgery in that hand. BP has been in 987D - 569T systolic, occasional 414A. Chief Complaint   Patient presents with    Diabetes    Hypertension    Cholesterol Problem    Immunization/Injection        Prior to Admission medications    Medication Sig Start Date End Date Taking? Authorizing Provider   metFORMIN ER (GLUCOPHAGE XR) 500 mg tablet Take 1 Tablet by mouth two (2) times daily (with meals). 2/14/23  Yes Susy Carranza MD   lisinopriL (PRINIVIL, ZESTRIL) 5 mg tablet Take 1 Tablet by mouth daily. 2/14/23  Yes Susy Carranza MD   diclofenac (VOLTAREN) 1 % gel APPLY 2 TO 4 GRAMS TOPICALLY 4 TIMES DAILY AS NEEDED FOR PAIN 1/28/23  Yes Provider, Historical   letrozole (FEMARA) 2.5 mg tablet Take 1 Tablet by mouth daily. Indications: hormone receptor positive postmenopausal early breast cancer 1/10/23  Yes Lee Garcia,    atorvastatin (LIPITOR) 40 mg tablet TAKE 1 TABLET BY MOUTH ONCE DAILY IN THE EVENING 12/21/22  Yes Susy Carranza MD   DULoxetine (CYMBALTA) 20 mg capsule Take 40 mg by mouth nightly. Yes Provider, Historical   ascorbic acid, vitamin C, (VITAMIN C) 500 mg tablet Take 500 mg by mouth daily. Yes Provider, Historical   cholecalciferol (VITAMIN D3) (1000 Units /25 mcg) tablet Take 1,000 Units by mouth daily.    Yes Provider, Historical   pramipexole (MIRAPEX) 0.5 mg tablet Take 1 tablet by mouth nightly 7/18/22  Yes Marques Craig MD   omeprazole (PRILOSEC) 20 mg capsule Take 1 capsule by mouth once daily 7/12/22  Yes Marques Craig MD   acetaminophen (TYLENOL) 325 mg tablet Take 650 mg by mouth every four (4) hours as needed for Pain. Yes Provider, Historical   methocarbamoL (ROBAXIN) 500 mg tablet Take 500 mg by mouth as needed for Muscle Spasm(s). 2/14/23  Provider, Historical         No Known Allergies      REVIEW OF SYSTEMS:  Per HPI    PHYSICAL EXAM:  Visit Vitals  BP (!) 140/80   Pulse 83   Temp 98 °F (36.7 °C) (Oral)   Resp 12   Ht 5' (1.524 m)   Wt 157 lb (71.2 kg)   LMP 10/26/1995   SpO2 98%   BMI 30.66 kg/m²     Constitutional: Appears well-developed and well-nourished. No distress. HENT:   Head: Normocephalic and atraumatic. Eyes: No scleral icterus. Cardiovascular: Normal S1/S2, regular rhythm. No murmurs, rubs, or gallops. Pulmonary/Chest: Effort normal and breath sounds normal. No respiratory distress. No wheezes, rhonchi, or rales. Ext: No edema. Neurological: Alert. Psychiatric: Normal mood and affect. Behavior is normal.    DM foot exam: 2+ pedal pulses, no lesions, sensation intact to monofilament and vibration b/l      Lab Results   Component Value Date/Time    Sodium 140 12/09/2022 10:13 AM    Potassium 4.0 12/09/2022 10:13 AM    Chloride 111 (H) 12/09/2022 10:13 AM    CO2 26 12/09/2022 10:13 AM    Anion gap 3 (L) 12/09/2022 10:13 AM    Glucose 104 (H) 12/09/2022 10:13 AM    Glucose 98 08/05/2010 08:34 AM    BUN 14 12/09/2022 10:13 AM    Creatinine 0.99 12/09/2022 10:13 AM    BUN/Creatinine ratio 14 12/09/2022 10:13 AM    GFR est AA 62 02/07/2022 12:52 PM    GFR est non-AA 54 (L) 02/07/2022 12:52 PM    Calcium 8.4 (L) 12/09/2022 10:13 AM    Bilirubin, total 0.3 02/07/2022 12:52 PM    Alk.  phosphatase 108 02/07/2022 12:52 PM    Protein, total 6.9 02/07/2022 12:52 PM    Albumin 4.1 02/07/2022 12:52 PM    Globulin 3.3 06/21/2021 03:44 AM    A-G Ratio 1.5 02/07/2022 12:52 PM    ALT (SGPT) 15 02/07/2022 12:52 PM     Lab Results   Component Value Date/Time    Hemoglobin A1c 6.9 (H) 02/07/2022 12:52 PM    Hemoglobin A1c 6.3 (H) 06/07/2021 08:21 AM    Hemoglobin A1c 6.4 (H) 06/29/2020 10:22 AM      Lab Results   Component Value Date/Time    Cholesterol, total 203 (H) 02/07/2022 12:52 PM    HDL Cholesterol 33 (L) 02/07/2022 12:52 PM    LDL, calculated 124 (H) 02/07/2022 12:52 PM    LDL, calculated 83 06/29/2020 10:22 AM    VLDL, calculated 46 (H) 02/07/2022 12:52 PM    VLDL, calculated 54 (H) 06/29/2020 10:22 AM    Triglyceride 257 (H) 02/07/2022 12:52 PM          ASSESSMENT/PLAN  Diagnoses and all orders for this visit:    1. Type 2 diabetes mellitus with hyperglycemia, without long-term current use of insulin ([facility])  -     REFERRAL TO DIABETIC EDUCATION  -     metFORMIN ER (GLUCOPHAGE XR) 500 mg tablet; Take 1 Tablet by mouth two (2) times daily (with meals). -     METABOLIC PANEL, COMPREHENSIVE; Future  -     HEMOGLOBIN A1C WITH EAG; Future    2. Type 2 diabetes mellitus without complication, without long-term current use of insulin (HCC)  -     AMB POC HEMOGLOBIN A1C  -      DIABETES FOOT EXAM    3. Mixed hyperlipidemia  -     LIPID PANEL; Future    4. Primary hypertension  -     lisinopriL (PRINIVIL, ZESTRIL) 5 mg tablet; Take 1 Tablet by mouth daily. 5. Infiltrating ductal carcinoma of left breast (HCC)    Blood pressure not to goal with repeated elevations. Start lisinopril 5 mg daily, potential side effects discussed. Diabetes is worse. Patient will start metformin, potential side effects discussed. She also agrees to go to diabetes education and work on decreasing sugar in her diet. Due for lipids, ordered. Get prior to follow-up visit. IDC left breast, on letrozole, status post lobectomy, managed by heme-onc.     Health Maintenance Due   Topic Date Due    Eye Exam Retinal or Dilated  Never done    Pneumococcal 65+ years (2 - PPSV23 if available, else PCV20) 01/31/2018    Lipid Screen  02/07/2023        Follow-up and Dispositions    Return in about 3 months (around 5/14/2023) for chol, dm, bp, labs prior, 30 MIN APPT. Reviewed plan of care. Patient has provided input and agrees with goals. The nurse provided the patient and/or family with advanced directive information if needed and encouraged the patient to provide a copy to the office when available.

## 2023-02-14 NOTE — PROGRESS NOTES
[id number] [address] [place] [address]  Identified pt with two pt identifiers(name and ). Chief Complaint   Patient presents with    Diabetes    Hypertension    Cholesterol Problem       Reviewed record In preparation for visit and have obtained necessary documentation. 1. Have you been to the ER, urgent care clinic or hospitalized since your last visit? [facility]     2. Have you seen or consulted any other health care providers outside of the [address] [place] [address] since your last visit? Include any pap smears or colon screening. Dr Jarrett Pelletier reviewed with provider. Health Maintenance reviewed:     Health Maintenance Due   Topic    COVID-19 Vaccine (1)    Foot Exam Q1     Eye Exam Retinal or Dilated     Pneumococcal 65+ years (2 - PPSV23 if available, else PCV20)    Lipid Screen           Wt Readings from Last 3 Encounters:   01/10/23 158 lb 1.6 oz (71.7 kg)   22 156 lb (70.8 kg)   12/15/22 156 lb 8.4 oz (71 kg)        Temp Readings from Last 3 Encounters:   01/10/23 98.6 °F (37 °C) (Temporal)   12/15/22 97.8 °F (36.6 °C)   22 97.7 °F (36.5 °C)        BP Readings from Last 3 Encounters:   01/10/23 (!) 154/83   12/15/22 108/70   22 (!) 167/74        Pulse Readings from Last 3 Encounters:   01/10/23 (!) 106   12/15/22 (!) 56   22 78      There were no vitals filed for this visit.        Learning Assessment:   :       Learning Assessment 10/7/2020 2019 2014   PRIMARY LEARNER Patient Patient Patient   HIGHEST LEVEL OF EDUCATION - PRIMARY LEARNER  - - DID NOT GRADUATE HIGH SCHOOL   BARRIERS PRIMARY LEARNER - - NONE   CO-LEARNER CAREGIVER - - No   PRIMARY LANGUAGE ENGLISH ENGLISH ENGLISH   LEARNER PREFERENCE PRIMARY DEMONSTRATION OTHER (COMMENT) DEMONSTRATION   ANSWERED BY patient patient patient   RELATIONSHIP SELF SELF SELF        Depression Screening:   :       3 most recent PHQ Screens 2023   Little interest or pleasure in doing things Not at all   Feeling down, depressed, irritable, or hopeless Not at all   Total Score PHQ 2 0        Fall Risk Assessment:   :       Fall Risk Assessment, last 12 mths 2/2/2023   Able to walk? Yes   Fall in past 12 months? 0   Do you feel unsteady? 1   Are you worried about falling 0        Abuse Screening:   :       Abuse Screening Questionnaire 2/2/2023 10/13/2022 6/7/2022 6/2/2020 2/19/2019 1/8/2018 12/29/2016   Do you ever feel afraid of your partner? N N N N N N N   Are you in a relationship with someone who physically or mentally threatens you? N N N N N N N   Is it safe for you to go home?  Y Y Y Y Y Y Y        ADL Screening:   :       ADL Assessment 2/2/2023   Feeding yourself No Help Needed   Getting from bed to chair No Help Needed   Getting dressed No Help Needed   Bathing or showering No Help Needed   Walk across the room (includes cane/walker) No Help Needed   Using the telphone No Help Needed   Taking your medications No Help Needed   Preparing meals No Help Needed   Managing money (expenses/bills) No Help Needed   Moderately strenuous housework (laundry) No Help Needed   Shopping for personal items (toiletries/medicines) No Help Needed   Shopping for groceries No Help Needed   Driving No Help Needed   Climbing a flight of stairs No Help Needed   Getting to places beyond walking distances No Help Needed

## 2023-02-20 NOTE — PROGRESS NOTES
[facility]  Diabetes Self-Management Education & Support Program  Encounter Note    SUMMARY  Diabetes self-care management training was completed related to problem solving. he participant will return on March 30 for 6 weeks follow up. The participant did identify SMART Goal(s) and will practice knowledge and skills related to healthy coping to improve diabetes self-management. EVALUATION:  Blood sugars= states bs this morning , on the 100's. Please see bs log pt brought yesterday. Healthy eating = working on eating less maple oatmeal.   Sleep= patient states she use CPAP , states gets 4-6 hrs sleep. RECOMMENDATIONS:  - Continue with lifestyle changes. TOPICS DISCUSSED TODAY:  HOW DO I FIGURE OUT WHAT'S INFLUENCING MY BLOOD GLUCOSES? 60      Next provider visit is scheduled : not booked at this time. SMART GOAL(S)  Will make a to do list , to reduce her stress. ACHIEVEMENT OF GOAL(S) : 0-24%  Will work on the same goal she states yesterday. DATE DSMES TOPIC EVALUATION     2/15/23 HOW DO I FIGURE OUT WHAT'S INFLUENCING MY BLOOD GLUCOSES? Problem solving using SOAR   Set goals   Sort options   Arrive at a plan   Reaffirm plan   Common problems in diabetes self-care   Hypoglycemia   Hyperglycemia   Sick days   Pattern management   Disaster preparedness plan        The participant has an action plan for   Hypoglycemia: Yes  Hyperglycemia: Yes  Sick days: Yes  During disasters: Yes    The participant needs to address : her sleep . States she have tried all different pillows, mattress, etc but nothing works. Advised to discuss with her provider. Caroline Morin RN on 2/16/23 at 2:30 PM     I have personally reviewed the health record, including provider notes, laboratory data and current medications before making these care and education recommendations. The time spent in this effort is included in the total time.   Total minutes: [address] HPI  Ms. Geronimo Hernandez is a 68y.o. year old female, she is seen today for right breast discharge, sometimes bloody, sometimes sticky/white. Happens daily for couple months. No lumps noted, no breast skin changes noted. Nipple discharge is spontaneous. Normal mammo in August. Some soreness and pain from nipple. No f/c or sweats. Chief Complaint   Patient presents with    Breast Discharge     Room 2C// R breast soreness and milky white discharge x months (everyday)         Prior to Admission medications    Medication Sig Start Date End Date Taking? Authorizing Provider   prednisoLONE acetate (PRED FORTE) 1 % ophthalmic suspension prednisolone acetate 1 % eye drops,suspension   Yes Provider, Historical   moxifloxacin (VIGAMOX) 0.5 % ophthalmic solution  3/11/19  Yes Provider, Historical   ketorolac (ACULAR) 0.5 % ophthalmic solution Administer 1 Drop to both eyes four (4) times daily. Yes Provider, Historical   metoprolol succinate (TOPROL-XL) 25 mg XL tablet Take 1 Tab by mouth daily. 2/19/19  Yes Gabby Abdul MD   atorvastatin (LIPITOR) 40 mg tablet Take 1 Tab by mouth daily. 2/19/19  Yes Gabby Abdul MD   pramipexole (MIRAPEX) 0.5 mg tablet Take 0.5 Tabs by mouth daily. Patient taking differently: Take 0.25 mg by mouth daily. 2/19/19  Yes Gabby Abdul MD   omeprazole (PRILOSEC) 20 mg capsule Take 1 Cap by mouth daily. 2/19/19  Yes Gabby Abdul MD   alendronate (FOSAMAX) 70 mg tablet TAKE 1 TABLET EVERY SUNDAY 2/19/19  Yes Gabby Abdul MD   furosemide (LASIX) 20 mg tablet Take 1 Tab by mouth daily as needed. Indications: swelling 10/2/18  Yes Gabby Abdul MD   Aspirin, Buffered 81 mg tab Take 81 mg by mouth daily. Yes Provider, Historical   acetaminophen (TYLENOL) 325 mg tablet Take  by mouth every four (4) hours as needed for Pain.    Yes Provider, Historical         No Known Allergies      REVIEW OF SYSTEMS:  Per HPI    PHYSICAL EXAM:  Visit Vitals  /74 Beryl, was evaluated in person at office visit. Pulse 70   Temp 98.1 °F (36.7 °C) (Oral)   Resp 16   Ht 5' (1.524 m)   Wt 157 lb 6.4 oz (71.4 kg)   LMP 10/26/1995   SpO2 96%   BMI 30.74 kg/m²     Constitutional: Appears well-developed and well-nourished. No distress. HENT:   Head: Normocephalic and atraumatic. Eyes: No scleral icterus. Cardiovascular: Normal S1/S2, regular rhythm. No murmurs, rubs, or gallops. Pulmonary/Chest: Effort normal and breath sounds normal. No respiratory distress. No wheezes, rhonchi, or rales. Breasts: symmetric , no masses, no skin changes, no axillary adenopathy   Neurological: Alert. Psychiatric: Normal mood and affect. Behavior is normal.     Lab Results   Component Value Date/Time    Sodium 140 11/29/2018 01:53 PM    Potassium 4.8 11/29/2018 01:53 PM    Chloride 102 11/29/2018 01:53 PM    CO2 27 11/29/2018 01:53 PM    Anion gap 12 07/16/2010 10:07 AM    Glucose 105 (H) 11/29/2018 01:53 PM    Glucose 98 08/05/2010 08:34 AM    BUN 15 11/29/2018 01:53 PM    Creatinine 0.95 11/29/2018 01:53 PM    BUN/Creatinine ratio 16 11/29/2018 01:53 PM    GFR est AA 69 11/29/2018 01:53 PM    GFR est non-AA 60 11/29/2018 01:53 PM    Calcium 8.8 11/29/2018 01:53 PM    Bilirubin, total <0.2 10/30/2017 08:22 AM    AST (SGOT) 14 10/30/2017 08:22 AM    Alk. phosphatase 80 10/30/2017 08:22 AM    Protein, total 6.6 10/30/2017 08:22 AM    Albumin 3.8 10/30/2017 08:22 AM    Globulin 3.1 07/16/2010 10:07 AM    A-G Ratio 1.4 10/30/2017 08:22 AM    ALT (SGPT) 19 10/30/2017 08:22 AM     Lab Results   Component Value Date/Time    Hemoglobin A1c 5.7 08/05/2010 08:34 AM      Lab Results   Component Value Date/Time    Cholesterol, total 247 (H) 01/08/2018 09:59 AM    HDL Cholesterol 33 (L) 01/08/2018 09:59 AM    LDL, calculated 158 (H) 01/08/2018 09:59 AM    VLDL, calculated 56 (H) 01/08/2018 09:59 AM    Triglyceride 282 (H) 01/08/2018 09:59 AM          ASSESSMENT/PLAN  Diagnoses and all orders for this visit:    1.  Bloody discharge from right nipple  -     LEANNA MAMMO RT DX INCL CAD; Future  -     REFERRAL TO BREAST SURGERY  -     US BREAST RT COMPLETE 4 QUAD; Future    will get imaging, refer to Dr. Qian Hardin Maintenance Due   Topic Date Due    Pneumococcal 65+ years (2 of 2 - PPSV23) 01/31/2018               Reviewed plan of care. Patient has provided input and agrees with goals. The nurse provided the patient and/or family with advanced directive information if needed and encouraged the patient to provide a copy to the office when available.

## 2023-02-27 ENCOUNTER — CLINICAL SUPPORT (OUTPATIENT)
Dept: DIABETES SERVICES | Age: 78
End: 2023-02-27
Payer: MEDICARE

## 2023-02-27 DIAGNOSIS — E11.9 TYPE 2 DIABETES MELLITUS WITHOUT COMPLICATION, WITHOUT LONG-TERM CURRENT USE OF INSULIN (HCC): Primary | ICD-10-CM

## 2023-02-27 PROCEDURE — G0108 DIAB MANAGE TRN  PER INDIV: HCPCS | Performed by: DIETITIAN, REGISTERED

## 2023-02-27 NOTE — PROGRESS NOTES
[facility]  Diabetes Self-Management Education & Support Program    Reason for Referral: new dx  Referral Source: Sada Bellamy MD  Services requested: DSMES       ASSESSMENT    From my perspective, the participant would benefit from [facility] specifically related to reducing risks, healthy eating, monitoring, taking medications, physical activity, healthy coping, and problem solving. Will adapt DSMES program to build on participant's skills score, strengths in confidence score, and strengths in preparedness score as noted in the Diabetes Skills, Confidence, and Preparedness Index. During the program, we will focus on providing DSMES that specifically addresses participant's interest in healthy eating, monitoring, taking medications, and physical activity, as shown by their reported readiness to change. The participant would be best served by attending weekly individual sessions. Diabetes Self-Management Education Follow-up Visit: 3/13/23 with me       Clinical Presentation  Pal Asif is a 68 y.o. White female referred for diabetes self-management education. Participant has Type 2 DM not on insulin for <1 year. Family history negativefor diabetes. Patient reports not receiving DSMES services in the past. Most recent A1c value:   Lab Results   Component Value Date/Time    Hemoglobin A1c 6.9 (H) 02/07/2022 12:52 PM    Hemoglobin A1c (POC) 7.4 02/14/2023 09:22 AM         Diabetes-related medications:  Current dosing:   Key Antihyperglycemic Medications               metFORMIN ER (GLUCOPHAGE XR) 500 mg tablet Take 1 Tablet by mouth two (2) times daily (with meals). Blood Pressure Management  Key ACE/ARB Medications               lisinopriL (PRINIVIL, ZESTRIL) 5 mg tablet Take 1 Tablet by mouth daily.             Lipid Management  Key Antihyperlipidemia Meds               atorvastatin (LIPITOR) 40 mg tablet TAKE 1 TABLET BY MOUTH ONCE DAILY IN THE EVENING Clot Prevention  Key Anti-Platelet Anticoagulant Meds       The patient is on no antiplatelet meds or anticoagulants. Learning Assessment  Learning objectives Educator assessment (2/27/2023)   Diabetes Disease Process  The participant can   A) describe diabetes in basic terms;   B) state the type of diabetes they have; &   C) state accepted blood glucose targets. Healthy Eating  The participant can   A) identify carbohydrate foods; &   B) accurately read food labels. Being Active  The participant can  A) state the benefits of physical activity;  B) report their current PA practices;  C) identify PA they would consider incorporating in their lives; &  D) develop an implementation plan. Monitoring  The participant can  A) operate their blood glucose meter; &  B) describe how they log their blood glucoses to share with their provider. Taking Medications  The participant can  A) name their diabetes medications;  B) state the purpose and dose;  C) note side effects; &  D) describe proper storage, disposal & transport (if appropriate). Healthy Coping  The participant can    A) describe their response to diabetes diagnosis; B) describe their specific coping mechanisms;  C) identify supportive people and/or other resources that positively support their diabetes self-care and health. Reducing Risks  The participant can describe the preventive measures used by providers to promote health and prevent diabetes complications. Problem Solving  The participant can   A) identify signs, symptoms & treatment of hypoglycemia;    B) identify signs, symptoms & treatment of hyperglycemia;  C) describe their sick day plan; &  D) identify BG patterns to discuss with their provider.        No  Yes  No        Yes  Yes        Yes  No  Yes  No        No  No        No  No  Yes  No - n/a        Yes  Yes  Yes        No          No  No  No  No     Characteristics to Learning   Barriers to Learning None     Favorite Ways to Learn   [x] Lecture  [] Slides  [x] Reading [] Video-Internet  [] Cassettes/CDs/MP3's  [] Interactive Small Groups [] Other       Behavioral Assessment  Current self-care practices  Educator assessment (2/27/2023)   Healthy Eating   Current practices    24-hour Dietary Recall:  Breakfast: toast w jelly and an egg  Lunch: none  Dinner: suzanne w green beans, potatoes, corn on the cob  Snacks: banana during lunch time, lemon cake  Beverages: water, OJ  Alcohol: none       Would benefit from DSMES related to Healthy Eating: Yes    Eats a carbohydrate controlled diet: No    Stage of change: Contemplation      Being Active  Current practices  How many days during the past week have you performed physical activity where your heart beats faster and your breathing is harder than normal for 30 minutes or more? 1 day(s)    How many days in a typical week do you perform activity such as this?  1 day(s)     Would benefit from [facility] related to Being Active: Yes      Exercises 150 minutes/week: No      Stage of change: Contemplation  Had hip replacement and it is bothering her so she cannot exercise like she used to   Monitoring  Current practices  Do you monitor your blood sugar? No    How often do you monitor? never    What are the range of readings? N/a    Do you know your last A1c measurement? No    Do you know the meaning of the A1c? No     Would benefit from [facility] related to Monitoring: Yes      Uses BG readings to establish trends and understand BG patterns: No      Stage of change: Contemplation       Taking Medication  Current practices  Do you understand what your diabetes medications do? No    How often do you miss doses of your diabetes medications? never    Can you afford your diabetes medications?  Yes   Would benefit from [facility] related to Taking Medication: Yes      Takes medications consistently to receive full benefit: Yes      Stage of change: Action       Healthy Coping   Current state  Diabetes Skills, Confidence and Preparedness Index: Total score: 5.4  Skills: 4.3  Confidence: 6.3  Preparedness: 6.0       Would benefit from DSMES related to Healthy Coping: Yes      Identifies specific people, organizations,etc, that actively support their diabetes self-care efforts: Yes      Stage of change: Preparation     Reducing Risks  Current state  Vaccines:  Influenza:   Immunization History   Administered Date(s) Administered    Influenza High Dose Vaccine PF 09/14/2015, 11/03/2016, 11/06/2017    Influenza Vaccine 10/05/2013    Influenza Vaccine (Tri) Adjuvanted (>65 Yrs FLUAD TRI [id number]) 10/15/2018, 10/08/2019    Influenza, FLUAD, (age 72 y+), Adjuvanted 10/13/2022       Pneumococcal:   Immunization History   Administered Date(s) Administered    (RETIRED) Pneumococcal Vaccine (Unspecified Type) 10/26/2003, 10/26/2010    Pneumococcal Conjugate (PCV-13) 01/31/2017        Hepatitis: There is no immunization history for the selected administration types on file for this patient. Examinations:  Diabetic Foot and Eye Exam HM Status   Topic Date Due    Eye Exam  Never done    Diabetic Foot Care  02/14/2024        Dental exam:  up to date    Foot exam: done on: 2/14/23    Heart Protection:  BP Readings from Last 2 Encounters:   02/14/23 (!) 140/80   01/10/23 (!) 154/83        Lab Results   Component Value Date/Time    LDL, calculated 124 (H) 02/07/2022 12:52 PM    LDL, calculated 83 06/29/2020 10:22 AM        Kidney Protection:  Lab Results   Component Value Date/Time    Microalb/Creat ratio (ug/mg creat.) 6 10/13/2022 12:00 AM        Would benefit from [facility] related to Reducing Risks:  Yes      Actively participates in decision-making with provider regarding secondary prevention:  Yes      Stage of change: Action   Problem Solving  Current state  Hypoglycemia Management:  What are signs and symptoms of hypoglycemia that you experience: Pt reported being unaware of s/s of hypoglycemia, due to not experiencing    How do you prevent hypoglycemia:  pt unaware how to prevent low blood sugars    How do you treat hypoglycemia: Patient is unaware of how to treat hypoglycemia and but knows to eat something sweet    Hyperglycemia Management:  What are signs and symptoms of hyperglycemia that you experience: Pt reported being unaware of s/s of hyperglycemia, due to not experiencing    How can you prevent hyperglycemia: patient is unaware of how to prevent high blood sugars    Sick Day Management:  What do you do differently on sick days:  Pt reported being unaware of self-management on sick days    Pattern Management:  Do you notice blood glucose patterns when you look at the readings in your meter or logbook? No    How do you use the blood glucose readings from your meter or logbook? Pt unaware of pattern management skills     Would benefit from [facility] related to Problem Solving: Yes      Articulates appropriate strategies to address hypoglycemia, hyperglycemia, sick day care and BG pattern: No      Stage of change: Contemplation       Note: Content derived from the American Association of Diabetes Educators' Diabetes Education Curriculum: A Guide to Successful Self-Management (3rd edition)      Archana Joyce RD on 2/27/2023 at 11:27 AM    I have personally reviewed the health record, including provider notes, laboratory data and current medications before making these care and education recommendations. The time spent in this effort is included in the total time.   Total minutes: 30      Overall SCPI score: 5.4 Skills Score: 4.3  Low: Taking Medication(Q2),Problem Solving(Q6),Reducing Risks(Q9) Confidence Score: 6.3  Low: Being Active(Q5) Preparedness Score: 6.0  Low: Healthy Eating(Q1),Being Active(Q2),Healthy Coping(Q3),Reducing Risks(Q4),Blood Sugar Monitoring(Q6),Problem Solving(Q7)  Healthy Eating Score: 5.8  Low: Skills(Q1) Taking Medication Score: 2.0  Low: Skills(Q2) Blood Sugar Monitoring Score: 5.8  Low: RRAZSR(C1) Reducing Risks Score: 5.5  Low: SRJQTN(J1)  Problem Solving Score: 4.7  Low: Skills(Q6) Healthy Coping Score: 6.7  Low: Preparedness(Q3) Being Active Score: 4.5  Low: Confidence(Q5)    Skills/Knowledge Questions  1. I know how to plan meals that have the best balance between carbohydrates, proteins and vegetables. 3  2. I know how my diabetes medications (pills, injectables and/or insulin) work in my body. 2  3. I know when to check my blood sugar if I want to see how my body responded to a meal. 6  4. I know when to check my blood sugars to determine if my medication or insulin doses are correct. 7  5. I know what to do to prevent a low blood sugar when I exercise (either before, during, or after). 7  6. When I am sick, I know what to do differently with my diabetes management. 2  7. I know how stress can affect my diabetes management. 7  8. When I look at my blood sugars over a given week, I can explain what my blood sugar pattern is. 3  9. I know what my target levels are for A1c, blood pressure and cholesterol. 2  Confidence Questions  1. I am confident that I can plan balanced meals and snacks. 7  2. I am confident that I can manage my stress. 7  3. I am confident that I can prevent a low blood sugar during or after exercise. 7  4. I am confident that the next time I eat out, I will be able to choose foods that best keep my blood sugars in target. 7  5. I am confident I can include exercise into my schedule. 3  6. I am confident that I can use my daily blood sugars to adjust my diet, my activity, and/or my insulin. 7  7. When something out of my normal routine happens, I am confident that I can problem-solve and keep my diabetes on track. 6  Preparedness Questions  1. Within the next month, I will begin to eat more balanced meals and snacks. 6  2. Within the next month, I will choose an exercise activity and I will start fitting it into my schedule. 6  3.  Within the next month, I will make a list of stress management options that work for me. 6  4. Within the next month, I will consistently plan ahead to prevent low blood sugars. 6  5. Within the next month, I will start adjusting my insulin doses on my own. 0  6. Within the next month, I will begin making changes to my diabetes management based on my daily blood sugars (eg - eating, activity and/or insulin). 6  7. Within the next month, I will begin making changes to my diabetes management to meet my overall goals (eg - eating, activity and/or insulin).  6

## 2023-03-13 ENCOUNTER — CLINICAL SUPPORT (OUTPATIENT)
Dept: DIABETES SERVICES | Age: 78
End: 2023-03-13
Payer: MEDICARE

## 2023-03-13 DIAGNOSIS — E11.9 TYPE 2 DIABETES MELLITUS WITHOUT COMPLICATION, WITHOUT LONG-TERM CURRENT USE OF INSULIN (HCC): Primary | ICD-10-CM

## 2023-03-13 PROCEDURE — G0108 DIAB MANAGE TRN  PER INDIV: HCPCS | Performed by: DIETITIAN, REGISTERED

## 2023-03-13 NOTE — PROGRESS NOTES
[facility]  Diabetes Self-Management Education & Support Program  Encounter Note    SUMMARY  Diabetes self-care management training was completed related to healthy eating. The participant will return on March 27 to continue DSMES related to taking medications and physical activity. The participant did identify SMART Goal(s) and will practice knowledge and skills related to healthy eating to improve diabetes self-management. EVALUATION:  Ms Fadi Pa reports she is having some regurgitation 1-2 hours after some of her meals. It started since taking one of the new medications she was recently prescribed (not since starting metformin). She is not sure if it is because she eats too much at one time, but this is the first time it has happened like this. She is going to reach out to Dr Alanna Dela Cruz to let her know. Metformin ER does seem to be causing some bloating, diarrhea, but is still getting used to it. Today, we discussed what is diabetes and nutrition using food models, healthy plate, and nutrition labels. She has noticed that she eats mostly carbs at some of her meals or no vegetables at dinner, so she is going to start with making a balanced breakfast.    She verbalized understanding of all topics discussed and did not have any other questions. RECOMMENDATIONS:  Practice reading nutrition labels/making balanced meals     TOPICS DISCUSSED TODAY:  WHAT IS DIABETES? Minutes: 30  WHAT CAN I EAT? 30      Next provider visit is scheduled for 3/27/23 with me       SMART GOAL(S)  Practice building a balanced meal for breakfast at least 3 times over the next week. ACHIEVEMENT OF GOAL(S) : 0-24%  Will evaluate at next visit         [address] [facility] [address]     3/13/2023 WHAT IS DIABETES?    Role of the normal pancreas in energy balance and blood glucose control   The defect seen in diabetes   Signs & symptoms of diabetes   Diagnosis of diabetes   Types of diabetes   Blood glucose targets in non-pregnant & non-pregnant adults       The participant knows  Their type of diabetes: Yes  The basic physiologic defect: Yes  Blood glucose targets: Yes     DATE DSMES TOPIC EVALUATION     3/13/2023 WHAT CAN I EAT? General principles   Determining a healthy weight   Nutritional terms & tools   Healthy Plate method   Carbohydrate Counting   Reading food labels   Free apps   Pregnancy recommendations      The participant   Uses Healthy Plate principles in constructing meals: Yes  Reads food labels in choosing acceptable foods: Yes    The participant needs to address using these methods to make balanced meals more often. Duong Bravo RD on 3/13/2023 at 12:09 PM    I have personally reviewed the health record, including provider notes, laboratory data and current medications before making these care and education recommendations. The time spent in this effort is included in the total time.   Total minutes: 75

## 2023-03-27 ENCOUNTER — CLINICAL SUPPORT (OUTPATIENT)
Dept: DIABETES SERVICES | Age: 78
End: 2023-03-27
Payer: MEDICARE

## 2023-03-27 ENCOUNTER — TELEPHONE (OUTPATIENT)
Dept: INTERNAL MEDICINE CLINIC | Age: 78
End: 2023-03-27

## 2023-03-27 DIAGNOSIS — E11.9 TYPE 2 DIABETES MELLITUS WITHOUT COMPLICATION, WITHOUT LONG-TERM CURRENT USE OF INSULIN (HCC): Primary | ICD-10-CM

## 2023-03-27 PROCEDURE — G0108 DIAB MANAGE TRN  PER INDIV: HCPCS | Performed by: DIETITIAN, REGISTERED

## 2023-03-27 RX ORDER — INSULIN PUMP SYRINGE, 3 ML
EACH MISCELLANEOUS
Qty: 1 KIT | Refills: 0 | Status: SHIPPED | OUTPATIENT
Start: 2023-03-27

## 2023-03-27 RX ORDER — IBUPROFEN 200 MG
CAPSULE ORAL
Qty: 100 STRIP | Refills: 3 | Status: SHIPPED | OUTPATIENT
Start: 2023-03-27

## 2023-03-27 RX ORDER — LANCETS
EACH MISCELLANEOUS
Qty: 100 EACH | Refills: 3 | Status: SHIPPED | OUTPATIENT
Start: 2023-03-27

## 2023-03-27 NOTE — TELEPHONE ENCOUNTER
Pt wants a new meter sent to [facility]. PCP: Shante Raines MD     Last appt: 2/14/2023     Future Appointments   Date Time Provider Rohini Camarillo   3/29/2023  4:00 PM MD DAHLIA Alamo BS AMB   4/3/2023  9:30 AM Toy Stalling, RD [facility] BS AMB   4/10/2023  8:30 AM Atiya Narvaez,  [address] BS AMB   4/10/2023  2:30 PM Toy Stalling, RD [facility] BS AMB   4/24/2023 10:45 AM Toy Stalling, RD [facility] BS AMB   5/23/2023  8:30 AM MD DAHLIA Alamo BS AMB   2/5/0422  0:54 AM Jason Vega MD [facility] BS AMB          Requested Prescriptions     Pending Prescriptions Disp Refills    Blood-Glucose Meter monitoring kit 1 Kit 0     Sig: Test once daily DX E11.9    glucose blood VI test strips (blood glucose test) strip 100 Strip 3     Sig: Test once daily DX E11.9    lancets misc 100 Each 3     Sig: Test once daily DX E11.9

## 2023-03-27 NOTE — PROGRESS NOTES
[facility]  Diabetes Self-Management Education & Support Program  Encounter Note    SUMMARY  Diabetes self-care management training was completed related to physical activity. The participant will return on April 3 to continue DSMES related to monitoring and taking medications. The participant did identify SMART Goal(s) and will practice knowledge and skills related to healthy eating and being active to improve diabetes self-management. EVALUATION:  Ms Josue has been making more balanced meals for breakfast. She is not used to eating breakfast so it has been somewhat of a challenge but she continues to work on it. We covered physical activity today - she is very active due to her work, but does not do much planned activity because of her hip/knee, so we looked at chair exercises she could try at home, as well as YouTube videos she can find to do. She is going to try to do this before our next visit. She is still vomiting after some of her meals and is getting more concerned, so she put in a call to Dr Danyel Wilder when she got here today. She has stopped taking metformin, lisinopril, and the letrozole from oncology, but is still experiencing this. Dr Marek Castillo nurse, Loree Carrion, came in to discuss the concern with her today and scheduled her for a virtual visit with Dr Danyel Wilder this week. She has a feeling she needs a referral to GI. Additionally, she has started to get dizzy and have spots in her eyes since last Friday, so I suggested we get an order for a glucometer in so she can check BG when this happens and hopefully rule out low blood sugars. Ms Josue verbalized understanding of all topics discussed and did not have any more questions. RECOMMENDATIONS:  Get meter to check BG when feeling some hypoglycemia symptoms. Discuss concerns with Dr Danyel Wilder at [facility].      TOPICS DISCUSSED TODAY:  HOW DOES PHYSICAL ACTIVITY AFFECT MY DIABETES? 30      Next provider visit is scheduled for 3/29/23 with Dr Paxton Meyers; 4/3/23 with me       SMART GOAL(S)  Practice building a balanced meal for breakfast at least 3 times over the next week. ACHIEVEMENT OF GOAL(S) : %    2. Increase physical activity by doing chair exercises for at least 10 minutes 3 times over the next week. ACHIEVEMENT OF GOAL(S) :  0-24%  Will evaluate at next visit       [address] [facility] EVALUATION     3/27/2023 HOW DOES PHYSICAL ACTIVITY AFFECT MY DIABETES? Benefits of physical activity   Beginning a program of physical activity   Walking   Pedometers   Goal setting   Structured physical activity program   Aerobic activity   Resistance   Flexibility   Balance   Physical activity program progression   Safety issues   Barriers to physical activity   Facilitators of physical activity        The participant has established a regular physical activity plan: No    The participant needs to address see above. Keisha Hyde RD on 3/27/2023 at 12:02 PM    I have personally reviewed the health record, including provider notes, laboratory data and current medications before making these care and education recommendations. The time spent in this effort is included in the total time.   Total minutes: 45

## 2023-03-28 ENCOUNTER — TELEPHONE (OUTPATIENT)
Dept: INTERNAL MEDICINE CLINIC | Age: 78
End: 2023-03-28

## 2023-03-28 NOTE — TELEPHONE ENCOUNTER
Called pt to change virtual appointment to in person tomorrow, message left to be here at 3:45pm. Asked to call back to confirm.

## 2023-03-29 ENCOUNTER — OFFICE VISIT (OUTPATIENT)
Dept: INTERNAL MEDICINE CLINIC | Age: 78
End: 2023-03-29
Payer: MEDICARE

## 2023-03-29 VITALS
BODY MASS INDEX: 30.43 KG/M2 | SYSTOLIC BLOOD PRESSURE: 137 MMHG | HEIGHT: 60 IN | DIASTOLIC BLOOD PRESSURE: 76 MMHG | TEMPERATURE: 97.6 F | HEART RATE: 87 BPM | RESPIRATION RATE: 12 BRPM | OXYGEN SATURATION: 98 % | WEIGHT: 155 LBS

## 2023-03-29 DIAGNOSIS — H93.19 TINNITUS, UNSPECIFIED LATERALITY: ICD-10-CM

## 2023-03-29 DIAGNOSIS — R13.10 DYSPHAGIA, UNSPECIFIED TYPE: ICD-10-CM

## 2023-03-29 DIAGNOSIS — R42 VERTIGO: Primary | ICD-10-CM

## 2023-03-29 DIAGNOSIS — E11.9 TYPE 2 DIABETES MELLITUS WITHOUT COMPLICATION, WITHOUT LONG-TERM CURRENT USE OF INSULIN (HCC): ICD-10-CM

## 2023-03-29 DIAGNOSIS — I10 PRIMARY HYPERTENSION: ICD-10-CM

## 2023-03-29 DIAGNOSIS — K92.1 BLOOD IN STOOL: ICD-10-CM

## 2023-03-29 DIAGNOSIS — K21.9 GASTROESOPHAGEAL REFLUX DISEASE, UNSPECIFIED WHETHER ESOPHAGITIS PRESENT: ICD-10-CM

## 2023-03-29 PROCEDURE — 3078F DIAST BP <80 MM HG: CPT | Performed by: INTERNAL MEDICINE

## 2023-03-29 PROCEDURE — 3051F HG A1C>EQUAL 7.0%<8.0%: CPT | Performed by: INTERNAL MEDICINE

## 2023-03-29 PROCEDURE — G8427 DOCREV CUR MEDS BY ELIG CLIN: HCPCS | Performed by: INTERNAL MEDICINE

## 2023-03-29 PROCEDURE — 3075F SYST BP GE 130 - 139MM HG: CPT | Performed by: INTERNAL MEDICINE

## 2023-03-29 PROCEDURE — 1123F ACP DISCUSS/DSCN MKR DOCD: CPT | Performed by: INTERNAL MEDICINE

## 2023-03-29 PROCEDURE — G8432 DEP SCR NOT DOC, RNG: HCPCS | Performed by: INTERNAL MEDICINE

## 2023-03-29 PROCEDURE — 1101F PT FALLS ASSESS-DOCD LE1/YR: CPT | Performed by: INTERNAL MEDICINE

## 2023-03-29 PROCEDURE — G8536 NO DOC ELDER MAL SCRN: HCPCS | Performed by: INTERNAL MEDICINE

## 2023-03-29 PROCEDURE — 1090F PRES/ABSN URINE INCON ASSESS: CPT | Performed by: INTERNAL MEDICINE

## 2023-03-29 PROCEDURE — G8417 CALC BMI ABV UP PARAM F/U: HCPCS | Performed by: INTERNAL MEDICINE

## 2023-03-29 PROCEDURE — 99215 OFFICE O/P EST HI 40 MIN: CPT | Performed by: INTERNAL MEDICINE

## 2023-03-29 RX ORDER — OMEPRAZOLE 40 MG/1
40 CAPSULE, DELAYED RELEASE ORAL DAILY
Qty: 90 CAPSULE | Refills: 2 | Status: SHIPPED | OUTPATIENT
Start: 2023-03-29

## 2023-03-29 RX ORDER — MECLIZINE HYDROCHLORIDE 25 MG/1
25 TABLET ORAL
Qty: 30 TABLET | Refills: 0 | Status: SHIPPED | OUTPATIENT
Start: 2023-03-29 | End: 2023-04-08

## 2023-03-29 RX ORDER — DULOXETIN HYDROCHLORIDE 60 MG/1
60 CAPSULE, DELAYED RELEASE ORAL DAILY
COMMUNITY
Start: 2023-03-28

## 2023-03-29 NOTE — PROGRESS NOTES
HPI  Ms. Jackson President is a 68y.o. year old female, she is seen today for multiple symptoms. Started 4 weeks ago with feeling dizzy, ringing in ears, felt like room was spinning, nausea, vomiting. Dizziness would resolve most days after 2-3 hours, later might eat, burp, then vomiting. Only feels dizzy if upright. Doesn't happen daily. No chest pain or sob other than mild aching in chest, burping would make it feel better. Better with sitting still. Feels like food is getting stuck, will sometimes vomit undigested food. Cough that feels like tickle in throat since November off and on. Worse since starting lisinopril in February. Stopped metformin about 3 weeks ago and nausea is improved, but still vomiting undigested food. No dizziness in about 3 days - had 2 spells in 3 weeks. Has cut out a lot of sugar in her diet. Also eating much less cookies, sweets. Takes omeprazole for heartburn, but has breakthrough symptoms. Is on duloxetine for hand pain - Dr. Alina Clifton - rheumatologist - hasn't had side effects. No weight loss. Has noticed red blood in stool for about a year - last noticed about 2 weeks ago. BM not painful. Last colonoscopy 2015 - one polyp - repeat due in 5 years but not scheduled due to covid and never rescheduled. Chief Complaint   Patient presents with    Cough    Dizziness    Nausea     vomiting        Prior to Admission medications    Medication Sig Start Date End Date Taking? Authorizing Provider   DULoxetine (CYMBALTA) 60 mg capsule Take 60 mg by mouth daily. 3/28/23  Yes Provider, Historical   omeprazole (PRILOSEC) 40 mg capsule Take 1 Capsule by mouth daily. 3/29/23  Yes Miroslava Blackmon MD   meclizine (ANTIVERT) 25 mg tablet Take 1 Tablet by mouth three (3) times daily as needed for Dizziness for up to 10 days.  3/29/23 4/8/23 Yes Miroslava Blackmon MD   Blood-Glucose Meter monitoring kit Test once daily DX E11.9 3/27/23  Yes Miroslava Blackmon MD glucose blood VI test strips (blood glucose test) strip Test once daily DX E11.9 3/27/23  Yes José Nichole MD   lancets misc Test once daily DX E11.9 3/27/23  Yes José Nichole MD   diclofenac (VOLTAREN) 1 % gel APPLY 2 TO 4 GRAMS TOPICALLY 4 TIMES DAILY AS NEEDED FOR PAIN 1/28/23  Yes Provider, Historical   letrozole (FEMARA) 2.5 mg tablet Take 1 Tablet by mouth daily. 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 Yes Provider, Historical   cholecalciferol (VITAMIN D3) (1000 Units /25 mcg) tablet Take 1,000 Units by mouth daily. Yes Provider, Historical   pramipexole (MIRAPEX) 0.5 mg tablet Take 1 tablet by mouth nightly 7/18/22  Yes José Nichole MD   acetaminophen (TYLENOL) 325 mg tablet Take 650 mg by mouth every four (4) hours as needed for Pain. Yes Provider, Historical   metFORMIN ER (GLUCOPHAGE XR) 500 mg tablet Take 1 Tablet by mouth two (2) times daily (with meals). 2/14/23 3/29/23  José Nichole MD   lisinopriL (PRINIVIL, ZESTRIL) 5 mg tablet Take 1 Tablet by mouth daily. 2/14/23 3/29/23  José Nichole MD   DULoxetine (CYMBALTA) 20 mg capsule Take 40 mg by mouth nightly. 3/29/23  Provider, Historical   omeprazole (PRILOSEC) 20 mg capsule Take 1 capsule by mouth once daily 7/12/22 3/29/23  José Nichole MD         No Known Allergies      REVIEW OF SYSTEMS:  Per HPI    PHYSICAL EXAM:  Visit Vitals  /76 (BP 1 Location: Left upper arm, BP Patient Position: Sitting)   Pulse 87   Temp 97.6 °F (36.4 °C) (Oral)   Resp 12   Ht 5' (1.524 m)   Wt 155 lb (70.3 kg)   LMP 10/26/1995   SpO2 98%   BMI 30.27 kg/m²     Constitutional: Appears well-developed and well-nourished. No distress.    HENT: Head: Normocephalic and atraumatic. Eyes: No scleral icterus. PERRL  Ears: tm's wnl   Mouth: OP clear without lesions, no pharyngeal exudate  Neck: no lad, no tm, supple   Cardiovascular: Normal S1/S2, regular rhythm. No murmurs, rubs, or gallops. Pulmonary/Chest: Effort normal and breath sounds normal. No respiratory distress. No wheezes, rhonchi, or rales. Abdomen: Soft, NT/ND, +BS, no rebound or guarding, no masses, no HSM appreciated. Ext: No edema. Neurological: Alert. CN II-XII intact  Psychiatric: Normal mood and affect. Behavior is normal.     Lab Results   Component Value Date/Time    Sodium 140 12/09/2022 10:13 AM    Potassium 4.0 12/09/2022 10:13 AM    Chloride 111 (H) 12/09/2022 10:13 AM    CO2 26 12/09/2022 10:13 AM    Anion gap 3 (L) 12/09/2022 10:13 AM    Glucose 104 (H) 12/09/2022 10:13 AM    Glucose 98 08/05/2010 08:34 AM    BUN 14 12/09/2022 10:13 AM    Creatinine 0.99 12/09/2022 10:13 AM    BUN/Creatinine ratio 14 12/09/2022 10:13 AM    GFR est AA 62 02/07/2022 12:52 PM    GFR est non-AA 54 (L) 02/07/2022 12:52 PM    Calcium 8.4 (L) 12/09/2022 10:13 AM    Bilirubin, total 0.3 02/07/2022 12:52 PM    Alk.  phosphatase 108 02/07/2022 12:52 PM    Protein, total 6.9 02/07/2022 12:52 PM    Albumin 4.1 02/07/2022 12:52 PM    Globulin 3.3 06/21/2021 03:44 AM    A-G Ratio 1.5 02/07/2022 12:52 PM    ALT (SGPT) 15 02/07/2022 12:52 PM     Lab Results   Component Value Date/Time    Hemoglobin A1c 6.9 (H) 02/07/2022 12:52 PM    Hemoglobin A1c 6.3 (H) 06/07/2021 08:21 AM    Hemoglobin A1c 6.4 (H) 06/29/2020 10:22 AM      Lab Results   Component Value Date/Time    Cholesterol, total 203 (H) 02/07/2022 12:52 PM    HDL Cholesterol 33 (L) 02/07/2022 12:52 PM    LDL, calculated 124 (H) 02/07/2022 12:52 PM    LDL, calculated 83 06/29/2020 10:22 AM    VLDL, calculated 46 (H) 02/07/2022 12:52 PM    VLDL, calculated 54 (H) 06/29/2020 10:22 AM    Triglyceride 257 (H) 02/07/2022 12:52 PM ASSESSMENT/PLAN  Diagnoses and all orders for this visit:    1. Vertigo    2. Blood in stool  -     REFERRAL TO GASTROENTEROLOGY    3. Dysphagia, unspecified type  -     REFERRAL TO GASTROENTEROLOGY    4. Gastroesophageal reflux disease, unspecified whether esophagitis present  -     REFERRAL TO GASTROENTEROLOGY  -     omeprazole (PRILOSEC) 40 mg capsule; Take 1 Capsule by mouth daily. 5. Type 2 diabetes mellitus without complication, without long-term current use of insulin ([facility] [address]    6. Primary hypertension    7. Tinnitus, unspecified laterality  -     REFERRAL TO ENT-OTOLARYNGOLOGY    Other orders  -     meclizine (ANTIVERT) 25 mg tablet; Take 1 Tablet by mouth three (3) times daily as needed for Dizziness for up to 10 days. Stop lisinopril - may be due for cough - BP acceptable  Will schedule with GI for gerd, dysphagia, nausea, blood in stool - overdue for colonoscopy - need to r/o polyp, stricture, gastritis, and increase omeprazole to 40mg  Will continue to hold metformin and work on diet changes for DM - suspect it was leading to nausea  Add meclizine prn vertigo    Health Maintenance Due   Topic Date Due    Eye Exam Retinal or Dilated  Never done    Pneumococcal 65+ years (2 - PPSV23 if available, else PCV20) 01/31/2018    Lipid Screen  02/07/2023        Follow-up and Dispositions    Return if symptoms worsen or fail to improve. >40 min total spent on visit including review of previous notes, testing, obtaining history, exam, and counseling patient on diagnoses      Reviewed plan of care. Patient has provided input and agrees with goals. The nurse provided the patient and/or family with advanced directive information if needed and encouraged the patient to provide a copy to the office when available.

## 2023-03-29 NOTE — PATIENT INSTRUCTIONS
Stop metformin.      Stop lisinopril    Increase omeprazole to 40mg daily     Take meclizine as needed for dizziness

## 2023-03-29 NOTE — PROGRESS NOTES
[id number] [address] [place] [address]  Identified pt with two pt identifiers(name and ). Chief Complaint   Patient presents with    Cough    Dizziness    Nausea     vomiting       Reviewed record In preparation for visit and have obtained necessary documentation. 1. Have you been to the ER, urgent care clinic or hospitalized since your last visit? No     2. Have you seen or consulted any other health care providers outside of the [address] [place] [address] since your last visit? Include any pap smears or colon screening. Ortho     Vitals reviewed with provider.     Health Maintenance reviewed:     Health Maintenance Due   Topic    Eye Exam Retinal or Dilated     Pneumococcal 65+ years (2 - PPSV23 if available, else PCV20)    Lipid Screen           Wt Readings from Last 3 Encounters:   23 155 lb (70.3 kg)   23 157 lb (71.2 kg)   01/10/23 158 lb 1.6 oz (71.7 kg)        Temp Readings from Last 3 Encounters:   23 97.6 °F (36.4 °C) (Oral)   23 98 °F (36.7 °C) (Oral)   01/10/23 98.6 °F (37 °C) (Temporal)        BP Readings from Last 3 Encounters:   23 137/76   23 (!) 140/80   01/10/23 (!) 154/83        Pulse Readings from Last 3 Encounters:   23 87   23 83   01/10/23 (!) 106        Vitals:    23 1553   BP: 137/76   Pulse: 87   Resp: 12   Temp: 97.6 °F (36.4 °C)   TempSrc: Oral   SpO2: 98%   Weight: 155 lb (70.3 kg)   Height: 5' (1.524 m)   PainSc:   0 - No pain   LMP: 10/26/1995          Learning Assessment:   :       Learning Assessment 10/7/2020 2019 2014   PRIMARY LEARNER Patient Patient Patient   HIGHEST LEVEL OF EDUCATION - PRIMARY LEARNER  - - DID NOT GRADUATE HIGH SCHOOL   BARRIERS PRIMARY LEARNER - - NONE   CO-LEARNER CAREGIVER - - No   PRIMARY LANGUAGE ENGLISH ENGLISH ENGLISH   LEARNER PREFERENCE PRIMARY DEMONSTRATION OTHER (COMMENT) DEMONSTRATION   ANSWERED BY patient patient patient   RELATIONSHIP SELF SELF SELF        Depression Screening:   :       3 most recent PHQ Screens 2/2/2023   Little interest or pleasure in doing things Not at all   Feeling down, depressed, irritable, or hopeless Not at all   Total Score PHQ 2 0        Fall Risk Assessment:   :       Fall Risk Assessment, last 12 mths 2/2/2023   Able to walk? Yes   Fall in past 12 months? 0   Do you feel unsteady? 1   Are you worried about falling 0        Abuse Screening:   :       Abuse Screening Questionnaire 2/2/2023 10/13/2022 6/7/2022 6/2/2020 2/19/2019 1/8/2018 12/29/2016   Do you ever feel afraid of your partner? N N N N N N N   Are you in a relationship with someone who physically or mentally threatens you? N N N N N N N   Is it safe for you to go home?  Y Y Y Y Y Y Y        ADL Screening:   :       ADL Assessment 2/2/2023   Feeding yourself No Help Needed   Getting from bed to chair No Help Needed   Getting dressed No Help Needed   Bathing or showering No Help Needed   Walk across the room (includes cane/walker) No Help Needed   Using the telphone No Help Needed   Taking your medications No Help Needed   Preparing meals No Help Needed   Managing money (expenses/bills) No Help Needed   Moderately strenuous housework (laundry) No Help Needed   Shopping for personal items (toiletries/medicines) No Help Needed   Shopping for groceries No Help Needed   Driving No Help Needed   Climbing a flight of stairs No Help Needed   Getting to places beyond walking distances No Help Needed

## 2023-04-03 ENCOUNTER — CLINICAL SUPPORT (OUTPATIENT)
Dept: DIABETES SERVICES | Age: 78
End: 2023-04-03
Payer: MEDICARE

## 2023-04-03 DIAGNOSIS — E11.9 TYPE 2 DIABETES MELLITUS WITHOUT COMPLICATION, WITHOUT LONG-TERM CURRENT USE OF INSULIN (HCC): Primary | ICD-10-CM

## 2023-04-03 PROCEDURE — G0108 DIAB MANAGE TRN  PER INDIV: HCPCS | Performed by: DIETITIAN, REGISTERED

## 2023-04-03 NOTE — PROGRESS NOTES
[facility]  Diabetes Self-Management Education & Support Program  Encounter Note    SUMMARY  Diabetes self-care management training was completed related to monitoring. The participant will return on April 10 to continue DSMES related to taking medications. The participant did identify SMART Goal(s) and will practice knowledge and skills related to healthy eating and monitoring and being active to improve diabetes self-management. EVALUATION:  Ms Kristie Gaucher did her chair exercises this last week and feels good about continuing to do them. She reports having to vomit 2x since seeing Dr Braeden العلي on 3/29. She has stopped the Lisinopril and reports she is still having a cough. Started checking BG and it was 118 this morning, 125 another morning. She did not eat until later in the afternoon, checked her BG around 12:30 pm and it was 245 after being outside for a few hours. Also usually has her coffee with 1 tsp sugar before checking (which we discussed would not make too much of a difference)    Discussed monitoring and she is going to check when she first wakes up instead of a couple hours after waking up or until first eating. Pt verbalized understanding of all topics discussed and did not have any other questions. RECOMMENDATIONS:  Bring meter and BG log next week    TOPICS DISCUSSED TODAY:  HOW CAN BLOOD GLUCOSE MONITORING HELP ME? 30      Next provider visit is scheduled for 4/10/23 with me       SMART GOAL(S)  Practice building a balanced meal for breakfast at least 3 times over the next week. ACHIEVEMENT OF GOAL(S) : %    2. Increase physical activity by doing chair exercises for 10 minutes 3 times over the next week. ACHIEVEMENT OF GOAL(S) :  %    3. Check BG when waking up each morning this week, or before first meal if she forgets.   ACHIEVEMENT OF GOAL(S) :  0-24%  Will evaluate at next visit     [address]     4/3/2023 HOW CAN BLOOD GLUCOSE MONITORING HELP ME? Value of blood glucose monitoring   Realistic expectations   Blood glucose monitoring targets   Target adjustments   Setting a1c & blood glucose targets with provider   Meter selection    Technique for obtaining blood droplet   Blood glucose testing sites   Determining best times to test   Pregnancy recommendations   Data sharing with provider        The participant   Can demonstrate their glucometer procedure: Yes  Logs their BG readings:  Yes    The participant needs to address see above. Jaquelin Hernandez RD on 4/3/2023 at 10:23 AM    I have personally reviewed the health record, including provider notes, laboratory data and current medications before making these care and education recommendations. The time spent in this effort is included in the total time.   Total minutes: 45

## 2023-04-22 DIAGNOSIS — E78.2 MIXED HYPERLIPIDEMIA: Primary | ICD-10-CM

## 2023-04-23 DIAGNOSIS — E11.65 TYPE 2 DIABETES MELLITUS WITH HYPERGLYCEMIA, WITHOUT LONG-TERM CURRENT USE OF INSULIN (HCC): Primary | ICD-10-CM

## 2023-04-24 ENCOUNTER — CLINICAL SUPPORT (OUTPATIENT)
Dept: DIABETES SERVICES | Age: 78
End: 2023-04-24
Payer: MEDICARE

## 2023-04-24 DIAGNOSIS — E11.9 TYPE 2 DIABETES MELLITUS WITHOUT COMPLICATION, WITHOUT LONG-TERM CURRENT USE OF INSULIN (HCC): Primary | ICD-10-CM

## 2023-04-24 PROCEDURE — G0108 DIAB MANAGE TRN  PER INDIV: HCPCS | Performed by: DIETITIAN, REGISTERED

## 2023-04-24 NOTE — PROGRESS NOTES
[address] [place] [address] [facility]  Diabetes Self-Management Education & Support Program  Encounter Note    SUMMARY  Diabetes self-care management training was completed related to reducing risks and problem solving. The participant will return on June 5 to complete post-assessment. The participant did not identify SMART Goal(s) and will practice knowledge and skills related to reducing risks, healthy eating and monitoring, being active and medications, and problem solving to improve diabetes self-management. EVALUATION:  Ms Zoila Kaplan had to r/s her gastroenterology appt to this Thursday (4/27). She is still throwing food up, including pills. She has been told by different providers to take her Celebrex and not take it, so she is not sure what to do and is going to bring this up. She checked BG most mornings and they were 122, 132, some in 130s, and the highest being 184 which was after she had a dessert the prior evening. We finished up reducing risks today and problem solving. She has not told some of her providers she has diabetes because she has not been back to them (eye doctor, podiatrist, dentist), but she plans on doing so. She does not have low blood sugars but still discussed the 15-15 rule and what to do for high BG, sick days, and disasters. She is open to putting together a grab-and-go emergency kit for herself. She verbalized understanding of all topics discussed and did not have any more questions. RECOMMENDATIONS:  Discussed Celebrex with providers  Get A1c checked  Put grab-and-go kit together     TOPICS DISCUSSED TODAY:  HOW DO I STAY HEALTHY? 30  HOW DO I FIGURE OUT WHAT'S INFLUENCING MY BLOOD GLUCOSES? 30      Next provider visit is scheduled for 5/23/23 with Dr Pablito Saunders; 6/5/23 with me       SMART GOAL(S)  Practice building a balanced meal for breakfast at least 3 times over the next week. ACHIEVEMENT OF GOAL(S) : %    2.    Increase physical activity by doing chair exercises for 10 minutes 3 times over the next week. ACHIEVEMENT OF GOAL(S) :  %    3. Check BG when waking up each morning this week, or before first meal if she forgets. ACHIEVEMENT OF GOAL(S) :  %       DATE DSMES TOPIC EVALUATION     4/24/2023 HOW DO I STAY HEALTHY? Prevention   Vaccinations   Preconception care (if applicable)  Examinations   Eye    Foot   Diabetic complications' prevention   Dental health   Heart health   Kidney Health   Nerve health   Sleep health      The participant has a personal diabetes care record to keep abreast of diabetes health Yes - shown example in book    The participant needs to address see above. DATE DSMES TOPIC EVALUATION     4/24/2023 HOW DO I FIGURE OUT WHAT'S INFLUENCING MY BLOOD GLUCOSES? Problem solving using SOAR   Set goals   Sort options   Arrive at a plan   Reaffirm plan   Common problems in diabetes self-care   Hypoglycemia   Hyperglycemia   Sick days   Pattern management   Disaster preparedness plan        The participant has an action plan for   Hypoglycemia: Yes  Hyperglycemia: Yes  Sick days: Yes  During disasters: Yes    The participant needs to address see above. Cony Briceño RD on 4/24/2023 at 11:52 AM    I have personally reviewed the health record, including provider notes, laboratory data and current medications before making these care and education recommendations. The time spent in this effort is included in the total time.   Total minutes: 60

## 2023-05-16 LAB
ALBUMIN SERPL-MCNC: 4 G/DL (ref 3.7–4.7)
ALBUMIN/GLOB SERPL: 1.7 {RATIO} (ref 1.2–2.2)
ALP SERPL-CCNC: 110 IU/L (ref 44–121)
ALT SERPL-CCNC: 27 IU/L (ref 0–32)
AST SERPL-CCNC: 30 IU/L (ref 0–40)
BILIRUB SERPL-MCNC: 0.3 MG/DL (ref 0–1.2)
BUN SERPL-MCNC: 20 MG/DL (ref 8–27)
BUN/CREAT SERPL: 22 (ref 12–28)
CALCIUM SERPL-MCNC: 8.8 MG/DL (ref 8.7–10.3)
CHLORIDE SERPL-SCNC: 103 MMOL/L (ref 96–106)
CHOLEST SERPL-MCNC: 192 MG/DL (ref 100–199)
CO2 SERPL-SCNC: 24 MMOL/L (ref 20–29)
CREAT SERPL-MCNC: 0.9 MG/DL (ref 0.57–1)
EGFRCR SERPLBLD CKD-EPI 2021: 66 ML/MIN/1.73
EST. AVERAGE GLUCOSE BLD GHB EST-MCNC: 169 MG/DL
GLOBULIN SER CALC-MCNC: 2.4 G/DL (ref 1.5–4.5)
GLUCOSE SERPL-MCNC: 111 MG/DL (ref 70–99)
HBA1C MFR BLD: 7.5 % (ref 4.8–5.6)
HDLC SERPL-MCNC: 48 MG/DL
LDLC SERPL CALC-MCNC: 130 MG/DL (ref 0–99)
POTASSIUM SERPL-SCNC: 5.2 MMOL/L (ref 3.5–5.2)
PROT SERPL-MCNC: 6.4 G/DL (ref 6–8.5)
SODIUM SERPL-SCNC: 139 MMOL/L (ref 134–144)
TRIGL SERPL-MCNC: 77 MG/DL (ref 0–149)
VLDLC SERPL CALC-MCNC: 14 MG/DL (ref 5–40)

## 2023-05-23 ENCOUNTER — OFFICE VISIT (OUTPATIENT)
Facility: CLINIC | Age: 78
End: 2023-05-23
Payer: MEDICARE

## 2023-05-23 VITALS
HEART RATE: 92 BPM | WEIGHT: 162.5 LBS | SYSTOLIC BLOOD PRESSURE: 150 MMHG | DIASTOLIC BLOOD PRESSURE: 88 MMHG | TEMPERATURE: 97.9 F | OXYGEN SATURATION: 95 % | HEIGHT: 60 IN | BODY MASS INDEX: 31.9 KG/M2 | RESPIRATION RATE: 12 BRPM

## 2023-05-23 DIAGNOSIS — E11.9 TYPE 2 DIABETES MELLITUS WITHOUT COMPLICATION, WITHOUT LONG-TERM CURRENT USE OF INSULIN (HCC): Primary | ICD-10-CM

## 2023-05-23 DIAGNOSIS — I10 PRIMARY HYPERTENSION: ICD-10-CM

## 2023-05-23 DIAGNOSIS — K21.9 GASTRO-ESOPHAGEAL REFLUX DISEASE WITHOUT ESOPHAGITIS: ICD-10-CM

## 2023-05-23 DIAGNOSIS — R13.10 DYSPHAGIA, UNSPECIFIED TYPE: ICD-10-CM

## 2023-05-23 DIAGNOSIS — K92.1 BLOOD IN STOOL: ICD-10-CM

## 2023-05-23 DIAGNOSIS — E78.2 MIXED HYPERLIPIDEMIA: ICD-10-CM

## 2023-05-23 PROCEDURE — 1123F ACP DISCUSS/DSCN MKR DOCD: CPT | Performed by: INTERNAL MEDICINE

## 2023-05-23 PROCEDURE — G8417 CALC BMI ABV UP PARAM F/U: HCPCS | Performed by: INTERNAL MEDICINE

## 2023-05-23 PROCEDURE — G8399 PT W/DXA RESULTS DOCUMENT: HCPCS | Performed by: INTERNAL MEDICINE

## 2023-05-23 PROCEDURE — 1036F TOBACCO NON-USER: CPT | Performed by: INTERNAL MEDICINE

## 2023-05-23 PROCEDURE — 3078F DIAST BP <80 MM HG: CPT | Performed by: INTERNAL MEDICINE

## 2023-05-23 PROCEDURE — 1090F PRES/ABSN URINE INCON ASSESS: CPT | Performed by: INTERNAL MEDICINE

## 2023-05-23 PROCEDURE — 99214 OFFICE O/P EST MOD 30 MIN: CPT | Performed by: INTERNAL MEDICINE

## 2023-05-23 PROCEDURE — 3051F HG A1C>EQUAL 7.0%<8.0%: CPT | Performed by: INTERNAL MEDICINE

## 2023-05-23 PROCEDURE — G8427 DOCREV CUR MEDS BY ELIG CLIN: HCPCS | Performed by: INTERNAL MEDICINE

## 2023-05-23 PROCEDURE — 3074F SYST BP LT 130 MM HG: CPT | Performed by: INTERNAL MEDICINE

## 2023-05-23 RX ORDER — LOSARTAN POTASSIUM 25 MG/1
25 TABLET ORAL DAILY
Qty: 30 TABLET | Refills: 5 | Status: SHIPPED | OUTPATIENT
Start: 2023-05-23

## 2023-05-23 RX ORDER — OMEPRAZOLE 40 MG/1
40 CAPSULE, DELAYED RELEASE ORAL
Qty: 90 CAPSULE | Refills: 1
Start: 2023-05-23

## 2023-05-23 SDOH — ECONOMIC STABILITY: HOUSING INSECURITY
IN THE LAST 12 MONTHS, WAS THERE A TIME WHEN YOU DID NOT HAVE A STEADY PLACE TO SLEEP OR SLEPT IN A SHELTER (INCLUDING NOW)?: NO

## 2023-05-23 SDOH — ECONOMIC STABILITY: FOOD INSECURITY: WITHIN THE PAST 12 MONTHS, THE FOOD YOU BOUGHT JUST DIDN'T LAST AND YOU DIDN'T HAVE MONEY TO GET MORE.: NEVER TRUE

## 2023-05-23 SDOH — ECONOMIC STABILITY: FOOD INSECURITY: WITHIN THE PAST 12 MONTHS, YOU WORRIED THAT YOUR FOOD WOULD RUN OUT BEFORE YOU GOT MONEY TO BUY MORE.: NEVER TRUE

## 2023-05-23 SDOH — ECONOMIC STABILITY: INCOME INSECURITY: HOW HARD IS IT FOR YOU TO PAY FOR THE VERY BASICS LIKE FOOD, HOUSING, MEDICAL CARE, AND HEATING?: SOMEWHAT HARD

## 2023-05-23 NOTE — PROGRESS NOTES
HPI  Ms. Sarina Reid is a 68y.o. year old female, she is seen today for follow up DM, HTN, cholesterol. Was having leg cramps day and night - none in last week since drinking more water. No chest pain, sob, dizziness, weakness, lightheadedness. DM - glucose has been \"pretty good\" if doesn't \"cheat\" - higher if she eats sugary foods, normally under 130 fasting. Trying to add breakfast. Also thinks she can cut back on sweets and bread. Still feeling like food is getting stuck when eating, will regurgitate food several hours later. Still noticing blood in her stool as well. Has appt with GI on June 9. Last visit I increased omeprazole to 40mg, helped some. Since stopping lisinopril less sensation of tickle in throat - now only occasionally while eating. LDL not to goal - in past several years has been eating more jams, rolls, baked goods. 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 Chief Complaint   Patient presents with    Diabetes    Hypertension    Cholesterol Problem    Leg Pain     Cramps         Prior to Admission medications    Medication Sig Start Date End Date Taking?  Authorizing Provider   omeprazole (PRILOSEC) 40 MG delayed release capsule Take 1 capsule by mouth every morning (before breakfast) 5/23/23  Yes Samson Lee MD   losartan (COZAAR) 25 MG tablet Take 1 tablet by mouth daily 5/23/23  Yes Samson Lee MD   acetaminophen (TYLENOL) 325 MG tablet Take 2 tablets by mouth every 4 hours as needed   Yes Ar Automatic Reconciliation   ascorbic acid (VITAMIN C) 500 MG tablet Take 1 tablet by mouth daily   Yes Ar Automatic Reconciliation   atorvastatin (LIPITOR) 40 MG tablet Take 1 tablet by mouth nightly 12/21/22  Yes Ar Automatic Reconciliation   vitamin D (CHOLECALCIFEROL) 25 MCG (1000 UT) TABS tablet Take 1 tablet by mouth daily   Yes Ar Automatic Reconciliation   diclofenac sodium (VOLTAREN) 1 % GEL APPLY 2 TO 4 GRAMS TOPICALLY 4 TIMES

## 2023-05-23 NOTE — PROGRESS NOTES
[address] [address] [place] [address]  Identified pt with two pt identifiers(name and ). Chief Complaint   Patient presents with    Diabetes    Hypertension    Cholesterol Problem    Leg Pain     Cramps        Reviewed record In preparation for visit and have obtained necessary documentation. 1. Have you been to the ER, urgent care clinic or hospitalized since your last visit? No     2. Have you seen or consulted any other health care providers outside of the [address] [place] [address] since your last visit? Include any pap smears or colon screening. Ortho, eye     Vitals reviewed with provider. Health Maintenance reviewed:     Health Maintenance Due   Topic    Shingles vaccine (2 of 3)    Pneumococcal 65+ years Vaccine (2 - PPSV23 if available, else PCV20)          Wt Readings from Last 3 Encounters:   23 162 lb 8 oz (73.7 kg)   04/10/23 160 lb (72.6 kg)   23 155 lb (70.3 kg)        Temp Readings from Last 3 Encounters:   23 97.9 °F (36.6 °C) (Oral)        BP Readings from Last 3 Encounters:   23 (!) 174/88   04/10/23 (!) 164/82   23 137/76        Pulse Readings from Last 3 Encounters:   23 92   04/10/23 100   23 87      [unfilled]       Learning Assessment:   :     [address] PRATT LEARNING ASSESSMENT 2023   PRIMARY LEARNER Patient   HIGHEST LEVEL OF EDUCATION - PRIMARY LEARNER DID NOT GRADUATE HIGH SCHOOL   BARRIERS PRIMARY LEARNER NONE   CO-LEARNER CAREGIVER No   PRIMARY LANGUAGE ENGLISH   LEARNER PREFERENCE PRIMARY DEMONSTRATION   ANSWERED BY patient   RELATIONSHIP SELF         Fall Risk Assessment:   :     Nemo Fall Risk Assessment and TUG Test 4/10/2023 2023 1/10/2023 2022 11/3/2022 10/13/2022 2022   Fall in past 12 months? 0 0 0 - - 0 0   Able to walk? Yes Yes Yes - - Yes Yes   Total Score - - - 1 0 - -          Abuse Screening:   :     NEMO Abuse Screening 2023   Do you ever feel afraid of your partner?  N   Are you in a relationship with someone who physically

## 2023-06-05 ENCOUNTER — NURSE ONLY (OUTPATIENT)
Age: 78
End: 2023-06-05
Payer: MEDICARE

## 2023-06-05 DIAGNOSIS — E11.9 TYPE 2 DIABETES MELLITUS WITHOUT COMPLICATION, WITHOUT LONG-TERM CURRENT USE OF INSULIN (HCC): Primary | ICD-10-CM

## 2023-06-05 PROCEDURE — G0108 DIAB MANAGE TRN  PER INDIV: HCPCS | Performed by: DIETITIAN, REGISTERED

## 2023-06-05 NOTE — PROGRESS NOTES
7.0  Low: Healthy Eating(Q1),Being Active(Q2),Healthy Coping(Q3),Blood Sugar Monitoring(Q6),Problem Solving(Q7)  Healthy Eating Score: 7.0  Low: Skills(Q1),Confidence(Q1),Confidence(Q4),Preparedness(Q1) Taking Medication Score: N/A Blood Sugar Monitoring Score: 7.0  Low: Skills(Q3),Skills(Q8),Confidence(Q6),Preparedness(Q6) Reducing Risks Score: 6.7  Low: CULGRO(I1)  Problem Solving Score: 6.7  Low: Confidence(Q7) Healthy Coping Score: 7.0  Low: Skills(Q7),Confidence(Q2),Preparedness(Q3) Being Active Score: 7.0  Low: Confidence(Q5),Preparedness(Q2)    Skills/Knowledge Questions  1. I know how to plan meals that have the best balance between carbohydrates, proteins and vegetables. 7  2. I know how my diabetes medications (pills, injectables and/or insulin) work in my body. 0  3. I know when to check my blood sugar if I want to see how my body responded to a meal. 7  4. I know when to check my blood sugars to determine if my medication or insulin doses are correct. 0  5. I know what to do to prevent a low blood sugar when I exercise (either before, during, or after). 7  6. When I am sick, I know what to do differently with my diabetes management. 7  7. I know how stress can affect my diabetes management. 7  8. When I look at my blood sugars over a given week, I can explain what my blood sugar pattern is. 7  9. I know what my target levels are for A1c, blood pressure and cholesterol. 6  Confidence Questions  1. I am confident that I can plan balanced meals and snacks. 7  2. I am confident that I can manage my stress. 7  3. I am confident that I can prevent a low blood sugar during or after exercise. 7  4. I am confident that the next time I eat out, I will be able to choose foods that best keep my blood sugars in target. 7  5. I am confident I can include exercise into my schedule. 7  6. I am confident that I can use my daily blood sugars to adjust my diet, my activity, and/or my insulin. 7  7.  When something out of my

## 2023-06-13 RX ORDER — ACETAMINOPHEN 500 MG
1000 TABLET ORAL AS NEEDED
COMMUNITY

## 2023-06-13 RX ORDER — DULOXETIN HYDROCHLORIDE 20 MG/1
40 CAPSULE, DELAYED RELEASE ORAL
COMMUNITY

## 2023-06-13 NOTE — FLOWSHEET NOTE
[address] [place] [address] Dr Garcia Preprocedure Instructions      1. On the day of your surgery, please report to registration located on the 2nd floor of the  [facility]. yes    2. You must have a responsible adult to drive you to the hospital, stay at the hospital during your procedure and drive you home. If they leave your procedure will not be started (no exceptions). yes    3. Do not have anything to eat or drink (including water, gum, mints, coffee, and juice) after midnight. This does not apply to the medications you were instructed to take by your physician. yes  If you are currently taking Plavix, Coumadin, Aspirin, or other blood-thinning agents, contact your physician for special instructions. not applicable    4. If you are having a procedure that requires bowel prep: We recommend that you have only clear liquids the day before your procedure and begin your bowel prep by 5:00 pm.  You may continue to drink clear liquids until midnight. If for any reason you are not able to complete your prep please notify your physician immediately. yes    5. Have a list of all current medications, including vitamins, herbal supplements and any other over the counter medications. yes    6. If you wear glasses, contacts, dentures and/or hearing aids, they may be removed prior to procedure, please bring a case to store them in. yes    7. You should understand that if you do not follow these instructions your procedure may be cancelled. If your physical condition changes (I.e. fever, cold or flu) please contact your doctor as soon as possible. 8. It is important that you be on time. If for any reason you are unable to keep your appointment please call [phone] the day of or your physicians office prior to your scheduled procedure    9.  Have you received your COVID Vaccine? no If no, you will need to receive a COVID test/swab here at [address] [place] [address] the [facility] parking lot Monday - Friday 8a - 11am. There are

## 2023-06-14 ENCOUNTER — HOSPITAL ENCOUNTER (OUTPATIENT)
Facility: HOSPITAL | Age: 78
Setting detail: OUTPATIENT SURGERY
Discharge: HOME | End: 2023-06-14
Attending: SPECIALIST | Admitting: SPECIALIST
Payer: MEDICARE

## 2023-06-14 VITALS
SYSTOLIC BLOOD PRESSURE: 128 MMHG | BODY MASS INDEX: 30.69 KG/M2 | WEIGHT: 156.31 LBS | HEIGHT: 60 IN | HEART RATE: 69 BPM | DIASTOLIC BLOOD PRESSURE: 68 MMHG | TEMPERATURE: 97.8 F | OXYGEN SATURATION: 96 % | RESPIRATION RATE: 20 BRPM

## 2023-06-14 PROCEDURE — 7100000011 HC PHASE II RECOVERY - ADDTL 15 MIN: Performed by: SPECIALIST

## 2023-06-14 PROCEDURE — 2709999900 HC NON-CHARGEABLE SUPPLY: Performed by: SPECIALIST

## 2023-06-14 PROCEDURE — 3600007512: Performed by: SPECIALIST

## 2023-06-14 PROCEDURE — 3600007502: Performed by: SPECIALIST

## 2023-06-14 PROCEDURE — 3700000001 HC ADD 15 MINUTES (ANESTHESIA): Performed by: SPECIALIST

## 2023-06-14 PROCEDURE — 3700000000 HC ANESTHESIA ATTENDED CARE: Performed by: SPECIALIST

## 2023-06-14 PROCEDURE — 88305 TISSUE EXAM BY PATHOLOGIST: CPT

## 2023-06-14 PROCEDURE — 7100000010 HC PHASE II RECOVERY - FIRST 15 MIN: Performed by: SPECIALIST

## 2023-06-14 RX ORDER — SODIUM CHLORIDE 9 MG/ML
INJECTION, SOLUTION INTRAVENOUS CONTINUOUS
Status: DISCONTINUED | OUTPATIENT
Start: 2023-06-14 | End: 2023-06-14 | Stop reason: HOSPADM

## 2023-06-14 RX ORDER — SIMETHICONE 20 MG/.3ML
40 EMULSION ORAL
Status: DISCONTINUED | OUTPATIENT
Start: 2023-06-14 | End: 2023-06-14 | Stop reason: HOSPADM

## 2023-06-14 RX ORDER — SODIUM CHLORIDE 0.9 % (FLUSH) 0.9 %
5-40 SYRINGE (ML) INJECTION PRN
Status: DISCONTINUED | OUTPATIENT
Start: 2023-06-14 | End: 2023-06-14 | Stop reason: HOSPADM

## 2023-06-14 ASSESSMENT — PAIN - FUNCTIONAL ASSESSMENT: PAIN_FUNCTIONAL_ASSESSMENT: NONE - DENIES PAIN

## 2023-06-14 NOTE — PROGRESS NOTES
1035  Timeout performed. Anesthesia staff at patient's bedside administering anesthesia and monitoring patients vital signs throughout procedure. See anesthesia note. Post procedure, report received from Celia WADSWORTH      1112  Endoscope was pre-cleaned at bedside immediately following procedure by [employer] Matt chilel      [phone]  Patient tolerated procedure. Abdomen soft and patient arousable and voices no complaints. Patient transported to endoscopy recovery area.    Report given to post procedure RN,   Juancarlos graham

## 2023-06-14 NOTE — DISCHARGE INSTRUCTIONS
[address] [facility] JENELLE Brooks MD  [phone]      June 14, 2023    Solis Bazan  YOB: 1945    COLONOSCOPY DISCHARGE INSTRUCTIONS    If there is redness at IV site you should apply warm compress to area. If redness or soreness persist contact Dr. Claudia Brooks' or your primary care doctor. There may be a slight amount of blood passed from the rectum. Gaseous discomfort may develop, but walking, belching will help relieve this. You may not operate a vehicle for 12 hours  You may not operate machinery or dangerous appliances for rest of today  You may not drink alcoholic beverages for 12 hours  Avoid making any critical decisions for 24 hours    DIET:  You may resume your normal diet, but some patients find that heavy or large meals may lead to indigestion or vomiting. I suggest a light meal as first food intake. MEDICATIONS:  The use of some over-the-counter pain medication may lead to bleeding after colon biopsies or polyp removal.  Tylenol (also called acetaminophen) is safe to take even if you have had colonoscopy with polyp removal.  Based on the procedure you had today you may not safely take aspirin or aspirin-like products for the next ten (10) days. Remember that Tylenol (also called acetaminophen) is safe to take after colonoscopy even if you have had biopsies or polyps removed. ACTIVITY:  You may resume your normal household activities, but it is recommended that you spend the remainder of the day resting -  avoid any strenuous activity. CALL DR. Hai Washington' OFFICE IF:  Increasing pain, nausea, vomiting  Abdominal distension (swelling)  Significant new or increased bleeding (oral or rectal)  Fever/Chills  Chest pain/shortness of breath                       Additional instructions:   No aspirin 10 days.   We found no dangerous problems but removed several polyps and I'll contact you with those

## 2023-06-14 NOTE — PROGRESS NOTES
Deena Warner  1945  [id number]    Situation:  Verbal report received from:EDGAR Yang  Procedure: Procedure(s):  colonoscopy  EGD ESOPHAGOGASTRODUODENOSCOPY  EGD BIOPSY  COLONOSCOPY POLYPECTOMY SNARE/COLD BIOPSY     Background:    Preoperative diagnosis: Bloating [R14.0]  Elevated hemoglobin A1c measurement [R73.09]  FHx: colon cancer [Z80.0]  Nausea and vomiting, unspecified vomiting type [R11.2]  Personal history of colonic polyps [Z86.010]  Rectal bleeding [K62.5]   Postoperative diagnosis: * No post-op diagnosis entered *     :  Dr. Mosquera Bantry  Assistant(s): Circulator: Cielo Walker RN  Endoscopy Technician: Yasmine Clayton     Specimens:   ID Type Source Tests Collected by Time Destination   A : duodenum biopsy Tissue Duodenum SURGICAL PATHOLOGY Randa Cordero MD 6/14/2023 1042    B : sigmoid colon polyp Tissue Sigmoid Colon SURGICAL PATHOLOGY Randa Cordero MD 6/14/2023 1054    C : ascending colon polyps Tissue Colon-Ascending SURGICAL PATHOLOGY Randa Cordero MD 6/14/2023 1106    D : transverse colon polyps Tissue Colon-Transverse SURGICAL PATHOLOGY Randa Cordero MD 6/14/2023 1108       H. Pylori  No    Assessment:  Intra-procedure medications     Anesthesia gave intra-procedure sedation and medications, see anesthesia flow sheet Yes    Intravenous fluids: NS@ KVO     Vital signs stable yes    Abdominal assessment: round and soft yes    Recommendation:  Discharge patient per MD order yes.   Return to floor na  Family or Friend family  Permission to share finding with family or friend Yes,

## 2023-06-14 NOTE — OP NOTE
[address] [facility] JENELLE Jones MD  [phone]      2023    Esophagogastroduodenoscopy & Colonoscopy Procedure Note  Kristen Yee  : 1945  Rosales Mariano Medical Record Number: [id number]      Indications:   GERD/regurgitation Personal history of colon polyps. Referring Physician:  Rose Marie Khalil MD  Anesthesia/Sedation: Conscious Sedation/Moderate Sedation/MAC  Endoscopist:  Dr. Amy Tovar  Complications:  None  Estimated Blood Loss:  None    Permit:  The indications, risks, benefits and alternatives were reviewed with the patient or their decision maker who was provided an opportunity to ask questions and all questions were answered. The specific risks of esophagogastroduodenoscopy with conscious sedation were reviewed, including but not limited to anesthetic complication, bleeding, adverse drug reaction, missed lesion, infection, IV site reactions, and intestinal perforation which would lead to the need for surgical repair. Alternatives to EGD and colonoscopy including radiographic imaging, observation without testing, or laboratory testing were reviewed as well as the limitations of those alternatives discussed. After considering the options and having all their questions answered, the patient or their decision maker provided both verbal and written consent to proceed. -----------EGD------------   Procedure in Detail:  After obtaining informed consent, positioning of the patient in the left lateral decubitus position, and conduction of a pre-procedure pause or \"time out\" the endoscope was introduced into the mouth and advanced to the duodenum. A careful inspection was made, and findings or interventions are described below. Findings:   VSATSKEPX:5YO uncomplicated hiatus hernia, no esophagitis, stricture or Ibarra's.   Stomach: normal   Duodenum/jejunum: normal aside from

## 2023-06-14 NOTE — PROGRESS NOTES
Endoscopy discharge instructions have been reviewed and given to patient. The patient verbalized understanding and acceptance of instructions. Dr. Bubba Bennett discussed with Geovanna Padilla procedure findings and next steps. Patient's dentures have been returned and are in mouth.

## 2023-07-16 RX ORDER — PRAMIPEXOLE DIHYDROCHLORIDE 0.5 MG/1
TABLET ORAL NIGHTLY
Qty: 90 TABLET | Refills: 0 | Status: SHIPPED | OUTPATIENT
Start: 2023-07-16

## 2023-07-26 ENCOUNTER — OFFICE VISIT (OUTPATIENT)
Age: 78
End: 2023-07-26
Payer: COMMERCIAL

## 2023-07-26 VITALS — HEIGHT: 60 IN | WEIGHT: 156 LBS | BODY MASS INDEX: 30.63 KG/M2

## 2023-07-26 DIAGNOSIS — Z85.3 HISTORY OF LEFT BREAST CANCER: Primary | ICD-10-CM

## 2023-07-26 PROCEDURE — 1123F ACP DISCUSS/DSCN MKR DOCD: CPT | Performed by: SURGERY

## 2023-07-26 PROCEDURE — 99213 OFFICE O/P EST LOW 20 MIN: CPT | Performed by: SURGERY

## 2023-07-26 NOTE — PROGRESS NOTES
HISTORY OF PRESENT ILLNESS  Henry Sites is a 66 y.o. female. HPI  ESTABLISHED Patient here for follow up LEFT breast cancer. Has had pain that comes and goes. Still taking letrozole. Denies other changes to the breast area. Review of Systems        11/03/2022: LEFT breast, 12:00, biopsy: IDC, spanning 6mm in greatest dimension, grade 1, ER+(%), RI+(81-90%), HER2-, Ki-67(15%). 12/15/2022: LEFT breast, excision. PATH: IDC, 9mm, grade 1, negative margins. Additional Findings: Intraductal papilloma and florid usual   ductal hyperplasia Pathologic stage: pT1b, pNX .   - LEFT breast, skin, excision: Seborrheic keratosis. No malignancy identified.        Past Medical History:   Diagnosis Date    Arthritis     bilateral hands, hips, lower back    At risk for sleep apnea 03/18/2019    on phone assessment with PAT, callie sleep scoring tool, scored 4    Cancer ([address]) 12/2022    LEFT BREAST    Chronic kidney disease 2017    kidney stones    COVID-19 vaccination not done 12/2022    COVID-19 virus infection 01/2022    H/O COVID 19 PNEUMONIA    Elevated hemoglobin A1c 01/27/2020    GERD (gastroesophageal reflux disease)     being treated    Heart murmur     High cholesterol     Hypertension     Kidney stones     Menopause 1995    Palpitations     as of 6/24/19 pt reports was evaluated by cardiology and cleared, now followed by cardiology, no further symptoms per pt    Pneumonia 2006 (approx)    Rheumatic fever     AGE 5    RLS (restless legs syndrome)     Sepsis ([address]) 6/20/2021    Shoulder fracture 2009 [facility] -Jan 09  Right side        Past Surgical History:   Procedure Laterality Date    BREAST LUMPECTOMY Left 12/15/2022    LEFT BREAST LUMPECTOMY WITH ULTRASOUND performed by Ronda Garibay MD at [address] [place] [address] Bilateral 03/2019    COLONOSCOPY  07/30/2010    Dr. Marleny Souza, diverticulosis, repeat due 7/30/2015    COLONOSCOPY N/A 6/14/2023    colonoscopy performed by Cindy Bashir

## 2023-07-26 NOTE — PROGRESS NOTES
HISTORY OF PRESENT ILLNESS  Dolly Barrett is a 66 y.o. female     HPI ESTABLISHED Patient here for follow up LEFT breast cancer. Has had pain that comes and goes. Still taking letrozole. Denies other changes to the breast area. 11/03/2022: LEFT breast, 12:00, biopsy: IDC, spanning 6mm in greatest   dimension, grade 1, ER+(%), KS+(81-90%), HER2-, Ki-67(15%). 12/15/2022: LEFT breast, excision. PATH: IDC, 9mm, grade 1, negative   margins. Additional Findings: Intraductal papilloma and florid usual   ductal hyperplasia Pathologic stage: pT1b, pNX .   - LEFT breast, skin, excision: Seborrheic keratosis. No malignancy identified.      Review of Systems      Physical Exam       ASSESSMENT and PLAN  {Assessment and Plan Chronic Disease:[id number]}

## 2023-07-27 ENCOUNTER — TELEPHONE (OUTPATIENT)
Facility: CLINIC | Age: 78
End: 2023-07-27

## 2023-07-27 NOTE — TELEPHONE ENCOUNTER
Pt called trying to get an appt for hip pain. Pt stated that she saw Dr. Emile Flores with ortho and he could not find anything wrong. She stated that it comes in and goes. She stated that when she gets out of bed its like her hip/ groin area will pop then get stiff and she can barely move.  Please call to advise

## 2023-08-17 LAB
BUN SERPL-MCNC: 23 MG/DL (ref 8–27)
BUN/CREAT SERPL: 19 (ref 12–28)
CALCIUM SERPL-MCNC: 9.4 MG/DL (ref 8.7–10.3)
CHLORIDE SERPL-SCNC: 105 MMOL/L (ref 96–106)
CHOLEST SERPL-MCNC: 197 MG/DL (ref 100–199)
CO2 SERPL-SCNC: 19 MMOL/L (ref 20–29)
CREAT SERPL-MCNC: 1.19 MG/DL (ref 0.57–1)
EGFRCR SERPLBLD CKD-EPI 2021: 47 ML/MIN/1.73
GLUCOSE SERPL-MCNC: 165 MG/DL (ref 70–99)
HBA1C MFR BLD: 7 % (ref 4.8–5.6)
HDLC SERPL-MCNC: 41 MG/DL
LDLC SERPL CALC-MCNC: 132 MG/DL (ref 0–99)
POTASSIUM SERPL-SCNC: 5.4 MMOL/L (ref 3.5–5.2)
SODIUM SERPL-SCNC: 140 MMOL/L (ref 134–144)
SPECIMEN STATUS REPORT: NORMAL
TRIGL SERPL-MCNC: 131 MG/DL (ref 0–149)
VLDLC SERPL CALC-MCNC: 24 MG/DL (ref 5–40)

## 2023-08-23 ENCOUNTER — OFFICE VISIT (OUTPATIENT)
Facility: CLINIC | Age: 78
End: 2023-08-23
Payer: MEDICARE

## 2023-08-23 VITALS
HEART RATE: 87 BPM | DIASTOLIC BLOOD PRESSURE: 70 MMHG | SYSTOLIC BLOOD PRESSURE: 140 MMHG | OXYGEN SATURATION: 97 % | HEIGHT: 63 IN | WEIGHT: 158 LBS | RESPIRATION RATE: 12 BRPM | BODY MASS INDEX: 28 KG/M2 | TEMPERATURE: 98.4 F

## 2023-08-23 DIAGNOSIS — I10 PRIMARY HYPERTENSION: Primary | ICD-10-CM

## 2023-08-23 DIAGNOSIS — E11.9 TYPE 2 DIABETES MELLITUS WITHOUT COMPLICATION, WITHOUT LONG-TERM CURRENT USE OF INSULIN (HCC): ICD-10-CM

## 2023-08-23 DIAGNOSIS — E78.2 MIXED HYPERLIPIDEMIA: ICD-10-CM

## 2023-08-23 DIAGNOSIS — R79.89 ELEVATED SERUM CREATININE: ICD-10-CM

## 2023-08-23 PROCEDURE — G8417 CALC BMI ABV UP PARAM F/U: HCPCS | Performed by: INTERNAL MEDICINE

## 2023-08-23 PROCEDURE — 3077F SYST BP >= 140 MM HG: CPT | Performed by: INTERNAL MEDICINE

## 2023-08-23 PROCEDURE — 1090F PRES/ABSN URINE INCON ASSESS: CPT | Performed by: INTERNAL MEDICINE

## 2023-08-23 PROCEDURE — 3078F DIAST BP <80 MM HG: CPT | Performed by: INTERNAL MEDICINE

## 2023-08-23 PROCEDURE — G8399 PT W/DXA RESULTS DOCUMENT: HCPCS | Performed by: INTERNAL MEDICINE

## 2023-08-23 PROCEDURE — 1123F ACP DISCUSS/DSCN MKR DOCD: CPT | Performed by: INTERNAL MEDICINE

## 2023-08-23 PROCEDURE — 3051F HG A1C>EQUAL 7.0%<8.0%: CPT | Performed by: INTERNAL MEDICINE

## 2023-08-23 PROCEDURE — G8427 DOCREV CUR MEDS BY ELIG CLIN: HCPCS | Performed by: INTERNAL MEDICINE

## 2023-08-23 PROCEDURE — 1036F TOBACCO NON-USER: CPT | Performed by: INTERNAL MEDICINE

## 2023-08-23 PROCEDURE — 99214 OFFICE O/P EST MOD 30 MIN: CPT | Performed by: INTERNAL MEDICINE

## 2023-08-23 RX ORDER — CELECOXIB 200 MG/1
200 CAPSULE ORAL DAILY
COMMUNITY
Start: 2023-08-09

## 2023-08-23 RX ORDER — BLOOD SUGAR DIAGNOSTIC
STRIP MISCELLANEOUS
COMMUNITY
Start: 2023-07-07

## 2023-08-23 RX ORDER — AMLODIPINE BESYLATE 5 MG/1
5 TABLET ORAL DAILY
Qty: 30 TABLET | Refills: 5 | Status: SHIPPED | OUTPATIENT
Start: 2023-08-23

## 2023-08-23 RX ORDER — DULOXETIN HYDROCHLORIDE 60 MG/1
60 CAPSULE, DELAYED RELEASE ORAL DAILY
COMMUNITY
Start: 2023-08-13

## 2023-08-24 ENCOUNTER — TELEPHONE (OUTPATIENT)
Facility: CLINIC | Age: 78
End: 2023-08-24

## 2023-09-05 RX ORDER — METOPROLOL SUCCINATE 25 MG/1
25 TABLET, EXTENDED RELEASE ORAL DAILY
Qty: 30 TABLET | Refills: 3 | Status: SHIPPED | OUTPATIENT
Start: 2023-09-05

## 2023-09-05 RX ORDER — BLOOD GLUCOSE CONTROL HIGH,LOW
EACH MISCELLANEOUS
Qty: 1 EACH | Refills: 1 | Status: SHIPPED | OUTPATIENT
Start: 2023-09-05

## 2023-09-05 RX ORDER — BLOOD SUGAR DIAGNOSTIC
STRIP MISCELLANEOUS
Qty: 100 EACH | Refills: 3 | Status: SHIPPED | OUTPATIENT
Start: 2023-09-05

## 2023-09-05 NOTE — TELEPHONE ENCOUNTER
Pt has been taking the amlodipine 5mg daily for last several weeks. Sunday feet started swelling, does not go down over night. Props them up when sitting, does not add salt to food. BP this morning was 148/83, not always on a bare arm and does not wait 10 minutes before checking. Informed of proper BP technique.

## 2023-09-05 NOTE — TELEPHONE ENCOUNTER
Pt stated her amlodipine is making her sweat a lot and causing her feet to swell making it hard to put on shoes, please call back to advise.

## 2023-09-05 NOTE — TELEPHONE ENCOUNTER
PCP: Ankit Marie MD     Last appt: 8/23/2023    Future Appointments   Date Time Provider [address]   10/9/2023 10:30 AM Nancie White DO [facility] BS AMB   10/11/2023 11:00 AM Ankit Marie MD [facility] BS AMB   7/30/2024 10:00 AM Brandan Beltran APRN - NP [facility] BS AMB          Requested Prescriptions     Pending Prescriptions Disp Refills    ACCU-CHEK GUIDE strip 100 each 3     Sig: USE 1 STRIP TO CHECK BLOOD GLUCOSE ONCE DAILY DX E11.9    Blood Glucose Calibration (ACCU-CHEK GUIDE CONTROL) LIQD 1 each 1     Sig: Use to check meter DX E11.9

## 2023-09-14 NOTE — PROGRESS NOTES
-PPI   1900: Bedside and Verbal shift change report given to Algie Goldberg, RN (oncoming nurse) by Aman Boyer RN (offgoing nurse). Report included the following information SBAR, Kardex, Intake/Output, MAR, Recent Results, Med Rec Status and Cardiac Rhythm NSR.     2324: Paged Oma Carlos NP the following: Patient requesting medication for her restless leg syndrome. Apparently, her Mirapex was discontinued, I am not sure why, but she took that last night for her restless leg and it was d/c'd    Spoke to Oma Carlos NP. Will not re-order medication at this time. Will give patient pain meds as ordered. 0003 06/22/2021: Patient is extremely upset of mirapex being d/c'd. Reassure patient and gave patient medication for pain. 8782: End of Shift Note    Bedside shift change report given to Aman Boyer RN (oncoming nurse) by Algie Goldberg (offgoing nurse). Report included the following information SBAR, Kardex, Intake/Output, MAR, Accordion, Recent Results, Med Rec Status and Cardiac Rhythm NSR    Shift worked:  [id number]     Shift summary and any significant changes:     Patient was placed on 2L NC overnight because desatting in 70s. Concerns for physician to address:  Patient would like to know why mirapex was d/c'd     Zone phone for oncoming shift:   [phone]       Activity:  Activity Level:  Up with Assistance  Number times ambulated in hallways past shift: 0  Number of times OOB to chair past shift: 0    Cardiac:   Cardiac Monitoring: Yes      Cardiac Rhythm: Sinus Rhythm    Access:   Current line(s): PIV     Genitourinary:   Urinary status: voiding    Respiratory:   O2 Device: None (Room air)  Chronic home O2 use?: NO  Incentive spirometer at bedside: NO     GI:  Last Bowel Movement Date: 06/21/21  Current diet:  ADULT DIET Regular  Passing flatus: YES  Tolerating current diet: YES       Pain Management:   Patient states pain is manageable on current regimen: YES    Skin:  Clifton Score: 20  Interventions: float heels, increase time out of bed and nutritional support     Patient Safety:  Fall Score:  Total Score: 3  Interventions: bed/chair alarm, assistive device (walker, cane, etc), gripper socks, pt to call before getting OOB and stay with me (per policy)  High Fall Risk: Yes    Length of Stay:  Expected LOS: 2d 14h  Actual LOS: [place] [address]

## 2023-10-23 RX ORDER — PRAMIPEXOLE DIHYDROCHLORIDE 0.5 MG/1
TABLET ORAL NIGHTLY
Qty: 90 TABLET | Refills: 0 | Status: SHIPPED | OUTPATIENT
Start: 2023-10-23

## 2023-10-24 ENCOUNTER — TELEPHONE (OUTPATIENT)
Facility: CLINIC | Age: 78
End: 2023-10-24

## 2023-10-24 NOTE — TELEPHONE ENCOUNTER
Pt states she has had leg pain and swelling for last 6 - 8 weeks. Called office 9/25 and amlodipine was changed to toprol, no change. BP elevated and legs still hurt, red and swollen.

## 2023-10-24 NOTE — TELEPHONE ENCOUNTER
Pt called and stated that she came in yesterday due to her having swelling in her legs and feet and has not heard back from anyone yet.  Pt asked for someone to call her about this asap

## 2023-10-26 ENCOUNTER — OFFICE VISIT (OUTPATIENT)
Facility: CLINIC | Age: 78
End: 2023-10-26

## 2023-10-26 ENCOUNTER — HOSPITAL ENCOUNTER (OUTPATIENT)
Facility: HOSPITAL | Age: 78
Discharge: HOME | End: 2023-10-26
Attending: INTERNAL MEDICINE
Payer: MEDICARE

## 2023-10-26 VITALS
WEIGHT: 158 LBS | HEIGHT: 63 IN | RESPIRATION RATE: 12 BRPM | SYSTOLIC BLOOD PRESSURE: 124 MMHG | TEMPERATURE: 98.7 F | HEART RATE: 99 BPM | DIASTOLIC BLOOD PRESSURE: 80 MMHG | BODY MASS INDEX: 28 KG/M2 | OXYGEN SATURATION: 98 %

## 2023-10-26 DIAGNOSIS — M79.89 PAIN AND SWELLING OF LOWER LEG, LEFT: Primary | ICD-10-CM

## 2023-10-26 DIAGNOSIS — I10 PRIMARY HYPERTENSION: ICD-10-CM

## 2023-10-26 DIAGNOSIS — M79.662 PAIN AND SWELLING OF LOWER LEG, LEFT: Primary | ICD-10-CM

## 2023-10-26 DIAGNOSIS — M79.89 PAIN AND SWELLING OF LOWER LEG, LEFT: ICD-10-CM

## 2023-10-26 DIAGNOSIS — M79.662 PAIN AND SWELLING OF LOWER LEG, LEFT: ICD-10-CM

## 2023-10-26 LAB — ECHO BSA: 1.78 M2

## 2023-10-26 PROCEDURE — 93971 EXTREMITY STUDY: CPT

## 2023-10-26 RX ORDER — FAMOTIDINE 40 MG/1
TABLET, FILM COATED ORAL
COMMUNITY
Start: 2023-09-11

## 2023-10-26 RX ORDER — METOPROLOL SUCCINATE 50 MG/1
50 TABLET, EXTENDED RELEASE ORAL DAILY
Qty: 30 TABLET | Refills: 5 | Status: SHIPPED | OUTPATIENT
Start: 2023-10-26

## 2023-10-26 NOTE — PROGRESS NOTES
HPI  Ms. Aline Petersen is a 66y.o. year old female, she is seen today for leg pain and swelling. Has been getting leg swelling L>R for the past several months. Says she stopped omeprazole 5 days ago and leg swelling has improved since then. Has also been soaking feet in epsom salts, elevating feet. No chest pain or sob. No known injury. Scheduled for EGD tomorrow. No dizziness or lightheadedness. Chief Complaint   Patient presents with    Leg Pain     swelling        Prior to Admission medications    Medication Sig Start Date End Date Taking?  Authorizing Provider   famotidine (PEPCID) 40 MG tablet TAKE 1 TABLET BY MOUTH ONCE DAILY IN THE EVENING FOR HEARTBURN/VOMITING 9/11/23  Yes ProviderNini MD   metoprolol succinate (TOPROL XL) 50 MG extended release tablet Take 1 tablet by mouth daily 10/26/23  Yes Wallis Galeazzi, MD   pramipexole (MIRAPEX) 0.5 MG tablet Take 1 tablet by mouth nightly 10/23/23  Yes Wallis Galeazzi, MD   ACCU-CHEK GUIDE strip USE 1 STRIP TO CHECK BLOOD GLUCOSE ONCE DAILY DX E11.9 9/5/23  Yes Wallis Galeazzi, MD   Blood Glucose Calibration (ACCU-CHEK GUIDE CONTROL) LIQD Use to check meter DX E11.9 9/5/23  Yes Wallis Galeazzi, MD   celecoxib (CELEBREX) 200 MG capsule Take 1 capsule by mouth daily 8/9/23  Yes Provider, MD Nini   DULoxetine (CYMBALTA) 60 MG extended release capsule Take 1 capsule by mouth daily 8/13/23  Yes Provider, MD Nini   Cholecalciferol (VITAMIN D3) 25 MCG (1000 UT) CAPS Take by mouth daily   Yes Provider, Historical, MD   acetaminophen (TYLENOL) 500 MG tablet Take 2 tablets by mouth as needed for Pain   Yes Provider, MD Nini   omeprazole (PRILOSEC) 40 MG delayed release capsule Take 1 capsule by mouth every morning (before breakfast) 5/23/23  Yes Wallis Galeazzi, MD   ascorbic acid (VITAMIN C) 500 MG tablet Take 1 tablet by mouth daily   Yes Automatic Reconciliation, Ar   atorvastatin (LIPITOR) 40 MG tablet Take

## 2023-10-30 ENCOUNTER — HOSPITAL ENCOUNTER (OUTPATIENT)
Facility: HOSPITAL | Age: 78
Discharge: HOME | End: 2023-11-02
Payer: MEDICARE

## 2023-10-30 VITALS
WEIGHT: 161.82 LBS | OXYGEN SATURATION: 100 % | TEMPERATURE: 97.8 F | DIASTOLIC BLOOD PRESSURE: 70 MMHG | HEART RATE: 79 BPM | BODY MASS INDEX: 28.67 KG/M2 | HEIGHT: 63 IN | SYSTOLIC BLOOD PRESSURE: 144 MMHG | RESPIRATION RATE: 18 BRPM

## 2023-10-30 LAB
ABO + RH BLD: NORMAL
ALBUMIN SERPL-MCNC: 3.1 G/DL (ref 3.5–5)
ALBUMIN/GLOB SERPL: 0.8 (ref 1.1–2.2)
ALP SERPL-CCNC: 86 U/L (ref 45–117)
ALT SERPL-CCNC: 26 U/L (ref 12–78)
ANION GAP SERPL CALC-SCNC: 3 MMOL/L (ref 5–15)
APPEARANCE UR: CLEAR
AST SERPL-CCNC: 18 U/L (ref 15–37)
BACTERIA URNS QL MICRO: ABNORMAL /HPF
BILIRUB SERPL-MCNC: 0.3 MG/DL (ref 0.2–1)
BILIRUB UR QL: NEGATIVE
BLOOD GROUP ANTIBODIES SERPL: NORMAL
BUN SERPL-MCNC: 16 MG/DL (ref 6–20)
BUN/CREAT SERPL: 15 (ref 12–20)
CALCIUM SERPL-MCNC: 8.7 MG/DL (ref 8.5–10.1)
CHLORIDE SERPL-SCNC: 113 MMOL/L (ref 97–108)
CO2 SERPL-SCNC: 26 MMOL/L (ref 21–32)
COLOR UR: ABNORMAL
CREAT SERPL-MCNC: 1.07 MG/DL (ref 0.55–1.02)
EPITH CASTS URNS QL MICRO: ABNORMAL /LPF
ERYTHROCYTE [DISTWIDTH] IN BLOOD BY AUTOMATED COUNT: 14.2 % (ref 11.5–14.5)
EST. AVERAGE GLUCOSE BLD GHB EST-MCNC: 148 MG/DL
GLOBULIN SER CALC-MCNC: 4.1 G/DL (ref 2–4)
GLUCOSE SERPL-MCNC: 116 MG/DL (ref 65–100)
GLUCOSE UR STRIP.AUTO-MCNC: NEGATIVE MG/DL
HBA1C MFR BLD: 6.8 % (ref 4–5.6)
HCT VFR BLD AUTO: 37.3 % (ref 35–47)
HGB BLD-MCNC: 11.8 G/DL (ref 11.5–16)
HGB UR QL STRIP: NEGATIVE
INR PPP: 1 (ref 0.9–1.1)
KETONES UR QL STRIP.AUTO: NEGATIVE MG/DL
LEUKOCYTE ESTERASE UR QL STRIP.AUTO: ABNORMAL
MCH RBC QN AUTO: 29.9 PG (ref 26–34)
MCHC RBC AUTO-ENTMCNC: 31.6 G/DL (ref 30–36.5)
MCV RBC AUTO: 94.4 FL (ref 80–99)
NITRITE UR QL STRIP.AUTO: POSITIVE
NRBC # BLD: 0 K/UL (ref 0–0.01)
NRBC BLD-RTO: 0 PER 100 WBC
PH UR STRIP: 5 (ref 5–8)
PLATELET # BLD AUTO: 286 K/UL (ref 150–400)
PMV BLD AUTO: 9.9 FL (ref 8.9–12.9)
POTASSIUM SERPL-SCNC: 4.5 MMOL/L (ref 3.5–5.1)
PROT SERPL-MCNC: 7.2 G/DL (ref 6.4–8.2)
PROT UR STRIP-MCNC: NEGATIVE MG/DL
PROTHROMBIN TIME: 10.4 SEC (ref 9–11.1)
RBC # BLD AUTO: 3.95 M/UL (ref 3.8–5.2)
RBC #/AREA URNS HPF: ABNORMAL /HPF (ref 0–5)
SODIUM SERPL-SCNC: 142 MMOL/L (ref 136–145)
SP GR UR REFRACTOMETRY: 1.02
SPECIMEN EXP DATE BLD: NORMAL
URINE CULTURE IF INDICATED: ABNORMAL
UROBILINOGEN UR QL STRIP.AUTO: 1 EU/DL (ref 0.2–1)
WBC # BLD AUTO: 6.2 K/UL (ref 3.6–11)
WBC URNS QL MICRO: ABNORMAL /HPF (ref 0–4)

## 2023-10-30 PROCEDURE — 87086 URINE CULTURE/COLONY COUNT: CPT

## 2023-10-30 PROCEDURE — 85610 PROTHROMBIN TIME: CPT

## 2023-10-30 PROCEDURE — 83036 HEMOGLOBIN GLYCOSYLATED A1C: CPT

## 2023-10-30 PROCEDURE — 86901 BLOOD TYPING SEROLOGIC RH(D): CPT

## 2023-10-30 PROCEDURE — 87077 CULTURE AEROBIC IDENTIFY: CPT

## 2023-10-30 PROCEDURE — 86900 BLOOD TYPING SEROLOGIC ABO: CPT

## 2023-10-30 PROCEDURE — 97161 PT EVAL LOW COMPLEX 20 MIN: CPT

## 2023-10-30 PROCEDURE — 80053 COMPREHEN METABOLIC PANEL: CPT

## 2023-10-30 PROCEDURE — 81001 URINALYSIS AUTO W/SCOPE: CPT

## 2023-10-30 PROCEDURE — 87186 SC STD MICRODIL/AGAR DIL: CPT

## 2023-10-30 PROCEDURE — 85027 COMPLETE CBC AUTOMATED: CPT

## 2023-10-30 PROCEDURE — 86850 RBC ANTIBODY SCREEN: CPT

## 2023-10-30 PROCEDURE — 36415 COLL VENOUS BLD VENIPUNCTURE: CPT

## 2023-10-30 PROCEDURE — APPNB30 APP NON BILLABLE TIME 0-30 MINS: Performed by: NURSE PRACTITIONER

## 2023-10-30 RX ORDER — CELECOXIB 200 MG/1
400 CAPSULE ORAL ONCE
Status: CANCELLED | OUTPATIENT
Start: 2023-11-06

## 2023-10-30 RX ORDER — ACETAMINOPHEN 500 MG
1000 TABLET ORAL ONCE
Status: CANCELLED | OUTPATIENT
Start: 2023-11-06

## 2023-10-30 RX ORDER — LOSARTAN POTASSIUM 25 MG/1
25 TABLET ORAL DAILY
COMMUNITY

## 2023-10-30 RX ORDER — DULOXETIN HYDROCHLORIDE 20 MG/1
40 CAPSULE, DELAYED RELEASE ORAL NIGHTLY
COMMUNITY

## 2023-10-30 RX ORDER — SODIUM CHLORIDE, SODIUM LACTATE, POTASSIUM CHLORIDE, CALCIUM CHLORIDE 600; 310; 30; 20 MG/100ML; MG/100ML; MG/100ML; MG/100ML
INJECTION, SOLUTION INTRAVENOUS CONTINUOUS
Status: CANCELLED | OUTPATIENT
Start: 2023-11-06

## 2023-10-30 NOTE — PROGRESS NOTES

## 2023-10-30 NOTE — PROGRESS NOTES

## 2023-10-30 NOTE — PROGRESS NOTES
Patient attended the Joint Replacement Education Class at [facility]. The content of the class was presented using a power point presentation specific for patients undergoing hip and knee replacement surgery. The [address] [place] [address] Joint Replacement Education Handbook was given to the patient. Preparing for surgery, day of surgery routine and expectations, discharge process and help at home expectations, nutrition,medications, infection control, pain management, DVT prevention, ice therapy and safety were reviewed in class. Opportunity for questions provided, patient verbalized understanding of instructions. Prehab completed during PAT visit on 10/30/2023. Patient reports has RW for after surgery, will bring to hospital on DOS 11/6/2023.

## 2023-10-30 NOTE — PROGRESS NOTES
Orthopedic and Spine Patients: Instructions on When You Can   Eat or Drink Before Surgery      You have been provided a pre-surgery drink received at your pre-admission testing appointment. Night before surgery: You should drink 1/2 bottle of the  pre-surgery drink at bedtime. No food after midnight! Day of Surgery:  Complete 2nd half of the bottle of the pre-surgery drink 1 hour prior to arrival at hospital.  For questions call Pre-Admission Testing at [phone]. They are available from 8:00am-5:00pm, Monday through Friday.

## 2023-10-30 NOTE — PROGRESS NOTES
Called cardiologist office to schedule cardiac clearance appointment. Patient has clearance appointment scheduled with Benjamín Ochoa on 11/2/2023 at 930 am. Faxed clearance form to complete. EKG done 12/12/2022. Results in Epic.

## 2023-10-31 LAB
BACTERIA SPEC CULT: NORMAL
BACTERIA SPEC CULT: NORMAL
SERVICE CMNT-IMP: NORMAL

## 2023-11-01 ENCOUNTER — OFFICE VISIT (OUTPATIENT)
Facility: CLINIC | Age: 78
End: 2023-11-01

## 2023-11-01 VITALS
WEIGHT: 160.5 LBS | TEMPERATURE: 98.5 F | SYSTOLIC BLOOD PRESSURE: 132 MMHG | HEART RATE: 80 BPM | BODY MASS INDEX: 28.44 KG/M2 | HEIGHT: 63 IN | OXYGEN SATURATION: 98 % | DIASTOLIC BLOOD PRESSURE: 76 MMHG | RESPIRATION RATE: 12 BRPM

## 2023-11-01 DIAGNOSIS — E78.2 MIXED HYPERLIPIDEMIA: ICD-10-CM

## 2023-11-01 DIAGNOSIS — M16.12 PRIMARY OSTEOARTHRITIS OF LEFT HIP: ICD-10-CM

## 2023-11-01 DIAGNOSIS — Z01.818 PRE-OP EVALUATION: Primary | ICD-10-CM

## 2023-11-01 DIAGNOSIS — I10 PRIMARY HYPERTENSION: ICD-10-CM

## 2023-11-01 DIAGNOSIS — E11.9 TYPE 2 DIABETES MELLITUS WITHOUT COMPLICATION, WITHOUT LONG-TERM CURRENT USE OF INSULIN (HCC): ICD-10-CM

## 2023-11-01 LAB
BACTERIA SPEC CULT: ABNORMAL
CC UR VC: ABNORMAL
SERVICE CMNT-IMP: ABNORMAL

## 2023-11-01 RX ORDER — NITROFURANTOIN 25; 75 MG/1; MG/1
100 CAPSULE ORAL 2 TIMES DAILY
Qty: 14 CAPSULE | Refills: 0 | Status: SHIPPED | OUTPATIENT
Start: 2023-11-01 | End: 2023-11-08

## 2023-11-01 RX ORDER — PRAMIPEXOLE DIHYDROCHLORIDE 0.5 MG/1
0.5 TABLET ORAL NIGHTLY
Qty: 90 TABLET | Refills: 0 | Status: SHIPPED | OUTPATIENT
Start: 2023-11-01

## 2023-11-01 NOTE — PROGRESS NOTES
HPI  Ms. Trupti Muro is a 66y.o. year old female, she is seen today for pre op exam prior to left JAN with Dr. Roman Min. Edema is much better - but still some romeo in left leg, worse at end of day. Is s/p ankle surgery for fracture in 1982. In usual state of health today  No chest pain, sob, dizziness, weakness, lightheadedness    No cough, f/c, sweats, no n/v/abd pain.      METS > 4    Will get EKG tomorrow      Chief Complaint   Patient presents with    Pre-op Exam      Patient Active Problem List   Diagnosis    S/P hip replacement    Hyperlipidemia    Osteoporosis    Vitamin D deficiency    Traumatic complete tear of left rotator cuff    Low back pain    Abnormal stress test    Combined forms of age-related cataract of right eye    Advanced care planning/counseling discussion    Chronic midline low back pain without sciatica    Hypertension    RLS (restless legs syndrome)    Synovial cyst of lumbar facet joint    Type 2 diabetes mellitus, without long-term current use of insulin (HCC)    Infiltrating ductal carcinoma of left breast ([address])      Past Medical History:   Diagnosis Date    Arthritis     bilateral hands, hips, lower back    At risk for sleep apnea 03/18/2019    on phone assessment with PAT, callie sleep scoring tool, scored 4    Cancer ([address]) 12/2022    LEFT BREAST    COVID-19 vaccination not done 12/2022    COVID-19 virus infection 01/2022    H/O COVID 19 PNEUMONIA    Elevated hemoglobin A1c 01/27/2020    GERD (gastroesophageal reflux disease)     being treated    High cholesterol     Hypertension     Kidney stones 2017    Menopause 1995    Palpitations     as of 6/24/19 pt reports was evaluated by cardiology and cleared, now followed by cardiology, no further symptoms per pt    Pneumonia 2006 (approx)    Pre-diabetes     Rheumatic fever     AGE 5    RLS (restless legs syndrome)     Sepsis ([address]) 6/20/2021    Shoulder fracture 2009 [facility] -Jan 09  Right side     T2DM (type 2 diabetes

## 2023-11-01 NOTE — PROGRESS NOTES
Called placed to patient to inform her per NP Marysol Crouch she has a positive urine culture and her MRSA culture was positive for Staph. Informed patient that Macrobid and Mupirocin was sent to her local [facility]. Also advised patient not to take the pre-sugery drink that she was given in [facility] due to her A1C being 6.8. Patient verbalized understanding and was thankful.

## 2023-11-08 ENCOUNTER — HOSPITAL ENCOUNTER (OUTPATIENT)
Facility: HOSPITAL | Age: 78
Discharge: HOME | End: 2023-11-09
Attending: INTERNAL MEDICINE | Admitting: INTERNAL MEDICINE
Payer: MEDICARE

## 2023-11-08 DIAGNOSIS — I50.22 CHRONIC SYSTOLIC HEART FAILURE (HCC): ICD-10-CM

## 2023-11-08 DIAGNOSIS — R06.02 SOB (SHORTNESS OF BREATH): Primary | ICD-10-CM

## 2023-11-08 DIAGNOSIS — R94.39 ABNORMAL STRESS TEST: ICD-10-CM

## 2023-11-08 DIAGNOSIS — I50.20 HEART FAILURE, SYSTOLIC (HCC): ICD-10-CM

## 2023-11-08 LAB
ACT BLD: 155 SECS (ref 79–138)
ACT BLD: 179 SECS (ref 79–138)
ACT BLD: 257 SECS (ref 79–138)
ECHO BSA: 1.75 M2
GLUCOSE BLD STRIP.AUTO-MCNC: 123 MG/DL (ref 65–117)
SERVICE CMNT-IMP: ABNORMAL

## 2023-11-08 PROCEDURE — C1725 CATH, TRANSLUMIN NON-LASER: HCPCS | Performed by: INTERNAL MEDICINE

## 2023-11-08 PROCEDURE — 6370000000 HC RX 637 (ALT 250 FOR IP): Performed by: INTERNAL MEDICINE

## 2023-11-08 PROCEDURE — 6360000002 HC RX W HCPCS: Performed by: INTERNAL MEDICINE

## 2023-11-08 PROCEDURE — 99152 MOD SED SAME PHYS/QHP 5/>YRS: CPT | Performed by: INTERNAL MEDICINE

## 2023-11-08 PROCEDURE — 92978 ENDOLUMINL IVUS OCT C 1ST: CPT | Performed by: INTERNAL MEDICINE

## 2023-11-08 PROCEDURE — 85347 COAGULATION TIME ACTIVATED: CPT

## 2023-11-08 PROCEDURE — 2709999900 HC NON-CHARGEABLE SUPPLY: Performed by: INTERNAL MEDICINE

## 2023-11-08 PROCEDURE — C1769 GUIDE WIRE: HCPCS | Performed by: INTERNAL MEDICINE

## 2023-11-08 PROCEDURE — 82962 GLUCOSE BLOOD TEST: CPT

## 2023-11-08 PROCEDURE — 93458 L HRT ARTERY/VENTRICLE ANGIO: CPT | Performed by: INTERNAL MEDICINE

## 2023-11-08 PROCEDURE — C1887 CATHETER, GUIDING: HCPCS | Performed by: INTERNAL MEDICINE

## 2023-11-08 PROCEDURE — C1713 ANCHOR/SCREW BN/BN,TIS/BN: HCPCS | Performed by: INTERNAL MEDICINE

## 2023-11-08 PROCEDURE — C1874 STENT, COATED/COV W/DEL SYS: HCPCS | Performed by: INTERNAL MEDICINE

## 2023-11-08 PROCEDURE — 2500000003 HC RX 250 WO HCPCS: Performed by: INTERNAL MEDICINE

## 2023-11-08 PROCEDURE — C1894 INTRO/SHEATH, NON-LASER: HCPCS | Performed by: INTERNAL MEDICINE

## 2023-11-08 PROCEDURE — 2580000003 HC RX 258: Performed by: INTERNAL MEDICINE

## 2023-11-08 PROCEDURE — C9600 PERC DRUG-EL COR STENT SING: HCPCS | Performed by: INTERNAL MEDICINE

## 2023-11-08 PROCEDURE — 93571 IV DOP VEL&/PRESS C FLO 1ST: CPT | Performed by: INTERNAL MEDICINE

## 2023-11-08 PROCEDURE — 99153 MOD SED SAME PHYS/QHP EA: CPT | Performed by: INTERNAL MEDICINE

## 2023-11-08 PROCEDURE — 6360000004 HC RX CONTRAST MEDICATION: Performed by: INTERNAL MEDICINE

## 2023-11-08 PROCEDURE — C1753 CATH, INTRAVAS ULTRASOUND: HCPCS | Performed by: INTERNAL MEDICINE

## 2023-11-08 DEVICE — STENT ONYXNG20012UX ONYX 2.00X12RX
Type: IMPLANTABLE DEVICE | Status: FUNCTIONAL
Brand: ONYX FRONTIER™

## 2023-11-08 RX ORDER — ATORVASTATIN CALCIUM 40 MG/1
40 TABLET, FILM COATED ORAL NIGHTLY
Status: DISCONTINUED | OUTPATIENT
Start: 2023-11-08 | End: 2023-11-09 | Stop reason: HOSPADM

## 2023-11-08 RX ORDER — 0.9 % SODIUM CHLORIDE 0.9 %
INTRAVENOUS SOLUTION INTRAVENOUS CONTINUOUS PRN
Status: COMPLETED | OUTPATIENT
Start: 2023-11-08 | End: 2023-11-08

## 2023-11-08 RX ORDER — VERAPAMIL HYDROCHLORIDE 2.5 MG/ML
INJECTION, SOLUTION INTRAVENOUS PRN
Status: DISCONTINUED | OUTPATIENT
Start: 2023-11-08 | End: 2023-11-08 | Stop reason: HOSPADM

## 2023-11-08 RX ORDER — METOPROLOL SUCCINATE 50 MG/1
50 TABLET, EXTENDED RELEASE ORAL DAILY
Status: DISCONTINUED | OUTPATIENT
Start: 2023-11-08 | End: 2023-11-09 | Stop reason: HOSPADM

## 2023-11-08 RX ORDER — ACETAMINOPHEN 325 MG/1
650 TABLET ORAL EVERY 4 HOURS PRN
Status: DISCONTINUED | OUTPATIENT
Start: 2023-11-08 | End: 2023-11-09 | Stop reason: HOSPADM

## 2023-11-08 RX ORDER — PRAMIPEXOLE DIHYDROCHLORIDE 0.25 MG/1
0.5 TABLET ORAL NIGHTLY
Status: DISCONTINUED | OUTPATIENT
Start: 2023-11-08 | End: 2023-11-09 | Stop reason: HOSPADM

## 2023-11-08 RX ORDER — HEPARIN SODIUM 10000 [USP'U]/ML
INJECTION, SOLUTION INTRAVENOUS; SUBCUTANEOUS PRN
Status: DISCONTINUED | OUTPATIENT
Start: 2023-11-08 | End: 2023-11-08 | Stop reason: HOSPADM

## 2023-11-08 RX ORDER — LETROZOLE 2.5 MG/1
2.5 TABLET, FILM COATED ORAL DAILY
Status: DISCONTINUED | OUTPATIENT
Start: 2023-11-08 | End: 2023-11-09 | Stop reason: HOSPADM

## 2023-11-08 RX ORDER — SODIUM CHLORIDE 9 MG/ML
INJECTION, SOLUTION INTRAVENOUS CONTINUOUS
Status: ACTIVE | OUTPATIENT
Start: 2023-11-08 | End: 2023-11-09

## 2023-11-08 RX ORDER — FAMOTIDINE 20 MG/1
40 TABLET, FILM COATED ORAL DAILY
Status: DISCONTINUED | OUTPATIENT
Start: 2023-11-08 | End: 2023-11-09 | Stop reason: HOSPADM

## 2023-11-08 RX ORDER — FENTANYL CITRATE 50 UG/ML
INJECTION, SOLUTION INTRAMUSCULAR; INTRAVENOUS PRN
Status: DISCONTINUED | OUTPATIENT
Start: 2023-11-08 | End: 2023-11-08 | Stop reason: HOSPADM

## 2023-11-08 RX ORDER — LOSARTAN POTASSIUM 25 MG/1
25 TABLET ORAL DAILY
Status: DISCONTINUED | OUTPATIENT
Start: 2023-11-08 | End: 2023-11-09 | Stop reason: HOSPADM

## 2023-11-08 RX ORDER — HEPARIN SODIUM 1000 [USP'U]/ML
INJECTION, SOLUTION INTRAVENOUS; SUBCUTANEOUS PRN
Status: DISCONTINUED | OUTPATIENT
Start: 2023-11-08 | End: 2023-11-08 | Stop reason: HOSPADM

## 2023-11-08 RX ORDER — ASPIRIN 81 MG/1
81 TABLET ORAL DAILY
Qty: 90 TABLET | Refills: 3 | Status: SHIPPED | OUTPATIENT
Start: 2023-11-09

## 2023-11-08 RX ORDER — ASPIRIN 81 MG/1
TABLET, CHEWABLE ORAL PRN
Status: DISCONTINUED | OUTPATIENT
Start: 2023-11-08 | End: 2023-11-08 | Stop reason: HOSPADM

## 2023-11-08 RX ORDER — LIDOCAINE HYDROCHLORIDE 10 MG/ML
INJECTION, SOLUTION INFILTRATION; PERINEURAL PRN
Status: DISCONTINUED | OUTPATIENT
Start: 2023-11-08 | End: 2023-11-08 | Stop reason: HOSPADM

## 2023-11-08 RX ORDER — ASPIRIN 81 MG/1
81 TABLET ORAL DAILY
Status: DISCONTINUED | OUTPATIENT
Start: 2023-11-09 | End: 2023-11-09 | Stop reason: HOSPADM

## 2023-11-08 RX ORDER — ATROPINE SULFATE 0.1 MG/ML
INJECTION INTRAVENOUS
Status: DISCONTINUED
Start: 2023-11-08 | End: 2023-11-08 | Stop reason: WASHOUT

## 2023-11-08 RX ADMIN — ATORVASTATIN CALCIUM 40 MG: 40 TABLET, FILM COATED ORAL at 22:51

## 2023-11-08 RX ADMIN — FAMOTIDINE 40 MG: 20 TABLET, FILM COATED ORAL at 16:20

## 2023-11-08 RX ADMIN — SODIUM CHLORIDE: 9 INJECTION, SOLUTION INTRAVENOUS at 14:58

## 2023-11-08 RX ADMIN — LETROZOLE 2.5 MG: 2.5 TABLET ORAL at 16:20

## 2023-11-08 RX ADMIN — PRAMIPEXOLE DIHYDROCHLORIDE 0.5 MG: 0.25 TABLET ORAL at 22:51

## 2023-11-08 RX ADMIN — METOPROLOL SUCCINATE 50 MG: 50 TABLET, EXTENDED RELEASE ORAL at 16:20

## 2023-11-08 RX ADMIN — TICAGRELOR 90 MG: 90 TABLET ORAL at 22:50

## 2023-11-08 RX ADMIN — LOSARTAN POTASSIUM 25 MG: 25 TABLET, FILM COATED ORAL at 16:20

## 2023-11-08 NOTE — PROGRESS NOTES
6fr sheath removed from the right femoral artery,manual pressure and quickclot held for 15 min. Upon release no oozing or hematoma noted.  Groin care taken by Sowmya Seth R.N.

## 2023-11-08 NOTE — PROGRESS NOTES
Cardiac Cath Lab Recovery Arrival Note:      Cynthia Beal arrived to Cardiac Cath Lab, Recovery Area. Staff introduced to patient. Patient identifiers verified with NAME and DATE OF BIRTH. Procedure verified with patient. Consent forms reviewed and signed by patient or authorized representative and verified. Allergies verified. Patient and family oriented to department. Patient and family informed of procedure and plan of care. Questions answered with review. Patient prepped for procedure, per orders from physician, prior to arrival.    Patient on cardiac monitor, non-invasive blood pressure, SPO2 monitor. On RA. Patient is A&Ox 4. Patient reports no pain. Patient in stretcher, in low position, with side rails up, call bell within reach, patient instructed to call if assistance as needed. Patient prep in: 5 [facility] 6. Patient family has pager # n/a  Family in: reese.    Prep by: Casandra Bowman rn

## 2023-11-08 NOTE — H&P
H&P reviewed  Patient examined  No changes have occurred I discussed the risks/benefits/alternatives of the procedure with the patient. Risks include (but are not limited to) bleeding, infection, cva/mi/tamponade/death. The patient understands and agrees to proceed.

## 2023-11-09 VITALS
HEIGHT: 60 IN | OXYGEN SATURATION: 97 % | TEMPERATURE: 97.3 F | DIASTOLIC BLOOD PRESSURE: 71 MMHG | BODY MASS INDEX: 31.22 KG/M2 | WEIGHT: 159 LBS | RESPIRATION RATE: 16 BRPM | HEART RATE: 79 BPM | SYSTOLIC BLOOD PRESSURE: 129 MMHG

## 2023-11-09 PROCEDURE — 6370000000 HC RX 637 (ALT 250 FOR IP): Performed by: INTERNAL MEDICINE

## 2023-11-09 RX ADMIN — TICAGRELOR 90 MG: 90 TABLET ORAL at 08:31

## 2023-11-09 RX ADMIN — LOSARTAN POTASSIUM 25 MG: 25 TABLET, FILM COATED ORAL at 08:31

## 2023-11-09 RX ADMIN — ASPIRIN 81 MG: 81 TABLET, COATED ORAL at 08:31

## 2023-11-09 RX ADMIN — METOPROLOL SUCCINATE 50 MG: 50 TABLET, EXTENDED RELEASE ORAL at 08:31

## 2023-11-09 RX ADMIN — LETROZOLE 2.5 MG: 2.5 TABLET ORAL at 08:31

## 2023-11-09 RX ADMIN — FAMOTIDINE 40 MG: 20 TABLET, FILM COATED ORAL at 08:31

## 2023-11-09 NOTE — PLAN OF CARE
Problem: Chronic Conditions and Co-morbidities  Goal: Patient's chronic conditions and co-morbidity symptoms are monitored and maintained or improved  11/9/2023 0814 by Matthias Díaz RN  Outcome: Progressing  11/8/2023 2138 by Ajit Chirinos RN  Outcome: Progressing  Flowsheets (Taken 11/8/2023 1901)  Care Plan - Patient's Chronic Conditions and Co-Morbidity Symptoms are Monitored and Maintained or Improved: Monitor and assess patient's chronic conditions and comorbid symptoms for stability, deterioration, or improvement     Problem: Discharge Planning  Goal: Discharge to home or other facility with appropriate resources  11/9/2023 0814 by Matthias Díaz RN  Outcome: Progressing  11/8/2023 2138 by Ajit Chirinos RN  Outcome: Progressing  Flowsheets (Taken 11/8/2023 1901)  Discharge to home or other facility with appropriate resources: Identify barriers to discharge with patient and caregiver     Problem: Safety - Adult  Goal: Free from fall injury  11/9/2023 0814 by Matthias Díaz RN  Outcome: Progressing  Flowsheets (Taken 11/9/2023 0400 by Ajit Chirinos RN)  Free From Fall Injury: Instruct family/caregiver on patient safety  11/8/2023 2138 by Ajit Chirinos RN  Outcome: Progressing

## 2023-11-09 NOTE — PROGRESS NOTES
0700 Bedside shift change report given to [address] (oncoming nurse) by Bradley Crane (offgoing nurse). Report included the following information Nurse Handoff Report. Shift worked:  Day     Shift summary and any significant changes:    Kareem Blount MD came by and said pt was ok to go home. Card Rehab came by to give pt information. Pt expressed concern about affording prescriptions. Coupon was given and pt called pharmacy to ensure they were affordable and said they were. Pt discharging at 1130    Concerns for physician to address:       Zone phone for oncoming shift:          Activity:     Number times ambulated in hallways past shift: 1  Number of times OOB to chair past shift: 1    Cardiac:   Cardiac Monitoring: Yes           Access:  Current line(s): PIV     Genitourinary:   Urinary status: voiding    Respiratory:      Chronic home O2 use?: NO  Incentive spirometer at bedside: NO       GI:     Current diet:  ADULT DIET;  Regular; Low Fat/Low Chol/High Fiber/2 gm Na  Passing flatus: YES  Tolerating current diet: YES       Pain Management:   Patient states pain is manageable on current regimen: YES    Skin:     Interventions: float heels and increase time out of bed    Patient Safety:  Fall Score:    Interventions: gripper socks       Length of Stay:  Expected LOS: 1  Actual LOS: 0      Abrahan Jones RN

## 2023-11-09 NOTE — PLAN OF CARE
Problem: Chronic Conditions and Co-morbidities  Goal: Patient's chronic conditions and co-morbidity symptoms are monitored and maintained or improved  Outcome: Progressing  Flowsheets (Taken 11/8/2023 1901)  Care Plan - Patient's Chronic Conditions and Co-Morbidity Symptoms are Monitored and Maintained or Improved: Monitor and assess patient's chronic conditions and comorbid symptoms for stability, deterioration, or improvement     Problem: Discharge Planning  Goal: Discharge to home or other facility with appropriate resources  Outcome: Progressing  Flowsheets (Taken 11/8/2023 1901)  Discharge to home or other facility with appropriate resources: Identify barriers to discharge with patient and caregiver     Problem: Safety - Adult  Goal: Free from fall injury  Outcome: Progressing

## 2023-11-09 NOTE — CARDIO/PULMONARY
Chart reviewed: Patient is 66 y.o. female admitted with Heart failure, systolic ([address]) [S13.51]    Education: CAD education folder given to Mckinley Renae. Educated using teach back method. Reviewed CAD diagnosis definition and purpose of intervention. Discussed risk factors for CAD to include the following: family history, elevated BMI, hyperlipidemia, hypertension, diabetes, stress, and smoking. Smoking Cessation Program link added to AVS. Discussed Heart Healthy/Low Sodium (2000 mg) diet. Reviewed the importance of medication compliance. Discussed follow up appointments with cardiologist, signs and symptoms of angina, and what to report to physician after discharge. Emphasized the value of cardiac rehab. Discussed Cardiac Rehab Program format, benefits, and encouraged enrollment to assist with risk modification and management. Patient lives in [place], [place] (almost [facility]). No technology. Patient has limited funds and trouble affording medications. Spoke with nursing to escalate patient needs. Mckinley Renae verbalized understanding with questions answered.      Diana Mandujano RN

## 2023-11-10 ENCOUNTER — HOSPITAL ENCOUNTER (EMERGENCY)
Facility: HOSPITAL | Age: 78
Discharge: HOME | End: 2023-11-10
Attending: EMERGENCY MEDICINE
Payer: MEDICARE

## 2023-11-10 VITALS
HEIGHT: 60 IN | SYSTOLIC BLOOD PRESSURE: 109 MMHG | RESPIRATION RATE: 16 BRPM | BODY MASS INDEX: 31.68 KG/M2 | TEMPERATURE: 98.6 F | DIASTOLIC BLOOD PRESSURE: 55 MMHG | OXYGEN SATURATION: 97 % | WEIGHT: 161.38 LBS | HEART RATE: 123 BPM

## 2023-11-10 DIAGNOSIS — R11.2 NAUSEA AND VOMITING, UNSPECIFIED VOMITING TYPE: Primary | ICD-10-CM

## 2023-11-10 LAB
ALBUMIN SERPL-MCNC: 2.7 G/DL (ref 3.5–5)
ALBUMIN/GLOB SERPL: 0.8 (ref 1.1–2.2)
ALP SERPL-CCNC: 172 U/L (ref 45–117)
ALT SERPL-CCNC: 69 U/L (ref 12–78)
ANION GAP SERPL CALC-SCNC: 7 MMOL/L (ref 5–15)
APPEARANCE UR: ABNORMAL
AST SERPL-CCNC: 52 U/L (ref 15–37)
BACTERIA URNS QL MICRO: ABNORMAL /HPF
BASOPHILS # BLD: 0 K/UL (ref 0–0.1)
BASOPHILS NFR BLD: 0 % (ref 0–1)
BILIRUB SERPL-MCNC: 0.7 MG/DL (ref 0.2–1)
BILIRUB UR QL: NEGATIVE
BUN SERPL-MCNC: 16 MG/DL (ref 6–20)
BUN/CREAT SERPL: 12 (ref 12–20)
CALCIUM SERPL-MCNC: 8.4 MG/DL (ref 8.5–10.1)
CHLORIDE SERPL-SCNC: 110 MMOL/L (ref 97–108)
CO2 SERPL-SCNC: 23 MMOL/L (ref 21–32)
COLOR UR: ABNORMAL
CREAT SERPL-MCNC: 1.34 MG/DL (ref 0.55–1.02)
DIFFERENTIAL METHOD BLD: ABNORMAL
EKG ATRIAL RATE: 114 BPM
EKG DIAGNOSIS: NORMAL
EKG P AXIS: 51 DEGREES
EKG P-R INTERVAL: 134 MS
EKG Q-T INTERVAL: 352 MS
EKG QRS DURATION: 100 MS
EKG QTC CALCULATION (BAZETT): 485 MS
EKG R AXIS: -30 DEGREES
EKG T AXIS: -19 DEGREES
EKG VENTRICULAR RATE: 114 BPM
EOSINOPHIL # BLD: 0.2 K/UL (ref 0–0.4)
EOSINOPHIL NFR BLD: 2 % (ref 0–7)
EPITH CASTS URNS QL MICRO: ABNORMAL /LPF
ERYTHROCYTE [DISTWIDTH] IN BLOOD BY AUTOMATED COUNT: 14.4 % (ref 11.5–14.5)
GLOBULIN SER CALC-MCNC: 3.5 G/DL (ref 2–4)
GLUCOSE SERPL-MCNC: 170 MG/DL (ref 65–100)
GLUCOSE UR STRIP.AUTO-MCNC: 250 MG/DL
GRAN CASTS URNS QL MICRO: ABNORMAL /LPF
HCT VFR BLD AUTO: 39.5 % (ref 35–47)
HGB BLD-MCNC: 12.9 G/DL (ref 11.5–16)
HGB UR QL STRIP: NEGATIVE
IMM GRANULOCYTES # BLD AUTO: 0.1 K/UL (ref 0–0.04)
IMM GRANULOCYTES NFR BLD AUTO: 1 % (ref 0–0.5)
KETONES UR QL STRIP.AUTO: ABNORMAL MG/DL
LEUKOCYTE ESTERASE UR QL STRIP.AUTO: NEGATIVE
LIPASE SERPL-CCNC: 56 U/L (ref 13–75)
LYMPHOCYTES # BLD: 0.2 K/UL (ref 0.8–3.5)
LYMPHOCYTES NFR BLD: 2 % (ref 12–49)
MCH RBC QN AUTO: 29.2 PG (ref 26–34)
MCHC RBC AUTO-ENTMCNC: 32.7 G/DL (ref 30–36.5)
MCV RBC AUTO: 89.4 FL (ref 80–99)
MONOCYTES # BLD: 0.3 K/UL (ref 0–1)
MONOCYTES NFR BLD: 3 % (ref 5–13)
NEUTS SEG # BLD: 10.1 K/UL (ref 1.8–8)
NEUTS SEG NFR BLD: 92 % (ref 32–75)
NITRITE UR QL STRIP.AUTO: NEGATIVE
NRBC # BLD: 0 K/UL (ref 0–0.01)
NRBC BLD-RTO: 0 PER 100 WBC
PH UR STRIP: 5 (ref 5–8)
PLATELET # BLD AUTO: 195 K/UL (ref 150–400)
PMV BLD AUTO: 10 FL (ref 8.9–12.9)
POTASSIUM SERPL-SCNC: 3.6 MMOL/L (ref 3.5–5.1)
PROT SERPL-MCNC: 6.2 G/DL (ref 6.4–8.2)
PROT UR STRIP-MCNC: 100 MG/DL
RBC # BLD AUTO: 4.42 M/UL (ref 3.8–5.2)
RBC #/AREA URNS HPF: ABNORMAL /HPF (ref 0–5)
RBC MORPH BLD: ABNORMAL
SODIUM SERPL-SCNC: 140 MMOL/L (ref 136–145)
SP GR UR REFRACTOMETRY: 1.03
TROPONIN I SERPL HS-MCNC: 225 NG/L (ref 0–51)
TROPONIN I SERPL HS-MCNC: 246 NG/L (ref 0–51)
URINE CULTURE IF INDICATED: ABNORMAL
UROBILINOGEN UR QL STRIP.AUTO: 1 EU/DL (ref 0.2–1)
WBC # BLD AUTO: 10.9 K/UL (ref 3.6–11)
WBC MORPH BLD: ABNORMAL
WBC URNS QL MICRO: ABNORMAL /HPF (ref 0–4)

## 2023-11-10 PROCEDURE — 99284 EMERGENCY DEPT VISIT MOD MDM: CPT

## 2023-11-10 PROCEDURE — 84484 ASSAY OF TROPONIN QUANT: CPT

## 2023-11-10 PROCEDURE — 85025 COMPLETE CBC W/AUTO DIFF WBC: CPT

## 2023-11-10 PROCEDURE — 6370000000 HC RX 637 (ALT 250 FOR IP): Performed by: EMERGENCY MEDICINE

## 2023-11-10 PROCEDURE — 81001 URINALYSIS AUTO W/SCOPE: CPT

## 2023-11-10 PROCEDURE — 96374 THER/PROPH/DIAG INJ IV PUSH: CPT

## 2023-11-10 PROCEDURE — 83690 ASSAY OF LIPASE: CPT

## 2023-11-10 PROCEDURE — 2580000003 HC RX 258: Performed by: EMERGENCY MEDICINE

## 2023-11-10 PROCEDURE — 93005 ELECTROCARDIOGRAM TRACING: CPT | Performed by: EMERGENCY MEDICINE

## 2023-11-10 PROCEDURE — 36415 COLL VENOUS BLD VENIPUNCTURE: CPT

## 2023-11-10 PROCEDURE — 80053 COMPREHEN METABOLIC PANEL: CPT

## 2023-11-10 PROCEDURE — 96361 HYDRATE IV INFUSION ADD-ON: CPT

## 2023-11-10 PROCEDURE — 6360000002 HC RX W HCPCS: Performed by: EMERGENCY MEDICINE

## 2023-11-10 RX ORDER — CEPHALEXIN 500 MG/1
500 CAPSULE ORAL 2 TIMES DAILY
Qty: 14 CAPSULE | Refills: 0 | Status: SHIPPED | OUTPATIENT
Start: 2023-11-10 | End: 2023-11-17

## 2023-11-10 RX ORDER — ONDANSETRON 4 MG/1
4 TABLET, ORALLY DISINTEGRATING ORAL 3 TIMES DAILY PRN
Qty: 21 TABLET | Refills: 0 | Status: SHIPPED | OUTPATIENT
Start: 2023-11-10

## 2023-11-10 RX ORDER — ONDANSETRON 4 MG/1
4 TABLET, ORALLY DISINTEGRATING ORAL ONCE
Status: COMPLETED | OUTPATIENT
Start: 2023-11-10 | End: 2023-11-10

## 2023-11-10 RX ORDER — ONDANSETRON 2 MG/ML
4 INJECTION INTRAMUSCULAR; INTRAVENOUS ONCE
Status: COMPLETED | OUTPATIENT
Start: 2023-11-10 | End: 2023-11-10

## 2023-11-10 RX ORDER — 0.9 % SODIUM CHLORIDE 0.9 %
1000 INTRAVENOUS SOLUTION INTRAVENOUS ONCE
Status: COMPLETED | OUTPATIENT
Start: 2023-11-10 | End: 2023-11-10

## 2023-11-10 RX ADMIN — SODIUM CHLORIDE 1000 ML: 9 INJECTION, SOLUTION INTRAVENOUS at 13:13

## 2023-11-10 RX ADMIN — ONDANSETRON 4 MG: 4 TABLET, ORALLY DISINTEGRATING ORAL at 15:13

## 2023-11-10 RX ADMIN — ONDANSETRON 4 MG: 2 INJECTION INTRAMUSCULAR; INTRAVENOUS at 11:34

## 2023-11-10 ASSESSMENT — PAIN - FUNCTIONAL ASSESSMENT: PAIN_FUNCTIONAL_ASSESSMENT: NONE - DENIES PAIN

## 2023-11-13 LAB
EKG ATRIAL RATE: 114 BPM
EKG DIAGNOSIS: NORMAL
EKG P AXIS: 51 DEGREES
EKG P-R INTERVAL: 134 MS
EKG Q-T INTERVAL: 352 MS
EKG QRS DURATION: 100 MS
EKG QTC CALCULATION (BAZETT): 485 MS
EKG R AXIS: -30 DEGREES
EKG T AXIS: -19 DEGREES
EKG VENTRICULAR RATE: 114 BPM

## 2023-11-15 ENCOUNTER — OFFICE VISIT (OUTPATIENT)
Age: 78
End: 2023-11-15
Payer: MEDICARE

## 2023-11-15 ENCOUNTER — TELEMEDICINE (OUTPATIENT)
Facility: CLINIC | Age: 78
End: 2023-11-15

## 2023-11-15 VITALS
TEMPERATURE: 97.9 F | DIASTOLIC BLOOD PRESSURE: 70 MMHG | WEIGHT: 161 LBS | HEART RATE: 97 BPM | OXYGEN SATURATION: 98 % | BODY MASS INDEX: 31.44 KG/M2 | RESPIRATION RATE: 18 BRPM | SYSTOLIC BLOOD PRESSURE: 120 MMHG

## 2023-11-15 DIAGNOSIS — M79.89 LEG SWELLING: Primary | ICD-10-CM

## 2023-11-15 DIAGNOSIS — R13.10 DYSPHAGIA, UNSPECIFIED TYPE: ICD-10-CM

## 2023-11-15 DIAGNOSIS — C50.912 INFILTRATING DUCTAL CARCINOMA OF LEFT BREAST (HCC): Primary | ICD-10-CM

## 2023-11-15 DIAGNOSIS — I25.10 CAD S/P PERCUTANEOUS CORONARY ANGIOPLASTY: ICD-10-CM

## 2023-11-15 DIAGNOSIS — Z98.61 CAD S/P PERCUTANEOUS CORONARY ANGIOPLASTY: ICD-10-CM

## 2023-11-15 PROCEDURE — 99213 OFFICE O/P EST LOW 20 MIN: CPT | Performed by: INTERNAL MEDICINE

## 2023-11-15 PROCEDURE — 1036F TOBACCO NON-USER: CPT | Performed by: INTERNAL MEDICINE

## 2023-11-15 PROCEDURE — G8427 DOCREV CUR MEDS BY ELIG CLIN: HCPCS | Performed by: INTERNAL MEDICINE

## 2023-11-15 PROCEDURE — G8484 FLU IMMUNIZE NO ADMIN: HCPCS | Performed by: INTERNAL MEDICINE

## 2023-11-15 PROCEDURE — 1123F ACP DISCUSS/DSCN MKR DOCD: CPT | Performed by: INTERNAL MEDICINE

## 2023-11-15 PROCEDURE — 3074F SYST BP LT 130 MM HG: CPT | Performed by: INTERNAL MEDICINE

## 2023-11-15 PROCEDURE — 3078F DIAST BP <80 MM HG: CPT | Performed by: INTERNAL MEDICINE

## 2023-11-15 PROCEDURE — G8399 PT W/DXA RESULTS DOCUMENT: HCPCS | Performed by: INTERNAL MEDICINE

## 2023-11-15 PROCEDURE — G8417 CALC BMI ABV UP PARAM F/U: HCPCS | Performed by: INTERNAL MEDICINE

## 2023-11-15 PROCEDURE — 1090F PRES/ABSN URINE INCON ASSESS: CPT | Performed by: INTERNAL MEDICINE

## 2023-11-15 RX ORDER — LETROZOLE 2.5 MG/1
2.5 TABLET, FILM COATED ORAL DAILY
Qty: 90 TABLET | Refills: 3 | Status: SHIPPED | OUTPATIENT
Start: 2023-11-15

## 2023-11-15 RX ORDER — FUROSEMIDE 20 MG/1
20 TABLET ORAL DAILY
Qty: 30 TABLET | Refills: 1 | Status: SHIPPED | OUTPATIENT
Start: 2023-11-15

## 2023-11-15 NOTE — PROGRESS NOTES
Nitin Hawthorne is a 66 y.o. female    Chief Complaint   Patient presents with    Follow-up      breast cancer       1. Have you been to the ER, urgent care clinic since your last visit? Hospitalized since your last visit? Yes, [facility] ER on 11/10/23 post surgery, nausea/vomiting    2. Have you seen or consulted any other health care providers outside of the [address] [place] [address] Avenue since your last visit? Include any pap smears or colon screening.  No
texture. Psych: Alert, oriented, appropriate affect, normal judgment/insight  Neuro non focal  Breast no masses palpable b/l     Results:     Lab Results   Component Value Date/Time    WBC 10.9 11/10/2023 10:54 AM    HGB 12.9 11/10/2023 10:54 AM    HCT 39.5 11/10/2023 10:54 AM     11/10/2023 10:54 AM    MCV 89.4 11/10/2023 10:54 AM     Lab Results   Component Value Date/Time     11/10/2023 10:54 AM    K 3.6 11/10/2023 10:54 AM     11/10/2023 10:54 AM    CO2 23 11/10/2023 10:54 AM    BUN 16 11/10/2023 10:54 AM    GFRAA 62 2022 12:52 PM     Lab Results   Component Value Date/Time    ALT 69 11/10/2023 10:54 AM    GLOB 3.5 11/10/2023 10:54 AM         Records reviewed and summarized above. Pathology report(s) reviewed above. Radiology report(s) reviewed above. Assessment:/PLAN     1) LEFT breast cancer post lumpectomy . PATHOLOGIC STAGE CLASSIFICATION (pTNM, AJCC 8th Edition)       Primary Tumor (pT): pT1b       Regional Lymph Nodes (pN): pNX    SPECIAL STUDIES        Breast Biomarker Testing Performed on Previous Biopsy:           Estrogen Receptor (ER) Status: Positive, %           Progesterone Receptor (PgR) Status: Positive, 81-90%           HER2 (by immunohistochemistry): Equivocal  (Score 2+)           HER2 (by in situ hybridization): Negative (not amplified)           Ki-67 Percentage of Positive Nuclei: 15%           Testing Performed on Case Number: [id number]        Pt not interested in chemo. Not seeing Rad/onc per surgery. Open to doing adjuvant hormonal therapy. Seen today for fu. On adjuvant letrozole and tolerating fine. .   Clinically doing well overall. b/l mammo ordered for this year. .    Labs and routine HM per PCP. Fu here in 6 months. 2) arthritis hand/ RLS/ GERD. Per PCP. 3) Psychosocial.  Mood good. Coping well. Has good support. 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       years

## 2023-11-17 ENCOUNTER — TELEPHONE (OUTPATIENT)
Age: 78
End: 2023-11-17

## 2023-11-17 DIAGNOSIS — C50.912 INFILTRATING DUCTAL CARCINOMA OF LEFT BREAST (HCC): ICD-10-CM

## 2023-11-17 DIAGNOSIS — C50.812 MALIGNANT NEOPLASM OF OVERLAPPING SITES OF LEFT BREAST IN FEMALE, ESTROGEN RECEPTOR POSITIVE (HCC): ICD-10-CM

## 2023-11-17 DIAGNOSIS — Z17.0 MALIGNANT NEOPLASM OF OVERLAPPING SITES OF LEFT BREAST IN FEMALE, ESTROGEN RECEPTOR POSITIVE (HCC): ICD-10-CM

## 2023-11-17 DIAGNOSIS — Z79.811 USE OF LETROZOLE (FEMARA): Primary | ICD-10-CM

## 2023-11-17 NOTE — TELEPHONE ENCOUNTER
1500  Call to [facility] scheduling, spoke with Michael Valverde, pt ID verified x2, discussed office received message stating order has ABN trigger, Michael Valverde confirmed ABN triggers for mammogram, needs new order with updated dx codes, will forward to providers, understanding verbalized, appreciated Michael Valverde for the assistance.

## 2024-01-06 DIAGNOSIS — K21.9 GASTRO-ESOPHAGEAL REFLUX DISEASE WITHOUT ESOPHAGITIS: ICD-10-CM

## 2024-01-07 RX ORDER — OMEPRAZOLE 40 MG/1
40 CAPSULE, DELAYED RELEASE ORAL DAILY
Qty: 90 CAPSULE | Refills: 0 | Status: SHIPPED | OUTPATIENT
Start: 2024-01-07

## 2024-01-22 RX ORDER — PRAMIPEXOLE DIHYDROCHLORIDE 0.5 MG/1
0.5 TABLET ORAL NIGHTLY
Qty: 90 TABLET | Refills: 0 | Status: SHIPPED | OUTPATIENT
Start: 2024-01-22

## 2024-01-22 NOTE — TELEPHONE ENCOUNTER
PCP: Catalina Nina MD     Last appt:  11/15/2023      Future Appointments   Date Time Provider Department Center   3/1/2024  9:30 AM Catalina Nina MD [facility] BS AMB   5/20/2024 10:30 AM [facility]   7/30/2024 10:00 AM Silva Polo, APRN - NP [facility] BS AMB          Requested Prescriptions     Pending Prescriptions Disp Refills    pramipexole (MIRAPEX) 0.5 MG tablet [Pharmacy Med Name: Pramipexole Dihydrochloride 0.5 MG Oral Tablet] 90 tablet 0     Sig: Take 1 tablet by mouth nightly

## 2024-02-02 DIAGNOSIS — M79.89 LEG SWELLING: ICD-10-CM

## 2024-02-02 NOTE — TELEPHONE ENCOUNTER
PCP: Catalina Nina MD     Last appt:  11/15/2023      Future Appointments   Date Time Provider Department Center   3/1/2024  9:30 AM Catalina Nina MD [facility] BS AMB   5/20/2024 10:30 AM Beth Narayan DO [facility] BS AMB   7/30/2024 10:00 AM Silva Polo, APRN - NP [facility] BS AMB          Requested Prescriptions     Pending Prescriptions Disp Refills    furosemide (LASIX) 20 MG tablet [Pharmacy Med Name: Furosemide 20 MG Oral Tablet] 30 tablet 0     Sig: Take 1 tablet by mouth once daily

## 2024-02-03 RX ORDER — FUROSEMIDE 20 MG/1
20 TABLET ORAL DAILY
Qty: 30 TABLET | Refills: 5 | Status: SHIPPED | OUTPATIENT
Start: 2024-02-03

## 2024-02-05 ENCOUNTER — TELEPHONE (OUTPATIENT)
Facility: CLINIC | Age: 79
End: 2024-02-05

## 2024-02-05 NOTE — TELEPHONE ENCOUNTER
Informed pt that we had famotidine caused leg and feet swelling, entered by pt thru [facility]iv. She's seeing GI next month, will address with them.

## 2024-02-05 NOTE — TELEPHONE ENCOUNTER
Pt called and stated that the pharmacy could not fill her famotidine medication and she was wondering if she still needed this medication or not

## 2024-02-28 DIAGNOSIS — M79.89 LEG SWELLING: ICD-10-CM

## 2024-02-28 DIAGNOSIS — I10 PRIMARY HYPERTENSION: ICD-10-CM

## 2024-02-28 RX ORDER — FUROSEMIDE 20 MG/1
20 TABLET ORAL DAILY
Qty: 90 TABLET | Refills: 1 | Status: SHIPPED | OUTPATIENT
Start: 2024-02-28

## 2024-02-28 RX ORDER — METOPROLOL SUCCINATE 50 MG/1
50 TABLET, EXTENDED RELEASE ORAL DAILY
Qty: 90 TABLET | Refills: 1 | Status: SHIPPED | OUTPATIENT
Start: 2024-02-28

## 2024-02-28 RX ORDER — ATORVASTATIN CALCIUM 40 MG/1
40 TABLET, FILM COATED ORAL NIGHTLY
Qty: 90 TABLET | Refills: 1 | Status: SHIPPED | OUTPATIENT
Start: 2024-02-28

## 2024-02-28 NOTE — TELEPHONE ENCOUNTER
PCP: Catalina Nina MD     Last appt: 11/15/2023    Future Appointments   Date Time Provider Department Center   3/1/2024  9:30 AM Catalina Nina MD [facility] BS AMB   5[facility]20[facility]2024 10:30 [facility]   7/30/2024 10:00 AM Silva Polo, APRN - NP [facility] BS AMB          Requested Prescriptions     Pending Prescriptions Disp Refills    atorvastatin (LIPITOR) 40 MG tablet 90 tablet 1     Sig: Take 1 tablet by mouth nightly    furosemide (LASIX) 20 MG tablet 90 tablet 1     Sig: Take 1 tablet by mouth daily    metoprolol succinate (TOPROL XL) 50 MG extended release tablet 90 tablet 1     Sig: Take 1 tablet by mouth daily

## 2024-02-29 DIAGNOSIS — C50.912 INFILTRATING DUCTAL CARCINOMA OF LEFT BREAST (HCC): Primary | ICD-10-CM

## 2024-02-29 RX ORDER — LETROZOLE 2.5 MG/1
2.5 TABLET, FILM COATED ORAL DAILY
Qty: 90 TABLET | Refills: 3 | Status: SHIPPED | OUTPATIENT
Start: 2024-02-29

## 2024-03-01 ENCOUNTER — OFFICE VISIT (OUTPATIENT)
Facility: CLINIC | Age: 79
End: 2024-03-01

## 2024-03-01 VITALS
TEMPERATURE: 97.2 F | RESPIRATION RATE: 16 BRPM | BODY MASS INDEX: 31.57 KG/M2 | DIASTOLIC BLOOD PRESSURE: 66 MMHG | SYSTOLIC BLOOD PRESSURE: 124 MMHG | WEIGHT: 160.8 LBS | HEART RATE: 75 BPM | OXYGEN SATURATION: 96 % | HEIGHT: 60 IN

## 2024-03-01 DIAGNOSIS — K21.9 GASTRO-ESOPHAGEAL REFLUX DISEASE WITHOUT ESOPHAGITIS: ICD-10-CM

## 2024-03-01 DIAGNOSIS — I50.22 CHRONIC SYSTOLIC HEART FAILURE (HCC): Primary | ICD-10-CM

## 2024-03-01 DIAGNOSIS — E78.2 MIXED HYPERLIPIDEMIA: ICD-10-CM

## 2024-03-01 DIAGNOSIS — E55.9 VITAMIN D DEFICIENCY: ICD-10-CM

## 2024-03-01 DIAGNOSIS — Z98.61 CAD S/P PERCUTANEOUS CORONARY ANGIOPLASTY: ICD-10-CM

## 2024-03-01 DIAGNOSIS — C50.912 INFILTRATING DUCTAL CARCINOMA OF LEFT BREAST (HCC): ICD-10-CM

## 2024-03-01 DIAGNOSIS — E11.65 TYPE 2 DIABETES MELLITUS WITH HYPERGLYCEMIA, WITHOUT LONG-TERM CURRENT USE OF INSULIN (HCC): ICD-10-CM

## 2024-03-01 DIAGNOSIS — I10 PRIMARY HYPERTENSION: ICD-10-CM

## 2024-03-01 DIAGNOSIS — G25.81 RLS (RESTLESS LEGS SYNDROME): ICD-10-CM

## 2024-03-01 DIAGNOSIS — I25.10 CAD S/P PERCUTANEOUS CORONARY ANGIOPLASTY: ICD-10-CM

## 2024-03-01 DIAGNOSIS — E11.9 TYPE 2 DIABETES MELLITUS WITHOUT COMPLICATION, WITHOUT LONG-TERM CURRENT USE OF INSULIN (HCC): ICD-10-CM

## 2024-03-01 LAB
25(OH)D3 SERPL-MCNC: 28.6 NG/ML (ref 30–100)
ALBUMIN SERPL-MCNC: 3.3 G/DL (ref 3.5–5)
ALBUMIN/GLOB SERPL: 0.9 (ref 1.1–2.2)
ALP SERPL-CCNC: 111 U/L (ref 45–117)
ALT SERPL-CCNC: 28 U/L (ref 12–78)
ANION GAP SERPL CALC-SCNC: 3 MMOL/L (ref 5–15)
AST SERPL-CCNC: 21 U/L (ref 15–37)
BILIRUB DIRECT SERPL-MCNC: <0.1 MG/DL (ref 0–0.2)
BILIRUB SERPL-MCNC: 0.2 MG/DL (ref 0.2–1)
BUN SERPL-MCNC: 24 MG/DL (ref 6–20)
BUN/CREAT SERPL: 16 (ref 12–20)
CALCIUM SERPL-MCNC: 9.2 MG/DL (ref 8.5–10.1)
CHLORIDE SERPL-SCNC: 108 MMOL/L (ref 97–108)
CHOLEST SERPL-MCNC: 190 MG/DL
CO2 SERPL-SCNC: 28 MMOL/L (ref 21–32)
CREAT SERPL-MCNC: 1.47 MG/DL (ref 0.55–1.02)
EST. AVERAGE GLUCOSE BLD GHB EST-MCNC: 166 MG/DL
GLOBULIN SER CALC-MCNC: 3.7 G/DL (ref 2–4)
GLUCOSE SERPL-MCNC: 176 MG/DL (ref 65–100)
HBA1C MFR BLD: 7.4 % (ref 4–5.6)
HDLC SERPL-MCNC: 33 MG/DL
HDLC SERPL: 5.8 (ref 0–5)
LDLC SERPL CALC-MCNC: 103.2 MG/DL (ref 0–100)
POTASSIUM SERPL-SCNC: 4.8 MMOL/L (ref 3.5–5.1)
PROT SERPL-MCNC: 7 G/DL (ref 6.4–8.2)
SODIUM SERPL-SCNC: 139 MMOL/L (ref 136–145)
TRIGL SERPL-MCNC: 269 MG/DL
VLDLC SERPL CALC-MCNC: 53.8 MG/DL

## 2024-03-01 RX ORDER — OMEPRAZOLE 40 MG/1
40 CAPSULE, DELAYED RELEASE ORAL DAILY
Qty: 90 CAPSULE | Refills: 2 | Status: CANCELLED | OUTPATIENT
Start: 2024-03-01

## 2024-03-01 RX ORDER — PANTOPRAZOLE SODIUM 40 MG/1
40 TABLET, DELAYED RELEASE ORAL
Qty: 90 TABLET | Refills: 1 | Status: SHIPPED | OUTPATIENT
Start: 2024-03-01

## 2024-03-01 RX ORDER — METHOCARBAMOL 500 MG/1
TABLET, FILM COATED ORAL
COMMUNITY
Start: 2024-01-12

## 2024-03-01 RX ORDER — CLOPIDOGREL BISULFATE 75 MG/1
TABLET ORAL
COMMUNITY
Start: 2024-02-28

## 2024-03-01 RX ORDER — PRAMIPEXOLE DIHYDROCHLORIDE 0.5 MG/1
0.5 TABLET ORAL NIGHTLY
Qty: 90 TABLET | Refills: 2 | Status: SHIPPED | OUTPATIENT
Start: 2024-03-01

## 2024-03-01 RX ORDER — DULOXETIN HYDROCHLORIDE 60 MG/1
60 CAPSULE, DELAYED RELEASE ORAL DAILY
COMMUNITY
Start: 2024-02-19

## 2024-03-01 NOTE — PROGRESS NOTES
HPI  Ms. Tonie Lucero is a 78 y.o. year old female, she is seen today for follow up DM.   DM - highest 114 lately - says she cut out sweets - candy, etc    No chest pain, sob, dizziness, weakness, lightheadedness.   Occasional edema - mild - left ankle.     Per cardiology note Gerald Waters 1/30/24  \"78-year-old female who initially presented to me for cardiac clearance left hip replacement with Alvarado Stokes nuclear stress test showed an infarct and reduced ejection fraction. Had PTCA stenting of the mid LAD. With guideline directed medical therapy and revascularization her ejection fraction has now normalized 55-60%. Continue guideline directed medical therapy, blood pressure at target. Plans to see her primary care Dr. Nina in March for routine labs, please check lipids at that visit. Will move forward in evaluation of her left lower extremity edema with venous reflux study and given her history of type 2 diabetes and now coronary artery disease I will screen for PAD with BEE.   Follow-up with me in 3 months, at that visit we will decide staging of her hip replacement. At the bare minimum will need 6 months of uninterrupted dual antiplatelet therapy.\"    Couldn't afford brilinta - on plavix instead.     Hip replacement and hiatal hernia surgery on hold due to PTCA.     No heartburn or indigestion lately.   Cut out fried foods, spicy foods and eating smaller portions.     RLS controlled with mirapex.       Chief Complaint   Patient presents with    Follow-up     4 month follow up        Prior to Admission medications    Medication Sig Start Date End Date Taking? Authorizing Provider   pramipexole (MIRAPEX) 0.5 MG tablet Take 1 tablet by mouth nightly 3/1/24  Yes Catalina Nina MD   clopidogrel (PLAVIX) 75 MG tablet Take 1 tablet(s) every day by oral route. 2/28/24  Yes Provider, MD Nini   methocarbamol (ROBAXIN) 500 MG tablet TAKE 1 TABLET BY MOUTH THREE TIMES DAILY FOR MUSCLE SPASM FOR 7 DAYS

## 2024-03-01 NOTE — PROGRESS NOTES
Tonie Lucero is a 78 y.o. female    Chief Complaint   Patient presents with    Follow-up     4 month follow up       /66   Pulse 75   Temp 97.2 °F (36.2 °C)   Resp 16   Ht 1.524 m (5')   Wt 72.9 kg (160 lb 12.8 oz)   SpO2 96%   BMI 31.40 kg/m²         1. Have you been to the ER, urgent care clinic since your last visit?  Hospitalized since your last visit? No    2. Have you seen or consulted any other health care providers outside of the [facility] since your last visit?  Include any pap smears or colon screening. No    Learning Assessment:      2023     8:30 AM   [facility] AMB LEARNING ASSESSMENT   Primary Learner Patient   level of education DID NOT GRADUATE HIGH SCHOOL   Barriers Factors NONE   co-learner caregiver No   Primary Language ENGLISH   Learning Preference DEMONSTRATION   Answered By patient   Relationship to Learner SELF       Fall Risk Assessment:      2023    11:12 AM 4/10/2023     8:34 AM 2023     3:16 PM 1/10/2023    12:59 PM 2022     9:52 AM 11/3/2022    11:42 AM 10/13/2022     8:00 AM   Amb Fall Risk Assessment and TUG Test   Do you feel unsteady or are you worried about falling?  no         2 or more falls in past year? no         Fall with injury in past year? no         Fall in past 12 months?  0 0 0   0   Able to walk?  Yes Yes Yes   Yes   Total Score     1 0        Abuse Screenin/23/2023     8:00 AM   AMB Abuse Screening   Do you ever feel afraid of your partner? N   Are you in a relationship with someone who physically or mentally threatens you? N   Is it safe for you to go home? Y       ADL Screenin/23/2023     8:00 AM   ADL ASSESSMENT   Feeding yourself No Help Needed   Getting from bed to chair No Help Needed   Getting dressed No Help Needed   Bathing or showering No Help Needed   Walk across the room (includes cane/walker) No Help Needed   Using the telphone No Help Needed   Taking your medications No Help Needed

## 2024-03-04 DIAGNOSIS — K21.9 GASTRO-ESOPHAGEAL REFLUX DISEASE WITHOUT ESOPHAGITIS: ICD-10-CM

## 2024-03-04 NOTE — TELEPHONE ENCOUNTER
PCP: Catalina Nina MD     Last appt: 3/1/2024    Future Appointments   Date Time Provider Department Center   5/20/2024 10:30 AM Beth Narayan DO [facility] BS AMB   7/30/2024 10:00 AM Silva Polo APRN - NP [facility] BS AMB   9/10/2024  8:30 AM Catalina Nina MD [facility] BS AMB          Requested Prescriptions     Pending Prescriptions Disp Refills    omeprazole (PRILOSEC) 40 MG delayed release capsule 90 capsule 2     Sig: Take 1 capsule by mouth daily

## 2024-03-05 RX ORDER — OMEPRAZOLE 40 MG/1
40 CAPSULE, DELAYED RELEASE ORAL DAILY
Qty: 90 CAPSULE | Refills: 2 | Status: SHIPPED | OUTPATIENT
Start: 2024-03-05

## 2024-03-05 RX ORDER — ATORVASTATIN CALCIUM 40 MG/1
40 TABLET, FILM COATED ORAL NIGHTLY
Qty: 90 TABLET | Refills: 0 | OUTPATIENT
Start: 2024-03-05

## 2024-03-11 DIAGNOSIS — R79.89 ELEVATED SERUM CREATININE: Primary | ICD-10-CM

## 2024-03-11 RX ORDER — ERGOCALCIFEROL 1.25 MG/1
50000 CAPSULE ORAL WEEKLY
Qty: 12 CAPSULE | Refills: 0 | Status: SHIPPED | OUTPATIENT
Start: 2024-03-11

## 2024-03-12 ENCOUNTER — TELEPHONE (OUTPATIENT)
Facility: CLINIC | Age: 79
End: 2024-03-12

## 2024-03-15 DIAGNOSIS — R79.89 ELEVATED SERUM CREATININE: ICD-10-CM

## 2024-03-15 LAB
ANION GAP SERPL CALC-SCNC: 5 MMOL/L (ref 5–15)
BUN SERPL-MCNC: 34 MG/DL (ref 6–20)
BUN/CREAT SERPL: 24 (ref 12–20)
CALCIUM SERPL-MCNC: 9 MG/DL (ref 8.5–10.1)
CHLORIDE SERPL-SCNC: 109 MMOL/L (ref 97–108)
CO2 SERPL-SCNC: 25 MMOL/L (ref 21–32)
CREAT SERPL-MCNC: 1.4 MG/DL (ref 0.55–1.02)
GLUCOSE SERPL-MCNC: 136 MG/DL (ref 65–100)
POTASSIUM SERPL-SCNC: 4.7 MMOL/L (ref 3.5–5.1)
SODIUM SERPL-SCNC: 139 MMOL/L (ref 136–145)

## 2024-05-20 RX ORDER — LANCETS
EACH MISCELLANEOUS
Qty: 100 EACH | Refills: 0 | Status: SHIPPED | OUTPATIENT
Start: 2024-05-20 | End: 2024-05-24 | Stop reason: SDUPTHER

## 2024-05-24 RX ORDER — LANCETS
EACH MISCELLANEOUS
Qty: 100 EACH | Refills: 3 | Status: SHIPPED | OUTPATIENT
Start: 2024-05-24

## 2024-05-24 NOTE — TELEPHONE ENCOUNTER
PCP: Catalina Nina MD     Last appt: 3/1/2024    Future Appointments   Date Time Provider Department Center   7/30/2024 10:00 AM Silva Polo APRN - NP [facility] BS AMB   9/10/2024  8:30 AM Catalina Nina MD [facility] BS AMB   12/5/2024  1:00 PM [facility]          Requested Prescriptions     Pending Prescriptions Disp Refills    Accu-Chek Softclix Lancets MISC 100 each 0     Sig: USE 1 LANCET TO CHECK BLOOD GLUCOSE ONCE DAILY DX E11.65

## 2024-05-24 NOTE — TELEPHONE ENCOUNTER
PCP: Catalina Nina MD     Last appt: 3/1/2024    Future Appointments   Date Time Provider Department Center   7/30/2024 10:00 AM Silva Polo APRN - NP [facility] BS AMB   9/10/2024  8:30 AM Catalina Nina MD [facility] BS AMB   12/5/2024  1:00 PM Lit Oconnor MD [facility] BS AMB          Requested Prescriptions     Pending Prescriptions Disp Refills    atorvastatin (LIPITOR) 40 MG tablet 90 tablet 1     Sig: Take 1 tablet by mouth nightly

## 2024-05-27 RX ORDER — ATORVASTATIN CALCIUM 40 MG/1
40 TABLET, FILM COATED ORAL NIGHTLY
Qty: 90 TABLET | Refills: 1 | Status: SHIPPED | OUTPATIENT
Start: 2024-05-27

## 2024-05-31 ENCOUNTER — TELEPHONE (OUTPATIENT)
Facility: CLINIC | Age: 79
End: 2024-05-31

## 2024-05-31 RX ORDER — ROSUVASTATIN CALCIUM 40 MG/1
1 TABLET, COATED ORAL DAILY
COMMUNITY

## 2024-05-31 NOTE — TELEPHONE ENCOUNTER
Received a fax from [facility] stating RX's for atorvastatin and crestor have been filled, requesting clarification. Pt is currently taking both atorvastatin and crestor, advised to take one or the other, per Dr Nina. She will stop Crestor for now, finish the atorvastatin. Then restart Crestor and stay on it. [facility] pharmacy notified.

## 2024-07-09 NOTE — H&P
Date: 2023 1:30 PM  Patient Name: Nickie Terrazas  Account #: [id number]  Gender: Female   (age): 1945 (66)  Provider:    Keyanna Nagel PA-C     Referring Physician:    James Zuniga. Ashlyn Muñoz, [address], [address]  [phone] (phone)  [phone] (fax)     Chief Complaint:    vomiting/colonoscopy     History of Present Illness:  65 yo WF presents as new patient for one year of regurgitation of foods, pills, if she drinks too much fluid. Intermittent epigastric cramping/bloating abdominal pain. Regurgitation happens about 1-2 hours after she eats. Regurgitation also happens with soups. No bloody vomit, nausea, dysphagia, abnormal weight loss. Nonsmoker, no alcohol. No marijuana. No diabetes, no hx of weight loss medications. She has been on omeprazole for 8 years for heartburn. She takes Tylenol for pain. Takes duloxetine for arthritis, though not taking it right now. S/P breast CA. Lumpectomy and oral medication. No chemo or radiation. Gas/bloating started with losartan 6 months ago. No change in her bowel habits. She has a bowel movement almost everyday. Some low volume blood in stool. Going on for last year. Blood is mostly when she wipes. No melena. Has follow up with PCP next Friday. Dad had colon cancer. He was diagnosed in his 76s. Passed at age 80 of pneumonia. Her last colonoscopy was 2015 years ago in [place]. Was due for screening in . Last A1C on 5/15 was 7.6.   CBC normal  CMP - random gluc 111      [facility] nephrolithiasis, bacteremia, hip replacement  Past Medical History  Medical Conditions:   COVID-19  High cholesterol  Dx Studies:   Colonoscopy  Medications:   ascorbic acid (vitamin C) 500 mg Take 1 tablet by mouth once a day  atorvastatin 40 mg Take 1 tablet by mouth every night  cholecalciferol (vitamin D3) 125 mcg (5,000 unit) Take 1 capsule by mouth once a day  duloxetine 60 mg Take 1 capsule by mouth once a
Pre-Endoscopy H&P Update  Chief complaint/HPI/ROS:  The indication for the procedure, the patient's history and the patient's current medications are reviewed prior to the procedure and that data is reported on the H&P to which this document is attached. Any significant complaints with regard to organ systems will be noted, and if not mentioned then a review of systems is not contributory.   Past Medical History:   Diagnosis Date    Arthritis     bilateral hands, hips, lower back    At risk for sleep apnea 03/18/2019    on phone assessment with PAT, callie sleep scoring tool, scored 4    Cancer ([address] 12/2022    LEFT BREAST    Chronic kidney disease 2017    kidney stones    COVID-19 vaccination not done 12/2022    COVID-19 virus infection 01/2022    H/O COVID 19 PNEUMONIA    Elevated hemoglobin A1c 01/27/2020    GERD (gastroesophageal reflux disease)     being treated    Heart murmur     High cholesterol     Hypertension     Kidney stones     Menopause 1995    Palpitations     as of 6/24/19 pt reports was evaluated by cardiology and cleared, now followed by cardiology, no further symptoms per pt    Pneumonia 2006 (approx)    Rheumatic fever     AGE 5    RLS (restless legs syndrome)     Sepsis ([address] 6/20/2021    Shoulder fracture 2009 [facility] -Jan 09  Right side       Past Surgical History:   Procedure Laterality Date    BREAST LUMPECTOMY Left 12/15/2022    LEFT BREAST LUMPECTOMY WITH ULTRASOUND performed by Aneesh Buchanan MD at [address] Bilateral 03/2019    COLONOSCOPY  07/30/2010    Dr. Joselo Payton, diverticulosis, repeat due 7/30/2015    COLONOSCOPY,SUSAN LEE,FORCEP/CAUTERY  09/04/2015         HX OPEN REDUCTION INTERNAL FIXATION Left 1982    ankle    ORTHOPEDIC SURGERY  2019    L4-L5 BACK SURGERY - synovial cyst removal    ORTHOPEDIC SURGERY Left     HAND SURGERY    ORTHOPEDIC SURGERY Left     ORIF ANKLE    ROTATOR CUFF REPAIR Right 2008    ROTATOR CUFF REPAIR Left 2012    TOTAL HIP
7 (severe pain)

## 2024-07-17 ENCOUNTER — TRANSCRIBE ORDERS (OUTPATIENT)
Facility: HOSPITAL | Age: 79
End: 2024-07-17

## 2024-07-17 DIAGNOSIS — M76.12 PSOAS TENDINITIS OF LEFT SIDE: Primary | ICD-10-CM

## 2024-07-26 DIAGNOSIS — M79.89 LEG SWELLING: ICD-10-CM

## 2024-07-26 RX ORDER — FUROSEMIDE 20 MG/1
20 TABLET ORAL DAILY
Qty: 90 TABLET | Refills: 3 | Status: SHIPPED | OUTPATIENT
Start: 2024-07-26

## 2024-07-30 ENCOUNTER — HOSPITAL ENCOUNTER (OUTPATIENT)
Facility: HOSPITAL | Age: 79
Discharge: HOME | End: 2024-08-02
Attending: ORTHOPAEDIC SURGERY
Payer: MEDICARE

## 2024-07-30 DIAGNOSIS — M76.12 PSOAS TENDINITIS OF LEFT SIDE: ICD-10-CM

## 2024-07-30 PROCEDURE — 76942 ECHO GUIDE FOR BIOPSY: CPT

## 2024-07-30 RX ORDER — BUPIVACAINE HYDROCHLORIDE AND EPINEPHRINE 5; 5 MG/ML; UG/ML
30 INJECTION, SOLUTION EPIDURAL; INTRACAUDAL; PERINEURAL ONCE
Status: DISCONTINUED | OUTPATIENT
Start: 2024-07-30 | End: 2024-08-03 | Stop reason: HOSPADM

## 2024-07-30 RX ORDER — BUPIVACAINE HYDROCHLORIDE AND EPINEPHRINE 5; 5 MG/ML; UG/ML
10 INJECTION, SOLUTION EPIDURAL; INTRACAUDAL; PERINEURAL ONCE
Status: DISCONTINUED | OUTPATIENT
Start: 2024-07-30 | End: 2024-08-03 | Stop reason: HOSPADM

## 2024-07-30 RX ORDER — TRIAMCINOLONE ACETONIDE 40 MG/ML
40 INJECTION, SUSPENSION INTRA-ARTICULAR; INTRAMUSCULAR ONCE
Status: DISCONTINUED | OUTPATIENT
Start: 2024-07-30 | End: 2024-08-03 | Stop reason: HOSPADM

## 2024-07-31 DIAGNOSIS — I10 PRIMARY HYPERTENSION: ICD-10-CM

## 2024-07-31 RX ORDER — METOPROLOL SUCCINATE 50 MG/1
50 TABLET, EXTENDED RELEASE ORAL DAILY
Qty: 90 TABLET | Refills: 3 | Status: SHIPPED | OUTPATIENT
Start: 2024-07-31

## 2024-08-05 ENCOUNTER — OFFICE VISIT (OUTPATIENT)
Age: 79
End: 2024-08-05
Payer: MEDICARE

## 2024-08-05 VITALS — BODY MASS INDEX: 31.41 KG/M2 | WEIGHT: 160 LBS | HEIGHT: 60 IN

## 2024-08-05 DIAGNOSIS — C50.912 MALIGNANT NEOPLASM OF LEFT BREAST IN FEMALE, ESTROGEN RECEPTOR POSITIVE, UNSPECIFIED SITE OF BREAST (HCC): Primary | ICD-10-CM

## 2024-08-05 DIAGNOSIS — Z98.890 S/P LUMPECTOMY OF BREAST: ICD-10-CM

## 2024-08-05 DIAGNOSIS — Z85.3 HISTORY OF LEFT BREAST CANCER: ICD-10-CM

## 2024-08-05 DIAGNOSIS — Z17.0 MALIGNANT NEOPLASM OF LEFT BREAST IN FEMALE, ESTROGEN RECEPTOR POSITIVE, UNSPECIFIED SITE OF BREAST (HCC): Primary | ICD-10-CM

## 2024-08-05 PROCEDURE — G8399 PT W/DXA RESULTS DOCUMENT: HCPCS | Performed by: NURSE PRACTITIONER

## 2024-08-05 PROCEDURE — 99213 OFFICE O/P EST LOW 20 MIN: CPT | Performed by: NURSE PRACTITIONER

## 2024-08-05 PROCEDURE — 1036F TOBACCO NON-USER: CPT | Performed by: NURSE PRACTITIONER

## 2024-08-05 PROCEDURE — G8427 DOCREV CUR MEDS BY ELIG CLIN: HCPCS | Performed by: NURSE PRACTITIONER

## 2024-08-05 PROCEDURE — 1090F PRES/ABSN URINE INCON ASSESS: CPT | Performed by: NURSE PRACTITIONER

## 2024-08-05 PROCEDURE — 1123F ACP DISCUSS/DSCN MKR DOCD: CPT | Performed by: NURSE PRACTITIONER

## 2024-08-05 PROCEDURE — G8417 CALC BMI ABV UP PARAM F/U: HCPCS | Performed by: NURSE PRACTITIONER

## 2024-08-05 NOTE — PROGRESS NOTES
of this exam and the recommendation were directly discussed with  the patient at the time of exam by myself.      Review of Systems      Physical Exam  Constitutional:       Appearance: Normal appearance.   Chest:   Breasts:     Right: No mass, nipple discharge, skin change or tenderness.      Left: No mass, nipple discharge, skin change or tenderness.      Comments: Well healed surgical incision to the LEFT breast  Musculoskeletal:      Comments: FROM - UE x 2   Lymphadenopathy:      Upper Body:      Right upper body: No supraclavicular or axillary adenopathy.      Left upper body: No supraclavicular or axillary adenopathy.   Neurological:      Mental Status: She is alert.   Psychiatric:         Attention and Perception: Attention normal.         Mood and Affect: Mood normal.         Speech: Speech normal.         Behavior: Behavior normal.          Ht 1.524 m (5')   Wt 72.6 kg (160 lb)   BMI 31.25 kg/m²       ASSESSMENT and PLAN   Diagnosis Orders   1. Malignant neoplasm of left breast in female, estrogen receptor positive, unspecified site of breast (HCC)        2. S/P lumpectomy of breast        3. History of left breast cancer  KARL ELLA DIGITAL DIAGNOSTIC BILATERAL            Normal exam with no evidence of local recurrence.    Taking letrozole and reports that she is tolerating it well, no concerns.  Continues follow-up with Dr. Narayan.    BDMammogram 3D in 12/2024.  RTC here in 6 months or sooner PRN. She is comfortable with this plan.  All questions answered and she stated understanding.

## 2024-08-08 LAB — HBA1C MFR BLD HPLC: 9 %

## 2024-08-12 DIAGNOSIS — K21.9 GASTRO-ESOPHAGEAL REFLUX DISEASE WITHOUT ESOPHAGITIS: ICD-10-CM

## 2024-08-12 RX ORDER — PANTOPRAZOLE SODIUM 40 MG/1
40 TABLET, DELAYED RELEASE ORAL
Qty: 90 TABLET | Refills: 3 | Status: SHIPPED | OUTPATIENT
Start: 2024-08-12

## 2024-08-21 ENCOUNTER — TELEPHONE (OUTPATIENT)
Facility: CLINIC | Age: 79
End: 2024-08-21

## 2024-08-21 NOTE — TELEPHONE ENCOUNTER
Reviewing lab results placed in my office - faxed from cardiology without any notice -noted to have A1c of 9.0 on 8/8/2024.  This is much higher than it had been and patient needs an appointment ASAP.   PACU PACU

## 2024-08-27 ENCOUNTER — OFFICE VISIT (OUTPATIENT)
Facility: CLINIC | Age: 79
End: 2024-08-27
Payer: MEDICARE

## 2024-08-27 VITALS
TEMPERATURE: 97.8 F | HEART RATE: 73 BPM | DIASTOLIC BLOOD PRESSURE: 80 MMHG | RESPIRATION RATE: 12 BRPM | OXYGEN SATURATION: 96 % | BODY MASS INDEX: 31.41 KG/M2 | SYSTOLIC BLOOD PRESSURE: 140 MMHG | HEIGHT: 60 IN | WEIGHT: 160 LBS

## 2024-08-27 DIAGNOSIS — I10 PRIMARY HYPERTENSION: ICD-10-CM

## 2024-08-27 DIAGNOSIS — E11.65 TYPE 2 DIABETES MELLITUS WITH HYPERGLYCEMIA, WITHOUT LONG-TERM CURRENT USE OF INSULIN (HCC): Primary | ICD-10-CM

## 2024-08-27 DIAGNOSIS — I50.22 CHRONIC SYSTOLIC HEART FAILURE (HCC): ICD-10-CM

## 2024-08-27 DIAGNOSIS — Z23 NEED FOR PROPHYLACTIC VACCINATION AGAINST STREPTOCOCCUS PNEUMONIAE (PNEUMOCOCCUS): ICD-10-CM

## 2024-08-27 DIAGNOSIS — N18.31 CHRONIC KIDNEY DISEASE, STAGE 3A (HCC): ICD-10-CM

## 2024-08-27 PROCEDURE — G8427 DOCREV CUR MEDS BY ELIG CLIN: HCPCS | Performed by: INTERNAL MEDICINE

## 2024-08-27 PROCEDURE — 3077F SYST BP >= 140 MM HG: CPT | Performed by: INTERNAL MEDICINE

## 2024-08-27 PROCEDURE — 3079F DIAST BP 80-89 MM HG: CPT | Performed by: INTERNAL MEDICINE

## 2024-08-27 PROCEDURE — 1123F ACP DISCUSS/DSCN MKR DOCD: CPT | Performed by: INTERNAL MEDICINE

## 2024-08-27 PROCEDURE — 1036F TOBACCO NON-USER: CPT | Performed by: INTERNAL MEDICINE

## 2024-08-27 PROCEDURE — 90677 PCV20 VACCINE IM: CPT | Performed by: INTERNAL MEDICINE

## 2024-08-27 PROCEDURE — G0009 ADMIN PNEUMOCOCCAL VACCINE: HCPCS | Performed by: INTERNAL MEDICINE

## 2024-08-27 PROCEDURE — 3051F HG A1C>EQUAL 7.0%<8.0%: CPT | Performed by: INTERNAL MEDICINE

## 2024-08-27 PROCEDURE — 1090F PRES/ABSN URINE INCON ASSESS: CPT | Performed by: INTERNAL MEDICINE

## 2024-08-27 PROCEDURE — 99214 OFFICE O/P EST MOD 30 MIN: CPT | Performed by: INTERNAL MEDICINE

## 2024-08-27 PROCEDURE — G8417 CALC BMI ABV UP PARAM F/U: HCPCS | Performed by: INTERNAL MEDICINE

## 2024-08-27 PROCEDURE — G8399 PT W/DXA RESULTS DOCUMENT: HCPCS | Performed by: INTERNAL MEDICINE

## 2024-08-27 RX ORDER — EZETIMIBE 10 MG/1
1 TABLET ORAL DAILY
COMMUNITY
Start: 2024-08-12

## 2024-08-27 RX ORDER — TRAMADOL HYDROCHLORIDE 50 MG/1
25 TABLET ORAL
COMMUNITY

## 2024-08-27 RX ORDER — DAPAGLIFLOZIN 10 MG/1
10 TABLET, FILM COATED ORAL
COMMUNITY
Start: 2024-05-30

## 2024-08-27 ASSESSMENT — PATIENT HEALTH QUESTIONNAIRE - PHQ9
SUM OF ALL RESPONSES TO PHQ QUESTIONS 1-9: 0
2. FEELING DOWN, DEPRESSED OR HOPELESS: NOT AT ALL
SUM OF ALL RESPONSES TO PHQ QUESTIONS 1-9: 0
1. LITTLE INTEREST OR PLEASURE IN DOING THINGS: NOT AT ALL
SUM OF ALL RESPONSES TO PHQ9 QUESTIONS 1 & 2: 0

## 2024-08-27 NOTE — PROGRESS NOTES
Tonie Lucero  Identified pt with two pt identifiers(name and ).     Chief Complaint   Patient presents with    Diabetes       Reviewed record In preparation for visit and have obtained necessary documentation.     1. Have you been to the ER, urgent care clinic or hospitalized since your last visit? No     2. Have you seen or consulted any other health care providers outside of the [facility] since your last visit? Include any pap smears or colon screening. Cardiology, ortho     Vitals reviewed with provider.    Health Maintenance reviewed: After verbal order read back of Dr Nina, patient received Prevnar 20 in left deltoid.  NDC: 5886-5741-91 Lot: [id number] Exp:2025.  Patient tolerated procedure without complaints and received VIS.    Health Maintenance Due   Topic    Respiratory Syncytial Virus (RSV) Pregnant or age 60 yrs+ (1 - 1-dose 60+ series)    Shingles vaccine (2 of 3)    Pneumococcal 65+ years Vaccine (2 of 2 - PPSV23 or PCV20)    COVID-19 Vaccine ( season)    Depression Screen     Flu vaccine (1)          Wt Readings from Last 3 Encounters:   24 72.6 kg (160 lb)   24 72.6 kg (160 lb)   24 72.9 kg (160 lb 12.8 oz)        Temp Readings from Last 3 Encounters:   24 97.8 °F (36.6 °C) (Oral)   24 97.2 °F (36.2 °C)   11/15/23 97.9 °F (36.6 °C)        BP Readings from Last 3 Encounters:   24 (!) 163/84   24 124/66   11/15/23 120/70        Pulse Readings from Last 3 Encounters:   24 73   24 75   11/15/23 97      [unfilled]       Learning Assessment:   :         2023     8:30 AM   [facility] LEARNING ASSESSMENT   Primary Learner Patient   level of education DID NOT GRADUATE HIGH SCHOOL   Barriers Factors NONE   co-learner caregiver No   Primary Language ENGLISH   Learning Preference DEMONSTRATION   Answered By patient   Relationship to Learner SELF         Fall Risk Assessment:   :         2024     9:49 AM 2023    
  Topic Date Due    Respiratory Syncytial Virus (RSV) Pregnant or age 60 yrs+ (1 - 1-dose 60+ series) Never done    Shingles vaccine (2 of 3) 03/20/2015    COVID-19 Vaccine (1 - 2023-24 season) Never done    Flu vaccine (1) 08/01/2024             Keep appointment next week      Reviewed plan of care. Patient has provided input and agrees with goals.     The nurse provided the patient and/or family with advanced directive information if needed and encouraged the patient to provide a copy to the office when available.

## 2024-08-27 NOTE — PATIENT INSTRUCTIONS
You should be taking entresto, NOT losartan. Check you pill bottles when you get home.       Patient Education        pneumococcal 20-valent conjugate vaccine  Pronunciation:  DIXIE chaney 20-VAY pooja bardales VAX een  Brand:  Prevnar 20  What is the most important information I should know about pneumococcal 20-valent conjugate vaccine?  You should not receive this vaccine if you ever had a severe allergic reaction to a pneumococcal or diphtheria toxoid vaccine.  What is pneumococcal 20-valent conjugate vaccine?  Pneumococcal disease is a serious infection caused by a bacteria that can infect the sinuses, inner ear, lungs, blood, and brain. These conditions can be fatal.  Pneumococcal 20-valent conjugate vaccine is used in adults to help prevent disease caused by pneumococcal bacteria. This vaccine contains 20 different types of pneumococcal bacteria.  This vaccine helps your body develop immunity to the disease, but will not treat an active infection you already have.  Like any vaccine, pneumococcal 20-valent conjugate vaccine may not provide protection from disease in every person.  What should I discuss with my healthcare provider before taking pneumococcal 20-valent conjugate vaccine?  You should not receive this vaccine if you ever had a severe allergic reaction to a pneumococcal or diphtheria toxoid vaccine.  Tell the vaccination provider if you have:  a weak immune system (caused by disease or by using certain medicine); or  if you are receiving radiation or chemotherapy.  You can still receive a vaccine if you have a minor cold. In the case of a more severe illness with a fever or any type of infection, wait until you get better before receiving this vaccine.  Tell the vaccination provider if you are pregnant or breastfeeding.  How should I take pneumococcal 20-valent conjugate vaccine?  This vaccine is given as an injection (shot) into a muscle.  Pneumococcal 20-valent conjugate vaccine is usually  given as 1 shot.  What happens if I miss a dose?  Pneumococcal 20-valent conjugate vaccine is used as a single dose and does not have a booster schedule.  What happens if I overdose?  An overdose of this vaccine is unlikely to occur.  What should I avoid while taking pneumococcal 20-valent conjugate vaccine?  Follow your doctor's instructions about any restrictions on food, beverages, or activity.  What are the possible side effects of pneumococcal 20-valent conjugate vaccine?  Get emergency medical help if you have signs of an allergic reaction: hives; difficult breathing; swelling of your face, lips, tongue, or throat.  Keep track of all side effects you have. If you ever need another pneumococcal 20-valent conjugate vaccine, you will need to tell the vaccination provider if the previous shot caused any side effects.  Becoming infected with pneumococcal disease is much more dangerous to your health than receiving this vaccine. However, like any medicine, this vaccine can cause side effects but the risk of serious side effects is low.  Common side effects may include:  pain or swelling where a shot was given;  muscle or joint pain;  headache; or  feeling tired.  This is not a complete list of side effects and others may occur. Call your doctor for medical advice about side effects. You may report vaccine side effects to the [place] Department of Health and Human Services at 1-197.325.9395.  What other drugs will affect pneumococcal 20-valent conjugate vaccine?  Tell the vaccination provider if you have recently received drugs or treatments that can weaken the immune system, including:  steroid medicine;  medications to treat psoriasis, rheumatoid arthritis, or other autoimmune disorders; or  medicines to treat or prevent organ transplant rejection.  This list is not complete. Other drugs may affect pneumococcal 20-valent conjugate vaccine, including prescription and over-the-counter medicines, vitamins, and herbal

## 2024-08-28 ENCOUNTER — TELEPHONE (OUTPATIENT)
Facility: CLINIC | Age: 79
End: 2024-08-28

## 2024-08-28 DIAGNOSIS — E11.65 TYPE 2 DIABETES MELLITUS WITH HYPERGLYCEMIA, WITHOUT LONG-TERM CURRENT USE OF INSULIN (HCC): Primary | ICD-10-CM

## 2024-08-28 NOTE — TELEPHONE ENCOUNTER
Patient called and stated that the mounjaro cost to much it was 600.00 dollars. Is there anything else she can take that's a lot cheaper

## 2024-08-30 NOTE — TELEPHONE ENCOUNTER
Pt will call call her insurance to see what is covered, if all medications in class to expensive will sen to [facility].

## 2024-09-06 NOTE — TELEPHONE ENCOUNTER
Pt states she has left 2 messages for insurance company to call her back. Would like the referral to Dr Gee.

## 2024-09-09 ENCOUNTER — TELEPHONE (OUTPATIENT)
Dept: PHARMACY | Facility: CLINIC | Age: 79
End: 2024-09-09

## 2024-09-10 ENCOUNTER — OFFICE VISIT (OUTPATIENT)
Facility: CLINIC | Age: 79
End: 2024-09-10

## 2024-09-10 ENCOUNTER — TELEPHONE (OUTPATIENT)
Facility: CLINIC | Age: 79
End: 2024-09-10

## 2024-09-10 VITALS
WEIGHT: 161.5 LBS | RESPIRATION RATE: 12 BRPM | BODY MASS INDEX: 31.71 KG/M2 | TEMPERATURE: 98.1 F | HEART RATE: 70 BPM | OXYGEN SATURATION: 96 % | SYSTOLIC BLOOD PRESSURE: 130 MMHG | DIASTOLIC BLOOD PRESSURE: 64 MMHG | HEIGHT: 60 IN

## 2024-09-10 DIAGNOSIS — N18.31 CHRONIC KIDNEY DISEASE, STAGE 3A (HCC): ICD-10-CM

## 2024-09-10 DIAGNOSIS — I10 PRIMARY HYPERTENSION: ICD-10-CM

## 2024-09-10 DIAGNOSIS — R25.2 LEG CRAMPS: ICD-10-CM

## 2024-09-10 DIAGNOSIS — G89.29 CHRONIC MIDLINE LOW BACK PAIN WITHOUT SCIATICA: ICD-10-CM

## 2024-09-10 DIAGNOSIS — E55.9 VITAMIN D DEFICIENCY: ICD-10-CM

## 2024-09-10 DIAGNOSIS — Z00.00 MEDICARE ANNUAL WELLNESS VISIT, SUBSEQUENT: Primary | ICD-10-CM

## 2024-09-10 DIAGNOSIS — M54.50 CHRONIC MIDLINE LOW BACK PAIN WITHOUT SCIATICA: ICD-10-CM

## 2024-09-10 DIAGNOSIS — E11.65 TYPE 2 DIABETES MELLITUS WITH HYPERGLYCEMIA, WITHOUT LONG-TERM CURRENT USE OF INSULIN (HCC): ICD-10-CM

## 2024-09-10 DIAGNOSIS — R68.89 OTHER GENERAL SYMPTOMS AND SIGNS: ICD-10-CM

## 2024-09-10 ASSESSMENT — PATIENT HEALTH QUESTIONNAIRE - PHQ9
1. LITTLE INTEREST OR PLEASURE IN DOING THINGS: NOT AT ALL
2. FEELING DOWN, DEPRESSED OR HOPELESS: NOT AT ALL
SUM OF ALL RESPONSES TO PHQ9 QUESTIONS 1 & 2: 0
SUM OF ALL RESPONSES TO PHQ QUESTIONS 1-9: 0

## 2024-09-10 ASSESSMENT — LIFESTYLE VARIABLES
HOW MANY STANDARD DRINKS CONTAINING ALCOHOL DO YOU HAVE ON A TYPICAL DAY: PATIENT DOES NOT DRINK
HOW OFTEN DO YOU HAVE A DRINK CONTAINING ALCOHOL: NEVER

## 2024-09-11 LAB
25(OH)D3 SERPL-MCNC: 44 NG/ML (ref 30–100)
MAGNESIUM SERPL-MCNC: 2.2 MG/DL (ref 1.6–2.4)
VIT B12 SERPL-MCNC: 359 PG/ML (ref 193–986)

## 2024-09-12 ENCOUNTER — TELEPHONE (OUTPATIENT)
Facility: CLINIC | Age: 79
End: 2024-09-12

## 2024-09-22 ENCOUNTER — HOSPITAL ENCOUNTER (OUTPATIENT)
Facility: HOSPITAL | Age: 79
Discharge: HOME | End: 2024-09-25
Attending: ORTHOPAEDIC SURGERY
Payer: MEDICARE

## 2024-09-22 DIAGNOSIS — M23.92 INTERNAL DERANGEMENT OF LEFT KNEE: ICD-10-CM

## 2024-09-22 PROCEDURE — 73721 MRI JNT OF LWR EXTRE W/O DYE: CPT

## 2024-09-29 NOTE — PROGRESS NOTES
Pharmacy Progress Note - Diabetes Management      Assessment / Plan:   Diabetes Management:  - Per ADA guidelines, Pt's A1c is not at goal of < 7%.  - Review fasting and post prandial goals <130 and <180, respectively.    - Patient is currently enrolled in the AZ&Me program for Farxiga 10 mg daily  - Discuss [facility] PAP's criteria for Rybelsus. Application provided. Patient to complete and return application w/ POI.   - Given patient's age, concurrent dual anti-platelet therapy, recommend for patient to limit Vit C to max 500 mg daily     S/O: Ms. Tonie Lucero is a 79 y.o. female, referred by Catalina Juarez MD, with a PMH of T2DM, HTN, HLD, CAD, CHF, CKD III, osteoporosis, s/p hip replacement, RLS, GERD, was seen today for diabetes management.  Patient's last A1c was 9% (Aug 2024), 7.4% (March 2024), 6.8% (Oct 2023), 7% (Aug 2023), 7.5% (May 2023), 7.4% (Feb 2023),6.6% (Oct 2022).       Interim update:   Saw Dr Nina 9/13/24  Reports Rybelsus and Farxiga were ~$600 per prescription     SHx:  - Lives alone  - Cleans houses 2 days/week.     Medication Adherence/Access:  - Brought in home medications including prescription/non-prescriptions:  yes  - Endorses barriers to affording/accessing medications : yes  - Uses a pill box/other methods to organize medications: yes  - Endorses adherence to current regimen?: yes  - Uses [facility] pharmacy ; Rx insurance is: [employer] Part D     Diabetes Management:  Diabetes History:  - Endorses she was pre-diabetic when she turned 60. Was dx with DM in the past few years   - Family hx: denies  - Previous anti-hyperglycemic agents includes:  Metformin - significant GI irritation     Current anti-hyperglycemic regimen includes:    - Farxiga 10 mg daily   - Rybelsus 3 mg daily -- not taking     - Approved for AZ&Me for Farxiga (through Gerald Waters NP) - Med [facility]   - ASA 81 mg daily, plavix 75 mg daily  - States she is no longer on Entresto -

## 2024-10-02 ENCOUNTER — PHARMACY VISIT (OUTPATIENT)
Age: 79
End: 2024-10-02

## 2024-10-02 VITALS
OXYGEN SATURATION: 97 % | DIASTOLIC BLOOD PRESSURE: 68 MMHG | BODY MASS INDEX: 31.61 KG/M2 | HEART RATE: 70 BPM | HEIGHT: 60 IN | SYSTOLIC BLOOD PRESSURE: 138 MMHG | WEIGHT: 161 LBS

## 2024-10-02 DIAGNOSIS — E11.65 TYPE 2 DIABETES MELLITUS WITH HYPERGLYCEMIA, WITHOUT LONG-TERM CURRENT USE OF INSULIN (HCC): Primary | ICD-10-CM

## 2024-12-04 DIAGNOSIS — G25.81 RLS (RESTLESS LEGS SYNDROME): ICD-10-CM

## 2024-12-04 RX ORDER — PRAMIPEXOLE DIHYDROCHLORIDE 0.5 MG/1
0.5 TABLET ORAL NIGHTLY
Qty: 90 TABLET | Refills: 3 | Status: SHIPPED | OUTPATIENT
Start: 2024-12-04

## 2024-12-04 NOTE — TELEPHONE ENCOUNTER
Future Appointments:  Future Appointments   Date Time Provider Department Center   12/5/2024  1:00 PM Lit Oconnor MD [id number] BS AMB   12/19/2024  8:30 AM [facility] 2 [facility]   1/20/2025  9:50 AM [facility]   2/3/2025 10:00 AM Silva Polo, APRN - NP [facility] BS AMB        Last Appointment With Me:  9/10/2024     Requested Prescriptions     Pending Prescriptions Disp Refills    pramipexole (MIRAPEX) 0.5 MG tablet [Pharmacy Med Name: Pramipexole Dihydrochloride Oral Tablet 0.5 MG] 90 tablet 3     Sig: TAKE 1 TABLET EVERY NIGHT

## 2024-12-05 ENCOUNTER — OFFICE VISIT (OUTPATIENT)
Age: 79
End: 2024-12-05
Payer: MEDICARE

## 2024-12-05 VITALS
OXYGEN SATURATION: 97 % | SYSTOLIC BLOOD PRESSURE: 136 MMHG | BODY MASS INDEX: 31.06 KG/M2 | HEIGHT: 60 IN | HEART RATE: 75 BPM | DIASTOLIC BLOOD PRESSURE: 70 MMHG | TEMPERATURE: 97.8 F | RESPIRATION RATE: 16 BRPM | WEIGHT: 158.2 LBS

## 2024-12-05 DIAGNOSIS — R25.2 MUSCLE CRAMPS: ICD-10-CM

## 2024-12-05 DIAGNOSIS — E11.42 DIABETIC PERIPHERAL NEUROPATHY ASSOCIATED WITH TYPE 2 DIABETES MELLITUS (HCC): ICD-10-CM

## 2024-12-05 DIAGNOSIS — G25.81 RLS (RESTLESS LEGS SYNDROME): ICD-10-CM

## 2024-12-05 DIAGNOSIS — M47.27 LUMBOSACRAL RADICULOPATHY DUE TO DEGENERATIVE JOINT DISEASE OF SPINE: ICD-10-CM

## 2024-12-05 DIAGNOSIS — G60.9 IDIOPATHIC SMALL FIBER PERIPHERAL NEUROPATHY: ICD-10-CM

## 2024-12-05 DIAGNOSIS — G25.81 RLS (RESTLESS LEGS SYNDROME): Primary | ICD-10-CM

## 2024-12-05 PROCEDURE — 3078F DIAST BP <80 MM HG: CPT | Performed by: PSYCHIATRY & NEUROLOGY

## 2024-12-05 PROCEDURE — 1123F ACP DISCUSS/DSCN MKR DOCD: CPT | Performed by: PSYCHIATRY & NEUROLOGY

## 2024-12-05 PROCEDURE — G8484 FLU IMMUNIZE NO ADMIN: HCPCS | Performed by: PSYCHIATRY & NEUROLOGY

## 2024-12-05 PROCEDURE — 1160F RVW MEDS BY RX/DR IN RCRD: CPT | Performed by: PSYCHIATRY & NEUROLOGY

## 2024-12-05 PROCEDURE — 1126F AMNT PAIN NOTED NONE PRSNT: CPT | Performed by: PSYCHIATRY & NEUROLOGY

## 2024-12-05 PROCEDURE — 1090F PRES/ABSN URINE INCON ASSESS: CPT | Performed by: PSYCHIATRY & NEUROLOGY

## 2024-12-05 PROCEDURE — 3075F SYST BP GE 130 - 139MM HG: CPT | Performed by: PSYCHIATRY & NEUROLOGY

## 2024-12-05 PROCEDURE — 1159F MED LIST DOCD IN RCRD: CPT | Performed by: PSYCHIATRY & NEUROLOGY

## 2024-12-05 PROCEDURE — G8399 PT W/DXA RESULTS DOCUMENT: HCPCS | Performed by: PSYCHIATRY & NEUROLOGY

## 2024-12-05 PROCEDURE — G8417 CALC BMI ABV UP PARAM F/U: HCPCS | Performed by: PSYCHIATRY & NEUROLOGY

## 2024-12-05 PROCEDURE — 1036F TOBACCO NON-USER: CPT | Performed by: PSYCHIATRY & NEUROLOGY

## 2024-12-05 PROCEDURE — G8427 DOCREV CUR MEDS BY ELIG CLIN: HCPCS | Performed by: PSYCHIATRY & NEUROLOGY

## 2024-12-05 PROCEDURE — 99204 OFFICE O/P NEW MOD 45 MIN: CPT | Performed by: PSYCHIATRY & NEUROLOGY

## 2024-12-05 PROCEDURE — 3051F HG A1C>EQUAL 7.0%<8.0%: CPT | Performed by: PSYCHIATRY & NEUROLOGY

## 2024-12-05 RX ORDER — TRAMADOL HYDROCHLORIDE 50 MG/1
50 TABLET ORAL EVERY 6 HOURS PRN
COMMUNITY

## 2024-12-05 RX ORDER — DIPHENHYDRAMINE HCL 25 MG
25 TABLET ORAL NIGHTLY PRN
COMMUNITY

## 2024-12-05 RX ORDER — M-VIT,TX,IRON,MINS/CALC/FOLIC 27MG-0.4MG
1 TABLET ORAL DAILY
COMMUNITY

## 2024-12-05 RX ORDER — CALCIUM CARBONATE 260MG(650)
100 TABLET,CHEWABLE ORAL
COMMUNITY

## 2024-12-05 RX ORDER — METHOCARBAMOL 750 MG/1
750 TABLET, FILM COATED ORAL PRN
COMMUNITY

## 2024-12-05 ASSESSMENT — PATIENT HEALTH QUESTIONNAIRE - PHQ9
SUM OF ALL RESPONSES TO PHQ QUESTIONS 1-9: 0
2. FEELING DOWN, DEPRESSED OR HOPELESS: NOT AT ALL
1. LITTLE INTEREST OR PLEASURE IN DOING THINGS: NOT AT ALL
SUM OF ALL RESPONSES TO PHQ9 QUESTIONS 1 & 2: 0
SUM OF ALL RESPONSES TO PHQ QUESTIONS 1-9: 0

## 2024-12-06 LAB — CK SERPL-CCNC: 136 U/L (ref 26–192)

## 2024-12-06 NOTE — PROGRESS NOTES
Consult  REFERRED BY:  Catalina Nina MD    CHIEF COMPLAINT: Patient worked in on urgent evaluation but patient not sure why she was referred here, but it looks she has a diagnosis of neuropathy and I suspect her cardiologist sent her here for neuropathy evaluation and because she is having increasing cramps and swelling in her legs, left leg greater than the right, and she is seen as a new patient to me for these problems, and this problem is associated with significant increase in back pain that she has had for a while but seems to get worse and for which she has to take tramadol.  He has right hip pain in addition which may be radicular.  She is a known diabetic for years, and most likely has diabetic neuropathy.      Subjective:     Tonie Lucero is a 79 y.o. right-handed  female we are seeing at the request of Dr. Nina for evaluation of new problem of Patient worked in on urgent evaluation but patient not sure why she was referred here, but it looks she has a diagnosis of neuropathy and I suspect her cardiologist sent her here for neuropathy evaluation and because she is having increasing cramps and swelling in her legs, left leg greater than the right, and she is seen as a new patient to me for these problems, and this problem is associated with significant increase in back pain that she has had for a while but seems to get worse and for which she has to take tramadol.  He has right hip pain in addition which may be radicular.  She is a known diabetic for years, and most likely has diabetic neuropathy.  She has had normal B12 levels and folic acid levels, and her last hemoglobin A1c was 7.4.  She has had no toxin exposure and no trauma and no injury to her back recently.  She had some swelling in her left leg that cardiology evaluated her for.  She has known degenerative arthritis of the hands hips back and has sleep apnea, and has known back surgery for synovial cyst in the past.  She has

## 2024-12-10 LAB
INTERPRETATION: NORMAL
MAG (MYELIN ASSOC. GLYCOPROTEIN) AB IGM: <900 BTU (ref 0–999)

## 2024-12-11 LAB
GM 1 IGG: <20 % (ref 0–30)
INTERPRETATION: NORMAL

## 2024-12-12 LAB
ALBUMIN SERPL ELPH-MCNC: 3.4 G/DL (ref 2.9–4.4)
ALBUMIN/GLOB SERPL: 1 (ref 0.7–1.7)
ALPHA1 GLOB SERPL ELPH-MCNC: 0.2 G/DL (ref 0–0.4)
ALPHA2 GLOB SERPL ELPH-MCNC: 1 G/DL (ref 0.4–1)
B-GLOBULIN SERPL ELPH-MCNC: 1.3 G/DL (ref 0.7–1.3)
GAMMA GLOB SERPL ELPH-MCNC: 0.9 G/DL (ref 0.4–1.8)
GLOBULIN SER-MCNC: 3.5 G/DL (ref 2.2–3.9)
IGA SERPL-MCNC: 221 MG/DL (ref 64–422)
IGG SERPL-MCNC: 1066 MG/DL (ref 586–1602)
IGM SERPL-MCNC: 98 MG/DL (ref 26–217)
INTERPRETATION SERPL IEP-IMP: NORMAL
M PROTEIN SERPL ELPH-MCNC: NORMAL G/DL
PROT SERPL-MCNC: 6.9 G/DL (ref 6–8.5)

## 2024-12-18 ENCOUNTER — PROCEDURE VISIT (OUTPATIENT)
Age: 79
End: 2024-12-18
Payer: MEDICARE

## 2024-12-18 DIAGNOSIS — R25.2 MUSCLE CRAMPS: ICD-10-CM

## 2024-12-18 DIAGNOSIS — E11.42 DIABETIC PERIPHERAL NEUROPATHY ASSOCIATED WITH TYPE 2 DIABETES MELLITUS (HCC): ICD-10-CM

## 2024-12-18 DIAGNOSIS — G60.9 IDIOPATHIC SMALL FIBER PERIPHERAL NEUROPATHY: ICD-10-CM

## 2024-12-18 DIAGNOSIS — M47.27 LUMBOSACRAL RADICULOPATHY DUE TO DEGENERATIVE JOINT DISEASE OF SPINE: ICD-10-CM

## 2024-12-18 DIAGNOSIS — M54.16 LUMBAR BACK PAIN WITH RADICULOPATHY AFFECTING LEFT LOWER EXTREMITY: ICD-10-CM

## 2024-12-18 DIAGNOSIS — G25.81 RLS (RESTLESS LEGS SYNDROME): ICD-10-CM

## 2024-12-18 DIAGNOSIS — M48.061 DEGENERATIVE LUMBAR SPINAL STENOSIS: Primary | ICD-10-CM

## 2024-12-18 PROCEDURE — 95910 NRV CNDJ TEST 7-8 STUDIES: CPT | Performed by: PSYCHIATRY & NEUROLOGY

## 2024-12-18 PROCEDURE — 95886 MUSC TEST DONE W/N TEST COMP: CPT | Performed by: PSYCHIATRY & NEUROLOGY

## 2024-12-18 NOTE — PROGRESS NOTES
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

## 2024-12-18 NOTE — PROCEDURES
Conduction Studies  Anti Sensory Summary Table     Stim Site NR Peak (ms) Norm Peak (ms) O-P Amp (µV) Norm O-P Amp Site1 Site2 Dist (cm)   Left Sup Fibular Anti Sensory (Lat ankle)  34.2 °C   Lower leg    2.4 <4.6 14.7 >4 Lower leg Lat ankle 10.0   Right Sup Fibular Anti Sensory (Lat ankle)  34.2 °C   Lower leg    2.8 <4.6 13.5 >4 Lower leg Lat ankle 10.0   Left Sural Anti Sensory (Lat Mall)  34.2 °C   Calf NR  <4.4  >6 Calf Lat Mall 14.0   Site 2    0.8  1.1       Right Sural Anti Sensory (Lat Mall)  34.2 °C   Calf NR  <4.4  >6 Calf Lat Mall 14.0     Motor Summary Table     Stim Site NR Onset (ms) Norm Onset (ms) O-P* Amp (mV) Norm O-P Amp P-T Amp (mV) Site1 Site2 Dist (cm) David (m/s)   Left Fibular Motor (Ext Dig Brev)  34.2 °C   Ankle    3.9 <6.5 5.3 >1.1  Ankle Ext Dig Brev 8.0 21   B Fib    9.8  5.1   B Fib Ankle 29.0 49   Poplt    12.0  4.9   Poplt B Fib 10.0 45   Right Fibular Motor (Ext Dig Brev)  34.2 °C   Ankle    3.9 <6.5 4.2 >1.1  Ankle Ext Dig Brev 8.0 21   B Fib    10.9  4.2   B Fib Ankle 31.0 44   Poplt    12.6  4.2   Poplt B Fib 10.0 59   Left Tibial Motor (Abd Mahoney Brev)  34.2 °C   Ankle    4.1 <6.1 8.9 >1.1  Ankle Abd Mahoney Brev 8.0 20   Knee    12.0  6.1   Knee Ankle 35.0 44   Right Tibial Motor (Abd Mahoney Brev)  34.2 °C   Ankle    3.8 <6.1 6.7 >1.1  Ankle Abd Mahoney Brev 8.0 21   Knee    11.1  5.4   Knee Ankle 34.0 47     EMG     Side Muscle Nerve Root Ins Act Fibs Psw Recrt Duration Amp Poly Comment   Left AntTibialis Dp Br Peron L4-5 Nml Nml Nml Nml Nml Nml Nml    Left MedGastroc Tibial S1-2 Nml Nml Nml Nml Incr Incr 1+    Left VastusLat Femoral L2-4 Nml Nml Nml Nml Nml Nml Nml    Left BicepsFemL Sciatic L5-S2 Nml Nml Nml Nml Nml Nml Nml    Left Peroneus Long   Nml Nml Nml Nml Nml Nml Nml    Left Lower Lumb Parasp Rami L5,S1 Nml Nml Nml Nml Nml Nml Nml    Left Ext Dig Brev Dp Br Peron L5, S1 Nml Nml Nml Nml Incr Incr 1+    Left AbdHallucis MedPlantar S1-2 Nml Nml Nml Nml Incr Incr 1+    Right Lower

## 2024-12-19 ENCOUNTER — HOSPITAL ENCOUNTER (OUTPATIENT)
Facility: HOSPITAL | Age: 79
Setting detail: OUTPATIENT SURGERY
Discharge: HOME | End: 2024-12-19
Attending: INTERNAL MEDICINE | Admitting: INTERNAL MEDICINE
Payer: MEDICARE

## 2024-12-19 ENCOUNTER — TELEPHONE (OUTPATIENT)
Age: 79
End: 2024-12-19

## 2024-12-19 ENCOUNTER — ANESTHESIA EVENT (OUTPATIENT)
Facility: HOSPITAL | Age: 79
End: 2024-12-19
Payer: MEDICARE

## 2024-12-19 ENCOUNTER — ANESTHESIA (OUTPATIENT)
Facility: HOSPITAL | Age: 79
End: 2024-12-19
Payer: MEDICARE

## 2024-12-19 VITALS
HEIGHT: 60 IN | SYSTOLIC BLOOD PRESSURE: 115 MMHG | TEMPERATURE: 97 F | DIASTOLIC BLOOD PRESSURE: 50 MMHG | BODY MASS INDEX: 31.1 KG/M2 | WEIGHT: 158.4 LBS | HEART RATE: 76 BPM | OXYGEN SATURATION: 96 % | RESPIRATION RATE: 22 BRPM

## 2024-12-19 DIAGNOSIS — C50.912 INFILTRATING DUCTAL CARCINOMA OF LEFT BREAST (HCC): ICD-10-CM

## 2024-12-19 PROCEDURE — 3600007502: Performed by: INTERNAL MEDICINE

## 2024-12-19 PROCEDURE — 7100000010 HC PHASE II RECOVERY - FIRST 15 MIN: Performed by: INTERNAL MEDICINE

## 2024-12-19 PROCEDURE — 3700000000 HC ANESTHESIA ATTENDED CARE: Performed by: INTERNAL MEDICINE

## 2024-12-19 PROCEDURE — 2580000003 HC RX 258: Performed by: STUDENT IN AN ORGANIZED HEALTH CARE EDUCATION/TRAINING PROGRAM

## 2024-12-19 PROCEDURE — 2709999900 HC NON-CHARGEABLE SUPPLY: Performed by: INTERNAL MEDICINE

## 2024-12-19 PROCEDURE — 6360000002 HC RX W HCPCS: Performed by: STUDENT IN AN ORGANIZED HEALTH CARE EDUCATION/TRAINING PROGRAM

## 2024-12-19 PROCEDURE — 3700000001 HC ADD 15 MINUTES (ANESTHESIA): Performed by: INTERNAL MEDICINE

## 2024-12-19 PROCEDURE — 3600007512: Performed by: INTERNAL MEDICINE

## 2024-12-19 PROCEDURE — 7100000011 HC PHASE II RECOVERY - ADDTL 15 MIN: Performed by: INTERNAL MEDICINE

## 2024-12-19 RX ORDER — SODIUM CHLORIDE 9 MG/ML
INJECTION, SOLUTION INTRAVENOUS PRN
Status: DISCONTINUED | OUTPATIENT
Start: 2024-12-19 | End: 2024-12-19 | Stop reason: HOSPADM

## 2024-12-19 RX ORDER — LIDOCAINE HYDROCHLORIDE 20 MG/ML
INJECTION, SOLUTION EPIDURAL; INFILTRATION; INTRACAUDAL; PERINEURAL
Status: DISCONTINUED | OUTPATIENT
Start: 2024-12-19 | End: 2024-12-19 | Stop reason: SDUPTHER

## 2024-12-19 RX ORDER — SODIUM CHLORIDE 9 MG/ML
INJECTION, SOLUTION INTRAVENOUS CONTINUOUS
Status: DISCONTINUED | OUTPATIENT
Start: 2024-12-19 | End: 2024-12-19 | Stop reason: HOSPADM

## 2024-12-19 RX ORDER — SODIUM CHLORIDE 0.9 % (FLUSH) 0.9 %
5-40 SYRINGE (ML) INJECTION EVERY 12 HOURS SCHEDULED
Status: DISCONTINUED | OUTPATIENT
Start: 2024-12-19 | End: 2024-12-19 | Stop reason: HOSPADM

## 2024-12-19 RX ORDER — LETROZOLE 2.5 MG/1
2.5 TABLET, FILM COATED ORAL DAILY
Qty: 90 TABLET | Refills: 0 | Status: SHIPPED | OUTPATIENT
Start: 2024-12-19

## 2024-12-19 RX ORDER — SODIUM CHLORIDE 0.9 % (FLUSH) 0.9 %
5-40 SYRINGE (ML) INJECTION PRN
Status: DISCONTINUED | OUTPATIENT
Start: 2024-12-19 | End: 2024-12-19 | Stop reason: HOSPADM

## 2024-12-19 RX ORDER — CALCIUM CARBONATE 500(1250)
500 TABLET ORAL DAILY
COMMUNITY

## 2024-12-19 RX ORDER — SODIUM CHLORIDE, SODIUM LACTATE, POTASSIUM CHLORIDE, CALCIUM CHLORIDE 600; 310; 30; 20 MG/100ML; MG/100ML; MG/100ML; MG/100ML
INJECTION, SOLUTION INTRAVENOUS
Status: DISCONTINUED | OUTPATIENT
Start: 2024-12-19 | End: 2024-12-19 | Stop reason: SDUPTHER

## 2024-12-19 RX ADMIN — PROPOFOL 50 MG: 10 INJECTION, EMULSION INTRAVENOUS at 09:11

## 2024-12-19 RX ADMIN — LIDOCAINE HYDROCHLORIDE 40 MG: 20 INJECTION, SOLUTION EPIDURAL; INFILTRATION; INTRACAUDAL; PERINEURAL at 09:00

## 2024-12-19 RX ADMIN — PROPOFOL 50 MG: 10 INJECTION, EMULSION INTRAVENOUS at 09:14

## 2024-12-19 RX ADMIN — PROPOFOL 50 MG: 10 INJECTION, EMULSION INTRAVENOUS at 09:05

## 2024-12-19 RX ADMIN — PROPOFOL 50 MG: 10 INJECTION, EMULSION INTRAVENOUS at 09:02

## 2024-12-19 RX ADMIN — PROPOFOL 100 MG: 10 INJECTION, EMULSION INTRAVENOUS at 09:00

## 2024-12-19 RX ADMIN — PROPOFOL 50 MG: 10 INJECTION, EMULSION INTRAVENOUS at 09:09

## 2024-12-19 RX ADMIN — PROPOFOL 50 MG: 10 INJECTION, EMULSION INTRAVENOUS at 09:06

## 2024-12-19 RX ADMIN — PROPOFOL 50 MG: 10 INJECTION, EMULSION INTRAVENOUS at 09:17

## 2024-12-19 RX ADMIN — SODIUM CHLORIDE, POTASSIUM CHLORIDE, SODIUM LACTATE AND CALCIUM CHLORIDE: 600; 310; 30; 20 INJECTION, SOLUTION INTRAVENOUS at 09:00

## 2024-12-19 ASSESSMENT — PAIN - FUNCTIONAL ASSESSMENT: PAIN_FUNCTIONAL_ASSESSMENT: NONE - DENIES PAIN

## 2024-12-19 NOTE — H&P
[facility]  [address]  [place] [address] [phone]                                History and Physical     NAME: Tonie Lucero   :  1945   MRN:  [id number]     HPI:  The patient was seen and examined.    Past Surgical History:   Procedure Laterality Date    BACK SURGERY  2019    L4-L5 synovial cyst removal    BACK SURGERY      cyst removed    BREAST LUMPECTOMY Left 12/15/2022    LEFT BREAST LUMPECTOMY WITH ULTRASOUND performed by Ry Pena Jr., MD at [facility] AMBULATORY OR    CARDIAC PROCEDURE N/A 2023    Left heart cath / coronary angiography performed by Kathie Vitale MD at [facility] CARDIAC CATH LAB    CARDIAC PROCEDURE N/A 2023    Instantaneous wave-free ratio (iFR) performed by Kathie Vitale MD at [facility] CARDIAC CATH LAB    CARDIAC PROCEDURE N/A 2023    Left ventriculography performed by Kathie Vitale MD at [facility] CARDIAC CATH LAB    CARDIAC PROCEDURE N/A 2023    Intravascular ultrasound performed by Kathie Vitale MD at [facility] CARDIAC CATH LAB    CARDIAC PROCEDURE N/A 2023    Percutaneous coronary intervention performed by Kathie Vitale MD at [facility] CARDIAC CATH LAB    CARDIAC PROCEDURE N/A 2023    Insert stent arabella coronary performed by Kathie Vitale MD at [facility] CARDIAC CATH LAB    CATARACT REMOVAL Bilateral 2019    COLONOSCOPY  2010    Dr. Shah, diverticulosis, repeat due 2015    COLONOSCOPY N/A 2023    colonoscopy performed by Oscar Juarez MD at [facility] ENDOSCOPY    COLONOSCOPY N/A 2023    COLONOSCOPY POLYPECTOMY SNARE/COLD BIOPSY performed by Oscar Juarez MD at [facility] ENDOSCOPY    COLONOSCOPY,STEVEN JORDAN/CAUTERY  2015         HX OPEN REDUCTION INTERNAL FIXATION Left     ankle    ORTHOPEDIC SURGERY Left     HAND SURGERY    ROTATOR CUFF REPAIR Right     ROTATOR CUFF REPAIR Left     TOTAL HIP ARTHROPLASTY Right     UPPER

## 2024-12-19 NOTE — DISCHARGE INSTRUCTIONS
[facility]  [phone]                                  Tonie Lucero  [id number]  1945    It was my pleasure seeing you for your procedure.  You will also receive a summary report with the findings from this procedure and any further recommendations.  If you had polyps removed or biopsies taken during your procedure, you will receive a separate letter from me within approximately the next 2 weeks.  If you don't receive this letter or if you have any questions, please call my office [phone].     Please take note of the post procedure instructions listed below.    Dr. Torsten Bowie      CARE FOLLOWING YOUR PROCEDURE    These instructions give you information on caring for yourself after your procedure. Call your doctor if you have any problems or questions after your procedure.    HOME CARE  Walk if you have belly cramping or gas.  Walking will help get rid of the air and reduce the bloated feeling in your belly (abdomen).  Your IV site (where you received drugs) may be tender to touch.  Place warm towels on the site; keep your arm up on two pillows if you have any swelling or soreness in the area.  You may shower.    ACTIVITY:  Take frequent rest periods and move at a slower pace for the next 24 hours..  You may resume your regular activity tomorrow if you are feeling back to normal.  Do not drive or ride a bicycle for at least 24 hours (because of the medicine (anesthesia) used during the test).  Do not sign any important legal documents or use or operate any machinery for 24 hours  Do not take sleeping medicines/nerve drugs for 24 hours unless the doctor tells you.  You can return to work/school tomorrow unless otherwise instructed.    NUTRITION:  Drink plenty of fluids to keep your pee (urine) clear or pale yellow  Begin with a light meal and progress to your normal diet. Heavy or fried foods are harder to digest and may make you feel sick to your stomach

## 2024-12-19 NOTE — PROGRESS NOTES
Initial RN admission and assessment performed and documented in Endoscopy navigator.     Patient evaluated by anesthesia in pre-procedure holding.     All procedural vital signs, airway assessment, and level of consciousness information monitored and recorded by anesthesia staff on the anesthesia record.     Report received from CRNA post procedure.  Patient transported to recovery area by RN.    Endoscopy post procedure time out was performed and specimens were verified with physician.    Endoscope was pre-cleaned at bedside immediately following procedure by johan tech..

## 2024-12-19 NOTE — OP NOTE
[facility]  [address]  [place] [address] [phone]                              Colonoscopy Procedure Note      Indications:  Personal history polyps (high grade dysplasia in colonic adenoma 3 yrs prior), family history colon cancer (father)    :  Maureen Sarmiento MD    Staff: Circulator: Viktoriya Bain RN  Endoscopy Technician: Zhao Duke    Referring Provider: Catalina Nina MD    Sedation:  MAC    Procedure Details:  After informed consent was obtained with all risks and benefits of procedure explained and preoperative exam completed, the patient was taken to the endoscopy suite and placed in the left lateral decubitus position.  Upon sequential sedation as per above, a digital rectal exam was performed per below.  The Olympus videocolonoscope was inserted in the rectum and carefully advanced to the cecum.  The quality of preparation was inadequate.  [place] Bowel Prep Score :1/2/2.The colonoscope was slowly withdrawn with careful evaluation between folds. Retroflexion in the rectum was performed.     Findings:   Rectum: small internal hemorrhoids  Sigmoid: mild diverticulosis, small amount residual stool - lavaged, two sessile polyps (5 mm - 8 mm) - removed with cold snare  Descending Colon: mild diverticulosis, small amount residual stool - lavaged  Transverse Colon: three sessile polyps (6 mm - 10 mm) - removed with cold snare  Ascending Colon: one sessile polyps (8 mm) - removed with cold snare, one 2 mm sessile polyp - removed with forceps  Cecum: adherent residual stool - could not clear    Interventions:  polypecyomy    Specimen Removed:    ID Type Source Tests Collected by Time Destination   1 : right colon polyp Tissue Colon SURGICAL PATHOLOGY Maureen Sarmiento MD 12/19/2024 0909    2 : left colon polyp Tissue Colon SURGICAL PATHOLOGY Maureen Sarmiento MD 12/19/2024 0914        Complications: None.     EBL:  minimal     Impression:    See

## 2024-12-19 NOTE — ANESTHESIA POSTPROCEDURE EVALUATION
Department of Anesthesiology  Postprocedure Note    Patient: Tonie Lucero  MRN: [id number]  YOB: 1945  Date of evaluation: 12/19/2024    Procedure Summary       Date: 12/19/24 Room / Location: [facility] ENDO  / [facility] ENDOSCOPY    Anesthesia Start: 0846 Anesthesia Stop: 0920    Procedure: COLONOSCOPY (Lower GI Region) Diagnosis:       Personal history of colon polyps, unspecified      Gastroesophageal reflux disease with hiatal hernia      Family history of colon cancer      Rectal bleeding      (Personal history of colon polyps, unspecified [Z86.0100])      (Gastroesophageal reflux disease with hiatal hernia [K21.9, K44.9])      (Family history of colon cancer [Z80.0])      (Rectal bleeding [K62.5])    Surgeons: Maureen Sarmiento MD Responsible Provider: Ry Sims MD    Anesthesia Type: MAC ASA Status: 2            Anesthesia Type: MAC    Del Phase I: Del Score: 10    Del Phase II:      Anesthesia Post Evaluation    Patient location during evaluation: PACU  Patient participation: complete - patient participated  Level of consciousness: awake  Airway patency: patent  Nausea & Vomiting: no nausea  Cardiovascular status: blood pressure returned to baseline and hemodynamically stable  Respiratory status: acceptable  Hydration status: stable  Multimodal analgesia pain management approach    No notable events documented.

## 2024-12-19 NOTE — TELEPHONE ENCOUNTER
Received refill request for Letrozole; last seen in 11/23 and was to follow up in 6 months. Please contact patient to schedule non urgent/next available appointment with Dr Narayan. Letrozole refilled for 90 days.

## 2025-01-02 NOTE — PROGRESS NOTES
[id number] [address] [place] [address]  Identified pt with two pt identifiers(name and ). Chief Complaint   Patient presents with    Pre-op Exam     Room 1B //        Reviewed record In preparation for visit and have obtained necessary documentation. 1. Have you been to the ER, urgent care clinic or hospitalized since your last visit? No     2. Have you seen or consulted any other health care providers outside of the [address] [place] [address] since your last visit? Include any pap smears or colon screening. No    Patient has an advance directive. Vitals reviewed with provider.     Health Maintenance reviewed:     Health Maintenance Due   Topic    COVID-19 Vaccine (1)    Shingrix Vaccine Age 49> (1 of 2)    Pneumococcal 65+ years (2 of 2 - PPSV23)    Medicare Yearly Exam           Wt Readings from Last 3 Encounters:   21 150 lb (68 kg)   21 149 lb 14.6 oz (68 kg)   21 157 lb (71.2 kg)        Temp Readings from Last 3 Encounters:   21 97.7 °F (36.5 °C) (Oral)   21 98 °F (36.7 °C)   21 98.1 °F (36.7 °C) (Oral)        BP Readings from Last 3 Encounters:   21 (!) 152/73   21 (!) 156/69   21 (!) 152/84        Pulse Readings from Last 3 Encounters:   21 69   21 66   21 86        Vitals:    21 1056   BP: (!) 152/73   Pulse: 69   Resp: 16   Temp: 97.7 °F (36.5 °C)   TempSrc: Oral   SpO2: 96%   Weight: 150 lb (68 kg)   Height: 5' (1.524 m)   PainSc:   6   PainLoc: Hip   LMP: 10/26/1995          Learning Assessment:   :       Learning Assessment 10/7/2020 2019 2014   PRIMARY LEARNER Patient Patient Patient   HIGHEST LEVEL OF EDUCATION - PRIMARY LEARNER  - - DID NOT GRADUATE HIGH SCHOOL   BARRIERS PRIMARY LEARNER - - NONE   CO-LEARNER CAREGIVER - - No   PRIMARY LANGUAGE ENGLISH ENGLISH ENGLISH   LEARNER PREFERENCE PRIMARY DEMONSTRATION OTHER (COMMENT) DEMONSTRATION   ANSWERED BY patient patient patient   RELATIONSHIP SELF SELF SELF Depression Screening:   :       3 most recent PHQ Screens 6/8/2021   Little interest or pleasure in doing things Not at all   Feeling down, depressed, irritable, or hopeless Not at all   Total Score PHQ 2 0        Fall Risk Assessment:   :       Fall Risk Assessment, last 12 mths 3/11/2021   Able to walk? Yes   Fall in past 12 months? 0   Do you feel unsteady? 0   Are you worried about falling 0        Abuse Screening:   :       Abuse Screening Questionnaire 6/2/2020 2/19/2019 1/8/2018 12/29/2016 9/14/2015   Do you ever feel afraid of your partner? N N N N N   Are you in a relationship with someone who physically or mentally threatens you? N N N N N   Is it safe for you to go home?  Y Y Y Y Y        ADL Screening:   :       ADL Assessment 1/8/2018   Feeding yourself No Help Needed   Getting from bed to chair No Help Needed   Getting dressed No Help Needed   Bathing or showering No Help Needed   Walk across the room (includes cane/walker) No Help Needed   Using the telphone No Help Needed   Taking your medications No Help Needed   Preparing meals No Help Needed   Managing money (expenses/bills) No Help Needed   Moderately strenuous housework (laundry) No Help Needed   Shopping for personal items (toiletries/medicines) No Help Needed   Shopping for groceries No Help Needed   Driving No Help Needed   Climbing a flight of stairs No Help Needed   Getting to places beyond walking distances No Help Needed Patient is not pregnant (male or female)

## 2025-01-10 ENCOUNTER — TELEPHONE (OUTPATIENT)
Facility: CLINIC | Age: 80
End: 2025-01-10

## 2025-01-10 DIAGNOSIS — N18.32 STAGE 3B CHRONIC KIDNEY DISEASE (HCC): Primary | ICD-10-CM

## 2025-01-10 NOTE — TELEPHONE ENCOUNTER
Pt informed of referral to Nephrology Specialist and records have been faxed with a confirmation received.

## 2025-01-10 NOTE — TELEPHONE ENCOUNTER
Pt called and stated that two of her specialist asked that she talk to her pcp about being referred to a kidney specialist due to her lab work.

## 2025-01-14 ENCOUNTER — TELEPHONE (OUTPATIENT)
Facility: CLINIC | Age: 80
End: 2025-01-14

## 2025-01-14 NOTE — TELEPHONE ENCOUNTER
----- Message from [facility] sent at 1/14/2025  9:35 AM EST -----  Regarding: E[facility] Appointment Request  ECC Appointment Request    Patient needs appointment for ECC Appointment Type: Existing Condition Follow Up.    Patient Requested Dates(s): Monday Jan 20, 2025  Patient Requested Time: any time after 10:30 am   Provider Name:Catalina Nina MD    Reason for Appointment Request: Established Patient - No appointments available during search    The patient want to reschedule the appointment after the 9:50 because of schedule conflict.    Appt Details.  Date: Jan 20, 2025  Time : 9:50 am  Provider Name:Catalina Nina MD  --------------------------------------------------------------------------------------------------------------------------    Relationship to Patient: Self     Call Back Information: OK to leave message on voicemail  Preferred Call Back Number: Phone [phone]

## 2025-01-22 ENCOUNTER — HOSPITAL ENCOUNTER (OUTPATIENT)
Facility: HOSPITAL | Age: 80
Discharge: HOME | End: 2025-01-25
Attending: PSYCHIATRY & NEUROLOGY
Payer: MEDICARE

## 2025-01-22 DIAGNOSIS — M47.27 LUMBOSACRAL RADICULOPATHY DUE TO DEGENERATIVE JOINT DISEASE OF SPINE: ICD-10-CM

## 2025-01-22 DIAGNOSIS — M54.16 LUMBAR BACK PAIN WITH RADICULOPATHY AFFECTING LEFT LOWER EXTREMITY: ICD-10-CM

## 2025-01-22 DIAGNOSIS — R25.2 MUSCLE CRAMPS: ICD-10-CM

## 2025-01-22 DIAGNOSIS — M48.061 DEGENERATIVE LUMBAR SPINAL STENOSIS: ICD-10-CM

## 2025-01-22 PROCEDURE — 72158 MRI LUMBAR SPINE W/O & W/DYE: CPT

## 2025-01-22 PROCEDURE — A9579 GAD-BASE MR CONTRAST NOS,1ML: HCPCS | Performed by: PSYCHIATRY & NEUROLOGY

## 2025-01-22 PROCEDURE — 6360000004 HC RX CONTRAST MEDICATION: Performed by: PSYCHIATRY & NEUROLOGY

## 2025-01-22 RX ADMIN — GADOTERIDOL 15 ML: 279.3 INJECTION, SOLUTION INTRAVENOUS at 11:24

## 2025-01-26 ENCOUNTER — CLINICAL DOCUMENTATION (OUTPATIENT)
Age: 80
End: 2025-01-26

## 2025-01-27 ENCOUNTER — CLINICAL DOCUMENTATION (OUTPATIENT)
Age: 80
End: 2025-01-27

## 2025-01-27 ENCOUNTER — TELEPHONE (OUTPATIENT)
Age: 80
End: 2025-01-27

## 2025-01-27 NOTE — TELEPHONE ENCOUNTER
Called patient ID with two identifiers. Patient stated she has Arthritis in her back and the pain is a 10/10; she can't lean over. Been going on for couple weeks now. She Took tylenol it helped a little. I told her I would message Dr. Oconnor and we will be in contact with her soon. She said thank you.

## 2025-01-27 NOTE — TELEPHONE ENCOUNTER
Patient called stating that she has been having a lot of pain and wants to know if Dr Oconnor has any recommendations on what could help alleviate some of that pain. Please advise. [phone]

## 2025-01-27 NOTE — PROGRESS NOTES
I just called the patient last night, she said she was doing better, but advised her that if she is having a lot of pain physical therapy would be the best thing to start we will put an order for that and if she wants to do that, we could send her to an orthopedist if it is very bad to see if there is anything that he sees a surgical, and she can call us back with her decision

## 2025-01-28 ENCOUNTER — HOSPITAL ENCOUNTER (OUTPATIENT)
Facility: HOSPITAL | Age: 80
Discharge: HOME | End: 2025-01-31
Payer: MEDICARE

## 2025-01-28 DIAGNOSIS — Z85.3 HISTORY OF LEFT BREAST CANCER: ICD-10-CM

## 2025-01-28 PROCEDURE — G0279 TOMOSYNTHESIS, MAMMO: HCPCS

## 2025-01-31 NOTE — PROGRESS NOTES
HISTORY OF PRESENT ILLNESS  Tonie Lucero is a 79 y.o. female     HPI Established patient presents for follow-up for LEFT breast cancer.  Denies breast mass, skin changes, nipple discharge and pain.          Breast history -   Referring - self  2022: LEFT breast, 12:00, biopsy -   PATH: IDC, spanning 6mm in greatest dimension, grade 1, ER+(%), ND+(81-90%), HER2-, Ki-67(15%).    12/15/2022: LEFT breast lumpectomy - Dr. Pena  PATH: IDC, 9mm, grade 1, negative margins. Additional Findings: Intraductal papilloma and florid usual ductal hyperplasia Pathologic stage: pT1b, pNX .  LEFT breast, skin, excision: Seborrheic keratosis. No malignancy identified.   No XRT  2023: started letrozole - Dr. Narayan          Family history -   No family history of breast or ovarian cancer  Father - colon cancer      OB History          7    Para        Term   7            AB        Living             SAB        IAB        Ectopic        Molar        Multiple        Live Births              Obstetric Comments   Menarche:  14.   LMP: 49.  # of Children:  7.  Age at Delivery of First Child:  17.   Hysterectomy/oophorectomy:  NO/NO.  Breast Bx:  no.  Hx of Breast Feeding:  no.  BCP:  no. Hormone therapy:  no.                    Past Surgical History:   Procedure Laterality Date    BACK SURGERY      L4-L5 synovial cyst removal    BACK SURGERY      cyst removed    BREAST LUMPECTOMY Left 12/15/2022    LEFT BREAST LUMPECTOMY WITH ULTRASOUND performed by Ry Pena Jr., MD at [facility] AMBULATORY OR    CARDIAC PROCEDURE N/A 2023    Left heart cath / coronary angiography performed by Kathie Vitale MD at [facility] CARDIAC CATH LAB    CARDIAC PROCEDURE N/A 2023    Instantaneous wave-free ratio (iFR) performed by Kathie Vitale MD at [facility] CARDIAC CATH LAB    CARDIAC PROCEDURE N/A 2023    Left ventriculography performed by Kathie Vitale MD at [facility] CARDIAC CATH LAB

## 2025-02-03 ENCOUNTER — OFFICE VISIT (OUTPATIENT)
Age: 80
End: 2025-02-03
Payer: MEDICARE

## 2025-02-03 VITALS — HEIGHT: 60 IN | WEIGHT: 153 LBS | BODY MASS INDEX: 30.04 KG/M2

## 2025-02-03 DIAGNOSIS — Z98.890 S/P LUMPECTOMY OF BREAST: ICD-10-CM

## 2025-02-03 DIAGNOSIS — Z17.0 MALIGNANT NEOPLASM OF LEFT BREAST IN FEMALE, ESTROGEN RECEPTOR POSITIVE, UNSPECIFIED SITE OF BREAST (HCC): Primary | ICD-10-CM

## 2025-02-03 DIAGNOSIS — C50.912 MALIGNANT NEOPLASM OF LEFT BREAST IN FEMALE, ESTROGEN RECEPTOR POSITIVE, UNSPECIFIED SITE OF BREAST (HCC): Primary | ICD-10-CM

## 2025-02-03 DIAGNOSIS — Z85.3 HISTORY OF LEFT BREAST CANCER: ICD-10-CM

## 2025-02-03 PROCEDURE — 1159F MED LIST DOCD IN RCRD: CPT | Performed by: NURSE PRACTITIONER

## 2025-02-03 PROCEDURE — 1160F RVW MEDS BY RX/DR IN RCRD: CPT | Performed by: NURSE PRACTITIONER

## 2025-02-03 PROCEDURE — G8399 PT W/DXA RESULTS DOCUMENT: HCPCS | Performed by: NURSE PRACTITIONER

## 2025-02-03 PROCEDURE — 99213 OFFICE O/P EST LOW 20 MIN: CPT | Performed by: NURSE PRACTITIONER

## 2025-02-03 PROCEDURE — 1123F ACP DISCUSS/DSCN MKR DOCD: CPT | Performed by: NURSE PRACTITIONER

## 2025-02-03 PROCEDURE — G8417 CALC BMI ABV UP PARAM F/U: HCPCS | Performed by: NURSE PRACTITIONER

## 2025-02-03 PROCEDURE — G8427 DOCREV CUR MEDS BY ELIG CLIN: HCPCS | Performed by: NURSE PRACTITIONER

## 2025-02-03 PROCEDURE — 1036F TOBACCO NON-USER: CPT | Performed by: NURSE PRACTITIONER

## 2025-02-03 PROCEDURE — 1090F PRES/ABSN URINE INCON ASSESS: CPT | Performed by: NURSE PRACTITIONER

## 2025-02-07 SDOH — ECONOMIC STABILITY: FOOD INSECURITY: WITHIN THE PAST 12 MONTHS, THE FOOD YOU BOUGHT JUST DIDN'T LAST AND YOU DIDN'T HAVE MONEY TO GET MORE.: OFTEN TRUE

## 2025-02-07 SDOH — ECONOMIC STABILITY: FOOD INSECURITY: WITHIN THE PAST 12 MONTHS, YOU WORRIED THAT YOUR FOOD WOULD RUN OUT BEFORE YOU GOT MONEY TO BUY MORE.: SOMETIMES TRUE

## 2025-02-07 SDOH — ECONOMIC STABILITY: TRANSPORTATION INSECURITY
IN THE PAST 12 MONTHS, HAS LACK OF TRANSPORTATION KEPT YOU FROM MEETINGS, WORK, OR FROM GETTING THINGS NEEDED FOR DAILY LIVING?: NO

## 2025-02-07 SDOH — ECONOMIC STABILITY: TRANSPORTATION INSECURITY
IN THE PAST 12 MONTHS, HAS THE LACK OF TRANSPORTATION KEPT YOU FROM MEDICAL APPOINTMENTS OR FROM GETTING MEDICATIONS?: NO

## 2025-02-10 ENCOUNTER — OFFICE VISIT (OUTPATIENT)
Facility: CLINIC | Age: 80
End: 2025-02-10
Payer: MEDICARE

## 2025-02-10 VITALS
HEIGHT: 60 IN | SYSTOLIC BLOOD PRESSURE: 110 MMHG | TEMPERATURE: 98.4 F | DIASTOLIC BLOOD PRESSURE: 72 MMHG | HEART RATE: 82 BPM | OXYGEN SATURATION: 97 % | BODY MASS INDEX: 30.43 KG/M2 | RESPIRATION RATE: 12 BRPM | WEIGHT: 155 LBS

## 2025-02-10 DIAGNOSIS — E78.2 MIXED HYPERLIPIDEMIA: ICD-10-CM

## 2025-02-10 DIAGNOSIS — I50.22 CHRONIC SYSTOLIC HEART FAILURE (HCC): ICD-10-CM

## 2025-02-10 DIAGNOSIS — I10 PRIMARY HYPERTENSION: ICD-10-CM

## 2025-02-10 DIAGNOSIS — R13.10 DYSPHAGIA, UNSPECIFIED TYPE: ICD-10-CM

## 2025-02-10 DIAGNOSIS — E11.42 DIABETIC PERIPHERAL NEUROPATHY ASSOCIATED WITH TYPE 2 DIABETES MELLITUS (HCC): ICD-10-CM

## 2025-02-10 DIAGNOSIS — I25.10 CAD S/P PERCUTANEOUS CORONARY ANGIOPLASTY: ICD-10-CM

## 2025-02-10 DIAGNOSIS — J06.9 VIRAL UPPER RESPIRATORY TRACT INFECTION: ICD-10-CM

## 2025-02-10 DIAGNOSIS — N18.32 STAGE 3B CHRONIC KIDNEY DISEASE (HCC): ICD-10-CM

## 2025-02-10 DIAGNOSIS — E11.65 TYPE 2 DIABETES MELLITUS WITH HYPERGLYCEMIA, WITHOUT LONG-TERM CURRENT USE OF INSULIN (HCC): Primary | ICD-10-CM

## 2025-02-10 DIAGNOSIS — K44.9 HIATAL HERNIA: ICD-10-CM

## 2025-02-10 DIAGNOSIS — Z98.61 CAD S/P PERCUTANEOUS CORONARY ANGIOPLASTY: ICD-10-CM

## 2025-02-10 LAB — HBA1C MFR BLD: 9 %

## 2025-02-10 PROCEDURE — 99215 OFFICE O/P EST HI 40 MIN: CPT | Performed by: INTERNAL MEDICINE

## 2025-02-10 PROCEDURE — 1036F TOBACCO NON-USER: CPT | Performed by: INTERNAL MEDICINE

## 2025-02-10 PROCEDURE — 1123F ACP DISCUSS/DSCN MKR DOCD: CPT | Performed by: INTERNAL MEDICINE

## 2025-02-10 PROCEDURE — 3052F HG A1C>EQUAL 8.0%<EQUAL 9.0%: CPT | Performed by: INTERNAL MEDICINE

## 2025-02-10 PROCEDURE — G8427 DOCREV CUR MEDS BY ELIG CLIN: HCPCS | Performed by: INTERNAL MEDICINE

## 2025-02-10 PROCEDURE — G8399 PT W/DXA RESULTS DOCUMENT: HCPCS | Performed by: INTERNAL MEDICINE

## 2025-02-10 PROCEDURE — 3074F SYST BP LT 130 MM HG: CPT | Performed by: INTERNAL MEDICINE

## 2025-02-10 PROCEDURE — 1090F PRES/ABSN URINE INCON ASSESS: CPT | Performed by: INTERNAL MEDICINE

## 2025-02-10 PROCEDURE — G8417 CALC BMI ABV UP PARAM F/U: HCPCS | Performed by: INTERNAL MEDICINE

## 2025-02-10 PROCEDURE — 3078F DIAST BP <80 MM HG: CPT | Performed by: INTERNAL MEDICINE

## 2025-02-10 PROCEDURE — 1159F MED LIST DOCD IN RCRD: CPT | Performed by: INTERNAL MEDICINE

## 2025-02-10 PROCEDURE — 83036 HEMOGLOBIN GLYCOSYLATED A1C: CPT | Performed by: INTERNAL MEDICINE

## 2025-02-10 PROCEDURE — 1160F RVW MEDS BY RX/DR IN RCRD: CPT | Performed by: INTERNAL MEDICINE

## 2025-02-10 RX ORDER — AZELASTINE HYDROCHLORIDE 137 UG/1
SPRAY, METERED NASAL
COMMUNITY
Start: 2025-01-30

## 2025-02-10 ASSESSMENT — PATIENT HEALTH QUESTIONNAIRE - PHQ9
2. FEELING DOWN, DEPRESSED OR HOPELESS: NOT AT ALL
1. LITTLE INTEREST OR PLEASURE IN DOING THINGS: NOT AT ALL
SUM OF ALL RESPONSES TO PHQ9 QUESTIONS 1 & 2: 0
SUM OF ALL RESPONSES TO PHQ QUESTIONS 1-9: 0

## 2025-02-10 NOTE — PROGRESS NOTES
Tonie Lucero  Identified pt with two pt identifiers(name and ).     Chief Complaint   Patient presents with    Diabetes     Room 2C    Hypertension    Cough     X 3 weeks       Reviewed record In preparation for visit and have obtained necessary documentation.     1. Have you been to the ER, urgent care clinic or hospitalized since your last visit? No     2. Have you seen or consulted any other health care providers outside of the [facility] since your last visit? Include any pap smears or colon screening. No    Vitals reviewed with provider.    Health Maintenance reviewed:     Health Maintenance Due   Topic    Shingles vaccine (2 of 3)    Respiratory Syncytial Virus (RSV) Pregnant or age 60 yrs+ (1 - 1-dose 75+ series)    Diabetic Alb to Cr ratio (uACR) test     DTaP/Tdap/Td vaccine (2 - Td or Tdap)          Wt Readings from Last 3 Encounters:   02/10/25 70.3 kg (155 lb)   25 69.4 kg (153 lb)   24 71.8 kg (158 lb 6.4 oz)        Temp Readings from Last 3 Encounters:   02/10/25 98.4 °F (36.9 °C) (Oral)   24 97 °F (36.1 °C) (Temporal)   24 97.8 °F (36.6 °C) (Temporal)        BP Readings from Last 3 Encounters:   02/10/25 (!) 157/80   24 (!) 115/50   24 136/70        Pulse Readings from Last 3 Encounters:   02/10/25 82   24 76   24 75      [unfilled]       Learning Assessment:   :         2023     8:30 AM   [facility] AMB LEARNING ASSESSMENT   Primary Learner Patient   level of education DID NOT GRADUATE HIGH SCHOOL   Barriers Factors NONE   co-learner caregiver No   Primary Language ENGLISH   Learning Preference DEMONSTRATION   Answered By patient   Relationship to Learner SELF         Fall Risk Assessment:   :         2024    12:53 PM 9/10/2024     4:03 PM 9/10/2024     8:25 AM 2024     9:49 AM 2023    11:12 AM 4/10/2023     8:34 AM 2023     3:16 PM   Amb Fall Risk Assessment and TUG Test   Do you feel unsteady or are you worried

## 2025-02-10 NOTE — PATIENT INSTRUCTIONS
[facility] Utility - Financial Resources*  (Call United Way/211 if need more resources.)  Utilities  [employer]  What they offer: Partnership with the [facility] of . Assist with finding and applying for government funded programs and benefits. You can also update your benefits or report changes through [employer].  Website: https://www.[employer]/  Phone Number: 8335CALLVA (232-316-5757)    [employer]  What they offer: [employer] is [facility]'s energy assistance program of last resort for anyone who faces financial hardships from unemployment or family crisis.  Phone Number: 829.490.1898  Address: [address] [place] [address]  Website: https://www.CrowdCan.Do/[place]/billing/billing-options/energyshare    Energy Assistance Programs (EAP) - [facility] of   What they offer: EAP assists low-income households in meeting their immediate home energy needs.      Website: https://www.[place].gov/benefit/ea/  Available assistance:   Crisis Assistance - Heating Emergencies  Fuel Assistance - Offset Heating Fuel Costs  Cooling Assistance - Applies to Cooling Utility Bills and Equipment  How to apply:  Online:  https://StrangeLogicvirginiaInxero/  Call:  756.137.9564   Paper application:   Print application from https://www.[place].gov/benefit/ea/ and submit to your [facility] Department of     [facility] Department of   [facility] of  contacts: https://www.[facility].[place].[facility]/localagency/index.cgi  [place] Utility Affordability Programs  https://Gumhousea.gov/public-utilities/billing  Call:  275.685.7259   Email:  rakel@a.gov  Programs available:  Equal Monthly Payment Plan, [employer] Heat Program, [employer] Water Program, [employer] Water Conservation Program, Senior Assistance, Other Fuel Assistance Programs, PromisePay Payment Plans, Low-Income Household Water

## 2025-02-10 NOTE — PROGRESS NOTES
HPI  Ms. Tonie Lucero is a 79 y.o. year old female, she is seen today for follow up DM, HTN.   Plan last visit about 4 mos ago:  DM poorly controlled, working on diet changes - will rx rybelsus and hopefully affordable, will be seeing Dr. Gee soon to help with PAP for medications and management to improve glucose control  CKD has been stable - check labs  Leg cramps - check D, mag - also: For leg cramps may try taking vitamin B complex (three times daily, containing 30 mg of vitamin B6) or vitamin E (800 international units before bed). If you do not improve with one supplement after a two-month trial then try the other supplement.     Suspect leg cramps related to lumbar DDD  Unclear if she is taking entresto - patient to check bottles at home :Entresto should be taken twice a day - the old dose was 24/26 and new dose should be 49/51 twice a day.   Will refer to  for help with paying for bills, medications    Today:    Complains of cough for 3 weeks. Saw urgent care last week, prescribed nasal spray only, cough is improving. No f/c or sweats. Cough is productive of yellow sputum. Says she thinks she's improving. Had fever initially, sore throat, body aches.      No chest pain, sob, dizziness, weakness, lightheadedness. Can't recall numbers. Edema has resolved.     DM - says glucose \"up and down\" - checks every 3-4 days - not consistently  Saw Dr. Gee once, canceled appt and never rescheduled. Was to be seen 11/7/24 - had forms for rybelsus but didn't bring back.     I referred to nephrology in January for CKD - didn't get an appointment because she didn't know that I was the one who referred her when office called. I will re-refer.     Not taking entresto - says someone told her she \"didn't need it\" - I don't have records.     Tells me she still has difficulty with regurgitation of undigested food. It's not clear if she ever spoke to GI about this - Dr. Sarmiento - but had recent colonoscopy.   She has seen

## 2025-02-11 LAB
ANION GAP SERPL CALC-SCNC: 5 MMOL/L (ref 2–12)
BUN SERPL-MCNC: 21 MG/DL (ref 6–20)
BUN/CREAT SERPL: 15 (ref 12–20)
CALCIUM SERPL-MCNC: 9.5 MG/DL (ref 8.5–10.1)
CHLORIDE SERPL-SCNC: 104 MMOL/L (ref 97–108)
CHOLEST SERPL-MCNC: 128 MG/DL
CO2 SERPL-SCNC: 29 MMOL/L (ref 21–32)
CREAT SERPL-MCNC: 1.42 MG/DL (ref 0.55–1.02)
CREAT UR-MCNC: 14.7 MG/DL
GLUCOSE SERPL-MCNC: 175 MG/DL (ref 65–100)
HDLC SERPL-MCNC: 42 MG/DL
HDLC SERPL: 3 (ref 0–5)
LDLC SERPL CALC-MCNC: 55.4 MG/DL (ref 0–100)
MICROALBUMIN UR-MCNC: 0.7 MG/DL
MICROALBUMIN/CREAT UR-RTO: 48 MG/G (ref 0–30)
POTASSIUM SERPL-SCNC: 4.7 MMOL/L (ref 3.5–5.1)
SODIUM SERPL-SCNC: 138 MMOL/L (ref 136–145)
SPECIMEN HOLD: NORMAL
TRIGL SERPL-MCNC: 153 MG/DL
VLDLC SERPL CALC-MCNC: 30.6 MG/DL

## 2025-02-13 ENCOUNTER — TELEPHONE (OUTPATIENT)
Dept: PHARMACY | Facility: CLINIC | Age: 80
End: 2025-02-13

## 2025-02-13 NOTE — TELEPHONE ENCOUNTER
Marlen, Shila Davis, [facility]  Althea Leggett  Please schedule when able. It seems to be a full med review + DM management as well.    Reason for referral: Full Med Review/DM  Referring provider: Dr Nina  Referring provider office: [facility]  Referred to: Shila Gee, PharmD, BCACP, CDCES  Status of Patient: New Patient  Length of Appt: 60 minutes  Type of Appt: In Person   Patient need address: [address] [place] [address], [facility], [address], [place]  [address]

## 2025-02-13 NOTE — TELEPHONE ENCOUNTER
**[facility]**    Attempt made to contact patient at the mobile number.    Spoke to patient at mobile number and advised them of the previous message.    Patient verified understanding and scheduled a in person appointment .  Appointment scheduled for 3/11/25 at 1:15 pm with Shila Gee.    [facility]   Ambulatory Pharmacy Clinical   [phone]  Department, toll free: [phone], option 2       For Pharmacy Admin Tracking Only    Program: Medical Group  Recommendation Provided To: Patient/Caregiver: 1 via Telephone  Intervention Detail: Scheduled Appointment  Intervention Accepted By: Patient/Caregiver: 1  Gap Closed?: Yes   Time Spent (min): 15

## 2025-03-02 DIAGNOSIS — C50.912 INFILTRATING DUCTAL CARCINOMA OF LEFT BREAST (HCC): ICD-10-CM

## 2025-03-03 RX ORDER — LETROZOLE 2.5 MG/1
2.5 TABLET, FILM COATED ORAL DAILY
Qty: 30 TABLET | Refills: 0 | Status: SHIPPED | OUTPATIENT
Start: 2025-03-03

## 2025-03-08 NOTE — PROGRESS NOTES
Pharmacy Progress Note - Diabetes Management    Assessment / Plan:   Diabetes Management:  - Per ADA guidelines, Pt's A1c is not at goal of < 7%. BP <130/80.   - Limited BGM values to assess hyperglycemia management. Recommend for patient to bring in meter to next visit  - Continue Farxiga 10 mg daily for now.   - [employer] application for patient's Rybelsus restarted today.      S/O: Ms. Tonie Lucero is a 79 y.o. female, referred by Catalina Juarez MD, with a PMH of T2DM, HTN, HLD, CAD, CHF, CKD III, osteoporosis, s/p hip replacement, RLS, GERD, was seen today for diabetes management follow up.  Patient's last A1c was 9% (Feb 2025,Aug 2024), 7.4% (March 2024), 6.8% (Oct 2023), 7% (Aug 2023), 7.5% (May 2023), 7.4% (Feb 2023),6.6% (Oct 2022).       Last seen by me in Oct. 2024.     Interim update:   Saw Dr Nina in February.   Saw Neurology this morning.   Had cardiology f/up with Gerald Waters last week. No longer taking Entresto 49/51 mg BID. Taking valsartan 160 mg daily.   Patient reports significant social stressors since Dec.  Lost insurance. Currently in process of applying for SNAP and Medicaid.     Current anti-hyperglycemic regimen include(s):    - Farxiga 10 mg daily   - Rybelsus 3 mg daily    - Chasity Cares - no updates since last submission   - Approved for AZ&Me for Farxiga (through Gerald Waters NP) - [facility]   - ASA 81 mg daily, plavix 75 mg daily  - Crestor 40 mg daily, zetia 10 mg daily - LDL 55 ;  (Feb 2025)   - Scr 1.42 (Feb 2025)     ROS:  Today, Pt endorses:  Signs and symptoms of Hypoglycemia: none  Signs and symptoms of Hyperglycemia: none    Blood Glucose Monitoring (BGM) or CGM:  Forgot her meter today.   Reports morning readings usually in the 120-130s. No specific recollection.     Nutrition/Lifestyle Modifications:  Eating 2-3 meals/day +/- snack at night      The ASCVD Risk score (Thai ESPITIA, et al., 2019) failed to calculate for the following reasons:    The

## 2025-03-11 ENCOUNTER — OFFICE VISIT (OUTPATIENT)
Age: 80
End: 2025-03-11
Payer: MEDICARE

## 2025-03-11 ENCOUNTER — PHARMACY VISIT (OUTPATIENT)
Age: 80
End: 2025-03-11

## 2025-03-11 VITALS
SYSTOLIC BLOOD PRESSURE: 148 MMHG | DIASTOLIC BLOOD PRESSURE: 80 MMHG | BODY MASS INDEX: 30.7 KG/M2 | HEIGHT: 60 IN | HEART RATE: 76 BPM | RESPIRATION RATE: 18 BRPM | WEIGHT: 156.4 LBS | OXYGEN SATURATION: 97 %

## 2025-03-11 VITALS
DIASTOLIC BLOOD PRESSURE: 68 MMHG | WEIGHT: 157 LBS | OXYGEN SATURATION: 97 % | BODY MASS INDEX: 30.82 KG/M2 | HEART RATE: 84 BPM | SYSTOLIC BLOOD PRESSURE: 128 MMHG | HEIGHT: 60 IN

## 2025-03-11 DIAGNOSIS — M79.10 MYALGIA: ICD-10-CM

## 2025-03-11 DIAGNOSIS — G25.81 RLS (RESTLESS LEGS SYNDROME): ICD-10-CM

## 2025-03-11 DIAGNOSIS — E11.65 TYPE 2 DIABETES MELLITUS WITH HYPERGLYCEMIA, WITHOUT LONG-TERM CURRENT USE OF INSULIN (HCC): ICD-10-CM

## 2025-03-11 DIAGNOSIS — M17.12 PRIMARY OSTEOARTHRITIS OF LEFT KNEE: ICD-10-CM

## 2025-03-11 DIAGNOSIS — E11.42 DIABETIC PERIPHERAL NEUROPATHY ASSOCIATED WITH TYPE 2 DIABETES MELLITUS (HCC): ICD-10-CM

## 2025-03-11 DIAGNOSIS — M54.16 LUMBAR RADICULOPATHY, CHRONIC: Primary | ICD-10-CM

## 2025-03-11 DIAGNOSIS — Z79.899 MEDICATION MANAGEMENT: Primary | ICD-10-CM

## 2025-03-11 PROCEDURE — 3079F DIAST BP 80-89 MM HG: CPT | Performed by: NURSE PRACTITIONER

## 2025-03-11 PROCEDURE — 1159F MED LIST DOCD IN RCRD: CPT | Performed by: NURSE PRACTITIONER

## 2025-03-11 PROCEDURE — G8427 DOCREV CUR MEDS BY ELIG CLIN: HCPCS | Performed by: NURSE PRACTITIONER

## 2025-03-11 PROCEDURE — 3077F SYST BP >= 140 MM HG: CPT | Performed by: NURSE PRACTITIONER

## 2025-03-11 PROCEDURE — 99214 OFFICE O/P EST MOD 30 MIN: CPT | Performed by: NURSE PRACTITIONER

## 2025-03-11 PROCEDURE — 3052F HG A1C>EQUAL 8.0%<EQUAL 9.0%: CPT | Performed by: NURSE PRACTITIONER

## 2025-03-11 PROCEDURE — 1036F TOBACCO NON-USER: CPT | Performed by: NURSE PRACTITIONER

## 2025-03-11 PROCEDURE — 1090F PRES/ABSN URINE INCON ASSESS: CPT | Performed by: NURSE PRACTITIONER

## 2025-03-11 PROCEDURE — 1125F AMNT PAIN NOTED PAIN PRSNT: CPT | Performed by: NURSE PRACTITIONER

## 2025-03-11 PROCEDURE — 1123F ACP DISCUSS/DSCN MKR DOCD: CPT | Performed by: NURSE PRACTITIONER

## 2025-03-11 PROCEDURE — G8399 PT W/DXA RESULTS DOCUMENT: HCPCS | Performed by: NURSE PRACTITIONER

## 2025-03-11 PROCEDURE — 1160F RVW MEDS BY RX/DR IN RCRD: CPT | Performed by: NURSE PRACTITIONER

## 2025-03-11 PROCEDURE — G8417 CALC BMI ABV UP PARAM F/U: HCPCS | Performed by: NURSE PRACTITIONER

## 2025-03-11 RX ORDER — VALSARTAN 160 MG/1
160 TABLET ORAL DAILY
COMMUNITY
Start: 2025-02-24

## 2025-03-11 RX ORDER — GABAPENTIN 100 MG/1
100 CAPSULE ORAL NIGHTLY
Qty: 90 CAPSULE | Refills: 1 | Status: SHIPPED | OUTPATIENT
Start: 2025-03-11 | End: 2025-09-07

## 2025-03-11 ASSESSMENT — ENCOUNTER SYMPTOMS: BACK PAIN: 1

## 2025-03-11 ASSESSMENT — PATIENT HEALTH QUESTIONNAIRE - PHQ9
SUM OF ALL RESPONSES TO PHQ QUESTIONS 1-9: 0
1. LITTLE INTEREST OR PLEASURE IN DOING THINGS: NOT AT ALL
SUM OF ALL RESPONSES TO PHQ QUESTIONS 1-9: 0
2. FEELING DOWN, DEPRESSED OR HOPELESS: NOT AT ALL

## 2025-03-11 NOTE — PROGRESS NOTES
Chief Complaint   Patient presents with    Neurologic Problem     Follow up for neuropathy and RLS- left lag cramps - will get a Knot that she has to work out - sx mostly at night      1. Have you been to the ER, urgent care clinic since your last visit?  Hospitalized since your last visit? No     2. Have you seen or consulted any other health care providers outside of the [facility] since your last visit?  Include any pap smears or colon screening. No

## 2025-03-11 NOTE — PROGRESS NOTES
[facility]  [address]  [address]  [place] [address]  Tel: [phone]  Fax: [phone]      Date:  25     Name:  KAYLEIGH MARTINEZ  :  1945  MRN:  [id number]     PCP:  Catalina Nina MD    Chief Complaint   Patient presents with    Neurologic Problem     Follow up for neuropathy and RLS- left lag cramps - will get a Knot that she has to work out - sx mostly at night        HISTORY OF PRESENT ILLNESS:  History of Present Illness  The patient is a 79-year-old female here today for a regular follow-up. She was last seen by Dr. Oconnor in 2024, who performed an EMG and found mild polyneuropathy as well as mild chronic lumbar radiculopathy.    She reports experiencing a popping sensation in her left knee, which has previously resulted in the formation of a large knot. She sought immediate medical attention at an urgent care facility. She describes her symptoms as muscle spasms rather than cramps, with the most recent episode occurring the previous night. She has been referred to an orthopedic specialist for her knee issues but has not yet scheduled an appointment recently. She does not experience any numbness, tingling, burning, or pain in her legs, except during these episodes. She also reports no weakness, balance issues, or walking difficulties. She maintains hydration primarily through water intake, occasionally consuming orange juice and coffee, and abstains from soda. She attempts to stay active by walking to tend to her chickens twice daily. She reports no recent falls. She has not undergone physical therapy for her symptoms. She experiences the most discomfort in her left knee at night, which she attributes to prolonged standing. She believes her altered gait due to knee pain may be contributing to her back pain.    She experiences back pain and has been diagnosed with arthritis in both hips and her back. She has not received any injections for

## 2025-03-11 NOTE — PATIENT INSTRUCTIONS
Continue Farxiga 10 mg daily  Will work on your [employer] application for Rybelsus  Bring in your meter to the next visit

## 2025-03-12 ENCOUNTER — PHARMACY VISIT (OUTPATIENT)
Age: 80
End: 2025-03-12

## 2025-03-12 DIAGNOSIS — Z79.899 MEDICATION MANAGEMENT: Primary | ICD-10-CM

## 2025-03-12 NOTE — PROGRESS NOTES
Pharmacy Progress Note - Medication Access    [employer] application and documents for Ms. Tonie Lucero 79 y.o. reviewed, processed, and submitted today.      Contacted patient to share updates. All questions answered at this time.     Thank you for the consult,  Shila Gee, PharmD, BCACP, CDCES          For Pharmacy Admin Tracking Only    Program: Medical Group  CPA in place:  Yes  Recommendation Provided To: Patient/Caregiver: 2 via Virtual Visit  Intervention Detail: Benefit Assistance and Patient Access Assistance/Sample Provided  Intervention Accepted By: Patient/Caregiver: 2  Gap Closed?: Yes   Time Spent (min): 20

## 2025-03-13 ENCOUNTER — TELEPHONE (OUTPATIENT)
Age: 80
End: 2025-03-13

## 2025-03-13 NOTE — TELEPHONE ENCOUNTER
Pt called stating she cannot make upcoming ov as she lost part of her insurance    Pt will cb to schedule

## 2025-03-24 ENCOUNTER — TELEPHONE (OUTPATIENT)
Age: 80
End: 2025-03-24

## 2025-03-24 DIAGNOSIS — C50.912 INFILTRATING DUCTAL CARCINOMA OF LEFT BREAST (HCC): ICD-10-CM

## 2025-03-25 NOTE — TELEPHONE ENCOUNTER
FU call to patient, ID fully verified.  [phone].   Reviewed with patient that it has been a lengthy period since last OV with Provider, who is unable to refill Letrozole without being seen.  Patient is very bitter and explained to RN that she has been beset by multiple medical issues, her overall cost of living has soared making it necessary to cancel her [place] BCBS as she could not afford the premiums. States during call that she is expecting a realtor so that she can sell her home to offset expenses, pay down other debts, and avoid losing her home of many years as she cannot afford to make house payments.  States with some bitterness that she feels no one will assist her with moving forward, was recently denied surgery as she is only able to pay off her out of pocket costs over time.  States her income is too high for most assistance programs but her expenses make it unable to move forward and she does not know how to move forward.   Stated several times during call that she does not have computer, knowledge of computers, or access to computer to apply for most means of support. Initially refused RN's offer of , [employer]'s, phone number but eventually accepted phone number and agreed to call.  She stated that she feels rejected by the entire [facility] community due to her lack of ability to pay, in spite of her many years of care within system.    Reassured patient that we as a system care about her and her health issues, expressed hopes that she would be able to find insurance and re establish her care here.  Patient stated that she did not feel she had much hope but would try to connect with .   Update to Provider/PEYMAN

## 2025-03-26 ENCOUNTER — TELEPHONE (OUTPATIENT)
Age: 80
End: 2025-03-26

## 2025-03-26 RX ORDER — LETROZOLE 2.5 MG/1
2.5 TABLET, FILM COATED ORAL DAILY
Qty: 30 TABLET | Refills: 11 | OUTPATIENT
Start: 2025-03-26

## 2025-03-26 NOTE — TELEPHONE ENCOUNTER
Writer outreached patient to provide resource for financial counseling through [facility]. Call was unanswered, left discrete voicemail requesting return call.     [facility]is [facility]  Oncology Social Work Encounter    Location: Medical Oncology at [facility]  Patient: Tonie Lucero (1945)    Encounter Type: Referral      Concerns/Barriers to Care: financial stressors     Narrative: Pt returned call - verified patient ID.     Explained that I was made aware of pt's identified financial/insurance-related concerns.   Upon reviewing chart, SW noted that pt has been working with Care Coordination SW Veronica Barragan and has received resources to address concerns. SW also shared [facility] resource for financial counseling and reminded her of insurance referral to Jorgito Almodovar for navigating secondary plan issues and obtaining different or additional coverage as needed. Pt voiced understanding and appreciation, and no other needs at this time.      Information/Education/Referrals Provided:    Insurance/Entitlements   Financial/Medication Assistance      Plan:   Referral to [facility] for financial counseling   Referral to [employer] Almodovar for insurance-related concerns   Follow up as needed      Signed by: Ronit Kaye LMSW

## 2025-05-15 DIAGNOSIS — M79.89 LEG SWELLING: ICD-10-CM

## 2025-05-15 RX ORDER — FUROSEMIDE 20 MG/1
20 TABLET ORAL DAILY
Qty: 90 TABLET | Refills: 3 | Status: SHIPPED | OUTPATIENT
Start: 2025-05-15

## 2025-07-18 DIAGNOSIS — I10 PRIMARY HYPERTENSION: ICD-10-CM

## 2025-07-18 RX ORDER — METOPROLOL SUCCINATE 50 MG/1
50 TABLET, EXTENDED RELEASE ORAL DAILY
Qty: 90 TABLET | Refills: 3 | Status: SHIPPED | OUTPATIENT
Start: 2025-07-18

## 2025-07-18 NOTE — TELEPHONE ENCOUNTER
Future Appointments:  Future Appointments   Date Time Provider Department Center   9/15/2025  9:50 AM [facility]ina Nina [facility]   2/2/2026 10:00 AM Silva Polo APRN - NP [facility]        Last Appointment With Me:  2/10/2025     Requested Prescriptions     Pending Prescriptions Disp Refills    metoprolol succinate (TOPROL XL) 50 MG extended release tablet [Pharmacy Med Name: METOPROLOL SUCCINATE ER 50 MG Oral Tablet Extended Release 24 Hour] 90 tablet 3     Sig: TAKE 1 TABLET EVERY DAY

## 2025-07-23 DIAGNOSIS — K21.9 GASTRO-ESOPHAGEAL REFLUX DISEASE WITHOUT ESOPHAGITIS: ICD-10-CM

## 2025-07-23 RX ORDER — PANTOPRAZOLE SODIUM 40 MG/1
40 TABLET, DELAYED RELEASE ORAL
Qty: 90 TABLET | Refills: 3 | Status: SHIPPED | OUTPATIENT
Start: 2025-07-23

## 2025-07-23 NOTE — TELEPHONE ENCOUNTER
PCP: Catalina Nina MD     Last appt:  2/10/2025      Future Appointments   Date Time Provider Department Center   9/15/2025  9:50 AM Catalina Nina MD [facility]   2/2/2026 10:00 AM Silva Polo, APRN - NP C [facility] AMB          Requested Prescriptions     Pending Prescriptions Disp Refills    pantoprazole (PROTONIX) 40 MG tablet [Pharmacy Med Name: PANTOPRAZOLE SODIUM 40 MG Oral Tablet Delayed Release] 90 tablet 3     Sig: TAKE 1 TABLET EVERY MORNING BEFORE BREAKFAST

## 2025-08-01 ENCOUNTER — TELEPHONE (OUTPATIENT)
Facility: CLINIC | Age: 80
End: 2025-08-01

## 2025-08-01 NOTE — TELEPHONE ENCOUNTER
Jose called in from [facility] clinical lab to check on status of lab request forms. He states form has to be sent back today 8/1    Call back # [phone]    Fax # [phone]

## (undated) DEVICE — HIGH FLOW RATE EXTENSION SET, LUER LOCK ADAPTERS

## (undated) DEVICE — KIT JACK TBL PT CARE

## (undated) DEVICE — SOLUTION IRRIG 3000ML LAC R FLX CONT

## (undated) DEVICE — DRESSING WND W4IN L4IN FOAM N ADH POLYUR SHT OPTIFOAM

## (undated) DEVICE — SOLIDIFIER FLD 2OZ 1500CC N DISINF IN BTL DISP SAFESORB

## (undated) DEVICE — PREP SKN CHLRAPRP APL 26ML STR --

## (undated) DEVICE — BITEBLOCK ENDOSCP 60FR MAXI WHT POLYETH STURDY W/ VELC WVN

## (undated) DEVICE — ELECTRODE BLDE L4IN NONINSULATED EDGE

## (undated) DEVICE — PREP KIT PEEL PTCH POVIDONE IOD

## (undated) DEVICE — Z DISCONTINUED USE 2717541 SUTURE STRATAFIX SZ 3-0 L30CM NONABSORBABLE UD L26MM FS 3/8

## (undated) DEVICE — (D)PREP SKN CHLRAPRP APPL 26ML -- CONVERT TO ITEM 371833

## (undated) DEVICE — SUTURE VCRL SZ 2-0 L18IN ABSRB UD CT-1 L36MM 1/2 CIR J839D

## (undated) DEVICE — DRAPE,CHEST,FENES,15X10,STERIL: Brand: MEDLINE

## (undated) DEVICE — SOLUTION IV STRL H2O 500 ML AQUALITE POUR BTL

## (undated) DEVICE — SNARE VASC L240CM LOOP W10MM SHTH DIA2.4MM RND STIFF CLD

## (undated) DEVICE — DRAPE,REIN 53X77,STERILE: Brand: MEDLINE

## (undated) DEVICE — Z CONVERTED USE 2107985 COVER FLROSCP W36XL28IN 4 SIDE ADH

## (undated) DEVICE — SYRINGE INSULIN 1ML LUERSLIP TIP W/O SFTY U100 BLISTER PK

## (undated) DEVICE — STERILE POLYISOPRENE POWDER-FREE SURGICAL GLOVES: Brand: PROTEXIS

## (undated) DEVICE — SOLUTION IV 250ML 0.9% SOD CHL CLR INJ FLX BG CONT PRT CLSR

## (undated) DEVICE — SYR 10ML LUER LOK 1/5ML GRAD --

## (undated) DEVICE — SUTURE VCRL SZ 0 L27IN ABSRB UD L36MM CT-1 1/2 CIR J260H

## (undated) DEVICE — 4-PORT MANIFOLD: Brand: NEPTUNE 2

## (undated) DEVICE — NDL FLTR TIP 5 MIC 18GX1.5IN --

## (undated) DEVICE — 3M™ TEGADERM™ TRANSPARENT FILM DRESSING FRAME STYLE, 1626W, 4 IN X 4-3/4 IN (10 CM X 12 CM), 50/CT 4CT/CASE: Brand: 3M™ TEGADERM™

## (undated) DEVICE — SUPPLEMENT DIGESTIVE H2O SOL GI-EASE

## (undated) DEVICE — HI-TORQUE VERSACORE FLOPPY GUIDE WIRE SYSTEM 145 CM: Brand: HI-TORQUE VERSACORE

## (undated) DEVICE — STERILE POLYISOPRENE POWDER-FREE SURGICAL GLOVES WITH EMOLLIENT COATING: Brand: PROTEXIS

## (undated) DEVICE — PIN EXT FIX SCHNZ 3 MM

## (undated) DEVICE — KENDALL DL ECG CABLE AND LEAD WIRE SYSTEM, 5-LEAD, SINGLE PATIENT USE: Brand: KENDALL

## (undated) DEVICE — TR BAND RADIAL ARTERY COMPRESSION DEVICE: Brand: TR BAND

## (undated) DEVICE — CATHETER COR DIAG 4.0 5FR 100CM 0 SIDE H

## (undated) DEVICE — PRESSURE GUIDEWIRE: Brand: COMET™ II

## (undated) DEVICE — IV STRT KT 3282] LSL INDUSTRIES INC]

## (undated) DEVICE — 1200 GUARD II KIT W/5MM TUBE W/O VAC TUBE: Brand: GUARDIAN

## (undated) DEVICE — SNAP KOVER: Brand: UNBRANDED

## (undated) DEVICE — Device

## (undated) DEVICE — LAMINECTOMY RICHMOND-LF: Brand: MEDLINE INDUSTRIES, INC.

## (undated) DEVICE — DRAPE,LAPAROTOMY,PCH,STERILE: Brand: MEDLINE

## (undated) DEVICE — SHOULDER W/POUCH II-LF: Brand: MEDLINE INDUSTRIES, INC.

## (undated) DEVICE — STRAP,POSITIONING,KNEE/BODY,FOAM,4X60": Brand: MEDLINE

## (undated) DEVICE — NDL SPNE QNCKE 18GX3.5IN LF --

## (undated) DEVICE — BASIN ST MAJOR-NO CAUTERY: Brand: MEDLINE INDUSTRIES, INC.

## (undated) DEVICE — BIPOLAR FORCEPS CORD: Brand: VALLEYLAB

## (undated) DEVICE — SUTURE VCRL SZ 1 L18IN ABSRB VLT CT-1 L36MM 1/2 CIR J741D

## (undated) DEVICE — LABEL MED MRMC ORTH STRL

## (undated) DEVICE — SOL IRR STRL H2O 1000ML BTL --

## (undated) DEVICE — INFECTION CONTROL KIT SYS

## (undated) DEVICE — MINI TREK CORONARY DILATATION CATHETER 2.0 MM X 8 MM / RAPID-EXCHANGE: Brand: MINI TREK

## (undated) DEVICE — SUT MCRYL 4-0 27IN PS2 UD --

## (undated) DEVICE — GOWN,NON-REINFORCED,XXL: Brand: MEDLINE

## (undated) DEVICE — SOLUTION IRRIG 1000ML 0.9% SOD CHL USP POUR PLAS BTL

## (undated) DEVICE — SYR 50ML LR LCK 1ML GRAD NSAF --

## (undated) DEVICE — SUTURE STRATAFIX SPRL MCRYL + SZ 2-0 L18IN ABSRB UD CT-1 SXMP1B413

## (undated) DEVICE — SYR 5ML 1/5 GRAD LL NSAF LF --

## (undated) DEVICE — HYPODERMIC SAFETY NEEDLE: Brand: MAGELLAN

## (undated) DEVICE — CATHETER GUID 6FR L100CM DIA0.071IN NYL SHFT EBU3.0 W/O

## (undated) DEVICE — TUBING PRSS MON L6IN PVC M FEM CONN

## (undated) DEVICE — 3M™ IOBAN™ 2 ANTIMICROBIAL INCISE DRAPE 6651EZ: Brand: IOBAN™ 2

## (undated) DEVICE — 3M™ TEGADERM™ TRANSPARENT FILM DRESSING FRAME STYLE, 1624W, 2-3/8 IN X 2-3/4 IN (6 CM X 7 CM), 100/CT 4CT/CASE: Brand: 3M™ TEGADERM™

## (undated) DEVICE — TRAP FLUID BUFFALO FLTR

## (undated) DEVICE — ADHESIVE SKIN CLOSURE TOP 36 CC HI VISC DERMBND MINI

## (undated) DEVICE — SUT ETHLN 3-0 18IN PS2 BLK --

## (undated) DEVICE — PINNACLE INTRODUCER SHEATH: Brand: PINNACLE

## (undated) DEVICE — SHOULDER SUSPENSION KIT 6 PER BOX

## (undated) DEVICE — GUIDEWIRE VASC L260CM 0.035IN J TIP L3MM PTFE FIX COR NAMIC

## (undated) DEVICE — THE MONARCH® "D" CARTRIDGE IS A SINGLE-USE POLYPROPYLENE CARTRIDGE FOR POSTERIOR CHAMBER IOL DELIVERY: Brand: MONARCH® III

## (undated) DEVICE — MEDI-VAC NON-CONDUCTIVE SUCTION TUBING: Brand: CARDINAL HEALTH

## (undated) DEVICE — SPLINT WR VELC FOAM NEUT POS DISP FOR RAD ART ACC SFT STRP

## (undated) DEVICE — REM POLYHESIVE ADULT PATIENT RETURN ELECTRODE: Brand: VALLEYLAB

## (undated) DEVICE — GLOVE ORANGE PI 7 1/2   MSG9075

## (undated) DEVICE — KENDALL DL ECG CABLE AND LEAD WIRE SYSTEM, 3-LEAD, SINGLE PATIENT USE: Brand: KENDALL

## (undated) DEVICE — INSTRUMENT BATTERY

## (undated) DEVICE — BONE WAX WHITE: Brand: BONE WAX WHITE

## (undated) DEVICE — SET GRAV CK VLV NEEDLESS ST 3 GANGED 4WAY STPCOCK HI FLO 10

## (undated) DEVICE — SUTURE V-LOC 180 SZ 0 L12IN ABSRB GRN L37MM GS-21 1/2 CIR VLOCL0316

## (undated) DEVICE — ADHESIVE SKIN CLOSURE 4X22 CM PREMIERPRO EXOFINFUSION DISP

## (undated) DEVICE — SYSTEM SKIN CLSR 22CM DERMBND PRINEO

## (undated) DEVICE — COVER,MAYO STAND,STERILE: Brand: MEDLINE

## (undated) DEVICE — 4.5 MM FULL RADIUS STRAIGHT                                    BLADES, POWER/EP-1, YELLOW, PACKAGED                                    6 PER BOX, STERILE: Brand: DYONICS

## (undated) DEVICE — SUTURE STRATAFIX SYMMETRIC SZ 1 L18IN ABSRB VLT CT1 L36CM SXPP1A404

## (undated) DEVICE — GARMENT,MEDLINE,DVT,INT,CALF,MED, GEN2: Brand: MEDLINE

## (undated) DEVICE — FORCEP BX 3 2.2 MMX240 CM FOR ENDOSCP RADIAL JAW

## (undated) DEVICE — DEVICE TRNSF SPIK STL 2008S] MICROTEK MEDICAL INC]

## (undated) DEVICE — SYR 3ML LL TIP 1/10ML GRAD --

## (undated) DEVICE — GUIDEWIRE VASC L145CM 0.035IN J TIP L3MM PTFE FIX COR NAMIC

## (undated) DEVICE — SMOKE EVACUATION PENCIL: Brand: VALLEYLAB

## (undated) DEVICE — 3.0MM PRECISION NEURO (MATCH HEAD)

## (undated) DEVICE — ELECTRODE,RADIOTRANSLUCENT,FOAM,3PK: Brand: MEDLINE

## (undated) DEVICE — INTENDED FOR TISSUE SEPARATION, AND OTHER PROCEDURES THAT REQUIRE A SHARP SURGICAL BLADE TO PUNCTURE OR CUT.: Brand: BARD-PARKER ® CARBON RIB-BACK BLADES

## (undated) DEVICE — SURGICAL PROCEDURE PACK CATRCT CUST

## (undated) DEVICE — COVER US PRB W5XL96IN LTX W/ GEL

## (undated) DEVICE — SOL IRR SOD CL 0.9% 1000ML BTL --

## (undated) DEVICE — TOWEL SURG W17XL27IN STD BLU COT NONFENESTRATED PREWASHED

## (undated) DEVICE — CATHETER GUID 5FR L100CM DIA0.058IN NYL SHFT EBU3.0 W/O

## (undated) DEVICE — TRAP SURG QUAD PARABOLA SLOT DSGN SFTY SCRN TRAPEASE

## (undated) DEVICE — TUBING, SUCTION, 1/4" X 10', STRAIGHT: Brand: MEDLINE

## (undated) DEVICE — 3M™ TEGADERM™ TRANSPARENT FILM DRESSING FRAME STYLE, 1628, 6 IN X 8 IN (15 CM X 20 CM), 10/CT 8CT/CASE: Brand: 3M™ TEGADERM™

## (undated) DEVICE — PACK,BASIC,SIRUS,V: Brand: MEDLINE

## (undated) DEVICE — 3M™ STERI-DRAPE™ INSTRUMENT POUCH 1018: Brand: STERI-DRAPE™

## (undated) DEVICE — SPONGE GZ W4XL4IN COT 12 PLY TYP VII WVN C FLD DSGN

## (undated) DEVICE — SYRINGE ANGIO 10 CC BRL STD PRNT POLYCARB LT BLU MEDALLION

## (undated) DEVICE — OSCILLATING TIP SAW CARTRIDGE: Brand: PRECISION FALCON

## (undated) DEVICE — GOWN,SIRUS,NONRNF,SETINSLV,2XL,18/CS: Brand: MEDLINE

## (undated) DEVICE — CORONARY IMAGING CATHETER: Brand: OPTICROSS 6

## (undated) DEVICE — DYONICS 25 INFLOW TUBE SET, 3 PER BOX

## (undated) DEVICE — MARKER,SKIN,WI/RULER AND LABELS: Brand: MEDLINE

## (undated) DEVICE — KIT COLON W/ 1.1OZ LUB AND 2 END

## (undated) DEVICE — SUTURE VCRL SZ 3-0 L27IN ABSRB UD L26MM SH 1/2 CIR J416H

## (undated) DEVICE — SOLUTION IV 1000ML 0.9% SOD CHL

## (undated) DEVICE — TUBING IRRIG L77IN DIA0.241IN L BOR FOR CYSTO W/ NVENT

## (undated) DEVICE — SUTURE ABSORBABLE MONOFILAMENT 2-0 WND CLOSURE GRN V-LOC 180 VLOCL0315

## (undated) DEVICE — FLOSEAL MATRIX IS INDICATED IN SURGICAL PROCEDURES (OTHER THAN IN OPHTHALMIC) AS AN ADJUNCT TO HEMOSTASIS WHEN CONTROL OF BLEEDING BY LIGATURE OR CONVENTIONALPROCEDURES IS INEFFECTIVE OR IMPRACTICAL.: Brand: FLOSEAL HEMOSTATIC MATRIX

## (undated) DEVICE — CUSTOM KT PTCA INFL DEV K05 00053H

## (undated) DEVICE — HANDLE LT SNAP ON ULT DURABLE LENS FOR TRUMPF ALC DISPOSABLE

## (undated) DEVICE — CATHETER IV 20 GAX1 IN N WNG KINK RESIST INSYT AUTOGRD

## (undated) DEVICE — GLIDESHEATH SLENDER ACCESS KIT: Brand: GLIDESHEATH SLENDER

## (undated) DEVICE — ANGIOGRAPHIC CATHETER: Brand: IMPULSE™

## (undated) DEVICE — CONTAINER SPEC 20 ML LID NEUT BUFF FORMALIN 10 % POLYPR STS

## (undated) DEVICE — KIT ACCS INTRO 4FR L10CM NDL 21GA L7CM GWIRE L40CM

## (undated) DEVICE — SUTURE PERMA-HAND SZ 2-0 L30IN NONABSORBABLE BLK L26MM SH K833H